# Patient Record
Sex: FEMALE | Race: WHITE | Employment: OTHER | URBAN - METROPOLITAN AREA
[De-identification: names, ages, dates, MRNs, and addresses within clinical notes are randomized per-mention and may not be internally consistent; named-entity substitution may affect disease eponyms.]

---

## 2017-09-12 PROBLEM — H26.9 LEFT CATARACT: Status: ACTIVE | Noted: 2017-09-12

## 2017-10-23 LAB — DIABETIC RETINOPATHY: NEGATIVE

## 2018-08-22 ENCOUNTER — HOSPITAL ENCOUNTER (OUTPATIENT)
Age: 73
Discharge: HOME | End: 2018-08-24
Payer: MEDICARE

## 2018-08-22 LAB
ALBUMIN SERPL-MCNC: 4.4 G/DL (ref 3.5–5.2)
ALP BLD-CCNC: 78 U/L (ref 35–104)
ALT SERPL-CCNC: 38 U/L (ref 0–32)
ANION GAP SERPL CALCULATED.3IONS-SCNC: 18 MMOL/L (ref 7–16)
AST SERPL-CCNC: 51 U/L (ref 0–31)
BASOPHILS ABSOLUTE: 0.05 E9/L (ref 0–0.2)
BASOPHILS RELATIVE PERCENT: 0.8 % (ref 0–2)
BILIRUB SERPL-MCNC: 0.5 MG/DL (ref 0–1.2)
BUN BLDV-MCNC: 27 MG/DL (ref 8–23)
CALCIUM SERPL-MCNC: 9.9 MG/DL (ref 8.6–10.2)
CHLORIDE BLD-SCNC: 98 MMOL/L (ref 98–107)
CHOLESTEROL, TOTAL: 155 MG/DL (ref 0–199)
CO2: 22 MMOL/L (ref 22–29)
CREAT SERPL-MCNC: 1.2 MG/DL (ref 0.5–1)
EOSINOPHILS ABSOLUTE: 0.22 E9/L (ref 0.05–0.5)
EOSINOPHILS RELATIVE PERCENT: 3.4 % (ref 0–6)
FERRITIN: 28 NG/ML
GFR AFRICAN AMERICAN: 53
GFR NON-AFRICAN AMERICAN: 44 ML/MIN/1.73
GLUCOSE BLD-MCNC: 122 MG/DL (ref 74–109)
HBA1C MFR BLD: 6.4 % (ref 4–5.6)
HCT VFR BLD CALC: 41.7 % (ref 34–48)
HDLC SERPL-MCNC: 47 MG/DL
HEMOGLOBIN: 12.9 G/DL (ref 11.5–15.5)
IMMATURE GRANULOCYTES #: 0.02 E9/L
IMMATURE GRANULOCYTES %: 0.3 % (ref 0–5)
IRON: 60 MCG/DL (ref 37–145)
LDL CHOLESTEROL CALCULATED: 75 MG/DL (ref 0–99)
LYMPHOCYTES ABSOLUTE: 2 E9/L (ref 1.5–4)
LYMPHOCYTES RELATIVE PERCENT: 31.3 % (ref 20–42)
MAGNESIUM: 1.3 MG/DL (ref 1.6–2.6)
MCH RBC QN AUTO: 28.1 PG (ref 26–35)
MCHC RBC AUTO-ENTMCNC: 30.9 % (ref 32–34.5)
MCV RBC AUTO: 90.8 FL (ref 80–99.9)
MONOCYTES ABSOLUTE: 0.62 E9/L (ref 0.1–0.95)
MONOCYTES RELATIVE PERCENT: 9.7 % (ref 2–12)
NEUTROPHILS ABSOLUTE: 3.47 E9/L (ref 1.8–7.3)
NEUTROPHILS RELATIVE PERCENT: 54.5 % (ref 43–80)
PDW BLD-RTO: 15.9 FL (ref 11.5–15)
PLATELET # BLD: 208 E9/L (ref 130–450)
PMV BLD AUTO: 11 FL (ref 7–12)
POTASSIUM SERPL-SCNC: 4.4 MMOL/L (ref 3.5–5)
PRO-BNP: 77 PG/ML (ref 0–125)
RBC # BLD: 4.59 E12/L (ref 3.5–5.5)
SODIUM BLD-SCNC: 138 MMOL/L (ref 132–146)
T4 FREE: 2.03 NG/DL (ref 0.93–1.7)
TOTAL PROTEIN: 7.2 G/DL (ref 6.4–8.3)
TRIGL SERPL-MCNC: 164 MG/DL (ref 0–149)
TSH SERPL DL<=0.05 MIU/L-ACNC: 2.78 UIU/ML (ref 0.27–4.2)
URIC ACID, SERUM: 8.2 MG/DL (ref 2.4–5.7)
VITAMIN B-12: 1888 PG/ML (ref 211–946)
VITAMIN D 25-HYDROXY: >120 NG/ML (ref 30–100)
VLDLC SERPL CALC-MCNC: 33 MG/DL
WBC # BLD: 6.4 E9/L (ref 4.5–11.5)

## 2018-08-22 PROCEDURE — 80053 COMPREHEN METABOLIC PANEL: CPT

## 2018-08-22 PROCEDURE — 83735 ASSAY OF MAGNESIUM: CPT

## 2018-08-22 PROCEDURE — 85025 COMPLETE CBC W/AUTO DIFF WBC: CPT

## 2018-08-22 PROCEDURE — 82607 VITAMIN B-12: CPT

## 2018-08-22 PROCEDURE — 82306 VITAMIN D 25 HYDROXY: CPT

## 2018-08-22 PROCEDURE — 84439 ASSAY OF FREE THYROXINE: CPT

## 2018-08-22 PROCEDURE — 82728 ASSAY OF FERRITIN: CPT

## 2018-08-22 PROCEDURE — 80061 LIPID PANEL: CPT

## 2018-08-22 PROCEDURE — 84443 ASSAY THYROID STIM HORMONE: CPT

## 2018-08-22 PROCEDURE — 83036 HEMOGLOBIN GLYCOSYLATED A1C: CPT

## 2018-08-22 PROCEDURE — 84550 ASSAY OF BLOOD/URIC ACID: CPT

## 2018-08-22 PROCEDURE — 36415 COLL VENOUS BLD VENIPUNCTURE: CPT

## 2018-08-22 PROCEDURE — 83880 ASSAY OF NATRIURETIC PEPTIDE: CPT

## 2018-08-22 PROCEDURE — 83540 ASSAY OF IRON: CPT

## 2018-10-04 RX ORDER — COLCHICINE 0.6 MG/1
0.6 TABLET ORAL DAILY
COMMUNITY
End: 2018-10-31 | Stop reason: ALTCHOICE

## 2018-10-04 RX ORDER — OXYBUTYNIN CHLORIDE 5 MG/1
5 TABLET ORAL DAILY
COMMUNITY
End: 2020-10-30 | Stop reason: SDUPTHER

## 2018-10-04 RX ORDER — ALLOPURINOL 100 MG/1
200 TABLET ORAL DAILY
COMMUNITY
End: 2020-10-30 | Stop reason: SDUPTHER

## 2018-10-04 RX ORDER — MAGNESIUM 30 MG
30 TABLET ORAL DAILY
COMMUNITY
End: 2020-10-30 | Stop reason: SDUPTHER

## 2018-10-04 RX ORDER — FUROSEMIDE 20 MG/1
20 TABLET ORAL DAILY PRN
Status: ON HOLD | COMMUNITY
End: 2021-04-28 | Stop reason: HOSPADM

## 2018-10-08 ENCOUNTER — ANESTHESIA EVENT (OUTPATIENT)
Dept: OPERATING ROOM | Age: 73
End: 2018-10-08
Payer: MEDICARE

## 2018-10-08 NOTE — ANESTHESIA PRE PROCEDURE
Department of Anesthesiology  Preprocedure Note       Name:  Evelia Henriquez   Age:  72 y.o.  :  1945                                          MRN:  [id number]         Date:  10/9/2018      Surgeon: Radha Martin):  Tracey Elliott MD    Procedure: Procedure(s):  RIGHT EYE CATARACT EMULSIFICATION IOL IMPLANT    Medications prior to admission:   Prior to Admission medications    Medication Sig Start Date End Date Taking?  Authorizing Provider   furosemide (LASIX) 20 MG tablet Take 20 mg by mouth See Admin Instructions QOD   Yes Historical Provider, MD   colchicine (COLCRYS) 0.6 MG tablet Take 0.6 mg by mouth daily   Yes Historical Provider, MD   KRILL OIL PO Take by mouth   Yes Historical Provider, MD   magnesium 30 MG tablet Take 30 mg by mouth 2 times daily   Yes Historical Provider, MD   Coenzyme Q10 (COQ10 PO) Take by mouth   Yes Historical Provider, MD   allopurinol (ZYLOPRIM) 100 MG tablet Take 100 mg by mouth daily   Yes Historical Provider, MD   oxybutynin (DITROPAN) 5 MG tablet Take 5 mg by mouth 3 times daily   Yes Historical Provider, MD   levothyroxine (SYNTHROID) 50 MCG tablet Take 50 mcg by mouth Daily   Yes Historical Provider, MD   omeprazole (PRILOSEC) 20 MG delayed release capsule Take 20 mg by mouth daily   Yes Historical Provider, MD   losartan (COZAAR) 25 MG tablet Take 25 mg by mouth daily   Yes Historical Provider, MD   propranolol (INDERAL LA) 60 MG extended release capsule Take 60 mg by mouth daily   Yes Historical Provider, MD   aspirin 81 MG tablet Take 81 mg by mouth daily    Historical Provider, MD   glipiZIDE-metformin (METAGLIP) 2.5-500 MG per tablet Take 1 tablet by mouth 2 times daily (before meals)    Historical Provider, MD   latanoprost (XALATAN) 0.005 % ophthalmic solution Place 1 drop into the left eye nightly    Historical Provider, MD   BIOTIN PO Take by mouth daily    Historical Provider, MD   Cholecalciferol (VITAMIN D3) 400 UNIT/ML LIQD Take by mouth daily    Historical Provider, MD       Current medications:    Current Facility-Administered Medications   Medication Dose Route Frequency Provider Last Rate Last Dose    phenylephrine (PHILIP-SYNEPHRINE) 10 % ophthalmic solution 1 drop  1 drop Right Eye PRN Anselmo Nova MD        lactated ringers infusion   Intravenous Continuous Patrick Johnson  mL/hr at 10/09/18 4396         Allergies:     Allergies   Allergen Reactions    Adhesive Tape     Epinephrine Other (See Comments)     Heart racing felt like I couldn't breath       Problem List:    Patient Active Problem List   Diagnosis Code    Left cataract H26.9       Past Medical History:        Diagnosis Date    Anesthesia complication     difficulty breathing post thyroid surgery    Cancer ([facility] [address]     skin     Diabetes mellitus ([address]     GERD (gastroesophageal reflux disease)     Hyperlipidemia     Hypertension     Sleep apnea     uses cpap    Thyroid disease     Tremors of nervous system     familial       Past Surgical History:        Procedure Laterality Date    ABDOMEN SURGERY      BLEPHAROPLASTY      CATARACT REMOVAL WITH IMPLANT Left 09/12/2017    CHOLECYSTECTOMY      COSMETIC SURGERY      abdominoplasty    FOOT SURGERY      HYSTERECTOMY      OVARIAN CYST REMOVAL      SKIN BIOPSY      THYROIDECTOMY, PARTIAL      left lobe removed    TONSILLECTOMY      TUBAL LIGATION         Social History:    Social History   Substance Use Topics    Smoking status: Never Smoker    Smokeless tobacco: Never Used    Alcohol use Yes      Comment: social                                Counseling given: Not Answered      Vital Signs (Current):   Vitals:    10/04/18 1228 10/09/18 0606   BP:  133/62   Pulse:  (!) 43   Resp:  16   SpO2:  97%   Weight: 205 lb (93 kg) 206 lb (93.4 kg)   Height: 5' 5\" (1.651 m) 5' 5\" (1.651 m)                                              BP Readings from Last 3 Encounters:   10/09/18 133/62   09/12/17 (!) 165/69       NPO Status:

## 2018-10-09 ENCOUNTER — ANESTHESIA (OUTPATIENT)
Dept: OPERATING ROOM | Age: 73
End: 2018-10-09
Payer: MEDICARE

## 2018-10-09 ENCOUNTER — HOSPITAL ENCOUNTER (OUTPATIENT)
Age: 73
Setting detail: OUTPATIENT SURGERY
Discharge: HOME | End: 2018-10-09
Attending: OPHTHALMOLOGY | Admitting: OPHTHALMOLOGY
Payer: MEDICARE

## 2018-10-09 VITALS
TEMPERATURE: 98 F | HEIGHT: 65 IN | WEIGHT: 206 LBS | RESPIRATION RATE: 16 BRPM | OXYGEN SATURATION: 96 % | SYSTOLIC BLOOD PRESSURE: 123 MMHG | HEART RATE: 45 BPM | BODY MASS INDEX: 34.32 KG/M2 | DIASTOLIC BLOOD PRESSURE: 53 MMHG

## 2018-10-09 VITALS
RESPIRATION RATE: 8 BRPM | OXYGEN SATURATION: 98 % | SYSTOLIC BLOOD PRESSURE: 170 MMHG | DIASTOLIC BLOOD PRESSURE: 89 MMHG

## 2018-10-09 PROBLEM — H26.9 RIGHT CATARACT: Status: ACTIVE | Noted: 2018-10-09

## 2018-10-09 LAB — GLUCOSE BLD-MCNC: 95 MG/DL

## 2018-10-09 PROCEDURE — 2500000003 HC RX 250 WO HCPCS: Performed by: OPHTHALMOLOGY

## 2018-10-09 PROCEDURE — 3700000000 HC ANESTHESIA ATTENDED CARE: Performed by: OPHTHALMOLOGY

## 2018-10-09 PROCEDURE — 6370000000 HC RX 637 (ALT 250 FOR IP): Performed by: OPHTHALMOLOGY

## 2018-10-09 PROCEDURE — 2580000003 HC RX 258: Performed by: ANESTHESIOLOGY

## 2018-10-09 PROCEDURE — 2500000003 HC RX 250 WO HCPCS: Performed by: NURSE ANESTHETIST, CERTIFIED REGISTERED

## 2018-10-09 PROCEDURE — 7100000010 HC PHASE II RECOVERY - FIRST 15 MIN: Performed by: OPHTHALMOLOGY

## 2018-10-09 PROCEDURE — 82962 GLUCOSE BLOOD TEST: CPT | Performed by: OPHTHALMOLOGY

## 2018-10-09 PROCEDURE — 3600000003 HC SURGERY LEVEL 3 BASE: Performed by: OPHTHALMOLOGY

## 2018-10-09 PROCEDURE — 6360000002 HC RX W HCPCS: Performed by: NURSE ANESTHETIST, CERTIFIED REGISTERED

## 2018-10-09 PROCEDURE — 7100000011 HC PHASE II RECOVERY - ADDTL 15 MIN: Performed by: OPHTHALMOLOGY

## 2018-10-09 PROCEDURE — V2632 POST CHMBR INTRAOCULAR LENS: HCPCS | Performed by: OPHTHALMOLOGY

## 2018-10-09 PROCEDURE — 2709999900 HC NON-CHARGEABLE SUPPLY: Performed by: OPHTHALMOLOGY

## 2018-10-09 DEVICE — LENS INTOCU +17.0 DIOPT A CONSTANT 118.8 L13MM DIA6MM 0DEG: Type: IMPLANTABLE DEVICE | Status: FUNCTIONAL

## 2018-10-09 RX ORDER — TETRACAINE HYDROCHLORIDE 5 MG/ML
SOLUTION OPHTHALMIC PRN
Status: DISCONTINUED | OUTPATIENT
Start: 2018-10-09 | End: 2018-10-09 | Stop reason: HOSPADM

## 2018-10-09 RX ORDER — PHENYLEPHRINE HCL 2.5 %
1 DROPS OPHTHALMIC (EYE)
Status: COMPLETED | OUTPATIENT
Start: 2018-10-09 | End: 2018-10-09

## 2018-10-09 RX ORDER — FLURBIPROFEN SODIUM 0.3 MG/ML
1 SOLUTION/ DROPS OPHTHALMIC
Status: COMPLETED | OUTPATIENT
Start: 2018-10-09 | End: 2018-10-09

## 2018-10-09 RX ORDER — SODIUM CHLORIDE, SODIUM LACTATE, POTASSIUM CHLORIDE, CALCIUM CHLORIDE 600; 310; 30; 20 MG/100ML; MG/100ML; MG/100ML; MG/100ML
INJECTION, SOLUTION INTRAVENOUS CONTINUOUS
Status: DISCONTINUED | OUTPATIENT
Start: 2018-10-09 | End: 2018-10-09 | Stop reason: HOSPADM

## 2018-10-09 RX ORDER — TETRACAINE HYDROCHLORIDE 5 MG/ML
1 SOLUTION OPHTHALMIC ONCE
Status: COMPLETED | OUTPATIENT
Start: 2018-10-09 | End: 2018-10-09

## 2018-10-09 RX ORDER — CYCLOPENTOLATE HYDROCHLORIDE 10 MG/ML
1 SOLUTION/ DROPS OPHTHALMIC
Status: COMPLETED | OUTPATIENT
Start: 2018-10-09 | End: 2018-10-09

## 2018-10-09 RX ORDER — PHENYLEPHRINE HYDROCHLORIDE 100 MG/ML
1 SOLUTION/ DROPS OPHTHALMIC PRN
Status: DISCONTINUED | OUTPATIENT
Start: 2018-10-09 | End: 2018-10-09 | Stop reason: HOSPADM

## 2018-10-09 RX ORDER — ALFENTANIL HYDROCHLORIDE 500 UG/ML
INJECTION INTRAVENOUS PRN
Status: DISCONTINUED | OUTPATIENT
Start: 2018-10-09 | End: 2018-10-09 | Stop reason: SDUPTHER

## 2018-10-09 RX ORDER — MIDAZOLAM HYDROCHLORIDE 1 MG/ML
INJECTION INTRAMUSCULAR; INTRAVENOUS PRN
Status: DISCONTINUED | OUTPATIENT
Start: 2018-10-09 | End: 2018-10-09 | Stop reason: SDUPTHER

## 2018-10-09 RX ORDER — BALANCED SALT SOLUTION 6.4; .75; .48; .3; 3.9; 1.7 MG/ML; MG/ML; MG/ML; MG/ML; MG/ML; MG/ML
SOLUTION OPHTHALMIC PRN
Status: DISCONTINUED | OUTPATIENT
Start: 2018-10-09 | End: 2018-10-09 | Stop reason: HOSPADM

## 2018-10-09 RX ADMIN — ALFENTANIL HYDROCHLORIDE 250 MCG: 500 INJECTION INTRAVENOUS at 07:34

## 2018-10-09 RX ADMIN — FLURBIPROFEN SODIUM 1 DROP: 0.3 SOLUTION/ DROPS OPHTHALMIC at 06:20

## 2018-10-09 RX ADMIN — PHENYLEPHRINE HYDROCHLORIDE 1 DROP: 25 SOLUTION/ DROPS OPHTHALMIC at 06:20

## 2018-10-09 RX ADMIN — FLURBIPROFEN SODIUM 1 DROP: 0.3 SOLUTION/ DROPS OPHTHALMIC at 06:15

## 2018-10-09 RX ADMIN — SODIUM CHLORIDE, POTASSIUM CHLORIDE, SODIUM LACTATE AND CALCIUM CHLORIDE: 600; 310; 30; 20 INJECTION, SOLUTION INTRAVENOUS at 06:10

## 2018-10-09 RX ADMIN — TETRACAINE HYDROCHLORIDE 1 DROP: 5 SOLUTION OPHTHALMIC at 06:30

## 2018-10-09 RX ADMIN — CYCLOPENTOLATE HYDROCHLORIDE 1 DROP: 10 SOLUTION/ DROPS OPHTHALMIC at 06:10

## 2018-10-09 RX ADMIN — ALFENTANIL HYDROCHLORIDE 250 MCG: 500 INJECTION INTRAVENOUS at 07:36

## 2018-10-09 RX ADMIN — CYCLOPENTOLATE HYDROCHLORIDE 1 DROP: 10 SOLUTION/ DROPS OPHTHALMIC at 06:20

## 2018-10-09 RX ADMIN — PHENYLEPHRINE HYDROCHLORIDE 1 DROP: 25 SOLUTION/ DROPS OPHTHALMIC at 06:15

## 2018-10-09 RX ADMIN — PHENYLEPHRINE HYDROCHLORIDE 1 DROP: 25 SOLUTION/ DROPS OPHTHALMIC at 06:10

## 2018-10-09 RX ADMIN — CYCLOPENTOLATE HYDROCHLORIDE 1 DROP: 10 SOLUTION/ DROPS OPHTHALMIC at 06:15

## 2018-10-09 RX ADMIN — FLURBIPROFEN SODIUM 1 DROP: 0.3 SOLUTION/ DROPS OPHTHALMIC at 06:10

## 2018-10-09 RX ADMIN — MIDAZOLAM 2 MG: 1 INJECTION INTRAMUSCULAR; INTRAVENOUS at 07:34

## 2018-10-09 RX ADMIN — SODIUM CHLORIDE, POTASSIUM CHLORIDE, SODIUM LACTATE AND CALCIUM CHLORIDE: 600; 310; 30; 20 INJECTION, SOLUTION INTRAVENOUS at 06:25

## 2018-10-09 ASSESSMENT — PULMONARY FUNCTION TESTS
PIF_VALUE: 1

## 2018-10-09 ASSESSMENT — PAIN SCALES - GENERAL
PAINLEVEL_OUTOF10: 0

## 2018-10-09 ASSESSMENT — PAIN - FUNCTIONAL ASSESSMENT: PAIN_FUNCTIONAL_ASSESSMENT: 0-10

## 2018-10-31 ENCOUNTER — ANESTHESIA EVENT (OUTPATIENT)
Dept: OPERATING ROOM | Age: 73
End: 2018-10-31
Payer: MEDICARE

## 2018-10-31 NOTE — ANESTHESIA PRE PROCEDURE
PREGTESTUR, PREGSERUM, HCG, HCGQUANT     ABGs: No results found for: PHART, PO2ART, ZBI4VUG, QSB3OPW, BEART, I2KZOLSH     Type & Screen (If Applicable):  No results found for: LABABO, [address]    Anesthesia Evaluation  Patient summary reviewed history of anesthetic complications (Dyspnea post thyroid surgery. ):   Airway: Mallampati: III  TM distance: <3 FB   Neck ROM: full  Mouth opening: > = 3 FB Dental: normal exam         Pulmonary:normal exam  breath sounds clear to auscultation  (+) sleep apnea:                             Cardiovascular:  Exercise tolerance: good (>4 METS),   (+) hypertension:, murmur (Gr II/VI murmur), hyperlipidemia        Rhythm: regular             Beta Blocker:  Not on Beta Blocker        PE comment: bradycardia   Neuro/Psych:                ROS comment: Tremors. GI/Hepatic/Renal:   (+) GERD:,           Endo/Other:    (+) DiabetesType II DM, , hypothyroidism::., .                  ROS comment: Cancer Skin. Abdominal:   (+) obese,         Vascular: negative vascular ROS. Anesthesia Plan      MAC     ASA 3       Induction: intravenous. Anesthetic plan and risks discussed with patient. Plan discussed with CRNA. Wes Srinivasan MD   10/8/2018  DOS STAFF ADDENDUM:    Pt seen and examined, chart reviewed (including anesthesia, drug and allergy history). Anesthetic plan, risks, benefits, alternatives, and personnel involved discussed with patient. Patient verbalized an understanding and agrees to proceed. Plan discussed with care team members and agreed upon.     Abi Garcia MD  Staff Anesthesiologist  11:06 AM

## 2018-11-01 ENCOUNTER — HOSPITAL ENCOUNTER (OUTPATIENT)
Age: 73
Setting detail: OUTPATIENT SURGERY
Discharge: HOME | End: 2018-11-01
Attending: OPHTHALMOLOGY | Admitting: OPHTHALMOLOGY
Payer: MEDICARE

## 2018-11-01 ENCOUNTER — ANESTHESIA (OUTPATIENT)
Dept: OPERATING ROOM | Age: 73
End: 2018-11-01
Payer: MEDICARE

## 2018-11-01 VITALS
OXYGEN SATURATION: 97 % | SYSTOLIC BLOOD PRESSURE: 158 MMHG | TEMPERATURE: 98.6 F | RESPIRATION RATE: 20 BRPM | DIASTOLIC BLOOD PRESSURE: 65 MMHG

## 2018-11-01 VITALS
TEMPERATURE: 98 F | SYSTOLIC BLOOD PRESSURE: 162 MMHG | HEART RATE: 57 BPM | WEIGHT: 205 LBS | RESPIRATION RATE: 16 BRPM | HEIGHT: 65 IN | BODY MASS INDEX: 34.16 KG/M2 | OXYGEN SATURATION: 95 % | DIASTOLIC BLOOD PRESSURE: 57 MMHG

## 2018-11-01 PROBLEM — T85.22XA DISLOCATED IOL (INTRAOCULAR LENS): Status: ACTIVE | Noted: 2018-11-01

## 2018-11-01 PROCEDURE — 3600000002 HC SURGERY LEVEL 2 BASE: Performed by: OPHTHALMOLOGY

## 2018-11-01 PROCEDURE — 3600000012 HC SURGERY LEVEL 2 ADDTL 15MIN: Performed by: OPHTHALMOLOGY

## 2018-11-01 PROCEDURE — 7100000010 HC PHASE II RECOVERY - FIRST 15 MIN: Performed by: OPHTHALMOLOGY

## 2018-11-01 PROCEDURE — 7100000011 HC PHASE II RECOVERY - ADDTL 15 MIN: Performed by: OPHTHALMOLOGY

## 2018-11-01 PROCEDURE — 3700000000 HC ANESTHESIA ATTENDED CARE: Performed by: OPHTHALMOLOGY

## 2018-11-01 PROCEDURE — 2709999900 HC NON-CHARGEABLE SUPPLY: Performed by: OPHTHALMOLOGY

## 2018-11-01 PROCEDURE — 3700000001 HC ADD 15 MINUTES (ANESTHESIA): Performed by: OPHTHALMOLOGY

## 2018-11-01 PROCEDURE — 6370000000 HC RX 637 (ALT 250 FOR IP): Performed by: OPHTHALMOLOGY

## 2018-11-01 PROCEDURE — 2580000003 HC RX 258: Performed by: ANESTHESIOLOGY

## 2018-11-01 PROCEDURE — 2500000003 HC RX 250 WO HCPCS: Performed by: OPHTHALMOLOGY

## 2018-11-01 PROCEDURE — V2632 POST CHMBR INTRAOCULAR LENS: HCPCS | Performed by: OPHTHALMOLOGY

## 2018-11-01 PROCEDURE — 82962 GLUCOSE BLOOD TEST: CPT | Performed by: OPHTHALMOLOGY

## 2018-11-01 PROCEDURE — 2500000003 HC RX 250 WO HCPCS: Performed by: NURSE ANESTHETIST, CERTIFIED REGISTERED

## 2018-11-01 PROCEDURE — 6360000002 HC RX W HCPCS: Performed by: NURSE ANESTHETIST, CERTIFIED REGISTERED

## 2018-11-01 DEVICE — LENS INTOCU +22.5 DIOPT A CONSTANT 118.8 L13MM DIA6MM 0DEG: Type: IMPLANTABLE DEVICE | Site: EYE | Status: FUNCTIONAL

## 2018-11-01 RX ORDER — HYDRALAZINE HYDROCHLORIDE 20 MG/ML
5 INJECTION INTRAMUSCULAR; INTRAVENOUS EVERY 10 MIN PRN
Status: DISCONTINUED | OUTPATIENT
Start: 2018-11-01 | End: 2018-11-01 | Stop reason: HOSPADM

## 2018-11-01 RX ORDER — FENTANYL CITRATE 50 UG/ML
50 INJECTION, SOLUTION INTRAMUSCULAR; INTRAVENOUS EVERY 5 MIN PRN
Status: DISCONTINUED | OUTPATIENT
Start: 2018-11-01 | End: 2018-11-01 | Stop reason: HOSPADM

## 2018-11-01 RX ORDER — ALFENTANIL HYDROCHLORIDE 500 UG/ML
INJECTION INTRAVENOUS PRN
Status: DISCONTINUED | OUTPATIENT
Start: 2018-11-01 | End: 2018-11-01 | Stop reason: SDUPTHER

## 2018-11-01 RX ORDER — MORPHINE SULFATE 2 MG/ML
1 INJECTION, SOLUTION INTRAMUSCULAR; INTRAVENOUS EVERY 5 MIN PRN
Status: DISCONTINUED | OUTPATIENT
Start: 2018-11-01 | End: 2018-11-01 | Stop reason: HOSPADM

## 2018-11-01 RX ORDER — HYDROCODONE BITARTRATE AND ACETAMINOPHEN 5; 325 MG/1; MG/1
2 TABLET ORAL PRN
Status: DISCONTINUED | OUTPATIENT
Start: 2018-11-01 | End: 2018-11-01 | Stop reason: HOSPADM

## 2018-11-01 RX ORDER — FLURBIPROFEN SODIUM 0.3 MG/ML
1 SOLUTION/ DROPS OPHTHALMIC
Status: COMPLETED | OUTPATIENT
Start: 2018-11-01 | End: 2018-11-01

## 2018-11-01 RX ORDER — HYDROCODONE BITARTRATE AND ACETAMINOPHEN 5; 325 MG/1; MG/1
1 TABLET ORAL PRN
Status: DISCONTINUED | OUTPATIENT
Start: 2018-11-01 | End: 2018-11-01 | Stop reason: HOSPADM

## 2018-11-01 RX ORDER — SODIUM CHLORIDE, SODIUM LACTATE, POTASSIUM CHLORIDE, CALCIUM CHLORIDE 600; 310; 30; 20 MG/100ML; MG/100ML; MG/100ML; MG/100ML
INJECTION, SOLUTION INTRAVENOUS CONTINUOUS
Status: DISCONTINUED | OUTPATIENT
Start: 2018-11-01 | End: 2018-11-01 | Stop reason: HOSPADM

## 2018-11-01 RX ORDER — MIDAZOLAM HYDROCHLORIDE 1 MG/ML
INJECTION INTRAMUSCULAR; INTRAVENOUS PRN
Status: DISCONTINUED | OUTPATIENT
Start: 2018-11-01 | End: 2018-11-01 | Stop reason: SDUPTHER

## 2018-11-01 RX ORDER — TETRACAINE HYDROCHLORIDE 5 MG/ML
1 SOLUTION OPHTHALMIC ONCE
Status: COMPLETED | OUTPATIENT
Start: 2018-11-01 | End: 2018-11-01

## 2018-11-01 RX ORDER — BALANCED SALT SOLUTION 6.4; .75; .48; .3; 3.9; 1.7 MG/ML; MG/ML; MG/ML; MG/ML; MG/ML; MG/ML
SOLUTION OPHTHALMIC PRN
Status: DISCONTINUED | OUTPATIENT
Start: 2018-11-01 | End: 2018-11-01 | Stop reason: HOSPADM

## 2018-11-01 RX ORDER — LABETALOL HYDROCHLORIDE 5 MG/ML
5 INJECTION, SOLUTION INTRAVENOUS EVERY 10 MIN PRN
Status: DISCONTINUED | OUTPATIENT
Start: 2018-11-01 | End: 2018-11-01 | Stop reason: HOSPADM

## 2018-11-01 RX ORDER — PHENYLEPHRINE HYDROCHLORIDE 100 MG/ML
1 SOLUTION/ DROPS OPHTHALMIC PRN
Status: DISCONTINUED | OUTPATIENT
Start: 2018-11-01 | End: 2018-11-01 | Stop reason: HOSPADM

## 2018-11-01 RX ORDER — CYCLOPENTOLATE HYDROCHLORIDE 10 MG/ML
1 SOLUTION/ DROPS OPHTHALMIC
Status: COMPLETED | OUTPATIENT
Start: 2018-11-01 | End: 2018-11-01

## 2018-11-01 RX ORDER — MEPERIDINE HYDROCHLORIDE 25 MG/ML
12.5 INJECTION INTRAMUSCULAR; INTRAVENOUS; SUBCUTANEOUS EVERY 5 MIN PRN
Status: DISCONTINUED | OUTPATIENT
Start: 2018-11-01 | End: 2018-11-01 | Stop reason: HOSPADM

## 2018-11-01 RX ORDER — TETRACAINE HYDROCHLORIDE 5 MG/ML
SOLUTION OPHTHALMIC PRN
Status: DISCONTINUED | OUTPATIENT
Start: 2018-11-01 | End: 2018-11-01 | Stop reason: HOSPADM

## 2018-11-01 RX ORDER — PROMETHAZINE HYDROCHLORIDE 25 MG/ML
25 INJECTION, SOLUTION INTRAMUSCULAR; INTRAVENOUS
Status: DISCONTINUED | OUTPATIENT
Start: 2018-11-01 | End: 2018-11-01 | Stop reason: HOSPADM

## 2018-11-01 RX ORDER — DIPHENHYDRAMINE HYDROCHLORIDE 50 MG/ML
12.5 INJECTION INTRAMUSCULAR; INTRAVENOUS
Status: DISCONTINUED | OUTPATIENT
Start: 2018-11-01 | End: 2018-11-01 | Stop reason: HOSPADM

## 2018-11-01 RX ORDER — PHENYLEPHRINE HCL 2.5 %
1 DROPS OPHTHALMIC (EYE)
Status: COMPLETED | OUTPATIENT
Start: 2018-11-01 | End: 2018-11-01

## 2018-11-01 RX ADMIN — CYCLOPENTOLATE HYDROCHLORIDE 1 DROP: 10 SOLUTION/ DROPS OPHTHALMIC at 05:45

## 2018-11-01 RX ADMIN — MIDAZOLAM 1 MG: 1 INJECTION INTRAMUSCULAR; INTRAVENOUS at 06:33

## 2018-11-01 RX ADMIN — PHENYLEPHRINE HYDROCHLORIDE 1 DROP: 25 SOLUTION/ DROPS OPHTHALMIC at 05:50

## 2018-11-01 RX ADMIN — ALFENTANIL HYDROCHLORIDE 250 MCG: 500 INJECTION INTRAVENOUS at 06:39

## 2018-11-01 RX ADMIN — PHENYLEPHRINE HYDROCHLORIDE 1 DROP: 25 SOLUTION/ DROPS OPHTHALMIC at 05:40

## 2018-11-01 RX ADMIN — FLURBIPROFEN SODIUM 1 DROP: 0.3 SOLUTION/ DROPS OPHTHALMIC at 05:45

## 2018-11-01 RX ADMIN — FLURBIPROFEN SODIUM 1 DROP: 0.3 SOLUTION/ DROPS OPHTHALMIC at 05:40

## 2018-11-01 RX ADMIN — CYCLOPENTOLATE HYDROCHLORIDE 1 DROP: 10 SOLUTION/ DROPS OPHTHALMIC at 05:40

## 2018-11-01 RX ADMIN — SODIUM CHLORIDE, POTASSIUM CHLORIDE, SODIUM LACTATE AND CALCIUM CHLORIDE: 600; 310; 30; 20 INJECTION, SOLUTION INTRAVENOUS at 06:00

## 2018-11-01 RX ADMIN — TETRACAINE HYDROCHLORIDE 1 DROP: 25 LIQUID OPHTHALMIC at 06:09

## 2018-11-01 RX ADMIN — MIDAZOLAM 1 MG: 1 INJECTION INTRAMUSCULAR; INTRAVENOUS at 06:35

## 2018-11-01 RX ADMIN — ALFENTANIL HYDROCHLORIDE 250 MCG: 500 INJECTION INTRAVENOUS at 06:35

## 2018-11-01 RX ADMIN — FLURBIPROFEN SODIUM 1 DROP: 0.3 SOLUTION/ DROPS OPHTHALMIC at 05:50

## 2018-11-01 RX ADMIN — CYCLOPENTOLATE HYDROCHLORIDE 1 DROP: 10 SOLUTION/ DROPS OPHTHALMIC at 05:50

## 2018-11-01 RX ADMIN — ALFENTANIL HYDROCHLORIDE 250 MCG: 500 INJECTION INTRAVENOUS at 06:34

## 2018-11-01 RX ADMIN — ALFENTANIL HYDROCHLORIDE 250 MCG: 500 INJECTION INTRAVENOUS at 06:36

## 2018-11-01 RX ADMIN — PHENYLEPHRINE HYDROCHLORIDE 1 DROP: 25 SOLUTION/ DROPS OPHTHALMIC at 05:45

## 2018-11-01 ASSESSMENT — PAIN - FUNCTIONAL ASSESSMENT: PAIN_FUNCTIONAL_ASSESSMENT: 0-10

## 2018-11-01 ASSESSMENT — PULMONARY FUNCTION TESTS
PIF_VALUE: 0

## 2018-11-01 ASSESSMENT — PAIN SCALES - GENERAL
PAINLEVEL_OUTOF10: 0

## 2018-11-01 NOTE — ANESTHESIA POSTPROCEDURE EVALUATION
Department of Anesthesiology  Postprocedure Note    Patient: Evelia Henriquez  MRN: [id number]  YOB: 1945  Date of evaluation: 11/1/2018  Time:  7:08 AM     Procedure Summary     Date:  11/01/18 Room / Location:  [address] [place] [address] 02 / [address] [place] [address]    Anesthesia Start:  7998 Anesthesia Stop:  Serge Lux    Procedure:  EYE IOL REMOVAL (Right ) Diagnosis:  (dislocated intraocular lens)    Surgeon:  Tracey Elliott MD Responsible Provider:  Jose Vargas MD    Anesthesia Type:  MAC ASA Status:  3          Anesthesia Type: MAC    Moraima Phase I: Moraima Score: 10    Moraima Phase II: Moraima Score: 10    Last vitals: Reviewed and per EMR flowsheets.        Anesthesia Post Evaluation    Patient location during evaluation: PACU  Patient participation: complete - patient participated  Level of consciousness: awake and alert  Airway patency: patent  Nausea & Vomiting: no nausea and no vomiting  Complications: no  Cardiovascular status: hemodynamically stable  Respiratory status: room air and spontaneous ventilation  Hydration status: stable

## 2018-11-06 NOTE — OP NOTE
[address]                                OPERATIVE REPORT    PATIENT NAME: Nasrin Goff                       :        1945  MED REC NO:   [id number]                            ROOM:  ACCOUNT NO:   [de-identified]                           ADMIT DATE: 2018  PROVIDER:     Clari Tracey MD    DATE OF PROCEDURE:  2018    PREOPERATIVE DIAGNOSIS:  Anisometropia of the right eye. POSTOPERATIVE DIAGNOSIS:  Anisometropia of the right eye. OPERATION PERFORMED:  Right intraocular lens exchange. SURGEON:  Clari Tracey MD    ANESTHESIA:  Local.    PROCEDURE NOTE:  The patient was brought into the operating room and  local anesthesia was introduced. The right eye was prepped with  Betadine solution in the periorbital area and in the superior and  inferior fornices. A side-port incision was made at the 2 o'clock  position and then a clear corneal incision was made at the 12 o'clock  meridian in the usual manner at the limbus with a 3.2-mm keratome. Viscoat was used to stabilize the anterior chamber. Using a lens  positioning hook, the intraocular lens was dialed out of the capsular  bag and into the anterior chamber. Using lens graspers through the side  port incision, microscissors were used to cut the lens in half and then  each half of the lens was removed from the eye. The anterior and  posterior capsular flaps were  using Healon and the PCB00 +22.5  diopter lens was then instilled into the capsular bag. Healon was  removed from in front of and behind the eye. The wounds were checked  and found to be airtight and watertight. The lid speculum was removed. The drape was removed. The patient was brought to the recovery room in  excellent condition and discharged with postop instructions in excellent  condition.         Mike Lieberman MD    D: 2018 14:43:54       T:

## 2019-05-04 ENCOUNTER — APPOINTMENT (OUTPATIENT)
Dept: GENERAL RADIOLOGY | Age: 74
End: 2019-05-04
Payer: MEDICARE

## 2019-05-04 ENCOUNTER — HOSPITAL ENCOUNTER (EMERGENCY)
Age: 74
Discharge: HOME | End: 2019-05-04
Attending: EMERGENCY MEDICINE
Payer: MEDICARE

## 2019-05-04 VITALS
OXYGEN SATURATION: 100 % | HEIGHT: 66 IN | BODY MASS INDEX: 32.21 KG/M2 | DIASTOLIC BLOOD PRESSURE: 74 MMHG | RESPIRATION RATE: 15 BRPM | WEIGHT: 200.44 LBS | TEMPERATURE: 97.3 F | HEART RATE: 48 BPM | SYSTOLIC BLOOD PRESSURE: 172 MMHG

## 2019-05-04 DIAGNOSIS — R00.1 BRADYCARDIA: Primary | ICD-10-CM

## 2019-05-04 LAB
ANION GAP SERPL CALCULATED.3IONS-SCNC: 12 MMOL/L (ref 7–16)
BASOPHILS ABSOLUTE: 0.05 E9/L (ref 0–0.2)
BASOPHILS RELATIVE PERCENT: 0.6 % (ref 0–2)
BUN BLDV-MCNC: 22 MG/DL (ref 8–23)
CALCIUM SERPL-MCNC: 10 MG/DL (ref 8.6–10.2)
CHLORIDE BLD-SCNC: 100 MMOL/L (ref 98–107)
CO2: 28 MMOL/L (ref 22–29)
CREAT SERPL-MCNC: 1 MG/DL (ref 0.5–1)
EOSINOPHILS ABSOLUTE: 0.18 E9/L (ref 0.05–0.5)
EOSINOPHILS RELATIVE PERCENT: 2 % (ref 0–6)
GFR AFRICAN AMERICAN: >60
GFR NON-AFRICAN AMERICAN: 54 ML/MIN/1.73
GLUCOSE BLD-MCNC: 83 MG/DL (ref 74–99)
HCT VFR BLD CALC: 37.9 % (ref 34–48)
HEMOGLOBIN: 12.1 G/DL (ref 11.5–15.5)
IMMATURE GRANULOCYTES #: 0.07 E9/L
IMMATURE GRANULOCYTES %: 0.8 % (ref 0–5)
LYMPHOCYTES ABSOLUTE: 2.03 E9/L (ref 1.5–4)
LYMPHOCYTES RELATIVE PERCENT: 22.4 % (ref 20–42)
MAGNESIUM: 1.6 MG/DL (ref 1.6–2.6)
MCH RBC QN AUTO: 28.7 PG (ref 26–35)
MCHC RBC AUTO-ENTMCNC: 31.9 % (ref 32–34.5)
MCV RBC AUTO: 90 FL (ref 80–99.9)
MONOCYTES ABSOLUTE: 0.87 E9/L (ref 0.1–0.95)
MONOCYTES RELATIVE PERCENT: 9.6 % (ref 2–12)
NEUTROPHILS ABSOLUTE: 5.87 E9/L (ref 1.8–7.3)
NEUTROPHILS RELATIVE PERCENT: 64.6 % (ref 43–80)
PDW BLD-RTO: 14.7 FL (ref 11.5–15)
PLATELET # BLD: 181 E9/L (ref 130–450)
PMV BLD AUTO: 9.8 FL (ref 7–12)
POTASSIUM SERPL-SCNC: 4.9 MMOL/L (ref 3.5–5)
PRO-BNP: 138 PG/ML (ref 0–125)
RBC # BLD: 4.21 E12/L (ref 3.5–5.5)
SODIUM BLD-SCNC: 140 MMOL/L (ref 132–146)
TROPONIN: <0.01 NG/ML (ref 0–0.03)
TSH SERPL DL<=0.05 MIU/L-ACNC: 1.81 UIU/ML (ref 0.27–4.2)
WBC # BLD: 9.1 E9/L (ref 4.5–11.5)

## 2019-05-04 PROCEDURE — 83880 ASSAY OF NATRIURETIC PEPTIDE: CPT

## 2019-05-04 PROCEDURE — 85025 COMPLETE CBC W/AUTO DIFF WBC: CPT

## 2019-05-04 PROCEDURE — 84443 ASSAY THYROID STIM HORMONE: CPT

## 2019-05-04 PROCEDURE — 71045 X-RAY EXAM CHEST 1 VIEW: CPT

## 2019-05-04 PROCEDURE — 93010 ELECTROCARDIOGRAM REPORT: CPT | Performed by: INTERNAL MEDICINE

## 2019-05-04 PROCEDURE — 83735 ASSAY OF MAGNESIUM: CPT

## 2019-05-04 PROCEDURE — 99284 EMERGENCY DEPT VISIT MOD MDM: CPT

## 2019-05-04 PROCEDURE — 84484 ASSAY OF TROPONIN QUANT: CPT

## 2019-05-04 PROCEDURE — 80048 BASIC METABOLIC PNL TOTAL CA: CPT

## 2019-05-04 PROCEDURE — 93005 ELECTROCARDIOGRAM TRACING: CPT | Performed by: STUDENT IN AN ORGANIZED HEALTH CARE EDUCATION/TRAINING PROGRAM

## 2019-05-04 PROCEDURE — 36415 COLL VENOUS BLD VENIPUNCTURE: CPT

## 2019-05-04 ASSESSMENT — ENCOUNTER SYMPTOMS
WHEEZING: 0
CONSTIPATION: 0
NAUSEA: 0
SHORTNESS OF BREATH: 0
COUGH: 0
VOMITING: 0
COLOR CHANGE: 0
DIARRHEA: 0
ABDOMINAL PAIN: 0

## 2019-05-04 NOTE — ED PROVIDER NOTES
Patient is a 51-year-old female with history of cervical cancer status post hysterectomy, type II diabetes, hyperlipidemia, hypertension, who presents to the ED for bradycardia. Patient states that she has a history of bradycardia. She states that her heart rate is usually in the 40s or 50s. Today, she states that a one half hours ago, the patient was standing, preparing lunch, and felt a sudden lightheadedness. She states that she began having some numbness and tingling to her lips. She states that she called her daughter, and the daughter came over, evaluated the patient as she is a nurse, and found that the patient's heart rate was in the 35s. Patient follows with Dr. Abhishek Flynn , and she states that she had a cardiac stress test performed last year. He was unremarkable at that time. Patient also states that she had a Holter monitor performed at that time, and noted some irregular heart rhythms, but was unable to specify which ones. She is not on any blood thinners. She states that she does take propranolol for which she uses for nervous tremors. No medication changes. Patient also has a history of hypothyroidism, has been taking her Synthroid, religiously, in the mornings. She has not missed any doses. The history is provided by the patient. No  was used. Review of Systems   Constitutional: Negative for chills and fever. Respiratory: Negative for cough, shortness of breath and wheezing. Cardiovascular: Negative for chest pain and palpitations. Gastrointestinal: Negative for abdominal pain, constipation, diarrhea, nausea and vomiting. Genitourinary: Negative for dysuria and hematuria. Musculoskeletal: Negative for neck pain and neck stiffness. Skin: Negative for color change, pallor, rash and wound. Neurological: Positive for light-headedness. Negative for dizziness, syncope, numbness and headaches.    Psychiatric/Behavioral: Negative for confusion and decreased concentration. The patient is not nervous/anxious. Physical Exam   Constitutional: She is oriented to person, place, and time. She appears well-developed and well-nourished. No distress. HENT:   Head: Normocephalic and atraumatic. Right Ear: External ear normal.   Left Ear: External ear normal.   Mouth/Throat: No oropharyngeal exudate. Eyes: Pupils are equal, round, and reactive to light. EOM are normal.   Neck: Normal range of motion. Cardiovascular: Regular rhythm and intact distal pulses. Exam reveals no gallop and no friction rub. Murmur: +2/5 systolic ejection murmur left sternal border. bradycardic     Pulmonary/Chest: Effort normal and breath sounds normal. No respiratory distress. She has no wheezes. She has no rales. She exhibits no tenderness. Abdominal: Soft. Bowel sounds are normal. She exhibits no distension and no mass. There is no tenderness. There is no rebound and no guarding. No hernia. Musculoskeletal: Normal range of motion. She exhibits no edema, tenderness or deformity. Lymphadenopathy:     She has no cervical adenopathy. Neurological: She is alert and oriented to person, place, and time. No cranial nerve deficit. Skin: Skin is warm and dry. Capillary refill takes less than 2 seconds. No rash noted. She is not diaphoretic. No erythema. No pallor. Psychiatric: She has a normal mood and affect. Her behavior is normal. Judgment and thought content normal.   Nursing note and vitals reviewed. Procedures  --------------------------------------------- PAST HISTORY ---------------------------------------------  Past Medical History:  has a past medical history of Anesthesia complication, [facility] GERD (gastroesophageal reflux disease), Hyperlipidemia, Hypertension, Sleep apnea, Thyroid disease, and Tremors of nervous system.     Past Surgical History:  has a past surgical history that includes Thyroidectomy, partial; skin biopsy; Tonsillectomy; ovarian cyst removal; Cholecystectomy; Cosmetic surgery; blepharoplasty; Hysterectomy; Tubal ligation; Foot surgery; Cataract removal with implant (Left, 09/12/2017); Cataract removal with implant (Right, 10/09/2018); pr remv cataract extracap,insert lens (Right, 10/9/2018); Cataract removal with implant (Right, 11/01/2018); and pr remv post eye implnt mater,intraocul (Right, 11/1/2018). Social History:  reports that she has never smoked. She has never used smokeless tobacco. She reports that she does not drink alcohol or use drugs. Family History: family history is not on file. The patients home medications have been reviewed.     Allergies: Adhesive tape; Epinephrine; and Propoxyphene    -------------------------------------------------- RESULTS -------------------------------------------------  Labs:  Results for orders placed or performed during the hospital encounter of 05/04/19   CBC auto differential   Result Value Ref Range    WBC 9.1 4.5 - 11.5 E9/L    RBC 4.21 3.50 - 5.50 E12/L    Hemoglobin 12.1 11.5 - 15.5 g/dL    Hematocrit 37.9 34.0 - 48.0 %    MCV 90.0 80.0 - 99.9 fL    MCH 28.7 26.0 - 35.0 pg    MCHC 31.9 (L) 32.0 - 34.5 %    RDW 14.7 11.5 - 15.0 fL    Platelets 373 492 - 512 E9/L    MPV 9.8 7.0 - 12.0 fL    Neutrophils % 64.6 43.0 - 80.0 %    Immature Granulocytes % 0.8 0.0 - 5.0 %    Lymphocytes % 22.4 20.0 - 42.0 %    Monocytes % 9.6 2.0 - 12.0 %    Eosinophils % 2.0 0.0 - 6.0 %    Basophils % 0.6 0.0 - 2.0 %    Neutrophils # 5.87 1.80 - 7.30 E9/L    Immature Granulocytes # 0.07 E9/L    Lymphocytes # 2.03 1.50 - 4.00 E9/L    Monocytes # 0.87 0.10 - 0.95 E9/L    Eosinophils # 0.18 0.05 - 0.50 E9/L    Basophils # 0.05 0.00 - 0.20 J8/Q   Basic Metabolic Panel   Result Value Ref Range    Sodium 140 132 - 146 mmol/L    Potassium 4.9 3.5 - 5.0 mmol/L    Chloride 100 98 - 107 mmol/L    CO2 28 22 - 29 mmol/L    Anion Gap 12 7 - 16 mmol/L    Glucose 83 74 - 99 mg/dL    BUN 22 8 - providing specific details for the plan of care and counseling regarding the diagnosis and prognosis. Their questions are answered at this time and they are agreeable with the plan. I discussed at length with them reasons for immediate return here for re evaluation. They will followup with their primary care physician by calling their office on Monday.      --------------------------------- ADDITIONAL PROVIDER NOTES ---------------------------------  At this time the patient is without objective evidence of an acute process requiring hospitalization or inpatient management. They have remained hemodynamically stable throughout their entire ED visit and are stable for discharge with outpatient follow-up. The plan has been discussed in detail and they are aware of the specific conditions for emergent return, as well as the importance of follow-up. Discharge Medication List as of 5/4/2019  6:07 PM          Diagnosis:  1. Bradycardia        Disposition:  Patient's disposition: Discharge to home  Patient's condition is stable. MDM  Number of Diagnoses or Management Options  Bradycardia:   Diagnosis management comments: Patient is a 73-year-old female who presents to the ED after mild lightheadedness and bradycardia. Patient was evaluated, found to have an initial heart rate in the mid 45s. She was otherwise resting comfortably in no acute distress. She had no focal neurologic deficits. Patient did not have any head trauma, the remainder for labwork was unremarkable. Chest x-ray was negative. He shouldn't remained to be mildly bradycardic, but did not have any obvious heart block, incomplete heart block or complete heart block. Patient was initially hypotensive. Unknown if the patient's use of propranolol may have been contributing to the bradycardia at this time. Unlikely to be propranolol overdose of the patient was also hypertensive.  Patient will be discharged at this time, she was instructed to follow-up with her PCP as well as cardiologist for further evaluation as needed. She was instructed come back to the ED if her symptoms worsen. Amount and/or Complexity of Data Reviewed  Clinical lab tests: ordered and reviewed  Tests in the radiology section of CPT®: ordered and reviewed        ED Course as of May 04 2000   Sat May 04, 2019   1747 Patient resting comfortably in no acute distress at this time. Although no focal neurologic deficits, offered CT head but patient declined at this time. Patient otherwise resting comfortably at this time. Patient monitored in the ED, no additional bradycardia. Patient would like to go home at this time. Smiling, in no acute distress.      [KS]      ED Course User Index  [KS] DO Yovana Garcia DO  Resident  05/04/19 2002

## 2019-05-04 NOTE — ED NOTES
Bed: 03  Expected date:   Expected time:   Means of arrival:   Comments:  trauma     Dot Lupis, RN  05/04/19 5019

## 2019-05-06 LAB
EKG ATRIAL RATE: 40 BPM
EKG P AXIS: 46 DEGREES
EKG P-R INTERVAL: 170 MS
EKG Q-T INTERVAL: 492 MS
EKG QRS DURATION: 80 MS
EKG QTC CALCULATION (BAZETT): 400 MS
EKG R AXIS: 15 DEGREES
EKG T AXIS: 44 DEGREES
EKG VENTRICULAR RATE: 40 BPM

## 2019-06-20 LAB
AVERAGE GLUCOSE: 148
CHOLESTEROL, TOTAL: 179 MG/DL
CHOLESTEROL/HDL RATIO: 4
CREATININE, URINE: 165
CREATININE: 1.1 MG/DL
HBA1C MFR BLD: 6.8 %
HDLC SERPL-MCNC: 45 MG/DL (ref 35–70)
LDL CHOLESTEROL CALCULATED: 102 MG/DL (ref 0–160)
MICROALBUMIN/CREAT 24H UR: 3.69 MG/G{CREAT}
MICROALBUMIN/CREAT UR-RTO: 22.3
POTASSIUM (K+): 4.4
TRIGL SERPL-MCNC: 158 MG/DL
VLDLC SERPL CALC-MCNC: NORMAL MG/DL

## 2019-10-20 ENCOUNTER — APPOINTMENT (OUTPATIENT)
Dept: GENERAL RADIOLOGY | Age: 74
End: 2019-10-20
Payer: MEDICARE

## 2019-10-20 ENCOUNTER — HOSPITAL ENCOUNTER (EMERGENCY)
Age: 74
Discharge: HOME | End: 2019-10-20
Attending: EMERGENCY MEDICINE
Payer: MEDICARE

## 2019-10-20 VITALS
BODY MASS INDEX: 34.99 KG/M2 | HEIGHT: 65 IN | TEMPERATURE: 97.7 F | DIASTOLIC BLOOD PRESSURE: 78 MMHG | HEART RATE: 63 BPM | RESPIRATION RATE: 19 BRPM | SYSTOLIC BLOOD PRESSURE: 208 MMHG | WEIGHT: 210 LBS | OXYGEN SATURATION: 100 %

## 2019-10-20 DIAGNOSIS — R00.2 PALPITATIONS: Primary | ICD-10-CM

## 2019-10-20 DIAGNOSIS — I49.8 SINUS ARRHYTHMIA: ICD-10-CM

## 2019-10-20 LAB
ANION GAP SERPL CALCULATED.3IONS-SCNC: 12 MMOL/L (ref 7–16)
BASOPHILS ABSOLUTE: 0.03 E9/L (ref 0–0.2)
BASOPHILS RELATIVE PERCENT: 0.4 % (ref 0–2)
BUN BLDV-MCNC: 18 MG/DL (ref 8–23)
CALCIUM SERPL-MCNC: 9.9 MG/DL (ref 8.6–10.2)
CHLORIDE BLD-SCNC: 104 MMOL/L (ref 98–107)
CO2: 26 MMOL/L (ref 22–29)
CREAT SERPL-MCNC: 1.1 MG/DL (ref 0.5–1)
EKG ATRIAL RATE: 60 BPM
EKG P AXIS: 31 DEGREES
EKG P-R INTERVAL: 154 MS
EKG Q-T INTERVAL: 436 MS
EKG QRS DURATION: 80 MS
EKG QTC CALCULATION (BAZETT): 436 MS
EKG R AXIS: 0 DEGREES
EKG T AXIS: 74 DEGREES
EKG VENTRICULAR RATE: 60 BPM
EOSINOPHILS ABSOLUTE: 0.16 E9/L (ref 0.05–0.5)
EOSINOPHILS RELATIVE PERCENT: 2.3 % (ref 0–6)
GFR AFRICAN AMERICAN: 59
GFR NON-AFRICAN AMERICAN: 49 ML/MIN/1.73
GLUCOSE BLD-MCNC: 172 MG/DL (ref 74–99)
HCT VFR BLD CALC: 37.7 % (ref 34–48)
HEMOGLOBIN: 11.9 G/DL (ref 11.5–15.5)
IMMATURE GRANULOCYTES #: 0.02 E9/L
IMMATURE GRANULOCYTES %: 0.3 % (ref 0–5)
LYMPHOCYTES ABSOLUTE: 2.16 E9/L (ref 1.5–4)
LYMPHOCYTES RELATIVE PERCENT: 30.6 % (ref 20–42)
MAGNESIUM: 1.6 MG/DL (ref 1.6–2.6)
MCH RBC QN AUTO: 29.1 PG (ref 26–35)
MCHC RBC AUTO-ENTMCNC: 31.6 % (ref 32–34.5)
MCV RBC AUTO: 92.2 FL (ref 80–99.9)
MONOCYTES ABSOLUTE: 0.54 E9/L (ref 0.1–0.95)
MONOCYTES RELATIVE PERCENT: 7.6 % (ref 2–12)
NEUTROPHILS ABSOLUTE: 4.16 E9/L (ref 1.8–7.3)
NEUTROPHILS RELATIVE PERCENT: 58.8 % (ref 43–80)
PDW BLD-RTO: 14 FL (ref 11.5–15)
PLATELET # BLD: 165 E9/L (ref 130–450)
PMV BLD AUTO: 9.9 FL (ref 7–12)
POTASSIUM SERPL-SCNC: 4.1 MMOL/L (ref 3.5–5)
RBC # BLD: 4.09 E12/L (ref 3.5–5.5)
SODIUM BLD-SCNC: 142 MMOL/L (ref 132–146)
TROPONIN: <0.01 NG/ML (ref 0–0.03)
WBC # BLD: 7.1 E9/L (ref 4.5–11.5)

## 2019-10-20 PROCEDURE — 71046 X-RAY EXAM CHEST 2 VIEWS: CPT

## 2019-10-20 PROCEDURE — 93005 ELECTROCARDIOGRAM TRACING: CPT | Performed by: EMERGENCY MEDICINE

## 2019-10-20 PROCEDURE — 96365 THER/PROPH/DIAG IV INF INIT: CPT

## 2019-10-20 PROCEDURE — 80048 BASIC METABOLIC PNL TOTAL CA: CPT

## 2019-10-20 PROCEDURE — 83735 ASSAY OF MAGNESIUM: CPT

## 2019-10-20 PROCEDURE — 93010 ELECTROCARDIOGRAM REPORT: CPT | Performed by: INTERNAL MEDICINE

## 2019-10-20 PROCEDURE — 85025 COMPLETE CBC W/AUTO DIFF WBC: CPT

## 2019-10-20 PROCEDURE — 6360000002 HC RX W HCPCS: Performed by: EMERGENCY MEDICINE

## 2019-10-20 PROCEDURE — 36415 COLL VENOUS BLD VENIPUNCTURE: CPT

## 2019-10-20 PROCEDURE — 99285 EMERGENCY DEPT VISIT HI MDM: CPT

## 2019-10-20 PROCEDURE — 84484 ASSAY OF TROPONIN QUANT: CPT

## 2019-10-20 RX ORDER — MAGNESIUM SULFATE IN WATER 40 MG/ML
2 INJECTION, SOLUTION INTRAVENOUS ONCE
Status: COMPLETED | OUTPATIENT
Start: 2019-10-20 | End: 2019-10-20

## 2019-10-20 RX ADMIN — MAGNESIUM SULFATE HEPTAHYDRATE 2 G: 40 INJECTION, SOLUTION INTRAVENOUS at 21:59

## 2020-05-28 VITALS
DIASTOLIC BLOOD PRESSURE: 70 MMHG | BODY MASS INDEX: 35.32 KG/M2 | HEART RATE: 76 BPM | RESPIRATION RATE: 14 BRPM | SYSTOLIC BLOOD PRESSURE: 120 MMHG | WEIGHT: 212 LBS | HEIGHT: 65 IN

## 2020-05-28 RX ORDER — DICYCLOMINE HCL 20 MG
20 TABLET ORAL EVERY 6 HOURS
COMMUNITY
End: 2020-09-09

## 2020-05-28 RX ORDER — ATORVASTATIN CALCIUM 10 MG/1
10 TABLET, FILM COATED ORAL DAILY
COMMUNITY
End: 2020-10-30 | Stop reason: SDUPTHER

## 2020-07-05 ENCOUNTER — HOSPITAL ENCOUNTER (OUTPATIENT)
Age: 75
Setting detail: OBSERVATION
Discharge: HOME | End: 2020-07-08
Attending: EMERGENCY MEDICINE | Admitting: HOSPITALIST
Payer: MEDICARE

## 2020-07-05 ENCOUNTER — APPOINTMENT (OUTPATIENT)
Dept: GENERAL RADIOLOGY | Age: 75
End: 2020-07-05
Payer: MEDICARE

## 2020-07-05 ENCOUNTER — APPOINTMENT (OUTPATIENT)
Dept: CT IMAGING | Age: 75
End: 2020-07-05
Payer: MEDICARE

## 2020-07-05 PROBLEM — M54.50 BACK PAIN AT L4-L5 LEVEL: Status: ACTIVE | Noted: 2020-07-05

## 2020-07-05 PROCEDURE — 99284 EMERGENCY DEPT VISIT MOD MDM: CPT

## 2020-07-05 PROCEDURE — 6360000002 HC RX W HCPCS: Performed by: HOSPITALIST

## 2020-07-05 PROCEDURE — 96376 TX/PRO/DX INJ SAME DRUG ADON: CPT

## 2020-07-05 PROCEDURE — G0378 HOSPITAL OBSERVATION PER HR: HCPCS

## 2020-07-05 PROCEDURE — 72131 CT LUMBAR SPINE W/O DYE: CPT

## 2020-07-05 PROCEDURE — 6370000000 HC RX 637 (ALT 250 FOR IP): Performed by: HOSPITALIST

## 2020-07-05 PROCEDURE — 6370000000 HC RX 637 (ALT 250 FOR IP): Performed by: FAMILY MEDICINE

## 2020-07-05 PROCEDURE — 96374 THER/PROPH/DIAG INJ IV PUSH: CPT

## 2020-07-05 PROCEDURE — 6360000002 HC RX W HCPCS: Performed by: NURSE PRACTITIONER

## 2020-07-05 PROCEDURE — 96375 TX/PRO/DX INJ NEW DRUG ADDON: CPT

## 2020-07-05 PROCEDURE — 72120 X-RAY BEND ONLY L-S SPINE: CPT

## 2020-07-05 RX ORDER — FUROSEMIDE 20 MG/1
20 TABLET ORAL DAILY
Status: DISCONTINUED | OUTPATIENT
Start: 2020-07-06 | End: 2020-07-08 | Stop reason: HOSPADM

## 2020-07-05 RX ORDER — FENTANYL CITRATE 50 UG/ML
25 INJECTION, SOLUTION INTRAMUSCULAR; INTRAVENOUS ONCE
Status: COMPLETED | OUTPATIENT
Start: 2020-07-05 | End: 2020-07-05

## 2020-07-05 RX ORDER — LATANOPROST 50 UG/ML
1 SOLUTION/ DROPS OPHTHALMIC NIGHTLY
Status: DISCONTINUED | OUTPATIENT
Start: 2020-07-05 | End: 2020-07-08 | Stop reason: HOSPADM

## 2020-07-05 RX ORDER — MORPHINE SULFATE 2 MG/ML
2 INJECTION, SOLUTION INTRAMUSCULAR; INTRAVENOUS
Status: DISCONTINUED | OUTPATIENT
Start: 2020-07-05 | End: 2020-07-06

## 2020-07-05 RX ORDER — LOSARTAN POTASSIUM 25 MG/1
25 TABLET ORAL DAILY
Status: DISCONTINUED | OUTPATIENT
Start: 2020-07-05 | End: 2020-07-08 | Stop reason: HOSPADM

## 2020-07-05 RX ORDER — ATORVASTATIN CALCIUM 10 MG/1
10 TABLET, FILM COATED ORAL DAILY
Status: DISCONTINUED | OUTPATIENT
Start: 2020-07-05 | End: 2020-07-08 | Stop reason: HOSPADM

## 2020-07-05 RX ORDER — PROPRANOLOL HCL 60 MG
60 CAPSULE, EXTENDED RELEASE 24HR ORAL NIGHTLY
Status: DISCONTINUED | OUTPATIENT
Start: 2020-07-05 | End: 2020-07-08 | Stop reason: HOSPADM

## 2020-07-05 RX ORDER — OXYCODONE HYDROCHLORIDE AND ACETAMINOPHEN 5; 325 MG/1; MG/1
1 TABLET ORAL EVERY 6 HOURS PRN
Status: DISCONTINUED | OUTPATIENT
Start: 2020-07-05 | End: 2020-07-08 | Stop reason: HOSPADM

## 2020-07-05 RX ORDER — LEVOTHYROXINE SODIUM 0.05 MG/1
50 TABLET ORAL DAILY
Status: DISCONTINUED | OUTPATIENT
Start: 2020-07-06 | End: 2020-07-08 | Stop reason: HOSPADM

## 2020-07-05 RX ORDER — KETOROLAC TROMETHAMINE 30 MG/ML
15 INJECTION, SOLUTION INTRAMUSCULAR; INTRAVENOUS ONCE
Status: COMPLETED | OUTPATIENT
Start: 2020-07-05 | End: 2020-07-05

## 2020-07-05 RX ORDER — OXYBUTYNIN CHLORIDE 5 MG/1
5 TABLET ORAL DAILY
Status: DISCONTINUED | OUTPATIENT
Start: 2020-07-05 | End: 2020-07-08 | Stop reason: HOSPADM

## 2020-07-05 RX ORDER — PANTOPRAZOLE SODIUM 40 MG/1
40 TABLET, DELAYED RELEASE ORAL
Status: DISCONTINUED | OUTPATIENT
Start: 2020-07-06 | End: 2020-07-08 | Stop reason: HOSPADM

## 2020-07-05 RX ORDER — DICYCLOMINE HYDROCHLORIDE 10 MG/1
20 CAPSULE ORAL EVERY 6 HOURS
Status: DISCONTINUED | OUTPATIENT
Start: 2020-07-05 | End: 2020-07-08 | Stop reason: HOSPADM

## 2020-07-05 RX ORDER — MORPHINE SULFATE 4 MG/ML
4 INJECTION, SOLUTION INTRAMUSCULAR; INTRAVENOUS ONCE
Status: COMPLETED | OUTPATIENT
Start: 2020-07-05 | End: 2020-07-05

## 2020-07-05 RX ORDER — ALLOPURINOL 100 MG/1
200 TABLET ORAL DAILY
Status: DISCONTINUED | OUTPATIENT
Start: 2020-07-05 | End: 2020-07-08 | Stop reason: HOSPADM

## 2020-07-05 RX ORDER — DEXAMETHASONE SODIUM PHOSPHATE 10 MG/ML
10 INJECTION INTRAMUSCULAR; INTRAVENOUS ONCE
Status: COMPLETED | OUTPATIENT
Start: 2020-07-05 | End: 2020-07-05

## 2020-07-05 RX ORDER — ASPIRIN 81 MG/1
81 TABLET, CHEWABLE ORAL DAILY
Status: DISCONTINUED | OUTPATIENT
Start: 2020-07-06 | End: 2020-07-06

## 2020-07-05 RX ADMIN — LATANOPROST 1 DROP: 50 SOLUTION OPHTHALMIC at 20:01

## 2020-07-05 RX ADMIN — DEXAMETHASONE SODIUM PHOSPHATE 10 MG: 10 INJECTION INTRAMUSCULAR; INTRAVENOUS at 14:05

## 2020-07-05 RX ADMIN — MORPHINE SULFATE 4 MG: 4 INJECTION, SOLUTION INTRAMUSCULAR; INTRAVENOUS at 14:55

## 2020-07-05 RX ADMIN — LOSARTAN POTASSIUM 25 MG: 25 TABLET, FILM COATED ORAL at 20:01

## 2020-07-05 RX ADMIN — FENTANYL CITRATE 25 MCG: 0.05 INJECTION, SOLUTION INTRAMUSCULAR; INTRAVENOUS at 14:04

## 2020-07-05 RX ADMIN — ALLOPURINOL 200 MG: 100 TABLET ORAL at 20:01

## 2020-07-05 RX ADMIN — OXYCODONE AND ACETAMINOPHEN 1 TABLET: 5; 325 TABLET ORAL at 23:14

## 2020-07-05 RX ADMIN — KETOROLAC TROMETHAMINE 15 MG: 30 INJECTION, SOLUTION INTRAMUSCULAR at 14:04

## 2020-07-05 RX ADMIN — ATORVASTATIN CALCIUM 10 MG: 10 TABLET, FILM COATED ORAL at 20:01

## 2020-07-05 RX ADMIN — MORPHINE SULFATE 2 MG: 2 INJECTION, SOLUTION INTRAMUSCULAR; INTRAVENOUS at 20:07

## 2020-07-05 RX ADMIN — MAGNESIUM GLUCONATE 500 MG ORAL TABLET 400 MG: 500 TABLET ORAL at 20:01

## 2020-07-05 RX ADMIN — MORPHINE SULFATE 2 MG: 2 INJECTION, SOLUTION INTRAMUSCULAR; INTRAVENOUS at 22:07

## 2020-07-05 RX ADMIN — OXYBUTYNIN CHLORIDE 5 MG: 5 TABLET ORAL at 20:01

## 2020-07-05 RX ADMIN — MORPHINE SULFATE 4 MG: 4 INJECTION, SOLUTION INTRAMUSCULAR; INTRAVENOUS at 15:52

## 2020-07-05 RX ADMIN — PROPRANOLOL HYDROCHLORIDE 60 MG: 60 CAPSULE, EXTENDED RELEASE ORAL at 20:01

## 2020-07-05 ASSESSMENT — PAIN SCALES - GENERAL
PAINLEVEL_OUTOF10: 5
PAINLEVEL_OUTOF10: 9
PAINLEVEL_OUTOF10: 9
PAINLEVEL_OUTOF10: 7
PAINLEVEL_OUTOF10: 9
PAINLEVEL_OUTOF10: 7
PAINLEVEL_OUTOF10: 7

## 2020-07-05 ASSESSMENT — PAIN DESCRIPTION - PAIN TYPE
TYPE: ACUTE PAIN
TYPE: CHRONIC PAIN
TYPE: ACUTE PAIN

## 2020-07-05 ASSESSMENT — PAIN DESCRIPTION - ORIENTATION
ORIENTATION: MID;LOWER
ORIENTATION: MID;LOWER

## 2020-07-05 ASSESSMENT — PAIN DESCRIPTION - LOCATION
LOCATION: BACK
LOCATION: BACK

## 2020-07-05 ASSESSMENT — PAIN DESCRIPTION - DIRECTION: RADIATING_TOWARDS: RIGHT HIP AND LEG

## 2020-07-05 ASSESSMENT — PAIN - FUNCTIONAL ASSESSMENT: PAIN_FUNCTIONAL_ASSESSMENT: PREVENTS OR INTERFERES SOME ACTIVE ACTIVITIES AND ADLS

## 2020-07-05 ASSESSMENT — ENCOUNTER SYMPTOMS: BACK PAIN: 1

## 2020-07-05 ASSESSMENT — PAIN DESCRIPTION - ONSET: ONSET: GRADUAL

## 2020-07-05 ASSESSMENT — PAIN DESCRIPTION - FREQUENCY: FREQUENCY: CONTINUOUS

## 2020-07-05 ASSESSMENT — PAIN DESCRIPTION - PROGRESSION: CLINICAL_PROGRESSION: GRADUALLY WORSENING

## 2020-07-05 NOTE — ED PROVIDER NOTES
ED Attending  CC: Sherron     Department of Emergency Medicine   ED  Provider Note  Admit Date/RoomTime: 7/5/2020  1:26 PM  ED Room: 24/24  Chief Complaint   Back Pain (Low back pain, radiating into right hip and down leg. States had shot at pain clinic recently )    History of Present Illness   Source of history provided by:  patient. History/Exam Limitations: none. Wilmar Polk is a 70 y.o. old female with a prior history of recurrent self limited episodes of low back pain in the past, presents to the emergency department by private vehicle, for acute-on-chronic, sharp right sided lower lumbar spine pain with radiation, below the right knee, for 4 day(s) prior to arrival.  The original pain was caused by no known injury. There has been no history of recent injury. Since onset the symptoms have been constant and moderate in severity. She denies any of the following: bladder incontinence, bladder urgency, bowel incontinence, bowel urgency or sacral numbness. Associated Signs & Symptoms: none. The pain is aggraveated by standing and relieved by nothing. There has been no abdominal pain, cramping, vomiting, diarrhea, fever, chills, sweats, headache, arthralgias, myalgias, irritability, URI symptoms or cough. She is not enrolled in pain management program.  Patient is a longstanding history of lower lumbar back pain. Patient recently had a caudal epidural nerve block and June 16 per Dr. Tru Hernandez    N/A  ROS    Pertinent positives and negatives are stated within HPI, all other systems reviewed and are negative. Past Surgical History:  has a past surgical history that includes Thyroidectomy, partial; skin biopsy; Tonsillectomy; ovarian cyst removal; Cholecystectomy; Cosmetic surgery; blepharoplasty; Hysterectomy; Tubal ligation; Foot surgery; Cataract removal with implant (Left, 09/12/2017);  Cataract removal with implant (Right, 10/09/2018); pr xcapsl ctrc rmvl insj io lens prosth w/o ecp (Right, 10/9/2018); Cataract removal with implant (Right, 11/01/2018); and pr remv post eye implnt mater,intraocul (Right, 11/1/2018). Social History:  reports that she has never smoked. She has never used smokeless tobacco. She reports that she does not drink alcohol or use drugs. Family History: family history is not on file. Allergies: Ace inhibitors; Adhesive tape; Epinephrine; and Propoxyphene    Physical Exam           ED Triage Vitals   BP Temp Temp Source Pulse Resp SpO2 Height Weight   07/05/20 1455 07/05/20 1317 07/05/20 1317 07/05/20 1317 07/05/20 1317 07/05/20 1317 -- --   (!) 193/74 97.5 °F (36.4 °C) Infrared 73 16 95 %        Oxygen Saturation Interpretation: Normal.    Constitutional:  Alert, development consistent with age. HEENT:  NC/NT. Airway patent. Neck:  Normal ROM. Supple. Respiratory:  Clear to auscultation and breath sounds equal.  CV:  Regular rate and rhythm, normal heart sounds, without pathological murmurs, ectopy, gallops, or rubs. GI:  Abdomen Soft, nontender, good bowel sounds. No firm or pulsatile mass. Back: lower lumbar spine right sided. Tenderness: Moderate. Swelling: no.              Range of Motion: full range with pain. CVA Tenderness: No.            Straight leg raising:  Right positive  degrees. Skin:  no erythema, rash or swelling noted. Distal Function:              Motor deficit: none. Sensory deficit: none. Pulse deficit: none. Calf Tenderness:  No Bilateral.               Edema:  none Both lower extremity(s). Gait:  Difficulty ambulating needed assistance . Integument:  Normal turgor. Warm, dry, without visible rash. Lymphatics: No lymphangitis or adenopathy noted. Neurological:  Oriented. Motor functions intact.     Lab / Imaging Results   (All laboratory and radiology results have been personally reviewed by myself)  Labs:  No results found for this visit on

## 2020-07-05 NOTE — PROGRESS NOTES
Contacted attending provider via perfect serve for elevated BP.     Electronically signed by Trupti Lucero RN on 7/5/2020 at 7:52 PM

## 2020-07-05 NOTE — H&P
PROSTH W/O ECP Right 10/9/2018    RIGHT EYE CATARACT EMULSIFICATION IOL IMPLANT performed by Sweetie Garces MD at [address] [facility], PARTIAL      left lobe removed    TONSILLECTOMY      TUBAL LIGATION         Binh Begum to Admission:    Prior to Admission medications    Medication Sig Start Date End Date Taking?  Authorizing Provider   dicyclomine (BENTYL) 20 MG tablet Take 20 mg by mouth every 6 hours    Historical Provider, MD   atorvastatin (LIPITOR) 10 MG tablet Take 10 mg by mouth daily    Historical Provider, MD   Methocarbamol (ROBAXIN PO) Take by mouth    Historical Provider, MD   COLCHICINE PO Take by mouth as needed    Historical Provider, MD   Magnesium Oxide (MAG- PO) Take by mouth daily    Historical Provider, MD   furosemide (LASIX) 20 MG tablet Take 20 mg by mouth daily as needed     Historical Provider, MD   KRILL OIL PO Take by mouth daily     Historical Provider, MD   magnesium 30 MG tablet Take 30 mg by mouth daily     Historical Provider, MD   Coenzyme Q10 (COQ10 PO) Take by mouth daily     Historical Provider, MD   allopurinol (ZYLOPRIM) 100 MG tablet Take 200 mg by mouth daily     Historical Provider, MD   oxybutynin (DITROPAN) 5 MG tablet Take 5 mg by mouth daily     Historical Provider, MD   levothyroxine (SYNTHROID) 50 MCG tablet Take 50 mcg by mouth Daily Takes an extra 1/2 tab on sundays & thursdays    Historical Provider, MD   omeprazole (PRILOSEC) 20 MG delayed release capsule Take 20 mg by mouth daily    Historical Provider, MD   aspirin 81 MG tablet Take 81 mg by mouth daily    Historical Provider, MD   glipiZIDE-metformin (METAGLIP) 2.5-500 MG per tablet Take 1 tablet by mouth daily     Historical Provider, MD   losartan (COZAAR) 25 MG tablet Take 25 mg by mouth daily    Historical Provider, MD   propranolol (INDERAL LA) 60 MG extended release capsule Take 60 mg by mouth nightly     Historical Provider, MD   latanoprost Sydcaitlyn Hooper) Indication:   lumbar pain/recent epidural lumbar pain/recent epidural Comparison: None. FINDINGS: No fracture or joint dislocation. The lumbar lordosis is preserved. Vertebral body heights are intact. The paraspinal soft tissues are unremarkable. L1-2: Unremarkable. L2-3: Mild loss of disc height. Mild facet hypertrophy. No significant central canal, lateral recess or neural foraminal stenoses. L3-4: Mild loss of disc height. Minimal anterolisthesis of L3 over L4. Mild to moderate facet hypertrophy. Moderate central canal stenosis. Mild lateral recess and neural foraminal stenoses. L4-5: Mild loss of disc small disc bulge. Mild facet and ligamentum flavum hypertrophy. Moderate central canal stenosis. Mild to moderate lateral recess and neural foraminal stenoses. L5-S1: Severe loss of disc height. Mild facet hypertrophy. No central canal stenosis. Mild lateral recess stenoses. Mild to moderate neural foraminal stenoses. 1. No fracture or joint dislocation. 2. Moderate central canal stenoses at L3-4 and L4-5. 3. Multilevel neural foraminal stenoses, worst (mild to moderate) at L4-5 and L5-S1. EKG:      CBC   No results for input(s): WBC, HGB, HCT, PLT in the last 72 hours. RENAL  No results for input(s): NA, K, CL, CO2, PHOS, BUN, CREATININE in the last 72 hours. Invalid input(s): CA  LFT'S  No results for input(s): AST, ALT, ALB, BILIDIR, BILITOT, ALKPHOS in the last 72 hours. COAG  No results for input(s): INR in the last 72 hours. CARDIAC ENZYMES  No results for input(s): CKTOTAL, CKMB, CKMBINDEX, TROPONINT in the last 72 hours.     U/A: No results found for: NITRITE, COLORU, WBCUA, RBCUA, MUCUS, BACTERIA, CLARITYU, SPECGRAV, LEUKOCYTESUR, BLOODU, GLUCOSEU, AMORPHOUS    ABG  No results found for: NIZ8CIL, BEART, G1BYWNGN, PHART, THGBART, JFD7BGR, PO2ART, ALW5BET        Active Hospital Problems    Diagnosis Date Noted    Back pain at L4-L5 level [M54.5] 07/05/2020       PHYSICIAN CERTIFICATION    I certify that Kyler Suero is expected to be hospitalized for < 2  midnights based on the following assessment and plan:    ASSESSMENT/PLAN:  1. Lumbar back pain with central and foraminal stenosis of L3-4, L4-5- VS [place] pain control at this time, neurosurgery consulted for evaluation and treatment, patient states she had steroid injections last was on 06/16/2020  2. Hypertension-losartan, Inderal, monitor blood pressure  3. Hypothyroidism-Synthroid daily  4. Hyperlipidemia-Lipitor at night, will check lipid profile Ceftin again in the morning.       DVT Prophylaxis: lovenox  Diet: No diet orders on file  Code Status: full code       Allan Rodriguez MD  [facility]

## 2020-07-06 PROBLEM — M54.41 ACUTE MIDLINE LOW BACK PAIN WITH RIGHT-SIDED SCIATICA: Status: ACTIVE | Noted: 2020-07-06

## 2020-07-06 LAB — METER GLUCOSE: 134 MG/DL (ref 74–99)

## 2020-07-06 PROCEDURE — 6370000000 HC RX 637 (ALT 250 FOR IP): Performed by: NEUROLOGICAL SURGERY

## 2020-07-06 PROCEDURE — G0378 HOSPITAL OBSERVATION PER HR: HCPCS

## 2020-07-06 PROCEDURE — 6360000002 HC RX W HCPCS: Performed by: HOSPITALIST

## 2020-07-06 PROCEDURE — 6370000000 HC RX 637 (ALT 250 FOR IP): Performed by: HOSPITALIST

## 2020-07-06 PROCEDURE — 6370000000 HC RX 637 (ALT 250 FOR IP): Performed by: FAMILY MEDICINE

## 2020-07-06 PROCEDURE — 96376 TX/PRO/DX INJ SAME DRUG ADON: CPT

## 2020-07-06 PROCEDURE — 82962 GLUCOSE BLOOD TEST: CPT

## 2020-07-06 PROCEDURE — 99219 PR INITIAL OBSERVATION CARE/DAY 50 MINUTES: CPT | Performed by: NEUROLOGICAL SURGERY

## 2020-07-06 RX ORDER — DOCUSATE SODIUM 100 MG/1
100 CAPSULE, LIQUID FILLED ORAL DAILY
Status: DISCONTINUED | OUTPATIENT
Start: 2020-07-07 | End: 2020-07-08 | Stop reason: HOSPADM

## 2020-07-06 RX ORDER — MORPHINE SULFATE 2 MG/ML
2 INJECTION, SOLUTION INTRAMUSCULAR; INTRAVENOUS
Status: COMPLETED | OUTPATIENT
Start: 2020-07-06 | End: 2020-07-06

## 2020-07-06 RX ORDER — CYCLOBENZAPRINE HCL 10 MG
10 TABLET ORAL 3 TIMES DAILY PRN
Status: DISCONTINUED | OUTPATIENT
Start: 2020-07-06 | End: 2020-07-08 | Stop reason: HOSPADM

## 2020-07-06 RX ORDER — GABAPENTIN 300 MG/1
300 CAPSULE ORAL 3 TIMES DAILY
Status: DISCONTINUED | OUTPATIENT
Start: 2020-07-06 | End: 2020-07-08 | Stop reason: HOSPADM

## 2020-07-06 RX ADMIN — LATANOPROST 1 DROP: 50 SOLUTION OPHTHALMIC at 20:12

## 2020-07-06 RX ADMIN — GABAPENTIN 300 MG: 300 CAPSULE ORAL at 08:41

## 2020-07-06 RX ADMIN — OXYCODONE AND ACETAMINOPHEN 1 TABLET: 5; 325 TABLET ORAL at 17:17

## 2020-07-06 RX ADMIN — LEVOTHYROXINE SODIUM 50 MCG: 0.05 TABLET ORAL at 06:21

## 2020-07-06 RX ADMIN — MORPHINE SULFATE 2 MG: 2 INJECTION, SOLUTION INTRAMUSCULAR; INTRAVENOUS at 12:00

## 2020-07-06 RX ADMIN — ATORVASTATIN CALCIUM 10 MG: 10 TABLET, FILM COATED ORAL at 20:12

## 2020-07-06 RX ADMIN — OXYBUTYNIN CHLORIDE 5 MG: 5 TABLET ORAL at 08:41

## 2020-07-06 RX ADMIN — LOSARTAN POTASSIUM 25 MG: 25 TABLET, FILM COATED ORAL at 08:41

## 2020-07-06 RX ADMIN — PANTOPRAZOLE SODIUM 40 MG: 40 TABLET, DELAYED RELEASE ORAL at 06:21

## 2020-07-06 RX ADMIN — MORPHINE SULFATE 2 MG: 2 INJECTION, SOLUTION INTRAMUSCULAR; INTRAVENOUS at 14:35

## 2020-07-06 RX ADMIN — OXYCODONE AND ACETAMINOPHEN 1 TABLET: 5; 325 TABLET ORAL at 23:26

## 2020-07-06 RX ADMIN — MORPHINE SULFATE 2 MG: 2 INJECTION, SOLUTION INTRAMUSCULAR; INTRAVENOUS at 03:08

## 2020-07-06 RX ADMIN — GABAPENTIN 300 MG: 300 CAPSULE ORAL at 14:43

## 2020-07-06 RX ADMIN — ALLOPURINOL 200 MG: 100 TABLET ORAL at 08:41

## 2020-07-06 RX ADMIN — PROPRANOLOL HYDROCHLORIDE 60 MG: 60 CAPSULE, EXTENDED RELEASE ORAL at 20:12

## 2020-07-06 RX ADMIN — OXYCODONE AND ACETAMINOPHEN 1 TABLET: 5; 325 TABLET ORAL at 07:46

## 2020-07-06 RX ADMIN — GABAPENTIN 300 MG: 300 CAPSULE ORAL at 20:12

## 2020-07-06 RX ADMIN — MAGNESIUM GLUCONATE 500 MG ORAL TABLET 400 MG: 500 TABLET ORAL at 08:41

## 2020-07-06 RX ADMIN — MORPHINE SULFATE 2 MG: 2 INJECTION, SOLUTION INTRAMUSCULAR; INTRAVENOUS at 08:48

## 2020-07-06 ASSESSMENT — PAIN DESCRIPTION - PROGRESSION: CLINICAL_PROGRESSION: GRADUALLY WORSENING

## 2020-07-06 ASSESSMENT — PAIN SCALES - GENERAL
PAINLEVEL_OUTOF10: 10
PAINLEVEL_OUTOF10: 5
PAINLEVEL_OUTOF10: 5
PAINLEVEL_OUTOF10: 6
PAINLEVEL_OUTOF10: 7
PAINLEVEL_OUTOF10: 7
PAINLEVEL_OUTOF10: 6

## 2020-07-06 ASSESSMENT — PAIN DESCRIPTION - DESCRIPTORS
DESCRIPTORS: ACHING;THROBBING
DESCRIPTORS: CONSTANT;SHARP

## 2020-07-06 ASSESSMENT — PAIN DESCRIPTION - LOCATION
LOCATION: BACK
LOCATION: BACK

## 2020-07-06 ASSESSMENT — PAIN DESCRIPTION - FREQUENCY
FREQUENCY: CONTINUOUS
FREQUENCY: CONTINUOUS

## 2020-07-06 ASSESSMENT — PAIN DESCRIPTION - ORIENTATION
ORIENTATION: RIGHT
ORIENTATION: MID;LOWER

## 2020-07-06 ASSESSMENT — PAIN DESCRIPTION - PAIN TYPE
TYPE: ACUTE PAIN
TYPE: ACUTE PAIN;CHRONIC PAIN

## 2020-07-06 ASSESSMENT — PAIN DESCRIPTION - DIRECTION: RADIATING_TOWARDS: R LEG/FOOT

## 2020-07-06 ASSESSMENT — PAIN DESCRIPTION - ONSET
ONSET: ON-GOING
ONSET: ON-GOING

## 2020-07-06 NOTE — PROGRESS NOTES
According to the Rn, the patient is extremely claustrophobic and needs anesthesia for the mri. Being that the patient ate breakfast this morning, the mri will need to be postponed for at least 8 hrs according to anesthesia. We will contact anesthesia later today to see their availability. RN notified.

## 2020-07-06 NOTE — PROGRESS NOTES
HOSPITALIST PROGRESS NOTE  Date: 7/6/2020   Name: Wilmar Polk   MRN: [id number]   YOB: 1945      Subjective/Interval Hx:   Patient states that she feels a little bit better today than she did yesterday son at the bedside stated she did talk to neurosurgery the pain is controlled with medication awaiting MRI at this time. Objective:   Physical Exam:   /66   Pulse 54   Temp 97.6 °F (36.4 °C) (Temporal)   Resp 18   Ht 5' 5\" (1.651 m)   Wt 204 lb (92.5 kg)   SpO2 96%   BMI 33.95 kg/m²   General: no acute distress, well nourished and well hydrated  HEENT: NCAT  Heart: S1S2 RRR  Lungs: Clear to ascultation bilaterally, respiratory effort normal  Abdomen: soft, NT/ND, positive bowel sounds  Extremities: no pitting edema, nontender   Neuro: patient is awake, alert and orientated times 3, no gross deficits  Skin: no rashes or ecchymosis        Meds:   Meds:    gabapentin  300 mg Oral TID    allopurinol  200 mg Oral Daily    atorvastatin  10 mg Oral Daily    dicyclomine  20 mg Oral Q6H    furosemide  20 mg Oral Daily    latanoprost  1 drop Left Eye Nightly    levothyroxine  50 mcg Oral Daily    losartan  25 mg Oral Daily    magnesium oxide  400 mg Oral Daily    pantoprazole  40 mg Oral QAM AC    oxybutynin  5 mg Oral Daily    propranolol  60 mg Oral Nightly      Infusions:   PRN Meds: cyclobenzaprine, 10 mg, TID PRN  morphine, 2 mg, Q2H PRN  oxyCODONE-acetaminophen, 1 tablet, Q6H PRN        Data/Labs:   No results for input(s): WBC, HGB, HCT, PLT in the last 72 hours. No results for input(s): NA, K, CL, CO2, PHOS, BUN, CREATININE in the last 72 hours. Invalid input(s): CA  No results for input(s): AST, ALT, ALB, BILIDIR, BILITOT, ALKPHOS in the last 72 hours. No results for input(s): INR in the last 72 hours. No results for input(s): CKTOTAL, CKMB, CKMBINDEX, TROPONINT in the last 72 hours. I/O last 3 completed shifts:   In: 360 [P.O.:360]  Out: 250 [Urine:250]    Intake/Output Summary (Last 24 hours) at 7/6/2020 1224  Last data filed at 7/6/2020 0821  Gross per 24 hour   Intake 600 ml   Output 250 ml   Net 350 ml        Assessment/Plan:   1. Lumbar back pain with central and foraminal stenosis of L3-4, L4-5- VS [place] pain control at this time, neurosurgery consulted for evaluation and treatment, patient states she had steroid injections last was on 06/16/2020 07/06/2020- patient states that pain is controlled with medication and smiling lying in bed with her son at the bedside, that she did talk to neurosurgery plans is for MRI and to continue medical management  2. Hypertension-losartan, Inderal, monitor blood pressure  3. Hypothyroidism-Synthroid daily  4.  Hyperlipidemia-Lipitor at night, will check lipid profile   in the morning, if indicated      DVT Prophylaxis: scd  Diet: Diet NPO Effective Now  Code Status: No Order    Dispo: when stable     Electronically signed by Shana Singleton MD on 7/6/2020 at 12:24 PM  [facility] Hospitalist

## 2020-07-06 NOTE — PROGRESS NOTES
Occupational Therapy      OT consult received and appreciated. Chart reviewed. Will hold evaluation due to awaiting NS POC 7/6. Will evaluate at a later time. Thank you.    Wili Buckley, OTR/L #[id number]

## 2020-07-06 NOTE — PROCEDURES
Spoke with Lety SINGH, MRI with anesthesia will be attempted tomorrow.  Patient to be NPO at midnight

## 2020-07-06 NOTE — CARE COORDINATION
7/6 Transition of Care: patient is alert and oriented. She resides in a home alone but prior to back pain was independent. She has support from her children. She does drive. She has a walker at home and has been using it the last couple of days. She sees a physician at the [facility] pain clinic and has been injected both in Dec 2019/June 2020. She also has had outpatient therapy along with seeing a chiropractor. Her pcp is Dr Boubacar Gipson and her pharmacy is [facility] in [place]. PT and OT and MRI are pending for determination of plan of care. She is planning on returning home at discharge and will discuss homecare if needed. Will follow.   [phone]

## 2020-07-06 NOTE — CONSULTS
[address]                  [address] [place],  [facility]                                  CONSULTATION    PATIENT NAME: Karina Horta                       :        1945  MED REC NO:   [id number]                            ROOM:       [id number]  ACCOUNT NO:   [de-identified]                           ADMIT DATE: 2020  PROVIDER:     Jese Rios MD    CONSULT DATE:  2020    REASON FOR CONSULT:  Midline low back pain and right-sided sciatica. HISTORY OF PRESENT ILLNESS:  The patient is a 72-year-old lady who  presents with back pain radiating to her right leg. This has been going  on since last year. She has actually tried two caudal epidural  injections. She has also tried some NSAIDs and some oral narcotic pain  medication without any significant relief. The pain got significantly  worse on Thursday, and she was unable to ambulate. She presented to the  emergency room yesterday. She denies any new weakness or loss of  control of bowel or bladder function, but does have some numbness in her  right lower extremity. PAST MEDICAL HISTORY:  Positive for tremors, thyroid disorder, sleep  apnea, hypertension, hyperlipidemia, gastroesophageal reflux, diabetes. PAST SURGICAL HISTORY:  Positive for tubal ligation, tonsillectomy,  thyroidectomy, skin biopsy, foot surgery, cholecystectomy, cataract  removal and implant. FAMILY HISTORY:  Positive for heart disease in her father and cancer in  her mother. SOCIAL HISTORY:  Negative for tobacco use. ALLERGIES:  Include ACE INHIBITORS and ADHESIVE TAPE. REVIEW OF SYSTEMS:  HEENT:  Negative for headache, double vision, or  blurry vision. CARDIOVASCULAR:  Negative for chest pain, arrhythmia or  palpitations. RESPIRATORY:  Negative for shortness of breath, asthma,  bronchitis or pneumonia. GASTROINTESTINAL:  Positive for  gastroesophageal reflux.   GENITOURINARY:  Negative for hematuria or  dysuria. HEMATOLOGIC:  Positive for easy bruising. INFECTIOUS:   Negative for any recent infection. MUSCULOSKELETAL:  Positive for back  pain. PSYCHIATRIC:  Negative for anxiety or depression. NEUROLOGIC:   Negative for seizure or stroke. ENDOCRINE:  Positive for thyroid  disorder and diabetes. PHYSICAL EXAMINATION:  VITAL SIGNS:  She is currently afebrile with a T-current of 36.1 degrees  Celsius, respiratory rate is 18, pulse 65, blood pressure is 157/69. GENERAL:  She is resting in bed. Appears to be in mild-to-moderate  distress secondary to pain. Appears her stated age. HEENT:  Her head is normocephalic and atraumatic. Pupils are 3 to 2 mm  and reactive. She has no drainage out of her eyes, ears, nose or  throat. SKIN:  Warm and dry. MUSCULOSKELETAL:  She has got good range of motion in her bilateral  upper and lower extremities. ABDOMEN:  Soft, nontender, nondistended. RESPIRATORY:  She is not using any accessory muscles for respiration. NEUROLOGIC:  On rest of her neurologic exam, she is awake, alert and  oriented x3. Cranial nerves II through XII are intact bilaterally. Motor exam reveals 5/5 strength in her bilateral upper and lower  extremities. Sensation is grossly intact to light touch. Reflexes are  2+ and symmetric. Toes are going down. IMAGING:  Review of imaging, she had CT scan of her lumbar spine that  shows that she has got stenosis at L3-L4 and L4-L5 with an L3-L4  degenerative spondylolisthesis. ASSESSMENT AND PLAN:  The patient is a 81-year-old lady with back pain  going into her right leg. She is neurologically stable. Plan is to  obtain an MRI of her lumbar spine. Once the MRI is obtained, we will  finalize treatment plan. I have advised her that she would need to  exhaust all conservative therapeutic intervention prior to considering  any kind of surgical intervention as any surgical intervention would  involve a fusion operation.         Abilio Sheppard, MD    D: 07/06/2020 6:47:31       T: 07/06/2020 8:24:54     [email or web address]  Job#: [id number]     Doc#: [id number]    CC:

## 2020-07-06 NOTE — PROGRESS NOTES
Physical Therapy    Facility/Department: [address] [facility]  Initial Assessment    NAME: Kamron Ibarra  : 1945  MRN: [id number]    Date of Service: 2020    Physical therapy orders received/treatment attempted and chart review completed. Patient on hold for MRI and NS input and not able to be seen. Will attempt at a later time/date as able. Thank you for the opportunity to assist in the care of this patient.     Luis Landry, PT, DPT  License [id number]

## 2020-07-07 ENCOUNTER — ANESTHESIA (OUTPATIENT)
Dept: MRI IMAGING | Age: 75
End: 2020-07-07
Payer: MEDICARE

## 2020-07-07 ENCOUNTER — ANESTHESIA EVENT (OUTPATIENT)
Dept: MRI IMAGING | Age: 75
End: 2020-07-07
Payer: MEDICARE

## 2020-07-07 ENCOUNTER — APPOINTMENT (OUTPATIENT)
Dept: MRI IMAGING | Age: 75
End: 2020-07-07
Payer: MEDICARE

## 2020-07-07 VITALS
RESPIRATION RATE: 14 BRPM | OXYGEN SATURATION: 98 % | SYSTOLIC BLOOD PRESSURE: 140 MMHG | DIASTOLIC BLOOD PRESSURE: 54 MMHG

## 2020-07-07 PROBLEM — E66.9 OBESITY: Status: ACTIVE | Noted: 2020-07-07

## 2020-07-07 PROBLEM — I10 HTN (HYPERTENSION): Status: ACTIVE | Noted: 2020-07-07

## 2020-07-07 PROCEDURE — 6360000002 HC RX W HCPCS: Performed by: ANESTHESIOLOGIST ASSISTANT

## 2020-07-07 PROCEDURE — 3700000001 HC ADD 15 MINUTES (ANESTHESIA)

## 2020-07-07 PROCEDURE — 6370000000 HC RX 637 (ALT 250 FOR IP): Performed by: NEUROLOGICAL SURGERY

## 2020-07-07 PROCEDURE — 7100000001 HC PACU RECOVERY - ADDTL 15 MIN

## 2020-07-07 PROCEDURE — 6370000000 HC RX 637 (ALT 250 FOR IP): Performed by: FAMILY MEDICINE

## 2020-07-07 PROCEDURE — G0378 HOSPITAL OBSERVATION PER HR: HCPCS

## 2020-07-07 PROCEDURE — 99225 PR SBSQ OBSERVATION CARE/DAY 25 MINUTES: CPT | Performed by: NEUROLOGICAL SURGERY

## 2020-07-07 PROCEDURE — 7100000000 HC PACU RECOVERY - FIRST 15 MIN

## 2020-07-07 PROCEDURE — 3700000000 HC ANESTHESIA ATTENDED CARE

## 2020-07-07 PROCEDURE — 6370000000 HC RX 637 (ALT 250 FOR IP): Performed by: HOSPITALIST

## 2020-07-07 PROCEDURE — 6360000002 HC RX W HCPCS: Performed by: FAMILY MEDICINE

## 2020-07-07 PROCEDURE — 72148 MRI LUMBAR SPINE W/O DYE: CPT

## 2020-07-07 PROCEDURE — 96376 TX/PRO/DX INJ SAME DRUG ADON: CPT

## 2020-07-07 PROCEDURE — 2580000003 HC RX 258: Performed by: ANESTHESIOLOGIST ASSISTANT

## 2020-07-07 PROCEDURE — 2500000003 HC RX 250 WO HCPCS: Performed by: ANESTHESIOLOGIST ASSISTANT

## 2020-07-07 RX ORDER — KETAMINE HYDROCHLORIDE 10 MG/ML
INJECTION, SOLUTION INTRAMUSCULAR; INTRAVENOUS PRN
Status: DISCONTINUED | OUTPATIENT
Start: 2020-07-07 | End: 2020-07-07 | Stop reason: SDUPTHER

## 2020-07-07 RX ORDER — GLYCOPYRROLATE 1 MG/5 ML
SYRINGE (ML) INTRAVENOUS PRN
Status: DISCONTINUED | OUTPATIENT
Start: 2020-07-07 | End: 2020-07-07 | Stop reason: SDUPTHER

## 2020-07-07 RX ORDER — MIDAZOLAM HYDROCHLORIDE 1 MG/ML
INJECTION INTRAMUSCULAR; INTRAVENOUS PRN
Status: DISCONTINUED | OUTPATIENT
Start: 2020-07-07 | End: 2020-07-07 | Stop reason: SDUPTHER

## 2020-07-07 RX ORDER — MORPHINE SULFATE 2 MG/ML
2 INJECTION, SOLUTION INTRAMUSCULAR; INTRAVENOUS EVERY 4 HOURS PRN
Status: COMPLETED | OUTPATIENT
Start: 2020-07-07 | End: 2020-07-08

## 2020-07-07 RX ORDER — SODIUM CHLORIDE 9 MG/ML
INJECTION, SOLUTION INTRAVENOUS CONTINUOUS PRN
Status: DISCONTINUED | OUTPATIENT
Start: 2020-07-07 | End: 2020-07-07 | Stop reason: SDUPTHER

## 2020-07-07 RX ADMIN — PROPRANOLOL HYDROCHLORIDE 60 MG: 60 CAPSULE, EXTENDED RELEASE ORAL at 19:33

## 2020-07-07 RX ADMIN — LATANOPROST 1 DROP: 50 SOLUTION OPHTHALMIC at 19:32

## 2020-07-07 RX ADMIN — CYCLOBENZAPRINE 10 MG: 10 TABLET, FILM COATED ORAL at 12:00

## 2020-07-07 RX ADMIN — PANTOPRAZOLE SODIUM 40 MG: 40 TABLET, DELAYED RELEASE ORAL at 06:36

## 2020-07-07 RX ADMIN — GABAPENTIN 300 MG: 300 CAPSULE ORAL at 14:35

## 2020-07-07 RX ADMIN — DOCUSATE SODIUM 100 MG: 100 CAPSULE, LIQUID FILLED ORAL at 06:36

## 2020-07-07 RX ADMIN — Medication 0.2 MG: at 11:07

## 2020-07-07 RX ADMIN — MIDAZOLAM 2 MG: 1 INJECTION INTRAMUSCULAR; INTRAVENOUS at 11:08

## 2020-07-07 RX ADMIN — GABAPENTIN 300 MG: 300 CAPSULE ORAL at 19:32

## 2020-07-07 RX ADMIN — ALLOPURINOL 200 MG: 100 TABLET ORAL at 14:35

## 2020-07-07 RX ADMIN — LEVOTHYROXINE SODIUM 50 MCG: 0.05 TABLET ORAL at 06:36

## 2020-07-07 RX ADMIN — MORPHINE SULFATE 2 MG: 2 INJECTION, SOLUTION INTRAMUSCULAR; INTRAVENOUS at 15:09

## 2020-07-07 RX ADMIN — ATORVASTATIN CALCIUM 10 MG: 10 TABLET, FILM COATED ORAL at 19:32

## 2020-07-07 RX ADMIN — KETAMINE HYDROCHLORIDE 20 MG: 10 INJECTION INTRAMUSCULAR; INTRAVENOUS at 11:08

## 2020-07-07 RX ADMIN — OXYCODONE AND ACETAMINOPHEN 1 TABLET: 5; 325 TABLET ORAL at 06:36

## 2020-07-07 RX ADMIN — OXYCODONE AND ACETAMINOPHEN 1 TABLET: 5; 325 TABLET ORAL at 22:43

## 2020-07-07 RX ADMIN — LOSARTAN POTASSIUM 25 MG: 25 TABLET, FILM COATED ORAL at 14:35

## 2020-07-07 RX ADMIN — CYCLOBENZAPRINE 10 MG: 10 TABLET, FILM COATED ORAL at 22:43

## 2020-07-07 RX ADMIN — SODIUM CHLORIDE: 9 INJECTION, SOLUTION INTRAVENOUS at 10:59

## 2020-07-07 RX ADMIN — OXYCODONE AND ACETAMINOPHEN 1 TABLET: 5; 325 TABLET ORAL at 12:43

## 2020-07-07 ASSESSMENT — PAIN DESCRIPTION - FREQUENCY
FREQUENCY: CONTINUOUS
FREQUENCY: CONTINUOUS
FREQUENCY: INTERMITTENT
FREQUENCY: CONTINUOUS
FREQUENCY: CONTINUOUS

## 2020-07-07 ASSESSMENT — PAIN DESCRIPTION - DESCRIPTORS
DESCRIPTORS: BURNING;SHOOTING;DISCOMFORT
DESCRIPTORS: BURNING;CRAMPING
DESCRIPTORS: BURNING;SHOOTING;DISCOMFORT
DESCRIPTORS: CONSTANT;SHARP
DESCRIPTORS: BURNING;DISCOMFORT;ACHING

## 2020-07-07 ASSESSMENT — PAIN DESCRIPTION - ONSET
ONSET: GRADUAL
ONSET: ON-GOING

## 2020-07-07 ASSESSMENT — PAIN DESCRIPTION - LOCATION
LOCATION: BACK

## 2020-07-07 ASSESSMENT — PAIN SCALES - GENERAL
PAINLEVEL_OUTOF10: 10
PAINLEVEL_OUTOF10: 0
PAINLEVEL_OUTOF10: 8
PAINLEVEL_OUTOF10: 6
PAINLEVEL_OUTOF10: 0
PAINLEVEL_OUTOF10: 7
PAINLEVEL_OUTOF10: 10
PAINLEVEL_OUTOF10: 0

## 2020-07-07 ASSESSMENT — PAIN DESCRIPTION - ORIENTATION
ORIENTATION: LOWER
ORIENTATION: RIGHT
ORIENTATION: LOWER

## 2020-07-07 ASSESSMENT — PAIN DESCRIPTION - PAIN TYPE
TYPE: ACUTE PAIN
TYPE: ACUTE PAIN;CHRONIC PAIN
TYPE: ACUTE PAIN
TYPE: ACUTE PAIN

## 2020-07-07 ASSESSMENT — PAIN DESCRIPTION - PROGRESSION
CLINICAL_PROGRESSION: GRADUALLY WORSENING
CLINICAL_PROGRESSION: GRADUALLY WORSENING
CLINICAL_PROGRESSION: NOT CHANGED

## 2020-07-07 ASSESSMENT — PAIN - FUNCTIONAL ASSESSMENT: PAIN_FUNCTIONAL_ASSESSMENT: PREVENTS OR INTERFERES SOME ACTIVE ACTIVITIES AND ADLS

## 2020-07-07 ASSESSMENT — PAIN DESCRIPTION - DIRECTION: RADIATING_TOWARDS: R LEG/FOOT

## 2020-07-07 NOTE — PROGRESS NOTES
Dr. Luna Cons via [facility] to see if we are able to get a one time dose of pain medication for patient as she is still having an 8/10 pain after percocet.

## 2020-07-07 NOTE — PROGRESS NOTES
Occupational Therapy      OT consult received and appreciated. Chart reviewed. Will hold evaluation due to awaiting MRI results and NS input. Will evaluate at a later time. Thank you.    Isabel Hill, OTR/L #[id number]

## 2020-07-07 NOTE — PROGRESS NOTES
Department of Neurosurgery  Progress Note    CHIEF COMPLAINT: seen for Low back pain, right leg pain    SUBJECTIVE:  MRI with anesthesia today. Continued back and leg pain. No new issues overnight. REVIEW OF SYSTEMS :  Constitutional: Negative for chills and fever. Neurological: Negative for dizziness, tremors and speech change.      OBJECTIVE:   VITALS:  BP (!) 137/51   Pulse 50   Temp 97.4 °F (36.3 °C) (Temporal)   Resp 18   Ht 5' 5\" (1.651 m)   Wt 204 lb (92.5 kg)   SpO2 96%   BMI 33.95 kg/m²     PHYSICAL:  Neurologic:  Mental Status Exam:  Level of Alertness:   awake  Orientation:   person, place, time  Motor Exam:  Motor exam is symmetrical 5 out of 5 all extremities bilaterally  Sensory:  Sensory intact  Tenderness to palpation of lumbar spine        DATA:  CBC:   Lab Results   Component Value Date    WBC 7.1 10/20/2019    RBC 4.09 10/20/2019    HGB 11.9 10/20/2019    HCT 37.7 10/20/2019    MCV 92.2 10/20/2019    MCH 29.1 10/20/2019    MCHC 31.6 10/20/2019    RDW 14.0 10/20/2019     10/20/2019    MPV 9.9 10/20/2019     BMP:    Lab Results   Component Value Date     10/20/2019    K 4.1 10/20/2019     10/20/2019    CO2 26 10/20/2019    BUN 18 10/20/2019    LABALBU 4.4 08/22/2018    CREATININE 1.1 10/20/2019    CREATININE 1.1 06/20/2019    CALCIUM 9.9 10/20/2019    GFRAA 59 10/20/2019    LABGLOM 49 10/20/2019    GLUCOSE 172 10/20/2019     PT/INR:  No results found for: PROTIME, INR  PTT:  No results found for: APTT, PTT[APTT}    Current Inpatient Medications  Current Facility-Administered Medications: gabapentin (NEURONTIN) capsule 300 mg, 300 mg, Oral, TID  cyclobenzaprine (FLEXERIL) tablet 10 mg, 10 mg, Oral, TID PRN  docusate sodium (COLACE) capsule 100 mg, 100 mg, Oral, Daily  allopurinol (ZYLOPRIM) tablet 200 mg, 200 mg, Oral, Daily  atorvastatin (LIPITOR) tablet 10 mg, 10 mg, Oral, Daily  dicyclomine (BENTYL) capsule 20 mg, 20 mg, Oral, Q6H  furosemide (LASIX) tablet 20 mg,

## 2020-07-07 NOTE — PROGRESS NOTES
Patient admitted to PACU and placed on appropriate monitorsAirway patent at this time. Report obtained from CRNA. Warm blankets applied.

## 2020-07-07 NOTE — PROGRESS NOTES
Floor Called, nurse to nurse given. Spoke with Maral RN . Patients test results review, VS reported to receiving nurse. Any and all important information regarding patient disclosed.

## 2020-07-07 NOTE — PROCEDURES
7/7/20 8:22 am - Spoke to Dr Brady Denton from Anesthesia and he may have an availability at 10:30 am. Notified Los wes RN of this and we will check in again with anesthesia at 10 am to see if it is for certain or not. Otherwise, MRI will have to be scheduled for tomorrow with Anes.

## 2020-07-07 NOTE — PROGRESS NOTES
Physical Therapy    PT order received, chart reviewed and PT evaluation held this date. Pt awaiting lumbar MRI and NS POC/recommendations. Will re-attempt once received. Thank you for the opportunity to assist in the care of this patient.   Ernesto Holland, PT, DPT  License PT [id number]

## 2020-07-07 NOTE — ANESTHESIA POSTPROCEDURE EVALUATION
Department of Anesthesiology  Postprocedure Note    Patient: Lea Amin  MRN: [id number]  YOB: 1945  Date of evaluation: 7/7/2020  Time:  12:22 PM     Procedure Summary     Date:  07/07/20 Room / Location:  [address] [place] [address]    Anesthesia Start:  1059 Anesthesia Stop:  1138    Procedure:  MRI LUMBAR SPINE WO CONTRAST Diagnosis:  (back pain)    Scheduled Providers:   Responsible Provider:  Shahla Mayer MD    Anesthesia Type:  MAC ASA Status:  3          Anesthesia Type: No value filed. Moraima Phase I: Moraima Score: 10    Moraima Phase II:      Last vitals: Reviewed and per EMR flowsheets.        Anesthesia Post Evaluation    Patient location during evaluation: PACU  Patient participation: complete - patient participated  Level of consciousness: awake and alert  Pain score: 0  Airway patency: patent  Nausea & Vomiting: no vomiting and no nausea  Complications: no  Cardiovascular status: hemodynamically stable  Respiratory status: acceptable  Hydration status: stable

## 2020-07-07 NOTE — PROGRESS NOTES
Hospitalist Progress Note      PCP: Arlene Riley MD    Date of Admission: 7/5/2020    Chief Complaint: back pain    Hospital Course: Bindu Farmer is a 70 y.o. female presented to the emergency department due to recurrent low back pain. Patient had received injections in the past which usually last for a few months her last injection was in June 16, 2020. She states that the pain starts in the middle low back and comes around her buttocks to her lateral side of her thigh down to her mid leg. Patient states onset occurred last night while she was sleeping has progressively worsened during the day. Will place patient in observation on hospitalist service with neurosurgery in consultation for evaluation and treatment per request of family. Patient denies any problems with bowel or bladder. 7/7/20: no acute events overnight. Awaiting MRI with anesthesia before NS treatment plan can be provided-thank you for your recommendations. Subjective:    Patient sitting up in side of bed, states pain is controlled at the present time.      Medications:  Reviewed    Infusion Medications   Scheduled Medications    gabapentin  300 mg Oral TID    docusate sodium  100 mg Oral Daily    allopurinol  200 mg Oral Daily    atorvastatin  10 mg Oral Daily    dicyclomine  20 mg Oral Q6H    furosemide  20 mg Oral Daily    latanoprost  1 drop Left Eye Nightly    levothyroxine  50 mcg Oral Daily    losartan  25 mg Oral Daily    magnesium oxide  400 mg Oral Daily    pantoprazole  40 mg Oral QAM AC    oxybutynin  5 mg Oral Daily    propranolol  60 mg Oral Nightly     PRN Meds: cyclobenzaprine, oxyCODONE-acetaminophen      Intake/Output Summary (Last 24 hours) at 7/7/2020 0933  Last data filed at 7/7/2020 0654  Gross per 24 hour   Intake 480 ml   Output 0 ml   Net 480 ml       Exam:    BP (!) 169/66   Pulse 50   Temp 97.4 °F (36.3 °C) (Temporal)   Resp 16   Ht 5' 5\" (1.651 m)   Wt 204 lb (92.5 kg)   SpO2 93% BMI 33.95 kg/m²     General appearance: No apparent distress, appears stated age and cooperative. HEENT: Pupils equal, round, and reactive to light. Conjunctivae/corneas clear. Neck: Supple, with full range of motion. No jugular venous distention. Trachea midline. Respiratory:  Normal respiratory effort. Clear to auscultation, bilaterally without Rales/Wheezes/Rhonchi. Cardiovascular: Regular rate and rhythm with normal S1/S2 without murmurs, rubs or gallops. Abdomen: Soft, non-tender, non-distended with normal bowel sounds. Musculoskeletal: No clubbing, cyanosis or edema bilaterally. Full range of motion without deformity. Skin: Skin color, texture, turgor normal.  No rashes or lesions. Neurologic:  Neurovascularly intact without any focal sensory/motor deficits. Psychiatric: Alert and oriented, thought content appropriate, normal insight  Capillary Refill: Brisk,< 3 seconds   Peripheral Pulses: +2 palpable, equal bilaterally       Labs:   No results for input(s): WBC, HGB, HCT, PLT in the last 72 hours. No results for input(s): NA, K, CL, CO2, BUN, CREATININE, CALCIUM, PHOS in the last 72 hours. Invalid input(s): MAGNES  No results for input(s): AST, ALT, BILIDIR, BILITOT, ALKPHOS in the last 72 hours. No results for input(s): INR in the last 72 hours. No results for input(s): Dulcie Mean in the last 72 hours. XR Lumbar Spine Flex and Ext Only   Final Result   Minimal instability from extension to neutral to flexion   at L3-4 where a discrete degree of anterolisthesis, likely related to   the degenerative hypertrophic changes of the L3-4 facet joints. .      CT LUMBAR SPINE WO CONTRAST   Final Result    1. No fracture or joint dislocation. 2. Moderate central canal stenoses at L3-4 and L4-5.   3. Multilevel neural foraminal stenoses, worst (mild to moderate) at   L4-5 and L5-S1.       MRI LUMBAR SPINE WO CONTRAST    (Results Pending)       Assessment/Plan:    Active Hospital Problems Diagnosis Date Noted    Obesity [E66.9] 07/07/2020    HTN (hypertension) [I10] 07/07/2020    Acute midline low back pain with right-sided sciatica [M54.41] 07/06/2020    Intractable back pain [M54.9]     Back pain at L4-L5 level [M54.5] 07/05/2020     Plan  MRI  Recommendations per NS  Pain control  BP control  Neurovascular checks  Resume home medications      DVT Prophylaxis: lovenox  Diet: Diet NPO, After Midnight  Code Status: No Order    PT/OT Eval Status: ordered    Dispo - observation    Abelino Sweeney, CNP

## 2020-07-07 NOTE — CARE COORDINATION
7/7 Update CM Note: Patient with completion of MRI under anesthesia and awaiting the results. Plan remains to home at discharge. Will follow.  MICHELLET

## 2020-07-07 NOTE — ANESTHESIA PRE PROCEDURE
Department of Anesthesiology  Preprocedure Note       Name:  Tequila Bass   Age:  70 y.o.  :  1945                                          MRN:  [id number]         Date:  2020      Surgeon: * Surgery not found *    MRI with anesthesia    Medications prior to admission:   Prior to Admission medications    Medication Sig Start Date End Date Taking?  Authorizing Provider   dicyclomine (BENTYL) 20 MG tablet Take 20 mg by mouth every 6 hours   Yes Historical Provider, MD   atorvastatin (LIPITOR) 10 MG tablet Take 10 mg by mouth daily   Yes Historical Provider, MD   Methocarbamol (ROBAXIN PO) Take by mouth   Yes Historical Provider, MD   Magnesium Oxide (MAG- PO) Take by mouth daily   Yes Historical Provider, MD   furosemide (LASIX) 20 MG tablet Take 20 mg by mouth daily as needed    Yes Historical Provider, MD   KRILL OIL PO Take by mouth daily    Yes Historical Provider, MD   magnesium 30 MG tablet Take 30 mg by mouth daily    Yes Historical Provider, MD   Coenzyme Q10 (COQ10 PO) Take by mouth daily    Yes Historical Provider, MD   allopurinol (ZYLOPRIM) 100 MG tablet Take 200 mg by mouth daily    Yes Historical Provider, MD   oxybutynin (DITROPAN) 5 MG tablet Take 5 mg by mouth daily    Yes Historical Provider, MD   levothyroxine (SYNTHROID) 50 MCG tablet Take 50 mcg by mouth Daily Takes an extra 1/2 tab on sundays &    Yes Historical Provider, MD   omeprazole (PRILOSEC) 20 MG delayed release capsule Take 20 mg by mouth daily   Yes Historical Provider, MD   aspirin 81 MG tablet Take 81 mg by mouth daily   Yes Historical Provider, MD   glipiZIDE-metformin (METAGLIP) 2.5-500 MG per tablet Take 1 tablet by mouth daily    Yes Historical Provider, MD   losartan (COZAAR) 25 MG tablet Take 25 mg by mouth daily   Yes Historical Provider, MD   propranolol (INDERAL LA) 60 MG extended release capsule Take 60 mg by mouth nightly    Yes Historical Provider, MD   latanoprost (XALATAN) 0.005 % ophthalmic solution Place 1 drop into the left eye nightly   Yes Historical Provider, MD   BIOTIN PO Take by mouth daily   Yes Historical Provider, MD   Cholecalciferol (VITAMIN D3) 400 UNIT/ML LIQD Take by mouth daily   Yes Historical Provider, MD   COLCHICINE PO Take by mouth as needed    Historical Provider, MD       Current medications:    Current Facility-Administered Medications   Medication Dose Route Frequency Provider Last Rate Last Dose    gabapentin (NEURONTIN) capsule 300 mg  300 mg Oral TID Maritza Duggan MD   300 mg at 07/06/20 2012    cyclobenzaprine (FLEXERIL) tablet 10 mg  10 mg Oral TID PRN Maritza Duggan MD        docusate sodium (COLACE) capsule 100 mg  100 mg Oral Daily Satya Bhatia MD   100 mg at 07/07/20 0636    allopurinol (ZYLOPRIM) tablet 200 mg  200 mg Oral Daily Zayda Gonzalez MD   200 mg at 07/06/20 0841    atorvastatin (LIPITOR) tablet 10 mg  10 mg Oral Daily Zayda Gonzalez MD   10 mg at 07/06/20 2012    dicyclomine (BENTYL) capsule 20 mg  20 mg Oral Q6H Zayda Gonzalez MD        furosemide (LASIX) tablet 20 mg  20 mg Oral Daily Zayda Gonzalez MD        latanoprost (XALATAN) 0.005 % ophthalmic solution 1 drop  1 drop Left Eye Nightly Zayda Gonzalez MD   1 drop at 07/06/20 2012    levothyroxine (SYNTHROID) tablet 50 mcg  50 mcg Oral Daily Zayda Gonzalez MD   50 mcg at 07/07/20 0636    losartan (COZAAR) tablet 25 mg  25 mg Oral Daily Zayda Gonzalez MD   25 mg at 07/06/20 0841    magnesium oxide (MAG-OX) tablet 400 mg  400 mg Oral Daily Zayda Gonzalez MD   400 mg at 07/06/20 0841    pantoprazole (PROTONIX) tablet 40 mg  40 mg Oral QAM AC Zayda Gonzalez MD   40 mg at 07/07/20 0636    oxybutynin (DITROPAN) tablet 5 mg  5 mg Oral Daily Zayda Gonzalez MD   5 mg at 07/06/20 0841    propranolol (INDERAL LA) extended release capsule 60 mg  60 mg Oral Nightly Zayda Gonzalez MD   60 mg at 07/06/20 2012    oxyCODONE-acetaminophen (PERCOCET) 5-325 MG per tablet 1 tablet  1 tablet Oral Q6H PRN Autumn Barker MD   1 tablet at 07/07/20 0636       Allergies: Allergies   Allergen Reactions    Ace Inhibitors      Cough      Adhesive Tape     Epinephrine Other (See Comments)     Heart racing felt like I couldn't breath    Propoxyphene Other (See Comments)       Problem List:    Patient Active Problem List   Diagnosis Code    Left cataract H26.9    Right cataract H26.9    Dislocated IOL (intraocular lens) T85. 23XA    Back pain at L4-L5 level M54.5    Acute midline low back pain with right-sided sciatica M54.41    Intractable back pain M54.9    Obesity E66.9    HTN (hypertension) I10       Past Medical History:        Diagnosis Date    Anesthesia complication     difficulty breathing post thyroid surgery    Cancer ([address]     skin     Diabetes mellitus ([address]     GERD (gastroesophageal reflux disease)     Hyperlipidemia     Hypertension     Sleep apnea     uses cpap    Thyroid disease     Tremors of nervous system     familial       Past Surgical History:        Procedure Laterality Date    BLEPHAROPLASTY      CATARACT REMOVAL WITH IMPLANT Left 09/12/2017    CATARACT REMOVAL WITH IMPLANT Right 10/09/2018    CATARACT REMOVAL WITH IMPLANT Right 11/01/2018    secondary procedure to correct first cataract     CHOLECYSTECTOMY      COSMETIC SURGERY      abdominoplasty    FOOT SURGERY      HYSTERECTOMY      OVARIAN CYST REMOVAL       [facility] POST EYE IMPLNT MATER,INTRAOCUL Right 11/1/2018    EYE IOL REMOVAL performed by Ligia Floyd MD at [facility] INS IO LENS PROSTH W/O ECP Right 10/9/2018    RIGHT EYE CATARACT EMULSIFICATION IOL IMPLANT performed by Ligia Floyd MD at [address] [facility], PARTIAL      left lobe removed    TONSILLECTOMY      TUBAL LIGATION         Social History:    Social History     Tobacco Use    Smoking status: Never Smoker    Smokeless tobacco: Never Used   Substance Use Topics    Incoming Ekg Results From [facility]     Component Value Ref Range & Units Status Collected Lab   Ventricular Rate 60  BPM Final 10/20/2019  6:34 PM [facility]   Atrial Rate 60  BPM Final 10/20/2019  6:34 PM [facility]   P-R Interval 154  ms Final 10/20/2019  6:34 PM [facility]   QRS Duration 80  ms Final 10/20/2019  6:34 PM [facility]   Q-T Interval 436  ms Final 10/20/2019  6:34 PM [facility]   QTc Calculation (Bazett) 436  ms Final 10/20/2019  6:34 PM [facility]   P [place] 31  degrees Final 10/20/2019  6:34 PM [facility]   R [place] 0  degrees Final 10/20/2019  6:34 PM [facility]   T [place] 74  degrees Final 10/20/2019  6:34 PM [facility]   Testing Performed By     Lab - [address] Name Director Address Valid Date Range   360-[facility] MUSE Unknown Unknown 04/18/16 0721-Present   Narrative & Impression     Normal sinus rhythm with sinus arrhythmia  Normal ECG  When compared with ECG of 04-MAY-2019 14:52,  Sinus bradycardia resolved  Confirmed by Mayra Myles ([id number]) on 10/20/2019 10:04:03 PM     CT lumbar spine: 7/5/2020  Impression    1. No fracture or joint dislocation. 2. Moderate central canal stenoses at L3-4 and L4-5.   3. Multilevel neural foraminal stenoses, worst (mild to moderate) at   L4-5 and L5-S1. Anesthesia Evaluation  Patient summary reviewed and Nursing notes reviewed history of anesthetic complications (Dyspnea post thyroid surgery. ):   Airway: Mallampati: III  TM distance: <3 FB   Neck ROM: full  Mouth opening: > = 3 FB Dental: normal exam     Comment: Pt claims she has crowns and has had several chipped teeth filled in the past, but denies any chipped, cracked or loose teeth currently    Pulmonary:normal exam  breath sounds clear to auscultation  (+) sleep apnea: on CPAP,                             Cardiovascular:  Exercise tolerance: good (>4 METS),   (+) hypertension:, murmur (Gr II/VI murmur), hyperlipidemia        Rhythm: regular             Beta Blocker:  Not on Beta Blocker        PE comment: bradycardia   Neuro/Psych:                ROS comment: Tremors GI/Hepatic/Renal:   (+) GERD (with meds): well controlled,           Endo/Other:    (+) Diabetes (doesn't check sugar daily)Type II DM, , hypothyroidism::., .                  ROS comment: Cancer Skin  cataracts  Back pain at L4-L5 level Abdominal:   (+) obese,         Vascular: negative vascular ROS. Anesthesia Plan      MAC     ASA 3     (Discussed used of versed and ketamine with Dr. Lucy Hooper )  Induction: intravenous. Anesthetic plan and risks discussed with patient. Plan discussed with attending.

## 2020-07-08 VITALS
HEART RATE: 54 BPM | OXYGEN SATURATION: 92 % | RESPIRATION RATE: 16 BRPM | DIASTOLIC BLOOD PRESSURE: 108 MMHG | SYSTOLIC BLOOD PRESSURE: 183 MMHG | BODY MASS INDEX: 33.99 KG/M2 | HEIGHT: 65 IN | TEMPERATURE: 97 F | WEIGHT: 204 LBS

## 2020-07-08 LAB
INR BLD: 0.9
PLATELET # BLD: 168 E9/L (ref 130–450)
PROTHROMBIN TIME: 10.3 SEC (ref 9.3–12.4)

## 2020-07-08 PROCEDURE — 85049 AUTOMATED PLATELET COUNT: CPT

## 2020-07-08 PROCEDURE — 6360000002 HC RX W HCPCS: Performed by: RADIOLOGY

## 2020-07-08 PROCEDURE — 6370000000 HC RX 637 (ALT 250 FOR IP): Performed by: FAMILY MEDICINE

## 2020-07-08 PROCEDURE — 6360000002 HC RX W HCPCS: Performed by: FAMILY MEDICINE

## 2020-07-08 PROCEDURE — G0378 HOSPITAL OBSERVATION PER HR: HCPCS

## 2020-07-08 PROCEDURE — 6370000000 HC RX 637 (ALT 250 FOR IP): Performed by: NEUROLOGICAL SURGERY

## 2020-07-08 PROCEDURE — 85610 PROTHROMBIN TIME: CPT

## 2020-07-08 PROCEDURE — 6370000000 HC RX 637 (ALT 250 FOR IP): Performed by: HOSPITALIST

## 2020-07-08 PROCEDURE — 62323 NJX INTERLAMINAR LMBR/SAC: CPT

## 2020-07-08 PROCEDURE — 2500000003 HC RX 250 WO HCPCS: Performed by: RADIOLOGY

## 2020-07-08 PROCEDURE — 36415 COLL VENOUS BLD VENIPUNCTURE: CPT

## 2020-07-08 PROCEDURE — 96376 TX/PRO/DX INJ SAME DRUG ADON: CPT

## 2020-07-08 PROCEDURE — 99225 PR SBSQ OBSERVATION CARE/DAY 25 MINUTES: CPT | Performed by: NEUROLOGICAL SURGERY

## 2020-07-08 RX ORDER — CYCLOBENZAPRINE HCL 10 MG
10 TABLET ORAL 3 TIMES DAILY PRN
Qty: 15 TABLET | Refills: 0 | Status: SHIPPED | OUTPATIENT
Start: 2020-07-08 | End: 2020-07-13

## 2020-07-08 RX ORDER — BETAMETHASONE SODIUM PHOSPHATE AND BETAMETHASONE ACETATE 3; 3 MG/ML; MG/ML
INJECTION, SUSPENSION INTRA-ARTICULAR; INTRALESIONAL; INTRAMUSCULAR; SOFT TISSUE
Status: COMPLETED | OUTPATIENT
Start: 2020-07-08 | End: 2020-07-08

## 2020-07-08 RX ORDER — LIDOCAINE HYDROCHLORIDE 20 MG/ML
INJECTION, SOLUTION INFILTRATION; PERINEURAL
Status: COMPLETED | OUTPATIENT
Start: 2020-07-08 | End: 2020-07-08

## 2020-07-08 RX ORDER — OXYCODONE HYDROCHLORIDE AND ACETAMINOPHEN 5; 325 MG/1; MG/1
1 TABLET ORAL EVERY 6 HOURS PRN
Qty: 20 TABLET | Refills: 0 | Status: SHIPPED | OUTPATIENT
Start: 2020-07-08 | End: 2020-07-13

## 2020-07-08 RX ORDER — GABAPENTIN 300 MG/1
300 CAPSULE ORAL 3 TIMES DAILY
Qty: 15 CAPSULE | Refills: 0 | Status: SHIPPED | OUTPATIENT
Start: 2020-07-08 | End: 2020-09-09

## 2020-07-08 RX ADMIN — LIDOCAINE HYDROCHLORIDE 5 ML: 20 INJECTION, SOLUTION INFILTRATION; PERINEURAL at 18:17

## 2020-07-08 RX ADMIN — BETAMETHASONE SODIUM PHOSPHATE AND BETAMETHASONE ACETATE 18 MG: 3; 3 INJECTION, SUSPENSION INTRA-ARTICULAR; INTRALESIONAL; INTRAMUSCULAR at 18:19

## 2020-07-08 RX ADMIN — PANTOPRAZOLE SODIUM 40 MG: 40 TABLET, DELAYED RELEASE ORAL at 06:37

## 2020-07-08 RX ADMIN — MORPHINE SULFATE 2 MG: 2 INJECTION, SOLUTION INTRAMUSCULAR; INTRAVENOUS at 01:33

## 2020-07-08 RX ADMIN — GABAPENTIN 300 MG: 300 CAPSULE ORAL at 14:26

## 2020-07-08 RX ADMIN — FUROSEMIDE 20 MG: 20 TABLET ORAL at 08:45

## 2020-07-08 RX ADMIN — DOCUSATE SODIUM 100 MG: 100 CAPSULE, LIQUID FILLED ORAL at 08:46

## 2020-07-08 RX ADMIN — GABAPENTIN 300 MG: 300 CAPSULE ORAL at 08:46

## 2020-07-08 RX ADMIN — OXYCODONE AND ACETAMINOPHEN 1 TABLET: 5; 325 TABLET ORAL at 17:44

## 2020-07-08 RX ADMIN — LEVOTHYROXINE SODIUM 50 MCG: 0.05 TABLET ORAL at 06:37

## 2020-07-08 RX ADMIN — LOSARTAN POTASSIUM 25 MG: 25 TABLET, FILM COATED ORAL at 08:45

## 2020-07-08 RX ADMIN — ALLOPURINOL 200 MG: 100 TABLET ORAL at 08:46

## 2020-07-08 RX ADMIN — OXYCODONE AND ACETAMINOPHEN 1 TABLET: 5; 325 TABLET ORAL at 12:18

## 2020-07-08 RX ADMIN — OXYCODONE AND ACETAMINOPHEN 1 TABLET: 5; 325 TABLET ORAL at 06:38

## 2020-07-08 RX ADMIN — MAGNESIUM GLUCONATE 500 MG ORAL TABLET 400 MG: 500 TABLET ORAL at 08:45

## 2020-07-08 RX ADMIN — CYCLOBENZAPRINE 10 MG: 10 TABLET, FILM COATED ORAL at 17:43

## 2020-07-08 RX ADMIN — OXYBUTYNIN CHLORIDE 5 MG: 5 TABLET ORAL at 08:45

## 2020-07-08 RX ADMIN — MORPHINE SULFATE 2 MG: 2 INJECTION, SOLUTION INTRAMUSCULAR; INTRAVENOUS at 10:01

## 2020-07-08 ASSESSMENT — PAIN SCALES - GENERAL
PAINLEVEL_OUTOF10: 9
PAINLEVEL_OUTOF10: 5
PAINLEVEL_OUTOF10: 10
PAINLEVEL_OUTOF10: 7

## 2020-07-08 NOTE — DISCHARGE SUMMARY
Hospital Medicine Discharge Summary    Patient: Ronald Villeda     Gender: female  : 1945   Age: 70 y.o. MRN: [id number]    Admitting Physician: Kamini Telles MD  Discharge Physician: PERLITA Segura - CNS, CNP    Code Status: No Order     Admit Date: 2020   Discharge Date:   20     Disposition:  Home    Discharge Diagnoses: Active Hospital Problems    Diagnosis Date Noted    Obesity [E66.9] 2020    HTN (hypertension) [I10] 2020    Acute midline low back pain with right-sided sciatica [M54.41] 2020    Intractable back pain [M54.9]     Back pain at L4-L5 level [M54.5] 2020       Follow-up appointments:  one week    Outpatient to do list: medications as ordered    Condition at Discharge:  Stable    Hospital Course: Thepatient is a 80 y. o. female presented to the emergency department due to recurrent low back pain.  Patient had received injections in the past which usually last for a few months her last injection was in 2020.  She states that the pain starts in the middle low back and comes around her buttocks to her lateral side of her thigh down to her mid leg.  Patient states onset occurred last night while she was sleeping has progressively worsened during the day.  Will place patient in observation on hospitalist service with neurosurgery in consultation for evaluation and treatment per request of family. Alon Bullock denies any problems with bowel or bladder. 20: no acute events overnight.  Awaiting MRI with anesthesia before NS treatment plan can be provided-thank you for your recommendations    Discharge Medications:   Current Discharge Medication List        Current Discharge Medication List        Current Discharge Medication List      CONTINUE these medications which have NOT CHANGED    Details   dicyclomine (BENTYL) 20 MG tablet Take 20 mg by mouth every 6 hours      atorvastatin (LIPITOR) 10 MG tablet Take 10 mg by mouth daily Methocarbamol (ROBAXIN PO) Take by mouth      Magnesium Oxide (MAG- PO) Take by mouth daily      furosemide (LASIX) 20 MG tablet Take 20 mg by mouth daily as needed       KRILL OIL PO Take by mouth daily       magnesium 30 MG tablet Take 30 mg by mouth daily       Coenzyme Q10 (COQ10 PO) Take by mouth daily       allopurinol (ZYLOPRIM) 100 MG tablet Take 200 mg by mouth daily       oxybutynin (DITROPAN) 5 MG tablet Take 5 mg by mouth daily       levothyroxine (SYNTHROID) 50 MCG tablet Take 50 mcg by mouth Daily Takes an extra 1/2 tab on sundays & thursdays      omeprazole (PRILOSEC) 20 MG delayed release capsule Take 20 mg by mouth daily      aspirin 81 MG tablet Take 81 mg by mouth daily      glipiZIDE-metformin (METAGLIP) 2.5-500 MG per tablet Take 1 tablet by mouth daily       losartan (COZAAR) 25 MG tablet Take 25 mg by mouth daily      propranolol (INDERAL LA) 60 MG extended release capsule Take 60 mg by mouth nightly       latanoprost (XALATAN) 0.005 % ophthalmic solution Place 1 drop into the left eye nightly      BIOTIN PO Take by mouth daily      Cholecalciferol (VITAMIN D3) 400 UNIT/ML LIQD Take by mouth daily      COLCHICINE PO Take by mouth as needed           Current Discharge Medication List          Discharge ROS:  A complete review of systems was asked and negative except for back pain    Discharge Exam:    /69   Pulse (!) 48   Temp 97.7 °F (36.5 °C) (Temporal)   Resp 18   Ht 5' 5\" (1.651 m)   Wt 204 lb (92.5 kg)   SpO2 95%   BMI 33.95 kg/m²   General appearance:  NAD  HEENT:   Normal cephalic, atraumatic, moist mucous membranes, no oropharyngeal erythema or exudate  Neck: Supple, trachea midline, no anterior cervical or SC LAD  Heart[de-identified] Normal s1/s2, RRR, no murmurs, gallops, or rubs. no leg edema  Lungs:  clear bilaterally, no wheeze, no rales or rhonchi, no use of accessory musclesNormal respiratory effort.  Clear to auscultation, bilaterally without Severe loss of disc height. Mild facet hypertrophy. No central canal stenosis. Mild lateral recess stenoses. Mild to moderate neural foraminal stenoses. 1. No fracture or joint dislocation. 2. Moderate central canal stenoses at L3-4 and L4-5. 3. Multilevel neural foraminal stenoses, worst (mild to moderate) at L4-5 and L5-S1. Mri Lumbar Spine Wo Contrast    Result Date: 7/7/2020  Clinical indications: Degenerative disc disease. Degenerative joint disease. Back pain. Back pain with sciatica. Back pain with radiculopathy. Intractable back pain. COMPARISON: Conventional radiographs of the lumbar spine July 5, 2020. CT of the lumbar spine July 5, 2020. TECHNIQUE: Multiplanar multisequence MRI of the lumbar spine was performed without contrast. FINDINGS: There is no evidence of fracture or spondylolisthesis. Slight anterolisthesis of L3 on the prior radiographs appears to be reduced in the supine position. There is diffuse disc dehydration and loss of disc height. Diffuse degenerative spondylosis and facet arthropathy are present. Conus terminates at L1. T12-L1: No evidence for central or neuroforaminal canal stenosis or neural impingement. L1-2: No evidence for central or neuroforaminal canal stenosis or neural impingement. L2-3: No evidence for central or neuroforaminal canal stenosis or neural impingement. L3-4: Mild central spinal canal stenosis due to circumferential disc bulge, ligament flavum hypertrophy and degenerative facet arthropathy. No definite foraminal stenosis. L4-5: Moderate central spinal canal stenosis due to circumferential disc bulge, ligament flavum hypertrophy and degenerative facet arthropathy. A prominent right paracentral component of circumferential disc bulge is exerts mass effect on the right L5 nerve root as it attempts to exit the thecal sac and the lateral recess. Less severe but similar process on the left with slight effacement of the left L5 nerve root.  No left foraminal stenosis. Mild stenosis of right neural foramen due to posterior lateral disc and osteophyte complex and degenerative facet arthropathy. L5-S1: No central canal stenosis. No evidence of foraminal stenosis or neural impingement. Central spinal canal stenosis at L3-4 and L4-5. Neural foraminal canal stenosis is present on the right at L4-5. Mass effect on bilateral L5 nerve roots at L4-5. Xr Lumbar Spine Flex And Ext Only    Result Date: 2020  Patient MRN:  [id number] : 1945 Age: 70 years Gender: Female Order Date:  2020 5:45 PM TECHNIQUE/NUMBER OF IMAGES/COMPARISON/CLINICAL HISTORY: X-ray series of the lumbar spine frontal and lateral views with additional flexion and extension views. History for evaluation of instability. Reviewed the images CT lumbar spine of . Same day. FINDINGS: There are discrete instability observed at the level of L3-4 where there is a discrete anterior position of L3 in relation to L4. The measurements of the anterolisthesis is about the 2.3 mm in extension, 2.8 mm in neutral, and 3.1 mm in flexion. Degenerative disc disease without indication for instability are seen in the levels of L4-5, L5-S1, and at L2-3, L1-2, T12-L1. Mild spondylosis are seen in degenerative changes in the intervertebral discs is of L5-S1, L3-4, L2-3. The alignment is preserved in the sagittal images. The pedicles spinous process to assess process intact. Degenerative changes are mainly seen in the facet joints of L3-L4 which are better demonstrated on the CT scan of the lumbar spine. Minimal instability from extension to neutral to flexion at L3-4 where a discrete degree of anterolisthesis, likely related to the degenerative hypertrophic changes of the L3-4 facet joints. .        The patient was seen and examined on day of discharge and this discharge summary is in conjunction with any daily progress note from day of discharge. Time Spent on discharge is 30 minutes  in the examination, evaluation, counseling and review of medications and discharge plan. Note that more than 30 minutes was spent in preparing discharge papers, discussing discharge with patient, medication review, etc.       Signed:    Radha Decker, APRN - CNS, CNP   7/8/2020      Thank you Arlene Riley MD for the opportunity to be involved in this patient's care.  If you have any questions or concerns please feel free to contact me at [phone]

## 2020-07-08 NOTE — CARE COORDINATION
7/8 CM Note: patient is scheduled for epidural injection for sometime today. Plan is for patient to discharge post injection if stable per pcp.  VAHE

## 2020-07-08 NOTE — INTERVAL H&P NOTE
H&P Update    Patient's History and Physical  was reviewed. The patient appears likely to able to tolerate the procedure. Risk and benefits discussed including ultimate complications, possibly death and consent obtained.     Nuha Melo, II

## 2020-07-08 NOTE — BRIEF OP NOTE
Brief Postoperative Note    Bufford Babinski  YOB: 1945  [id number]    Pre-operative Diagnosis and Procedure: epidural    Post-operative Diagnosis: Same    Anesthesia: Local    Estimated Blood Loss: < 10 cc    Surgeon: Trish ABDI     Complications: none    Specimen obtained: none     Findings: none     Shankar Nuñez II   7/8/2020 6:13 PM

## 2020-07-08 NOTE — PROGRESS NOTES
Department of Neurosurgery  Progress Note    CHIEF COMPLAINT: seen for Low back pain, right leg pain    SUBJECTIVE:   Continued back and leg pain. Some improvement with gabapentin    REVIEW OF SYSTEMS :  Constitutional: Negative for chills and fever. Neurological: Negative for dizziness, tremors and speech change.      OBJECTIVE:   VITALS:  /69   Pulse (!) 48   Temp 97.7 °F (36.5 °C) (Temporal)   Resp 18   Ht 5' 5\" (1.651 m)   Wt 204 lb (92.5 kg)   SpO2 95%   BMI 33.95 kg/m²     PHYSICAL:  Neurologic:  Mental Status Exam:  Level of Alertness:   awake  Orientation:   person, place, time  Motor Exam:  Motor exam is symmetrical 5 out of 5 all extremities bilaterally  Sensory:  Sensory intact  Tenderness to palpation of lumbar spine        DATA:  CBC:   Lab Results   Component Value Date    WBC 7.1 10/20/2019    RBC 4.09 10/20/2019    HGB 11.9 10/20/2019    HCT 37.7 10/20/2019    MCV 92.2 10/20/2019    MCH 29.1 10/20/2019    MCHC 31.6 10/20/2019    RDW 14.0 10/20/2019     10/20/2019    MPV 9.9 10/20/2019     BMP:    Lab Results   Component Value Date     10/20/2019    K 4.1 10/20/2019     10/20/2019    CO2 26 10/20/2019    BUN 18 10/20/2019    LABALBU 4.4 08/22/2018    CREATININE 1.1 10/20/2019    CREATININE 1.1 06/20/2019    CALCIUM 9.9 10/20/2019    GFRAA 59 10/20/2019    LABGLOM 49 10/20/2019    GLUCOSE 172 10/20/2019     PT/INR:  No results found for: PROTIME, INR  PTT:  No results found for: APTT, PTT[APTT}    Current Inpatient Medications  Current Facility-Administered Medications: morphine (PF) injection 2 mg, 2 mg, Intravenous, Q4H PRN  gabapentin (NEURONTIN) capsule 300 mg, 300 mg, Oral, TID  cyclobenzaprine (FLEXERIL) tablet 10 mg, 10 mg, Oral, TID PRN  docusate sodium (COLACE) capsule 100 mg, 100 mg, Oral, Daily  allopurinol (ZYLOPRIM) tablet 200 mg, 200 mg, Oral, Daily  atorvastatin (LIPITOR) tablet 10 mg, 10 mg, Oral, Daily  dicyclomine (BENTYL) capsule 20 mg, 20 mg, Oral, Q6H  furosemide (LASIX) tablet 20 mg, 20 mg, Oral, Daily  latanoprost (XALATAN) 0.005 % ophthalmic solution 1 drop, 1 drop, Left Eye, Nightly  levothyroxine (SYNTHROID) tablet 50 mcg, 50 mcg, Oral, Daily  losartan (COZAAR) tablet 25 mg, 25 mg, Oral, Daily  magnesium oxide (MAG-OX) tablet 400 mg, 400 mg, Oral, Daily  pantoprazole (PROTONIX) tablet 40 mg, 40 mg, Oral, QAM AC  oxybutynin (DITROPAN) tablet 5 mg, 5 mg, Oral, Daily  propranolol (INDERAL LA) extended release capsule 60 mg, 60 mg, Oral, Nightly  oxyCODONE-acetaminophen (PERCOCET) 5-325 MG per tablet 1 tablet, 1 tablet, Oral, Q6H PRN    ASSESSMENT:   L3-L4 spondylolisthesis, right L4-5 disc herniation on MRI    PLAN:  -consult IR for KATALINA  -Pain control  -PT/OT  -WBAT        Electronically signed by MERI Clark on 7/8/2020 at 9:17 AM     I have interviewed and examined the patient and agree with above. She has stenosis and listhesis at L3-L4 and L4-L5.   Will consider another KATALINA    Andrea Everett

## 2020-07-09 ENCOUNTER — APPOINTMENT (OUTPATIENT)
Dept: INTERVENTIONAL RADIOLOGY/VASCULAR | Age: 75
End: 2020-07-09
Payer: MEDICARE

## 2020-07-09 PROCEDURE — 2709999900 IR INJ INTERLAMINAR EPI/SUBARACHNOID LUMB/SAC

## 2020-07-10 ENCOUNTER — TELEPHONE (OUTPATIENT)
Dept: NEUROSURGERY | Age: 75
End: 2020-07-10

## 2020-07-10 NOTE — TELEPHONE ENCOUNTER
Pt's daughter Hortencia Atkinson called and stated mom was recently released from hospital and she was to get an order for PT/OT but never got one. Also wondering when she needs to f/u in clinic. Nuvia's phone # [phone].

## 2020-07-31 VITALS
BODY MASS INDEX: 33.95 KG/M2 | RESPIRATION RATE: 14 BRPM | DIASTOLIC BLOOD PRESSURE: 70 MMHG | HEART RATE: 78 BPM | SYSTOLIC BLOOD PRESSURE: 122 MMHG | TEMPERATURE: 96.6 F | WEIGHT: 204 LBS

## 2020-08-04 ENCOUNTER — OFFICE VISIT (OUTPATIENT)
Dept: NEUROSURGERY | Age: 75
End: 2020-08-04
Payer: MEDICARE

## 2020-08-04 VITALS — WEIGHT: 205 LBS | BODY MASS INDEX: 34.16 KG/M2 | TEMPERATURE: 97.5 F | HEIGHT: 65 IN

## 2020-08-04 PROCEDURE — 99213 OFFICE O/P EST LOW 20 MIN: CPT | Performed by: NEUROLOGICAL SURGERY

## 2020-08-04 RX ORDER — GABAPENTIN 600 MG
600 TABLET ORAL 3 TIMES DAILY
Qty: 90 TABLET | Refills: 3
Start: 2020-08-04 | End: 2020-09-09

## 2020-08-04 RX ORDER — GABAPENTIN 600 MG
600 TABLET ORAL 3 TIMES DAILY
Qty: 90 TABLET | Refills: 3 | Status: SHIPPED
Start: 2020-08-04 | End: 2020-12-08 | Stop reason: SDUPTHER

## 2020-08-04 NOTE — PROGRESS NOTES
Patient is here for follow up from a hospital consult for: back and leg pain. Physical exam  Alert and Oriented X3  PERRLA, EOMI  WHEELER 5/5  Sensation intact to LT and PP  Reflexes are 2+ and symmetric    A/P: patient is here for follow up for: back and leg pain. Her MRI shows a spondylolisthesis at L3-L4 with stenosis from L3-L5. She wishes to try more PT and increase in her gabapentin.   If this fails, she will need an L3-L4 and L4-L5 PLIF Follow up carlos Laws (0) understands/communicates without difficulty

## 2020-08-10 ENCOUNTER — TELEPHONE (OUTPATIENT)
Dept: NEUROSURGERY | Age: 75
End: 2020-08-10

## 2020-08-20 ENCOUNTER — PREP FOR PROCEDURE (OUTPATIENT)
Dept: NEUROSURGERY | Age: 75
End: 2020-08-20

## 2020-08-20 RX ORDER — SODIUM CHLORIDE 0.9 % (FLUSH) 0.9 %
10 SYRINGE (ML) INJECTION PRN
Status: CANCELLED | OUTPATIENT
Start: 2020-08-20

## 2020-08-20 RX ORDER — SODIUM CHLORIDE 9 MG/ML
INJECTION, SOLUTION INTRAVENOUS CONTINUOUS
Status: CANCELLED | OUTPATIENT
Start: 2020-08-20

## 2020-08-20 RX ORDER — SODIUM CHLORIDE 0.9 % (FLUSH) 0.9 %
10 SYRINGE (ML) INJECTION EVERY 12 HOURS SCHEDULED
Status: CANCELLED | OUTPATIENT
Start: 2020-08-20

## 2020-08-28 VITALS
DIASTOLIC BLOOD PRESSURE: 72 MMHG | HEART RATE: 56 BPM | RESPIRATION RATE: 14 BRPM | BODY MASS INDEX: 34.78 KG/M2 | WEIGHT: 209 LBS | TEMPERATURE: 96.4 F | SYSTOLIC BLOOD PRESSURE: 120 MMHG

## 2020-08-31 NOTE — PROGRESS NOTES
Patient having covid testing at [facility] 9/9/20. Patient asked to  bring ID and to self quarantine until day of procedure.

## 2020-09-09 ENCOUNTER — HOSPITAL ENCOUNTER (OUTPATIENT)
Age: 75
Discharge: HOME | End: 2020-09-11
Payer: MEDICARE

## 2020-09-09 PROCEDURE — U0003 INFECTIOUS AGENT DETECTION BY NUCLEIC ACID (DNA OR RNA); SEVERE ACUTE RESPIRATORY SYNDROME CORONAVIRUS 2 (SARS-COV-2) (CORONAVIRUS DISEASE [COVID-19]), AMPLIFIED PROBE TECHNIQUE, MAKING USE OF HIGH THROUGHPUT TECHNOLOGIES AS DESCRIBED BY CMS-2020-01-R: HCPCS

## 2020-09-09 RX ORDER — PROPRANOLOL HYDROCHLORIDE 10 MG/1
TABLET ORAL EVERY EVENING
COMMUNITY
Start: 2019-06-25 | End: 2021-02-03 | Stop reason: SDUPTHER

## 2020-09-09 NOTE — PROGRESS NOTES
Den [facility] PRE-ADMISSION TESTING GENERAL INSTRUCTIONS- [facility]-phone number:[phone]    GENERAL INSTRUCTIONS  [x] Antibacterial Soap shower Night before Surgery  [x] Ld wipe instruction sheet and wipes given. [x] Nothing by mouth after midnight, including gum, candy, mints, or water. [x] You may brush your teeth, gargle, but do NOT swallow water. [x]No smoking, chewing tobacco, illegal drugs, or alcohol within 24 hours of your surgery. [x] Jewelry, valuables or body piercing's should not be brought to the hospital. All body and/or tongue piercing's must be removed prior to arriving to hospital.  ALL hair pins must be removed. [x] Do not wear makeup, lotions, powders, deodorant. Nail polish as directed by the nurse. [x] Arrange transportation with a responsible adult  to and from the hospital.  [x] Bring insurance card and photo ID. [x] Transfusion Bracelet: Please bring with you to hospital, day of surgery  [x] Bring copy of living will or healthcare power of  papers to be placed in your electronic record. [x] CPAP/BI-PAP: Please bring your machine if you are to spend the night in the hospital.     PARKING INSTRUCTIONS:   [x] Arrival Time:____0500 for surgery 9-14 with dr Bill Galeana via [address] door          [x] To reach the [facility] from [facility], upon entering the hospital, take elevator B to the 3rd floor. EDUCATION INSTRUCTIONS:    [x] Pre-admission Testing educational folder given  [x] Incentive Spirometry,coughing & deep breathing exercises reviewed. [x]Fluoroscopy-Xray used in surgery reviewed with patient. Educational pamphlet placed in chart. [x]Pain: Post-op pain is normal and to be expected. You will be asked to rate your pain from 0-10(a zero is not acceptable-education is needed). Your post-op pain goal is:  [x] Ask your nurse for your pain medication.   [] Other:___________________________    MEDICATION INSTRUCTIONS:   [x]Bring a complete list of your medications, please write the last time you took the medicine, give this list to the nurse. GIVEN SEE LIST  [x] Take the following medications the morning of surgery with 1-2 ounces of water:   [x] Stop herbal supplements and vitamins 5 days before your surgery. [x] DO NOT take any diabetic medicine the morning of surgery. [x] If you are diabetic and your blood sugar is low or you feel symptomatic, you may drink 1-2 ounces of apple juice or take a glucose tablet. The morning of your procedure, you may call the pre-op area if you have concerns about your blood sugar [phone].   [] [x] Follow physician instructions regarding any blood thinners you may be taking. WHAT TO EXPECT:  [x] The day of surgery you will be greeted and checked in by the [employer].  In addition, you will be registered in the [facility] by a Patient Access Representative. Please bring your photo ID and insurance card. A nurse will greet you in accordance to the time you are needed in the pre-op area to prepare you for surgery. Please do not be discouraged if you are not greeted in the order you arrive as there are many variables that are involved in patient preparation. Your patience is greatly appreciated as you wait for your nurse. Please bring in items such as: books, magazines, newspapers, electronics, or any other items  to occupy your time in the waiting area. Bertha Pickard

## 2020-09-10 ENCOUNTER — HOSPITAL ENCOUNTER (OUTPATIENT)
Dept: GENERAL RADIOLOGY | Age: 75
Discharge: HOME | End: 2020-09-12
Payer: MEDICARE

## 2020-09-10 ENCOUNTER — HOSPITAL ENCOUNTER (OUTPATIENT)
Dept: PREADMISSION TESTING | Age: 75
Discharge: HOME | End: 2020-09-10
Payer: MEDICARE

## 2020-09-10 VITALS
HEART RATE: 58 BPM | RESPIRATION RATE: 20 BRPM | BODY MASS INDEX: 35.82 KG/M2 | OXYGEN SATURATION: 97 % | HEIGHT: 65 IN | TEMPERATURE: 98.1 F | DIASTOLIC BLOOD PRESSURE: 58 MMHG | WEIGHT: 215 LBS | SYSTOLIC BLOOD PRESSURE: 132 MMHG

## 2020-09-10 LAB
ABO/RH: NORMAL
ANION GAP SERPL CALCULATED.3IONS-SCNC: 12 MMOL/L (ref 7–16)
ANTIBODY SCREEN: NORMAL
BACTERIA: ABNORMAL /HPF
BASOPHILS ABSOLUTE: 0.04 E9/L (ref 0–0.2)
BASOPHILS RELATIVE PERCENT: 0.7 % (ref 0–2)
BILIRUBIN URINE: NEGATIVE
BLOOD, URINE: NEGATIVE
BUN BLDV-MCNC: 23 MG/DL (ref 8–23)
CALCIUM SERPL-MCNC: 9.2 MG/DL (ref 8.6–10.2)
CHLORIDE BLD-SCNC: 102 MMOL/L (ref 98–107)
CLARITY: CLEAR
CO2: 23 MMOL/L (ref 22–29)
COLOR: YELLOW
CREAT SERPL-MCNC: 0.9 MG/DL (ref 0.5–1)
EOSINOPHILS ABSOLUTE: 0.19 E9/L (ref 0.05–0.5)
EOSINOPHILS RELATIVE PERCENT: 3.3 % (ref 0–6)
EPITHELIAL CELLS, UA: ABNORMAL /HPF
GFR AFRICAN AMERICAN: >60
GFR NON-AFRICAN AMERICAN: >60 ML/MIN/1.73
GLUCOSE BLD-MCNC: 195 MG/DL (ref 74–99)
GLUCOSE URINE: NEGATIVE MG/DL
HCT VFR BLD CALC: 35.3 % (ref 34–48)
HEMOGLOBIN: 11.4 G/DL (ref 11.5–15.5)
IMMATURE GRANULOCYTES #: 0.04 E9/L
IMMATURE GRANULOCYTES %: 0.7 % (ref 0–5)
INR BLD: 1
KETONES, URINE: NEGATIVE MG/DL
LEUKOCYTE ESTERASE, URINE: ABNORMAL
LYMPHOCYTES ABSOLUTE: 1.49 E9/L (ref 1.5–4)
LYMPHOCYTES RELATIVE PERCENT: 25.7 % (ref 20–42)
MCH RBC QN AUTO: 30.1 PG (ref 26–35)
MCHC RBC AUTO-ENTMCNC: 32.3 % (ref 32–34.5)
MCV RBC AUTO: 93.1 FL (ref 80–99.9)
MONOCYTES ABSOLUTE: 0.59 E9/L (ref 0.1–0.95)
MONOCYTES RELATIVE PERCENT: 10.2 % (ref 2–12)
NEUTROPHILS ABSOLUTE: 3.44 E9/L (ref 1.8–7.3)
NEUTROPHILS RELATIVE PERCENT: 59.4 % (ref 43–80)
NITRITE, URINE: NEGATIVE
PDW BLD-RTO: 14.4 FL (ref 11.5–15)
PH UA: 6 (ref 5–9)
PLATELET # BLD: 161 E9/L (ref 130–450)
PMV BLD AUTO: 10 FL (ref 7–12)
POTASSIUM REFLEX MAGNESIUM: 4.3 MMOL/L (ref 3.5–5)
PROTEIN UA: NEGATIVE MG/DL
PROTHROMBIN TIME: 10.8 SEC (ref 9.3–12.4)
RBC # BLD: 3.79 E12/L (ref 3.5–5.5)
RBC UA: ABNORMAL /HPF (ref 0–2)
SODIUM BLD-SCNC: 137 MMOL/L (ref 132–146)
SPECIFIC GRAVITY UA: 1.02 (ref 1–1.03)
UROBILINOGEN, URINE: 0.2 E.U./DL
WBC # BLD: 5.8 E9/L (ref 4.5–11.5)
WBC UA: ABNORMAL /HPF (ref 0–5)

## 2020-09-10 PROCEDURE — 85610 PROTHROMBIN TIME: CPT

## 2020-09-10 PROCEDURE — 87081 CULTURE SCREEN ONLY: CPT

## 2020-09-10 PROCEDURE — 86901 BLOOD TYPING SEROLOGIC RH(D): CPT

## 2020-09-10 PROCEDURE — 36415 COLL VENOUS BLD VENIPUNCTURE: CPT

## 2020-09-10 PROCEDURE — 71046 X-RAY EXAM CHEST 2 VIEWS: CPT

## 2020-09-10 PROCEDURE — 86923 COMPATIBILITY TEST ELECTRIC: CPT

## 2020-09-10 PROCEDURE — 86850 RBC ANTIBODY SCREEN: CPT

## 2020-09-10 PROCEDURE — 86900 BLOOD TYPING SEROLOGIC ABO: CPT

## 2020-09-10 PROCEDURE — 87088 URINE BACTERIA CULTURE: CPT

## 2020-09-10 PROCEDURE — 80048 BASIC METABOLIC PNL TOTAL CA: CPT

## 2020-09-10 PROCEDURE — 85025 COMPLETE CBC W/AUTO DIFF WBC: CPT

## 2020-09-10 PROCEDURE — 81001 URINALYSIS AUTO W/SCOPE: CPT

## 2020-09-10 NOTE — H&P
HISTORY OF PRESENT ILLNESS:  The patient is a 43-year-old lady who  presents with back pain radiating to her right leg. This has been going  on since last year. She has actually tried two caudal epidural  injections. She has also tried some NSAIDs and some oral narcotic pain  medication without any significant relief. The pain got significantly  worse on Thursday, and she was unable to ambulate. She presented to the  emergency room yesterday. She denies any new weakness or loss of  control of bowel or bladder function, but does have some numbness in her  right lower extremity.     PAST MEDICAL HISTORY:  Positive for tremors, thyroid disorder, sleep  apnea, hypertension, hyperlipidemia, gastroesophageal reflux, diabetes.     PAST SURGICAL HISTORY:  Positive for tubal ligation, tonsillectomy,  thyroidectomy, skin biopsy, foot surgery, cholecystectomy, cataract  removal and implant.     FAMILY HISTORY:  Positive for heart disease in her father and cancer in  her mother.     SOCIAL HISTORY:  Negative for tobacco use.     ALLERGIES:  Include ACE INHIBITORS and ADHESIVE TAPE.     REVIEW OF SYSTEMS:  HEENT:  Negative for headache, double vision, or  blurry vision. CARDIOVASCULAR:  Negative for chest pain, arrhythmia or  palpitations. RESPIRATORY:  Negative for shortness of breath, asthma,  bronchitis or pneumonia. GASTROINTESTINAL:  Positive for  gastroesophageal reflux. GENITOURINARY:  Negative for hematuria or  dysuria. HEMATOLOGIC:  Positive for easy bruising. INFECTIOUS:   Negative for any recent infection. MUSCULOSKELETAL:  Positive for back  pain. PSYCHIATRIC:  Negative for anxiety or depression. NEUROLOGIC:   Negative for seizure or stroke. ENDOCRINE:  Positive for thyroid  disorder and diabetes.     PHYSICAL EXAMINATION:  VITAL SIGNS:  She is currently afebrile with a T-current of 36.1 degrees  Celsius, respiratory rate is 18, pulse 65, blood pressure is 157/69. GENERAL:  She is resting in bed. Appears to be in mild-to-moderate  distress secondary to pain. Appears her stated age. HEENT:  Her head is normocephalic and atraumatic. Pupils are 3 to 2 mm  and reactive. She has no drainage out of her eyes, ears, nose or  throat. SKIN:  Warm and dry. MUSCULOSKELETAL:  She has got good range of motion in her bilateral  upper and lower extremities. ABDOMEN:  Soft, nontender, nondistended. RESPIRATORY:  She is not using any accessory muscles for respiration. NEUROLOGIC:  On rest of her neurologic exam, she is awake, alert and  oriented x3. Cranial nerves II through XII are intact bilaterally. Motor exam reveals 5/5 strength in her bilateral upper and lower  extremities. Sensation is grossly intact to light touch. Reflexes are  2+ and symmetric. Toes are going down.     IMAGING:  Review of imaging, she had CT scan of her lumbar spine that  shows that she has got stenosis at L3-L4 and L4-L5 with an L3-L4  degenerative spondylolisthesis.     ASSESSMENT AND PLAN:  The patient is a 72-year-old lady with back pain  going into her right leg. I am recommending an L3-L4 and L4-L5 posterior lumbar interbody fusion. R/B/A of surgery have been discussed and she wishes to proceed. Tonye Cogan

## 2020-09-11 LAB
MRSA CULTURE ONLY: NORMAL
SARS-COV-2: NOT DETECTED
SOURCE: NORMAL

## 2020-09-12 LAB — URINE CULTURE, ROUTINE: NORMAL

## 2020-09-14 ENCOUNTER — HOSPITAL ENCOUNTER (INPATIENT)
Age: 75
LOS: 1 days | Discharge: NURSING, REHAB OR LONG-TERM CARE | DRG: 455 | End: 2020-09-17
Attending: NEUROLOGICAL SURGERY | Admitting: NEUROLOGICAL SURGERY
Payer: MEDICARE

## 2020-09-14 ENCOUNTER — ANESTHESIA (OUTPATIENT)
Dept: OPERATING ROOM | Age: 75
DRG: 455 | End: 2020-09-14
Payer: MEDICARE

## 2020-09-14 ENCOUNTER — APPOINTMENT (OUTPATIENT)
Dept: GENERAL RADIOLOGY | Age: 75
DRG: 455 | End: 2020-09-14
Attending: NEUROLOGICAL SURGERY
Payer: MEDICARE

## 2020-09-14 ENCOUNTER — ANESTHESIA EVENT (OUTPATIENT)
Dept: OPERATING ROOM | Age: 75
DRG: 455 | End: 2020-09-14
Payer: MEDICARE

## 2020-09-14 VITALS
SYSTOLIC BLOOD PRESSURE: 168 MMHG | OXYGEN SATURATION: 99 % | RESPIRATION RATE: 21 BRPM | TEMPERATURE: 97.3 F | DIASTOLIC BLOOD PRESSURE: 82 MMHG

## 2020-09-14 PROBLEM — M43.16 SPONDYLOLISTHESIS OF LUMBAR REGION: Status: ACTIVE | Noted: 2020-09-14

## 2020-09-14 LAB
B.E.: -0.5 MMOL/L (ref -3–0)
B.E.: -1.7 MMOL/L (ref -3–0)
BLOOD BANK DISPENSE STATUS: NORMAL
BLOOD BANK DISPENSE STATUS: NORMAL
BLOOD BANK PRODUCT CODE: NORMAL
BLOOD BANK PRODUCT CODE: NORMAL
BPU ID: NORMAL
BPU ID: NORMAL
CARDIOPULMONARY BYPASS: NO
CARDIOPULMONARY BYPASS: NO
DESCRIPTION BLOOD BANK: NORMAL
DESCRIPTION BLOOD BANK: NORMAL
DEVICE: ABNORMAL
DEVICE: ABNORMAL
HCO3 ARTERIAL: 23.8 MMOL/L (ref 22–26)
HCO3 ARTERIAL: 24.2 MMOL/L (ref 22–26)
HCT (EST): 29 % (ref 34–48)
HCT (EST): 31 % (ref 34–48)
HGB, (EST): 10.5 G/DL (ref 11.5–15.5)
HGB, (EST): 9.8 G/DL (ref 11.5–15.5)
METER GLUCOSE: 172 MG/DL (ref 74–99)
METER GLUCOSE: 224 MG/DL (ref 74–99)
METER GLUCOSE: 247 MG/DL (ref 74–99)
O2 SATURATION: 98.8 % (ref 92–98.5)
O2 SATURATION: 99.3 % (ref 92–98.5)
OPERATOR ID: ABNORMAL
OPERATOR ID: ABNORMAL
PCO2 ARTERIAL: 39 MMHG (ref 35–45)
PCO2 ARTERIAL: 42.9 MMHG (ref 35–45)
PH BLOOD GAS: 7.35 (ref 7.35–7.45)
PH BLOOD GAS: 7.4 (ref 7.35–7.45)
PO2 ARTERIAL: 128.8 MMHG (ref 60–80)
PO2 ARTERIAL: 149.4 MMHG (ref 60–80)
SOURCE, BLOOD GAS: ABNORMAL
SOURCE, BLOOD GAS: ABNORMAL

## 2020-09-14 PROCEDURE — 0SG1071 FUSION OF 2 OR MORE LUMBAR VERTEBRAL JOINTS WITH AUTOLOGOUS TISSUE SUBSTITUTE, POSTERIOR APPROACH, POSTERIOR COLUMN, OPEN APPROACH: ICD-10-PCS | Performed by: NEUROLOGICAL SURGERY

## 2020-09-14 PROCEDURE — 22633 ARTHRD CMBN 1NTRSPC LUMBAR: CPT | Performed by: NEUROLOGICAL SURGERY

## 2020-09-14 PROCEDURE — 2500000003 HC RX 250 WO HCPCS: Performed by: NURSE ANESTHETIST, CERTIFIED REGISTERED

## 2020-09-14 PROCEDURE — 3E0U0GB INTRODUCTION OF RECOMBINANT BONE MORPHOGENETIC PROTEIN INTO JOINTS, OPEN APPROACH: ICD-10-PCS | Performed by: NEUROLOGICAL SURGERY

## 2020-09-14 PROCEDURE — 2580000003 HC RX 258: Performed by: NEUROLOGICAL SURGERY

## 2020-09-14 PROCEDURE — 2580000003 HC RX 258: Performed by: INTERNAL MEDICINE

## 2020-09-14 PROCEDURE — 95909 NRV CNDJ TST 5-6 STUDIES: CPT | Performed by: AUDIOLOGIST

## 2020-09-14 PROCEDURE — 7100000001 HC PACU RECOVERY - ADDTL 15 MIN: Performed by: NEUROLOGICAL SURGERY

## 2020-09-14 PROCEDURE — 6370000000 HC RX 637 (ALT 250 FOR IP): Performed by: INTERNAL MEDICINE

## 2020-09-14 PROCEDURE — 00NY0ZZ RELEASE LUMBAR SPINAL CORD, OPEN APPROACH: ICD-10-PCS | Performed by: NEUROLOGICAL SURGERY

## 2020-09-14 PROCEDURE — 2720000010 HC SURG SUPPLY STERILE: Performed by: NEUROLOGICAL SURGERY

## 2020-09-14 PROCEDURE — C1713 ANCHOR/SCREW BN/BN,TIS/BN: HCPCS | Performed by: NEUROLOGICAL SURGERY

## 2020-09-14 PROCEDURE — 22853 INSJ BIOMECHANICAL DEVICE: CPT | Performed by: NEUROLOGICAL SURGERY

## 2020-09-14 PROCEDURE — 88311 DECALCIFY TISSUE: CPT

## 2020-09-14 PROCEDURE — 2780000010 HC IMPLANT OTHER: Performed by: NEUROLOGICAL SURGERY

## 2020-09-14 PROCEDURE — 95940 IONM IN OPERATNG ROOM 15 MIN: CPT | Performed by: AUDIOLOGIST

## 2020-09-14 PROCEDURE — 6360000002 HC RX W HCPCS: Performed by: PHYSICIAN ASSISTANT

## 2020-09-14 PROCEDURE — 82962 GLUCOSE BLOOD TEST: CPT

## 2020-09-14 PROCEDURE — 3700000001 HC ADD 15 MINUTES (ANESTHESIA): Performed by: NEUROLOGICAL SURGERY

## 2020-09-14 PROCEDURE — G0378 HOSPITAL OBSERVATION PER HR: HCPCS

## 2020-09-14 PROCEDURE — 0SG10AJ FUSION OF 2 OR MORE LUMBAR VERTEBRAL JOINTS WITH INTERBODY FUSION DEVICE, POSTERIOR APPROACH, ANTERIOR COLUMN, OPEN APPROACH: ICD-10-PCS | Performed by: NEUROLOGICAL SURGERY

## 2020-09-14 PROCEDURE — 6360000002 HC RX W HCPCS: Performed by: ANESTHESIOLOGY

## 2020-09-14 PROCEDURE — 8E0WXBF COMPUTER ASSISTED PROCEDURE OF TRUNK REGION, WITH FLUOROSCOPY: ICD-10-PCS | Performed by: NEUROLOGICAL SURGERY

## 2020-09-14 PROCEDURE — 3600000005 HC SURGERY LEVEL 5 BASE: Performed by: NEUROLOGICAL SURGERY

## 2020-09-14 PROCEDURE — 6360000002 HC RX W HCPCS: Performed by: NEUROLOGICAL SURGERY

## 2020-09-14 PROCEDURE — 7100000000 HC PACU RECOVERY - FIRST 15 MIN: Performed by: NEUROLOGICAL SURGERY

## 2020-09-14 PROCEDURE — 6370000000 HC RX 637 (ALT 250 FOR IP): Performed by: NEUROLOGICAL SURGERY

## 2020-09-14 PROCEDURE — 88304 TISSUE EXAM BY PATHOLOGIST: CPT

## 2020-09-14 PROCEDURE — 3209999900 FLUORO FOR SURGICAL PROCEDURES

## 2020-09-14 PROCEDURE — 2580000003 HC RX 258: Performed by: PHYSICIAN ASSISTANT

## 2020-09-14 PROCEDURE — 93005 ELECTROCARDIOGRAM TRACING: CPT | Performed by: INTERNAL MEDICINE

## 2020-09-14 PROCEDURE — 82803 BLOOD GASES ANY COMBINATION: CPT

## 2020-09-14 PROCEDURE — 22842 INSERT SPINE FIXATION DEVICE: CPT | Performed by: NEUROLOGICAL SURGERY

## 2020-09-14 PROCEDURE — 3600000015 HC SURGERY LEVEL 5 ADDTL 15MIN: Performed by: NEUROLOGICAL SURGERY

## 2020-09-14 PROCEDURE — 6360000002 HC RX W HCPCS: Performed by: NURSE ANESTHETIST, CERTIFIED REGISTERED

## 2020-09-14 PROCEDURE — 2500000003 HC RX 250 WO HCPCS: Performed by: NEUROLOGICAL SURGERY

## 2020-09-14 PROCEDURE — 2709999900 HC NON-CHARGEABLE SUPPLY: Performed by: NEUROLOGICAL SURGERY

## 2020-09-14 PROCEDURE — 3700000000 HC ANESTHESIA ATTENDED CARE: Performed by: NEUROLOGICAL SURGERY

## 2020-09-14 PROCEDURE — 22634 ARTHRD CMBN 1NTRSPC EA ADDL: CPT | Performed by: NEUROLOGICAL SURGERY

## 2020-09-14 PROCEDURE — 0SB20ZZ EXCISION OF LUMBAR VERTEBRAL DISC, OPEN APPROACH: ICD-10-PCS | Performed by: NEUROLOGICAL SURGERY

## 2020-09-14 DEVICE — BONE GRAFT KIT 7510800 INFUSE LARGE II
Type: IMPLANTABLE DEVICE | Site: SPINE LUMBAR | Status: FUNCTIONAL
Brand: INFUSE® BONE GRAFT

## 2020-09-14 DEVICE — CREO® THREADED 5.5 X 45MM POLYAXIAL SCREW
Type: IMPLANTABLE DEVICE | Site: SPINE LUMBAR | Status: FUNCTIONAL
Brand: CREO

## 2020-09-14 DEVICE — RISE SPACER 10X22MM, 7-13MM
Type: IMPLANTABLE DEVICE | Site: SPINE LUMBAR | Status: FUNCTIONAL
Brand: RISE

## 2020-09-14 DEVICE — CREO® THREADED 5.5 X 40MM POLYAXIAL SCREW
Type: IMPLANTABLE DEVICE | Site: SPINE LUMBAR | Status: FUNCTIONAL
Brand: CREO

## 2020-09-14 DEVICE — CREO® THREADED 7.5 X 40MM POLYAXIAL SCREW
Type: IMPLANTABLE DEVICE | Site: SPINE LUMBAR | Status: FUNCTIONAL
Brand: CREO

## 2020-09-14 DEVICE — 5.5MM CURVED ROD, TITANIUM ALLOY, 80MM LENGTH
Type: IMPLANTABLE DEVICE | Site: SPINE LUMBAR | Status: FUNCTIONAL
Brand: CREO

## 2020-09-14 DEVICE — 5.5MM CURVED ROD, TITANIUM ALLOY, 75MM LENGTH
Type: IMPLANTABLE DEVICE | Site: SPINE LUMBAR | Status: FUNCTIONAL
Brand: CREO

## 2020-09-14 DEVICE — THREADED LOCKING CAP, CREO
Type: IMPLANTABLE DEVICE | Site: SPINE LUMBAR | Status: FUNCTIONAL
Brand: CREO

## 2020-09-14 DEVICE — RISE SPACER 10X22MM, 8-14MM, 10&DEG;
Type: IMPLANTABLE DEVICE | Site: SPINE LUMBAR | Status: FUNCTIONAL
Brand: RISE

## 2020-09-14 DEVICE — CREO® THREADED 7.5 X 45MM POLYAXIAL SCREW
Type: IMPLANTABLE DEVICE | Site: SPINE LUMBAR | Status: FUNCTIONAL
Brand: CREO

## 2020-09-14 RX ORDER — PHENYLEPHRINE HCL IN 0.9% NACL 1 MG/10 ML
SYRINGE (ML) INTRAVENOUS PRN
Status: DISCONTINUED | OUTPATIENT
Start: 2020-09-14 | End: 2020-09-14 | Stop reason: SDUPTHER

## 2020-09-14 RX ORDER — DEXTROSE MONOHYDRATE 25 G/50ML
12.5 INJECTION, SOLUTION INTRAVENOUS PRN
Status: DISCONTINUED | OUTPATIENT
Start: 2020-09-14 | End: 2020-09-17 | Stop reason: HOSPADM

## 2020-09-14 RX ORDER — GLYCOPYRROLATE 1 MG/5 ML
SYRINGE (ML) INTRAVENOUS PRN
Status: DISCONTINUED | OUTPATIENT
Start: 2020-09-14 | End: 2020-09-14 | Stop reason: SDUPTHER

## 2020-09-14 RX ORDER — FENTANYL CITRATE 50 UG/ML
INJECTION, SOLUTION INTRAMUSCULAR; INTRAVENOUS PRN
Status: DISCONTINUED | OUTPATIENT
Start: 2020-09-14 | End: 2020-09-14 | Stop reason: SDUPTHER

## 2020-09-14 RX ORDER — SODIUM CHLORIDE 0.9 % (FLUSH) 0.9 %
10 SYRINGE (ML) INJECTION PRN
Status: DISCONTINUED | OUTPATIENT
Start: 2020-09-14 | End: 2020-09-17 | Stop reason: HOSPADM

## 2020-09-14 RX ORDER — NEOSTIGMINE METHYLSULFATE 1 MG/ML
INJECTION, SOLUTION INTRAVENOUS PRN
Status: DISCONTINUED | OUTPATIENT
Start: 2020-09-14 | End: 2020-09-14 | Stop reason: SDUPTHER

## 2020-09-14 RX ORDER — ONDANSETRON 2 MG/ML
INJECTION INTRAMUSCULAR; INTRAVENOUS PRN
Status: DISCONTINUED | OUTPATIENT
Start: 2020-09-14 | End: 2020-09-14 | Stop reason: SDUPTHER

## 2020-09-14 RX ORDER — POLYETHYLENE GLYCOL 3350 17 G/17G
17 POWDER, FOR SOLUTION ORAL DAILY
Status: DISCONTINUED | OUTPATIENT
Start: 2020-09-14 | End: 2020-09-17 | Stop reason: HOSPADM

## 2020-09-14 RX ORDER — BUPIVACAINE HYDROCHLORIDE 2.5 MG/ML
INJECTION, SOLUTION EPIDURAL; INFILTRATION; INTRACAUDAL PRN
Status: DISCONTINUED | OUTPATIENT
Start: 2020-09-14 | End: 2020-09-14 | Stop reason: ALTCHOICE

## 2020-09-14 RX ORDER — 0.9 % SODIUM CHLORIDE 0.9 %
20 INTRAVENOUS SOLUTION INTRAVENOUS ONCE
Status: DISCONTINUED | OUTPATIENT
Start: 2020-09-14 | End: 2020-09-17 | Stop reason: HOSPADM

## 2020-09-14 RX ORDER — GLIPIZIDE 5 MG/1
2.5 TABLET ORAL DAILY
Status: DISCONTINUED | OUTPATIENT
Start: 2020-09-14 | End: 2020-09-14

## 2020-09-14 RX ORDER — ROCURONIUM BROMIDE 10 MG/ML
INJECTION, SOLUTION INTRAVENOUS PRN
Status: DISCONTINUED | OUTPATIENT
Start: 2020-09-14 | End: 2020-09-14 | Stop reason: SDUPTHER

## 2020-09-14 RX ORDER — SODIUM PHOSPHATE, DIBASIC AND SODIUM PHOSPHATE, MONOBASIC 7; 19 G/133ML; G/133ML
1 ENEMA RECTAL DAILY PRN
Status: DISCONTINUED | OUTPATIENT
Start: 2020-09-14 | End: 2020-09-17 | Stop reason: HOSPADM

## 2020-09-14 RX ORDER — LATANOPROST 50 UG/ML
1 SOLUTION/ DROPS OPHTHALMIC NIGHTLY
Status: DISCONTINUED | OUTPATIENT
Start: 2020-09-14 | End: 2020-09-17 | Stop reason: HOSPADM

## 2020-09-14 RX ORDER — CYCLOBENZAPRINE HCL 10 MG
10 TABLET ORAL 3 TIMES DAILY PRN
Status: DISCONTINUED | OUTPATIENT
Start: 2020-09-14 | End: 2020-09-17 | Stop reason: HOSPADM

## 2020-09-14 RX ORDER — DEXTROSE MONOHYDRATE 50 MG/ML
100 INJECTION, SOLUTION INTRAVENOUS PRN
Status: DISCONTINUED | OUTPATIENT
Start: 2020-09-14 | End: 2020-09-17 | Stop reason: HOSPADM

## 2020-09-14 RX ORDER — LIDOCAINE HYDROCHLORIDE AND EPINEPHRINE 5; 5 MG/ML; UG/ML
INJECTION, SOLUTION INFILTRATION; PERINEURAL PRN
Status: DISCONTINUED | OUTPATIENT
Start: 2020-09-14 | End: 2020-09-14 | Stop reason: ALTCHOICE

## 2020-09-14 RX ORDER — PROPRANOLOL HYDROCHLORIDE 10 MG/1
10 TABLET ORAL EVERY EVENING
Status: DISCONTINUED | OUTPATIENT
Start: 2020-09-14 | End: 2020-09-17 | Stop reason: HOSPADM

## 2020-09-14 RX ORDER — VANCOMYCIN HYDROCHLORIDE 500 MG/10ML
INJECTION, POWDER, LYOPHILIZED, FOR SOLUTION INTRAVENOUS PRN
Status: DISCONTINUED | OUTPATIENT
Start: 2020-09-14 | End: 2020-09-14 | Stop reason: ALTCHOICE

## 2020-09-14 RX ORDER — SENNA AND DOCUSATE SODIUM 50; 8.6 MG/1; MG/1
1 TABLET, FILM COATED ORAL 2 TIMES DAILY
Status: DISCONTINUED | OUTPATIENT
Start: 2020-09-14 | End: 2020-09-17 | Stop reason: HOSPADM

## 2020-09-14 RX ORDER — LEVOTHYROXINE SODIUM 0.05 MG/1
50 TABLET ORAL DAILY
Status: DISCONTINUED | OUTPATIENT
Start: 2020-09-14 | End: 2020-09-17 | Stop reason: HOSPADM

## 2020-09-14 RX ORDER — SODIUM CHLORIDE 0.9 % (FLUSH) 0.9 %
10 SYRINGE (ML) INJECTION PRN
Status: DISCONTINUED | OUTPATIENT
Start: 2020-09-14 | End: 2020-09-14 | Stop reason: HOSPADM

## 2020-09-14 RX ORDER — DEXAMETHASONE SODIUM PHOSPHATE 10 MG/ML
INJECTION, SOLUTION INTRAMUSCULAR; INTRAVENOUS PRN
Status: DISCONTINUED | OUTPATIENT
Start: 2020-09-14 | End: 2020-09-14 | Stop reason: SDUPTHER

## 2020-09-14 RX ORDER — OXYCODONE HYDROCHLORIDE 10 MG/1
10 TABLET ORAL EVERY 4 HOURS PRN
Status: DISCONTINUED | OUTPATIENT
Start: 2020-09-14 | End: 2020-09-17 | Stop reason: HOSPADM

## 2020-09-14 RX ORDER — ONDANSETRON 2 MG/ML
4 INJECTION INTRAMUSCULAR; INTRAVENOUS EVERY 6 HOURS PRN
Status: DISCONTINUED | OUTPATIENT
Start: 2020-09-14 | End: 2020-09-17 | Stop reason: HOSPADM

## 2020-09-14 RX ORDER — SODIUM CHLORIDE 9 MG/ML
INJECTION, SOLUTION INTRAVENOUS CONTINUOUS
Status: DISCONTINUED | OUTPATIENT
Start: 2020-09-14 | End: 2020-09-14

## 2020-09-14 RX ORDER — MORPHINE SULFATE 4 MG/ML
4 INJECTION, SOLUTION INTRAMUSCULAR; INTRAVENOUS
Status: DISCONTINUED | OUTPATIENT
Start: 2020-09-14 | End: 2020-09-17 | Stop reason: HOSPADM

## 2020-09-14 RX ORDER — LOSARTAN POTASSIUM 25 MG/1
25 TABLET ORAL DAILY
Status: DISCONTINUED | OUTPATIENT
Start: 2020-09-14 | End: 2020-09-17 | Stop reason: HOSPADM

## 2020-09-14 RX ORDER — PANTOPRAZOLE SODIUM 40 MG/1
40 TABLET, DELAYED RELEASE ORAL
Status: DISCONTINUED | OUTPATIENT
Start: 2020-09-15 | End: 2020-09-17 | Stop reason: HOSPADM

## 2020-09-14 RX ORDER — MAGNESIUM GLUCONATE 27 MG(500)
250 TABLET ORAL DAILY
Status: DISCONTINUED | OUTPATIENT
Start: 2020-09-14 | End: 2020-09-17 | Stop reason: HOSPADM

## 2020-09-14 RX ORDER — PROPOFOL 10 MG/ML
INJECTION, EMULSION INTRAVENOUS PRN
Status: DISCONTINUED | OUTPATIENT
Start: 2020-09-14 | End: 2020-09-14 | Stop reason: SDUPTHER

## 2020-09-14 RX ORDER — DEXTROSE AND SODIUM CHLORIDE 5; .45 G/100ML; G/100ML
INJECTION, SOLUTION INTRAVENOUS CONTINUOUS
Status: DISCONTINUED | OUTPATIENT
Start: 2020-09-14 | End: 2020-09-17 | Stop reason: HOSPADM

## 2020-09-14 RX ORDER — SODIUM CHLORIDE 0.9 % (FLUSH) 0.9 %
10 SYRINGE (ML) INJECTION EVERY 12 HOURS SCHEDULED
Status: DISCONTINUED | OUTPATIENT
Start: 2020-09-14 | End: 2020-09-14 | Stop reason: HOSPADM

## 2020-09-14 RX ORDER — LIDOCAINE HYDROCHLORIDE 20 MG/ML
INJECTION, SOLUTION INTRAVENOUS PRN
Status: DISCONTINUED | OUTPATIENT
Start: 2020-09-14 | End: 2020-09-14 | Stop reason: SDUPTHER

## 2020-09-14 RX ORDER — PROMETHAZINE HYDROCHLORIDE 25 MG/1
12.5 TABLET ORAL EVERY 6 HOURS PRN
Status: DISCONTINUED | OUTPATIENT
Start: 2020-09-14 | End: 2020-09-17 | Stop reason: HOSPADM

## 2020-09-14 RX ORDER — GABAPENTIN 300 MG/1
600 CAPSULE ORAL 3 TIMES DAILY
Status: DISCONTINUED | OUTPATIENT
Start: 2020-09-14 | End: 2020-09-17 | Stop reason: HOSPADM

## 2020-09-14 RX ORDER — PROMETHAZINE HYDROCHLORIDE 25 MG/ML
6.25 INJECTION, SOLUTION INTRAMUSCULAR; INTRAVENOUS
Status: DISCONTINUED | OUTPATIENT
Start: 2020-09-14 | End: 2020-09-14 | Stop reason: HOSPADM

## 2020-09-14 RX ORDER — NICOTINE POLACRILEX 4 MG
15 LOZENGE BUCCAL PRN
Status: DISCONTINUED | OUTPATIENT
Start: 2020-09-14 | End: 2020-09-17 | Stop reason: HOSPADM

## 2020-09-14 RX ORDER — GLIPIZIDE AND METFORMIN HCL 2.5; 5 MG/1; MG/1
1 TABLET, FILM COATED ORAL DAILY
Status: DISCONTINUED | OUTPATIENT
Start: 2020-09-14 | End: 2020-09-14 | Stop reason: CLARIF

## 2020-09-14 RX ORDER — ATORVASTATIN CALCIUM 10 MG/1
10 TABLET, FILM COATED ORAL DAILY
Status: DISCONTINUED | OUTPATIENT
Start: 2020-09-14 | End: 2020-09-17 | Stop reason: HOSPADM

## 2020-09-14 RX ORDER — MORPHINE SULFATE 2 MG/ML
2 INJECTION, SOLUTION INTRAMUSCULAR; INTRAVENOUS
Status: DISCONTINUED | OUTPATIENT
Start: 2020-09-14 | End: 2020-09-17 | Stop reason: HOSPADM

## 2020-09-14 RX ORDER — FUROSEMIDE 20 MG/1
20 TABLET ORAL DAILY
Status: DISCONTINUED | OUTPATIENT
Start: 2020-09-14 | End: 2020-09-17 | Stop reason: HOSPADM

## 2020-09-14 RX ORDER — MIDAZOLAM HYDROCHLORIDE 1 MG/ML
INJECTION INTRAMUSCULAR; INTRAVENOUS PRN
Status: DISCONTINUED | OUTPATIENT
Start: 2020-09-14 | End: 2020-09-14 | Stop reason: SDUPTHER

## 2020-09-14 RX ORDER — SODIUM CHLORIDE 0.9 % (FLUSH) 0.9 %
10 SYRINGE (ML) INJECTION EVERY 12 HOURS SCHEDULED
Status: DISCONTINUED | OUTPATIENT
Start: 2020-09-14 | End: 2020-09-17 | Stop reason: HOSPADM

## 2020-09-14 RX ORDER — OXYCODONE HYDROCHLORIDE 5 MG/1
5 TABLET ORAL EVERY 4 HOURS PRN
Status: DISCONTINUED | OUTPATIENT
Start: 2020-09-14 | End: 2020-09-17 | Stop reason: HOSPADM

## 2020-09-14 RX ORDER — OXYBUTYNIN CHLORIDE 5 MG/1
5 TABLET ORAL DAILY
Status: DISCONTINUED | OUTPATIENT
Start: 2020-09-14 | End: 2020-09-17 | Stop reason: HOSPADM

## 2020-09-14 RX ADMIN — Medication 100 MCG: at 10:36

## 2020-09-14 RX ADMIN — CYCLOBENZAPRINE 10 MG: 10 TABLET, FILM COATED ORAL at 23:11

## 2020-09-14 RX ADMIN — ATORVASTATIN CALCIUM 10 MG: 10 TABLET, FILM COATED ORAL at 16:34

## 2020-09-14 RX ADMIN — BISACODYL 5 MG: 5 TABLET, COATED ORAL at 20:52

## 2020-09-14 RX ADMIN — FENTANYL CITRATE 25 MCG: 50 INJECTION, SOLUTION INTRAMUSCULAR; INTRAVENOUS at 11:11

## 2020-09-14 RX ADMIN — FENTANYL CITRATE 50 MCG: 50 INJECTION, SOLUTION INTRAMUSCULAR; INTRAVENOUS at 07:24

## 2020-09-14 RX ADMIN — HYDROMORPHONE HYDROCHLORIDE 0.5 MG: 1 INJECTION, SOLUTION INTRAMUSCULAR; INTRAVENOUS; SUBCUTANEOUS at 12:00

## 2020-09-14 RX ADMIN — SODIUM CHLORIDE: 9 INJECTION, SOLUTION INTRAVENOUS at 06:13

## 2020-09-14 RX ADMIN — ROCURONIUM BROMIDE 50 MG: 10 INJECTION, SOLUTION INTRAVENOUS at 06:33

## 2020-09-14 RX ADMIN — MIDAZOLAM 2 MG: 1 INJECTION INTRAMUSCULAR; INTRAVENOUS at 06:27

## 2020-09-14 RX ADMIN — GABAPENTIN 600 MG: 300 CAPSULE ORAL at 20:52

## 2020-09-14 RX ADMIN — MORPHINE SULFATE 4 MG: 4 INJECTION, SOLUTION INTRAMUSCULAR; INTRAVENOUS at 18:06

## 2020-09-14 RX ADMIN — POLYETHYLENE GLYCOL 3350 17 G: 17 POWDER, FOR SOLUTION ORAL at 16:32

## 2020-09-14 RX ADMIN — SODIUM CHLORIDE: 9 INJECTION, SOLUTION INTRAVENOUS at 11:02

## 2020-09-14 RX ADMIN — DOCUSATE SODIUM 50 MG AND SENNOSIDES 8.6 MG 1 TABLET: 8.6; 5 TABLET, FILM COATED ORAL at 20:52

## 2020-09-14 RX ADMIN — FENTANYL CITRATE 50 MCG: 50 INJECTION, SOLUTION INTRAMUSCULAR; INTRAVENOUS at 06:57

## 2020-09-14 RX ADMIN — LEVOTHYROXINE SODIUM 50 MCG: 0.05 TABLET ORAL at 16:33

## 2020-09-14 RX ADMIN — Medication 3 MG: at 10:30

## 2020-09-14 RX ADMIN — Medication 100 MCG: at 08:33

## 2020-09-14 RX ADMIN — Medication 100 MCG: at 10:50

## 2020-09-14 RX ADMIN — Medication 100 MCG: at 07:03

## 2020-09-14 RX ADMIN — Medication 100 MCG: at 09:21

## 2020-09-14 RX ADMIN — Medication 100 MCG: at 09:56

## 2020-09-14 RX ADMIN — FENTANYL CITRATE 50 MCG: 50 INJECTION, SOLUTION INTRAMUSCULAR; INTRAVENOUS at 06:33

## 2020-09-14 RX ADMIN — ROCURONIUM BROMIDE 20 MG: 10 INJECTION, SOLUTION INTRAVENOUS at 07:03

## 2020-09-14 RX ADMIN — Medication 2 G: at 21:59

## 2020-09-14 RX ADMIN — HYDROMORPHONE HYDROCHLORIDE 0.5 MG: 1 INJECTION, SOLUTION INTRAMUSCULAR; INTRAVENOUS; SUBCUTANEOUS at 11:40

## 2020-09-14 RX ADMIN — SODIUM CHLORIDE, PRESERVATIVE FREE 10 ML: 5 INJECTION INTRAVENOUS at 20:54

## 2020-09-14 RX ADMIN — HYDROMORPHONE HYDROCHLORIDE 0.5 MG: 1 INJECTION, SOLUTION INTRAMUSCULAR; INTRAVENOUS; SUBCUTANEOUS at 12:10

## 2020-09-14 RX ADMIN — Medication 100 MCG: at 08:00

## 2020-09-14 RX ADMIN — GLIPIZIDE 2.5 MG: 5 TABLET ORAL at 16:33

## 2020-09-14 RX ADMIN — DEXAMETHASONE SODIUM PHOSPHATE 10 MG: 10 INJECTION, SOLUTION INTRAMUSCULAR; INTRAVENOUS at 06:52

## 2020-09-14 RX ADMIN — OXYCODONE HYDROCHLORIDE 10 MG: 10 TABLET ORAL at 14:11

## 2020-09-14 RX ADMIN — Medication 100 MCG: at 09:44

## 2020-09-14 RX ADMIN — LIDOCAINE HYDROCHLORIDE 20 MG: 20 INJECTION, SOLUTION INTRAVENOUS at 06:33

## 2020-09-14 RX ADMIN — DEXTROSE AND SODIUM CHLORIDE: 5; 450 INJECTION, SOLUTION INTRAVENOUS at 21:47

## 2020-09-14 RX ADMIN — INSULIN LISPRO 2 UNITS: 100 INJECTION, SOLUTION INTRAVENOUS; SUBCUTANEOUS at 21:46

## 2020-09-14 RX ADMIN — HYDROMORPHONE HYDROCHLORIDE 0.5 MG: 1 INJECTION, SOLUTION INTRAMUSCULAR; INTRAVENOUS; SUBCUTANEOUS at 11:49

## 2020-09-14 RX ADMIN — SODIUM CHLORIDE, PRESERVATIVE FREE 10 ML: 5 INJECTION INTRAVENOUS at 22:00

## 2020-09-14 RX ADMIN — SODIUM CHLORIDE: 9 INJECTION, SOLUTION INTRAVENOUS at 07:49

## 2020-09-14 RX ADMIN — DOCUSATE SODIUM 50 MG AND SENNOSIDES 8.6 MG 1 TABLET: 8.6; 5 TABLET, FILM COATED ORAL at 16:34

## 2020-09-14 RX ADMIN — LOSARTAN POTASSIUM 25 MG: 25 TABLET, FILM COATED ORAL at 16:34

## 2020-09-14 RX ADMIN — Medication 0.2 MG: at 07:38

## 2020-09-14 RX ADMIN — ROCURONIUM BROMIDE 10 MG: 10 INJECTION, SOLUTION INTRAVENOUS at 08:31

## 2020-09-14 RX ADMIN — LATANOPROST 1 DROP: 50 SOLUTION OPHTHALMIC at 20:51

## 2020-09-14 RX ADMIN — OXYCODONE HYDROCHLORIDE 10 MG: 10 TABLET ORAL at 19:22

## 2020-09-14 RX ADMIN — Medication 2 G: at 06:52

## 2020-09-14 RX ADMIN — INSULIN LISPRO 1 UNITS: 100 INJECTION, SOLUTION INTRAVENOUS; SUBCUTANEOUS at 21:51

## 2020-09-14 RX ADMIN — MORPHINE SULFATE 4 MG: 4 INJECTION, SOLUTION INTRAMUSCULAR; INTRAVENOUS at 23:11

## 2020-09-14 RX ADMIN — GABAPENTIN 600 MG: 300 CAPSULE ORAL at 16:36

## 2020-09-14 RX ADMIN — PROPRANOLOL HYDROCHLORIDE 10 MG: 10 TABLET ORAL at 18:00

## 2020-09-14 RX ADMIN — Medication 100 MCG: at 07:41

## 2020-09-14 RX ADMIN — Medication 0.6 MG: at 10:30

## 2020-09-14 RX ADMIN — FENTANYL CITRATE 50 MCG: 50 INJECTION, SOLUTION INTRAMUSCULAR; INTRAVENOUS at 11:02

## 2020-09-14 RX ADMIN — Medication 250 MG: at 16:35

## 2020-09-14 RX ADMIN — Medication 100 MCG: at 10:11

## 2020-09-14 RX ADMIN — PROPOFOL 170 MG: 10 INJECTION, EMULSION INTRAVENOUS at 06:33

## 2020-09-14 RX ADMIN — ONDANSETRON HYDROCHLORIDE 4 MG: 2 INJECTION, SOLUTION INTRAMUSCULAR; INTRAVENOUS at 10:53

## 2020-09-14 RX ADMIN — MORPHINE SULFATE 4 MG: 4 INJECTION, SOLUTION INTRAMUSCULAR; INTRAVENOUS at 20:54

## 2020-09-14 RX ADMIN — CYCLOBENZAPRINE 10 MG: 10 TABLET, FILM COATED ORAL at 14:11

## 2020-09-14 RX ADMIN — OXYBUTYNIN CHLORIDE 5 MG: 5 TABLET ORAL at 20:51

## 2020-09-14 RX ADMIN — Medication 100 MCG: at 10:05

## 2020-09-14 RX ADMIN — MORPHINE SULFATE 4 MG: 4 INJECTION, SOLUTION INTRAMUSCULAR; INTRAVENOUS at 15:28

## 2020-09-14 RX ADMIN — METFORMIN HYDROCHLORIDE 500 MG: 500 TABLET ORAL at 16:33

## 2020-09-14 RX ADMIN — FUROSEMIDE 20 MG: 20 TABLET ORAL at 16:35

## 2020-09-14 RX ADMIN — Medication 100 MCG: at 10:00

## 2020-09-14 RX ADMIN — SODIUM CHLORIDE, PRESERVATIVE FREE 10 ML: 5 INJECTION INTRAVENOUS at 15:28

## 2020-09-14 RX ADMIN — Medication 2 G: at 10:42

## 2020-09-14 RX ADMIN — Medication 100 MCG: at 08:55

## 2020-09-14 ASSESSMENT — PULMONARY FUNCTION TESTS
PIF_VALUE: 28
PIF_VALUE: 28
PIF_VALUE: 11
PIF_VALUE: 27
PIF_VALUE: 26
PIF_VALUE: 28
PIF_VALUE: 21
PIF_VALUE: 28
PIF_VALUE: 27
PIF_VALUE: 28
PIF_VALUE: 27
PIF_VALUE: 26
PIF_VALUE: 27
PIF_VALUE: 26
PIF_VALUE: 28
PIF_VALUE: 28
PIF_VALUE: 26
PIF_VALUE: 23
PIF_VALUE: 27
PIF_VALUE: 27
PIF_VALUE: 26
PIF_VALUE: 27
PIF_VALUE: 28
PIF_VALUE: 27
PIF_VALUE: 26
PIF_VALUE: 27
PIF_VALUE: 28
PIF_VALUE: 27
PIF_VALUE: 27
PIF_VALUE: 26
PIF_VALUE: 27
PIF_VALUE: 20
PIF_VALUE: 27
PIF_VALUE: 26
PIF_VALUE: 3
PIF_VALUE: 27
PIF_VALUE: 28
PIF_VALUE: 27
PIF_VALUE: 28
PIF_VALUE: 27
PIF_VALUE: 27
PIF_VALUE: 15
PIF_VALUE: 26
PIF_VALUE: 0
PIF_VALUE: 11
PIF_VALUE: 27
PIF_VALUE: 4
PIF_VALUE: 27
PIF_VALUE: 31
PIF_VALUE: 27
PIF_VALUE: 1
PIF_VALUE: 27
PIF_VALUE: 0
PIF_VALUE: 22
PIF_VALUE: 27
PIF_VALUE: 21
PIF_VALUE: 27
PIF_VALUE: 22
PIF_VALUE: 27
PIF_VALUE: 28
PIF_VALUE: 28
PIF_VALUE: 22
PIF_VALUE: 27
PIF_VALUE: 28
PIF_VALUE: 21
PIF_VALUE: 27
PIF_VALUE: 28
PIF_VALUE: 28
PIF_VALUE: 27
PIF_VALUE: 27
PIF_VALUE: 28
PIF_VALUE: 27
PIF_VALUE: 22
PIF_VALUE: 2
PIF_VALUE: 28
PIF_VALUE: 27
PIF_VALUE: 28
PIF_VALUE: 27
PIF_VALUE: 1
PIF_VALUE: 27
PIF_VALUE: 28
PIF_VALUE: 27
PIF_VALUE: 24
PIF_VALUE: 26
PIF_VALUE: 28
PIF_VALUE: 27
PIF_VALUE: 20
PIF_VALUE: 26
PIF_VALUE: 27
PIF_VALUE: 27
PIF_VALUE: 23
PIF_VALUE: 27
PIF_VALUE: 2
PIF_VALUE: 24
PIF_VALUE: 22
PIF_VALUE: 24
PIF_VALUE: 27
PIF_VALUE: 27
PIF_VALUE: 28
PIF_VALUE: 3
PIF_VALUE: 28
PIF_VALUE: 27
PIF_VALUE: 26
PIF_VALUE: 27
PIF_VALUE: 25
PIF_VALUE: 24
PIF_VALUE: 28
PIF_VALUE: 27
PIF_VALUE: 27
PIF_VALUE: 15
PIF_VALUE: 24
PIF_VALUE: 28
PIF_VALUE: 26
PIF_VALUE: 29
PIF_VALUE: 28
PIF_VALUE: 27
PIF_VALUE: 21
PIF_VALUE: 27
PIF_VALUE: 28
PIF_VALUE: 27
PIF_VALUE: 27
PIF_VALUE: 5
PIF_VALUE: 2
PIF_VALUE: 30
PIF_VALUE: 21
PIF_VALUE: 3
PIF_VALUE: 28
PIF_VALUE: 27
PIF_VALUE: 11
PIF_VALUE: 8
PIF_VALUE: 24
PIF_VALUE: 14
PIF_VALUE: 27
PIF_VALUE: 28
PIF_VALUE: 28
PIF_VALUE: 27
PIF_VALUE: 27
PIF_VALUE: 28
PIF_VALUE: 24
PIF_VALUE: 22
PIF_VALUE: 28
PIF_VALUE: 27
PIF_VALUE: 28
PIF_VALUE: 27
PIF_VALUE: 27
PIF_VALUE: 26
PIF_VALUE: 28
PIF_VALUE: 27
PIF_VALUE: 26
PIF_VALUE: 26
PIF_VALUE: 27
PIF_VALUE: 4
PIF_VALUE: 28
PIF_VALUE: 27
PIF_VALUE: 25
PIF_VALUE: 3
PIF_VALUE: 27
PIF_VALUE: 27
PIF_VALUE: 28
PIF_VALUE: 17
PIF_VALUE: 28
PIF_VALUE: 26
PIF_VALUE: 27
PIF_VALUE: 27
PIF_VALUE: 26
PIF_VALUE: 24
PIF_VALUE: 26
PIF_VALUE: 25
PIF_VALUE: 27
PIF_VALUE: 27
PIF_VALUE: 24
PIF_VALUE: 2
PIF_VALUE: 27
PIF_VALUE: 24
PIF_VALUE: 27
PIF_VALUE: 27
PIF_VALUE: 4
PIF_VALUE: 27
PIF_VALUE: 27
PIF_VALUE: 26
PIF_VALUE: 28
PIF_VALUE: 27
PIF_VALUE: 1
PIF_VALUE: 25
PIF_VALUE: 15
PIF_VALUE: 27
PIF_VALUE: 28
PIF_VALUE: 25
PIF_VALUE: 27
PIF_VALUE: 28
PIF_VALUE: 19
PIF_VALUE: 24
PIF_VALUE: 26
PIF_VALUE: 28
PIF_VALUE: 28
PIF_VALUE: 0
PIF_VALUE: 2
PIF_VALUE: 28
PIF_VALUE: 27
PIF_VALUE: 26
PIF_VALUE: 22
PIF_VALUE: 26
PIF_VALUE: 24
PIF_VALUE: 27
PIF_VALUE: 27
PIF_VALUE: 24
PIF_VALUE: 27
PIF_VALUE: 4
PIF_VALUE: 27
PIF_VALUE: 27
PIF_VALUE: 25
PIF_VALUE: 27
PIF_VALUE: 27
PIF_VALUE: 28
PIF_VALUE: 28
PIF_VALUE: 27
PIF_VALUE: 28
PIF_VALUE: 27
PIF_VALUE: 28
PIF_VALUE: 2
PIF_VALUE: 27
PIF_VALUE: 28
PIF_VALUE: 27
PIF_VALUE: 28
PIF_VALUE: 28
PIF_VALUE: 20
PIF_VALUE: 27
PIF_VALUE: 28
PIF_VALUE: 26
PIF_VALUE: 27
PIF_VALUE: 2
PIF_VALUE: 28
PIF_VALUE: 27
PIF_VALUE: 27

## 2020-09-14 ASSESSMENT — PAIN DESCRIPTION - LOCATION
LOCATION: BACK

## 2020-09-14 ASSESSMENT — PAIN DESCRIPTION - FREQUENCY
FREQUENCY: CONTINUOUS
FREQUENCY: CONTINUOUS

## 2020-09-14 ASSESSMENT — PAIN SCALES - GENERAL
PAINLEVEL_OUTOF10: 7
PAINLEVEL_OUTOF10: 7
PAINLEVEL_OUTOF10: 5
PAINLEVEL_OUTOF10: 8
PAINLEVEL_OUTOF10: 9
PAINLEVEL_OUTOF10: 4
PAINLEVEL_OUTOF10: 10
PAINLEVEL_OUTOF10: 7
PAINLEVEL_OUTOF10: 9
PAINLEVEL_OUTOF10: 10
PAINLEVEL_OUTOF10: 10
PAINLEVEL_OUTOF10: 8

## 2020-09-14 ASSESSMENT — PAIN DESCRIPTION - ORIENTATION
ORIENTATION: MID
ORIENTATION: LOWER;MID
ORIENTATION: MID;LOWER

## 2020-09-14 ASSESSMENT — PAIN DESCRIPTION - DESCRIPTORS
DESCRIPTORS: ACHING;SHOOTING;THROBBING;SHARP
DESCRIPTORS: ACHING;DULL
DESCRIPTORS: ACHING;DISCOMFORT
DESCRIPTORS: ACHING;DISCOMFORT
DESCRIPTORS: ACHING;CONSTANT;DISCOMFORT
DESCRIPTORS: ACHING;SHOOTING

## 2020-09-14 ASSESSMENT — PAIN - FUNCTIONAL ASSESSMENT
PAIN_FUNCTIONAL_ASSESSMENT: PREVENTS OR INTERFERES SOME ACTIVE ACTIVITIES AND ADLS
PAIN_FUNCTIONAL_ASSESSMENT: 0-10

## 2020-09-14 ASSESSMENT — PAIN DESCRIPTION - PAIN TYPE
TYPE: SURGICAL PAIN

## 2020-09-14 NOTE — ANESTHESIA POSTPROCEDURE EVALUATION
Department of Anesthesiology  Postprocedure Note    Patient: Blu Turner  MRN: [id number]  YOB: 1945  Date of evaluation: 9/14/2020  Time:  3:57 PM     Procedure Summary     Date:  09/14/20 Room / Location:  [facility] OR 06 / [facility]    Anesthesia Start:  6248 Anesthesia Stop:  1132    Procedure:  L3-L4, L4-L5  POSTERIOR LUMBAR INTERBODY FUSION (N/A Spine Lumbar) Diagnosis:  (SPONDYLOLISTHESIS)    Surgeon:  Nathaniel Bah MD Responsible Provider:  Reese Hubbard MD    Anesthesia Type:  general ASA Status:  3          Anesthesia Type: general    Moraima Phase I: Moraima Score: 9    Moraima Phase II:      Last vitals: Reviewed and per EMR flowsheets.        Anesthesia Post Evaluation    Patient location during evaluation: PACU  Patient participation: complete - patient participated  Level of consciousness: awake and alert  Pain score: 4  Airway patency: patent  Nausea & Vomiting: no vomiting and no nausea  Complications: no  Cardiovascular status: hemodynamically stable  Respiratory status: spontaneous ventilation  Hydration status: stable

## 2020-09-14 NOTE — PROGRESS NOTES
Went to patient's room to speak with patient and her daughter regarding concerns. The patiet told me that she was apprehensive of having surgery due to the current COVID pandemic, and now she is even more concerned with hearing that a patient was being transferred for [facility]. She was stating that she did not want to leave her room while admitted. I explained to the patient and her daughter that we follow universal precautions with each patient and that [facility] is a surgical/ non-isolation floor. Any patients needing isolation are transferred off of our floor to continue to provide appropriate care. Patient is satisfied after re-assuring her of all of her concerns. Gave her my phone extension if she has any further needs.

## 2020-09-14 NOTE — PROGRESS NOTES
Acute Rehab Pre-Admission Screen      Referral date: 9/14/20  Onset/Hospital Admit Date: 9/14/2020  5:16 AM    Current Location: 5206/5206-A    Name: Magdalena Ramos: 1945  Age: 70 y.o. Admitting Diagnosis: spondylolisthesis- s/p lumbar fusion   Address: [address] [place] [address]  Home Phone: [phone] (home)  Social Security #:     Sex: female  Race:   Marital Status:    Ethnic/Cultural/Anglican Considerations: Orthodoxy    Advanced Directives: [x] Full Code  [] [facility] [] Medications only       [] Living Will  [] DPOA      []Organ donor      [] No mechanical breathing or ventilation     [] no tube feeding, nutrition or hydration      [x] Patient does not have advanced directives or living will         COVERAGE INFORMATION   Primary Insurer: BCBS medicare  Payor Contact:   Phone:   FAX:  Authorization #:     Verified coverage: [] Patient  [] Family/caregiver    [x] financial department [] insurance carrier    MEDICAL UPDATE:  History of present admission: 70year old female admitted 9/14/20 for treatment of lumbar spondylolisthesis with L3-L4, L4-L5  POSTERIOR LUMBAR INTERBODY FUSION.       PHYSICIAN / Triston Leap INFORMATION  Referring Physician: Magalys Lopez  Attending Physician: Edilma Gold MD  Primary Care Physician: Evens Solis MD  Consultants/Opinions (see full consult notes on chart): internal medicine    SOCIAL INFORMATION  Primary  Contact: Osborne Castleman  Relationship: child  Primary Phone: [phone]  Secondary  Contact: Davy Jackson  Relationship: child  Primary Phone: [phone]    Previous Community Services:   Caregiver available: [] Yes [] No Hours per day available:   Patient previously employed:  [] Yes [] Part Time [] Full Time [] No [] Retired  Occupation/Profession:   Prior living arrangements: [] Home  [] Assisted living  [] SNF [] Other  Lived with:  [] Alone  [] Spouse  [] Family  [] Other  Lived with:   Contact phone:   Home:   [facility]  Cancer ([facility] [address]     skin     GERD (gastroesophageal reflux disease)     Hyperlipidemia     Hypertension     Sleep apnea     uses cpap    Spinal stenosis     Thyroid disease     Tremors of nervous system     familial    Type 2 diabetes mellitus without complication (HCC)        Current Co-morbidities:  [] Alzheimer's   [] Dysphasia     [] Parkinsonism  [] Amputation   [] GERD  [] Peripheral artery disease   [] Anemia      [] Encephalopathy  [] Peripheral vascular disease  [] Anxiety   [] Gangrene   [] Pneumonia  [] Aphasia   [] Gout    [] Polyneuropathy  [] Asthma   [] Heart Failure (diastolic) [] Post-polio syndrome  [] Atrial fibrillation  [] Heart Failure (left-sided) [] Pseudomonas enteritis   [] Blind    [] Heart Failure (right-sided) [] Pulmonary embolism  [] Cellulitis     [] Heart Failure (systolic) [] Renal dialysis  [] Clostridium difficile  [] Hemiparesis   [] Renal failure  [] Congestive heart failure [] Hypertension   [] Rheumatoid arthritis  [] COPD   [] Hypotension   [] Seizure disorder   [] Coronary Artery Disease [] Hypothyroidism  [] Septicemia   [] Deaf    [] Hyperlipidemia   [] Sleep apnea  [] Depression   [] Morbid obesity  [] Spinal cord injury  [] Diabetes   [] MRSA   [] Stroke  [] Diabetic nephropathy  [] Myocardial infarction  [] Tracheostomy  [] Diabetic neuropathy  [] Osteoarthritis  [] Traumatic brain injury   [] Diabetic retinopathy  [] Osteoporosis   [] Urinary tract infection  [] DVT    [] Pancytopenia  [] Vocal cord paralysis  []  Spinal stenosis   []  kidney disease [] VRE  [] Post op    []    []        Medical/Functional Conditions requiring inpatient rehabilitation:     Risk for Medical/Clinical Complications:     CLINICAL DATA:     Height : 5'5     Weight:  215 lbs   BMI: 35.78       Date:  Date:  Date:    temperature      pulse      respirations      Blood pressure      Pulse oximeter         ALLERGIES: Epinephrine; Ace inhibitors;  Adhesive tape; and Propoxyphene    DIET Intravenous 2 times per day    ceFAZolin (ANCEF) IVPB  2 g Intravenous Q8H    polyethylene glycol  17 g Oral Daily    bisacodyl  5 mg Oral Daily    sennosides-docusate sodium  1 tablet Oral BID    sodium chloride  20 mL Intravenous Once    glipiZIDE  2.5 mg Oral Daily      sodium chloride       sodium chloride flush, promethazine **OR** ondansetron, oxyCODONE **OR** oxyCODONE, morphine **OR** morphine, cyclobenzaprine, magnesium hydroxide, fleet, benzocaine-menthol    SPECIAL PRECAUTIONS: [] No current precautions  [] Cardiac  [] Renal [] Sternal [] Respiratory      [] Neurological           [] Hip  [x] Spinal [] Seizure  [] Aspiration  [] Isolation precautions:    [] Contact   [] Respiratory   [] Protective     [] Droplet    [x] Weight Bearing precautions:         [] Non Weight Bearing :         [] Toe Touch Weight Bearing :        [] Partial Weight Bearing :         [x] Weight Bearing as Tolerated :         [x] Fall Risk:   [] Recent history of falls [] Falls risk level (Escamilla Scale):       [] Bed Alarm    [] Do not leave alone in the bathroom    [] Chair Alarm    [] Cognitive impairment      [] One to One supervision  [] Sitter / Tele sitter   [] Safety enclosure bed  [x] Decreased balance     SPECIAL REHABILITATION NEEDS:   [] IV Therapy: [] PRN Adapter  [] Midline  [] PICC      [] Central Line    [] TPN       [] Oxygen: [] Trach [] Bi-PAP [] CPAP  [] Nasal cannula  [] Liters:      [x] Wound Care:   [] Pressure ulcers(stage and location) -    [] Wound vac   [x] Wound or incision care-spinal incision    [x] Pain Management (level of pain, meds): back, 10-0, oxycodone     [] Incontinence Bladder [] Lew  Insertion date:    []Hemodialysis and  Frequency:   [] Incontinence Bowel    [] Last bowel movement :     Substance use history: [] Yes  [] No   [] Tobacco  [] Alcohol  [] Other     [] Ethnic  [] Cultural  [] Spiritual  [] Language [] Needs  [] Other than English  [] Hearing Impaired  [] Visually Impaired  [] Speaking Impaired  [] Blind  [] Special equipment:  [] Devices/Splints  [] Type   [] Brace   [] Type  [] Bariatric bed  [] Extra wide commode  [] Extra wide wheelchair [] Extra wide walker  [] Narayan walker  [] Narayan wheelchair  [] Transfer lift    [] Other equipment     FUNCTIONAL STATUS PT / Janeen Silva / Areli Macon:  FIM / EVAL Discipline Initial:  Follow Up:  Current:    Eating OT      Grooming OT      Bathing OT      Dressing Upper Extremity OT      Dressing Lower Extremity OT      Toileting OT      Toilet Transfers OT      Tub/Shower Transfers OT      Homemaking OT      Bed Mobility PT      Bed/Wheelchair Transfers PT      Locomotion Walk / Wheelchair  Device:  Distance: PT      Endurance PT      Expression SP      Social Interaction SP      Problem Solving SP      Memory SP      Comprehension SP      Swallowing SP      Bowel Management NSG      Bladder Management NSG        Comments on Functional Status: Able to tolerate 3 hours of therapy a day    [x] Able to participate a minimum of 3 hours per day of therapy intervention    Required treatments/services: [x] Rehabilitation nursing [] Dietitian / nurtition                 [x] Case management  [] Respiratory Therapy      [x] Social work   [] Other     Required Therapy:  Therapy Hours per Day Days per Week Therapeutic Interventions Required   [x] Physical Therapy 1.5 5-7 Gait, transfers, Safety, strength, education, endurance   [x] Occupational Therapy 1.5 5-7 ADLS, IADLS, Safety, strength, education, endurance   [] Speech Pathology      [] Prosthetics / Orthotics       []         Anticipated Discharge Plan:   Anticipated DME Needs:  [x] Home     [] Commode   [] Alone    [] Wheelchair   [] Supervised    [] Walker   [x] Assist    [] Oxygen        [] Hospital Bed  [] Assisted Living    [] Ramp        [x] To Be Determined    Anticipated Home Health Services:  Anticipated Outpatient Services:  [] PT       [] PT  [] OT      [] OT  [] Speech     [] Speech  [] Nursing     [] Dialysis  [] Aide      [x] To Be Determined  [x] To Be Determined    Anticipated support group:  [] Amputation  [] Multiple Sclerosis  [] Stroke  [] Brain Injury  [] Spinal cord injury  [] Other     Barriers to discharge: Impaired mobility, impaired self care    Discharge Support: [] Patient lives alone and does not have a caregiver available     [] Patient has a caregiver available     [] Discharge plan has been verified with patient's caregiver      [] Caregiver is in agreement with the discharge plan     Expected functional status for safe discharge: modified independent    Patient/support person goals: go home    Expected length of stay:     Discussed expected length of stay and agreeable to IRF plan: [] Yes   [] No    Impairment Group Category:     Etiological Diagnosis:     Primary Rehabilitation Diagnosis:     Electronically signed by Gonzalo Garza RN on 9/14/2020 at 2:04 PM    Prescreen completed __________________________________ (signature of prescreener)    Date:    Time:      JUSTIFICATION FOR ADMISSION TO ACUTE REHABILITATION:          RECOMMEND LEVEL OF CARE  Recommend inpatient rehabilitation: [] Yes   [] No  If no indicate reason:  [] Functional level too high  [] Unmotivated  [] No insurance carrier approval [] Unlikely to return to community  [] No medical necessity  [] Patient or family chose other facility  [] Too medically complex  [] Inadequate discharge plan  [x] Rehabilitation bed unavailable [] Functional level too low  [] patient or family refused ARU    If patient not accepted for IRF admission, recommended level of care:  [] [address]  [] [facility]  [] [place]   [] Home Care  [] Other      [] LTAC       Physician Assigned:  [] Dr. Daina Ramirez         [] Dr. Wilfredo Desai              [] Dr. Cristine Gould [] Dr. Nicolasa Drummond  [] Dr. Naida Garza REVIEW:

## 2020-09-14 NOTE — CONSULTS
Medical Consult Note    Patient's Name: Sharron Hills  5:41 PM  9/14/2020    Reason for Consult: Medical management  Requesting Physician: Dr. Jovi Mason    History Obtained From:  patient    History of Present Ilness:    Sharron Hills is a 70 y.o. female with back pain and lumbar radiculopathy that did not respond to conservative management, patient had surgery today L3-L4, L4-L5 posterior lumbar interbody fusion. Patient has past medical history of sleep apnea hypertension hyperlipidemia GERD and diabetes. The patient is examined she is awake, pleasant and cooperative denies any chest pain or shortness of breath.     Past Medical History:   Diagnosis Date    Anesthesia complication 5081    difficulty breathing post thyroid surgery [facility]    Cancer [facility])     skin     GERD (gastroesophageal reflux disease)     Hyperlipidemia     Hypertension     Sleep apnea     uses cpap    Spinal stenosis     Thyroid disease     Tremors of nervous system     familial    Type 2 diabetes mellitus without complication ([facility] [address]        Past Surgical History:   Procedure Laterality Date    BLEPHAROPLASTY      CATARACT REMOVAL WITH IMPLANT Left 09/12/2017    CATARACT REMOVAL WITH IMPLANT Right 10/09/2018    CATARACT REMOVAL WITH IMPLANT Right 11/01/2018    secondary procedure to correct first cataract     CHOLECYSTECTOMY      COSMETIC SURGERY      abdominoplasty    FOOT SURGERY Left     skin cancer    HYSTERECTOMY      LUMBAR SPINE SURGERY N/A 9/14/2020    L3-L4, L4-L5  POSTERIOR LUMBAR INTERBODY FUSION performed by Irina Quick MD at [address] [facility] [address] SHERI WALLACE,INTRAOCUL Right 11/1/2018    EYE IOL REMOVAL performed by Rocael Juárez MD at [address] [place] [address] INS IO LENS PROSTH W/O ECP Right 10/9/2018    RIGHT EYE CATARACT EMULSIFICATION IOL IMPLANT performed by Rocael Juárez MD at [address] [facility] left lobe removed    TONSILLECTOMY      TUBAL LIGATION         History reviewed. No pertinent family history. reports that she has never smoked. She has never used smokeless tobacco. She reports that she does not drink alcohol or use drugs. Allergies:  Epinephrine; Ace inhibitors;  Adhesive tape; and Propoxyphene    Current Medications:    atorvastatin (LIPITOR) tablet 10 mg, Daily  furosemide (LASIX) tablet 20 mg, Daily  latanoprost (XALATAN) 0.005 % ophthalmic solution 1 drop, Nightly  levothyroxine (SYNTHROID) tablet 50 mcg, Daily  losartan (COZAAR) tablet 25 mg, Daily  magnesium gluconate (MAGONATE) tablet 250 mg, Daily  gabapentin (NEURONTIN) capsule 600 mg, TID  [START ON 9/15/2020] pantoprazole (PROTONIX) tablet 40 mg, QAM AC  oxybutynin (DITROPAN) tablet 5 mg, Daily  propranolol (INDERAL) tablet 10 mg, QPM  sodium chloride flush 0.9 % injection 10 mL, 2 times per day  sodium chloride flush 0.9 % injection 10 mL, PRN  promethazine (PHENERGAN) tablet 12.5 mg, Q6H PRN    Or  ondansetron (ZOFRAN) injection 4 mg, Q6H PRN  0.9 % sodium chloride infusion, Continuous  ceFAZolin (ANCEF) 2 g in sterile water 20 mL IV syringe, Q8H  oxyCODONE (ROXICODONE) immediate release tablet 5 mg, Q4H PRN    Or  oxyCODONE HCl (OXY-IR) immediate release tablet 10 mg, Q4H PRN  morphine (PF) injection 2 mg, Q2H PRN    Or  morphine sulfate (PF) injection 4 mg, Q2H PRN  cyclobenzaprine (FLEXERIL) tablet 10 mg, TID PRN  polyethylene glycol (GLYCOLAX) packet 17 g, Daily  bisacodyl (DULCOLAX) EC tablet 5 mg, Daily  sennosides-docusate sodium (SENOKOT-S) 8.6-50 MG tablet 1 tablet, BID  magnesium hydroxide (MILK OF MAGNESIA) 400 MG/5ML suspension 30 mL, Daily PRN  fleet rectal enema 1 enema, Daily PRN  benzocaine-menthol (CEPACOL SORE THROAT) lozenge 1 lozenge, Q2H PRN  0.9 % sodium chloride bolus, Once  glipiZIDE (GLUCOTROL) tablet 2.5 mg, Daily    And  metFORMIN (GLUCOPHAGE) tablet 500 mg, Daily        REVIEW OF SYSTEMS:  CONSTITUTIONAL:  Neg   Recent weight changes,fatigue,fever,chills or night sweats  EYES:  Neg  blurriness,tearing,itching or acute change in vision  NOSE:  Neg  rhinorrhea,sneezing,itching,allergy or epistaxis  MOUTH/THROAT:  Neg  bleeding gums,hoarseness or sore throat. RESPIRATORY:   Neg SOB,wheeze,cough,sputum,hemoptysis or bronochitis  CARDIOVASCULAR   Neg : chest pain,palpitations,dyspnea on exertion,orthopnea,paroxysmal nocturnal dyspnea or edema  GASTROINTESTINAL:  Neg   Appetite changes,nausea,vomiting,or diarrhea,indigestion,dysphagia,change in bowel movements, or abdominal pain. NEUROLOGICAL:  Neg  Loss of Consciousness,memeory loss,forgetfulness,periods of confusion,decline in intellect,nervousness,insomina,aphasia or dysarthria. SKIN :  Neg  skin or hair changes,and has no itching,rashes,sores. PHYSICAL EXAM:  BP (!) 149/63   Pulse 75   Temp 98 °F (36.7 °C) (Temporal)   Resp 16   Ht 5' 5\" (1.651 m)   Wt 215 lb (97.5 kg)   SpO2 99%   BMI 35.78 kg/m²   General appearance: alert, appears stated age, cooperative and no distress  Head: Normocephalic, without obvious abnormality, atraumatic  Eyes: conjunctivae/corneas clear. PERRL, EOM's intact. Ears: normal external ear canals both ears  Neck: no adenopathy, no carotid bruit, no JVD, supple, symmetrical, trachea midline and thyroid not enlarged, no tenderness/mass/nodules  Lungs: clear to auscultation bilaterally, symmetrical expansion  Heart: regular rate and rhythm, S1, S2 normal, no murmur, regular rate and rhythm ,no precordial heave  Abdomen:soft, non-tender; non-distended normal bowel sounds no masses, no organomegaly  Extremities:Pedal edema none  Homans sign is negative, no sign of DVT  Skin: Skin color, texture, turgor normal. No rashes or lesions  Neurologic:Mental status: Alert, oriented, thought content appropriate  Patient moving all extremities.     Data:   Labs:  No results for input(s): WBC, HGB, HCT, MCV, PLT in the

## 2020-09-14 NOTE — PROGRESS NOTES
Patient admitted to pre op. She has a small dressing on her left foot. 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 She states she changes the dressing 2 times a day and applied gent to site. She also states she was cleared to go to surgery and all the doctors are aware.

## 2020-09-14 NOTE — PROGRESS NOTES
COVID testing completed on: 9/9/2020  Results of the test: negative  The patient verbally confirms that they did adhere to the self-quarantine guidelines. No signs or symptoms expressed or observed.

## 2020-09-14 NOTE — PROGRESS NOTES
INTRAOPERATIVE MONITORING EMG REPORT    Diagnosis: spondylolisthesis  Procedure: PLIF L3/4    Anesthesia: isoflurane  Surgeon: Dian Zamora M.D. Intra-op Monitorin hours    Procedure:     EMG recording electrodes were placed over the vastus medialis, vastus lateralis, anterior tibialis, and medial gastrocnemius  musles for recording spontaneous EMG activity. A silent control was performed to test the integrity of the system prior to the procedure. Results:  Spontaneous EMG: was primarily quiet during the surgical procedure.     Evoked EMG:   LEFT    Direct Nerve (mA)            Screw (mA)   L3                                                      10  L4                                                      15  L5     30      RIGHT     L3                                                      >30  L4                                                      29  L5                                                      26

## 2020-09-14 NOTE — BRIEF OP NOTE
Brief Postoperative Note      Patient: Kyler Suero  YOB: 1945  MRN: [id number]    Date of Procedure: 9/14/2020    Pre-Op Diagnosis: SPONDYLOLISTHESIS    Post-Op Diagnosis: Same       Procedure(s):  L3-L4, L4-L5  POSTERIOR LUMBAR INTERBODY FUSION    Surgeon(s):  Tomasa Roach MD    Assistant:  Physician Assistant: MERI Rojas  Resident: Estephania Hunter DO    Anesthesia: General    Estimated Blood Loss (mL): 746    Complications: None    Specimens:   ID Type Source Tests Collected by Time Destination   A : LUMBAR DISC Tissue Spine SURGICAL PATHOLOGY Tomasa Roach MD 9/14/2020 7202        Implants:  Implant Name Type Inv.  Item Serial No.  Lot No. LRB No. Used Action   SCREW CREO MIS THRD 7.5X45MM - [id number] Spine SCREW CREO MIS THRD 7.5X45MM [id number] [facility]  N/A 2 Implanted   SCREW CREO MIS THRD 7.5X40MM - [id number] Spine SCREW CREO MIS THRD 7.5X40MM [id number] [facility]  N/A 2 Implanted   SCREW POLYAXIAL CREO THRDED 5.5X45MM - [id number] Spine SCREW POLYAXIAL CREO THRDED 5.5X45MM [id number] [facility]  N/A 1 Implanted   SCREW CREO MIS THRD 5.5X40MM - [id number] Spine SCREW CREO MIS THRD 5.5X40MM [id number] [employer]  N/A 1 Implanted   CAP LK CREO THRD 5.5MM - [id number] Spine CAP LK CREO THRD 5.5MM [id number] [employer]  N/A 6 Implanted   JOSE-GRAFT INFUSE KT LG Spine JOSE-GRAFT INFUSE KT LG  [employer] [employer] [id number] N/A 1 Implanted   IMPL SPACER SPINE RISE 7-13 [id number] Spine IMPL SPACER SPINE RISE 7-13 [id number]  [employer] [id number] N/A 2 Implanted   IMPL SPACR SPINE RISE [id number] 8-14MM 10DEG Spine IMPL SPACR SPINE RISE [id number] 8-14MM 10DEG  [employer] [id number] N/A 2 Implanted   IMPL SPINE TEJAS CREO CRV TI 5.5X75MM Spine IMPL SPINE TEJAS CREO CRV TI 5.5X75MM  [employer]  N/A 1 Implanted   IMPL SPINE TEJAS CREO 80MM Spine IMPL SPINE TEJAS CREO 80MM  [employer] MED[employer] [id number] N/A 1 Implanted         Drains:   Closed/Suction Drain Midline Back Bulb 10 [facility] (Active) Dressing Status Clean;Dry; Intact 09/14/20 1057       Urethral Catheter 16 fr (Active)   $ Urethral catheter insertion Inserted for procedure 09/14/20 0710   Catheter Indications Perioperative use in selected surgeries including but not limited to urologic, pelvic or need for intraoperative monitoring of urinary output due to prolonged surgery, large volume infusion or need for diuretic therapy in surgery 09/14/20 0710   Urine Color Yellow 09/14/20 0710   Urine Appearance Clear 09/14/20 0710       Findings: see dictated op note    Electronically signed by Delonte Bassett MD on 9/14/2020 at 11:33 AM

## 2020-09-14 NOTE — ANESTHESIA PRE PROCEDURE
Department of Anesthesiology  Preprocedure Note       Name:  Kyler Suero   Age:  70 y.o.  :  1945                                          MRN:  [id number]         Date:  2020      Surgeon: Ny Amaro):  Tomasa Roach MD    Procedure: Procedure(s):  L3-L4, L4-L5  POSTERIOR LUMBAR INTERBODY FUSION -- NEEDS O-ARM, C-ARM, KACEY TABLE, CELL SAVER, PLATELET GEL, AUDIOLOGY, CAGES, PLATES, SCREWS -- GLOBUS    Medications prior to admission:   Prior to Admission medications    Medication Sig Start Date End Date Taking? Authorizing Provider   propranolol (INDERAL) 10 MG tablet every evening  19  Yes Historical Provider, MD   NEURONTIN 600 MG tablet Take 1 tablet by mouth 3 times daily.  20 Yes Tomasa Roach MD   atorvastatin (LIPITOR) 10 MG tablet Take 10 mg by mouth daily   Yes Historical Provider, MD   furosemide (LASIX) 20 MG tablet Take 20 mg by mouth daily as needed    Yes Historical Provider, MD   KRILL OIL PO Take by mouth daily    Yes Historical Provider, MD   magnesium 30 MG tablet Take 30 mg by mouth daily    Yes Historical Provider, MD   Coenzyme Q10 (COQ10 PO) Take by mouth daily    Yes Historical Provider, MD   allopurinol (ZYLOPRIM) 100 MG tablet Take 200 mg by mouth daily    Yes Historical Provider, MD   oxybutynin (DITROPAN) 5 MG tablet Take 5 mg by mouth daily    Yes Historical Provider, MD   levothyroxine (SYNTHROID) 50 MCG tablet Take 50 mcg by mouth Daily Takes an extra 1/2 tab on sundays &    Yes Historical Provider, MD   omeprazole (PRILOSEC) 20 MG delayed release capsule Take 20 mg by mouth daily   Yes Historical Provider, MD   aspirin 81 MG tablet Take 81 mg by mouth daily   Yes Historical Provider, MD   glipiZIDE-metformin (METAGLIP) 2.5-500 MG per tablet Take 1 tablet by mouth daily    Yes Historical Provider, MD   losartan (COZAAR) 25 MG tablet Take 25 mg by mouth daily   Yes Historical Provider, MD   latanoprost (XALATAN) 0.005 % ophthalmic solution Place 1 drop into the left eye nightly   Yes Historical Provider, MD   BIOTIN PO Take by mouth daily   Yes Historical Provider, MD   Cholecalciferol (VITAMIN D3) 400 UNIT/ML LIQD Take by mouth daily   Yes Historical Provider, MD   COLCHICINE PO Take by mouth as needed    Historical Provider, MD       Current medications:    Current Facility-Administered Medications   Medication Dose Route Frequency Provider Last Rate Last Dose    0.9 % sodium chloride infusion   Intravenous Continuous MERI Barragan 100 mL/hr at 09/14/20 4035      ceFAZolin (ANCEF) 2 g in sterile water 20 mL IV syringe  2 g Intravenous [facility]        sodium chloride flush 0.9 % injection 10 mL  10 mL Intravenous 2 times per day MERI Barragan        sodium chloride flush 0.9 % injection 10 mL  10 mL Intravenous PRN MERI Barragan           Allergies: Allergies   Allergen Reactions    Epinephrine Other (See Comments)     Heart racing felt like I couldn't breath    Ace Inhibitors      Cough      Adhesive Tape Rash    Propoxyphene Other (See Comments)     ? Problem List:    Patient Active Problem List   Diagnosis Code    Left cataract H26.9    Right cataract H26.9    Dislocated IOL (intraocular lens) T85. 23XA    Back pain at L4-L5 level M54.5    Acute midline low back pain with right-sided sciatica M54.41    Intractable back pain M54.9    Obesity E66.9    HTN (hypertension) I10       Past Medical History:        Diagnosis Date    Anesthesia complication 7359    difficulty breathing post thyroid surgery [facility]    Cancer [facility])     skin     GERD (gastroesophageal reflux disease)     Hyperlipidemia     Hypertension     Sleep apnea     uses cpap    Spinal stenosis     Thyroid disease     Tremors of nervous system     familial    Type 2 diabetes mellitus without complication ([facility] [address]        Past Surgical History:        Procedure Laterality Date    BLEPHAROPLASTY      CATARACT REMOVAL WITH IMPLANT Left 09/12/2017    CATARACT REMOVAL WITH IMPLANT Right 10/09/2018    CATARACT REMOVAL WITH IMPLANT Right 11/01/2018    secondary procedure to correct first cataract     CHOLECYSTECTOMY      COSMETIC SURGERY      abdominoplasty    FOOT SURGERY Left     skin cancer    HYSTERECTOMY      OVARIAN CYST REMOVAL      MD REMV POST EYE IMPLNT MATER,INTRAOCUL Right 11/1/2018    EYE IOL REMOVAL performed by Latoya Huertas MD at [address] RM[facility] INSJ IO LENS PROSTH W/O ECP Right 10/9/2018    RIGHT EYE CATARACT EMULSIFICATION IOL IMPLANT performed by Latoya Huertas MD at [address], PARTIAL      left lobe removed    TONSILLECTOMY      TUBAL LIGATION         Social History:    Social History     Tobacco Use    Smoking status: Never Smoker    Smokeless tobacco: Never Used   Substance Use Topics    Alcohol use: No     Comment: rare                                Counseling given: Not Answered      Vital Signs (Current):   Vitals:    09/09/20 1023 09/14/20 0531   BP:  (!) 155/68   Pulse:  60   Resp:  20   Temp:  97.1 °F (36.2 °C)   SpO2:  97%   Weight:  215 lb (97.5 kg)   Height: 5' 5.5\" (1.664 m) 5' 5\" (1.651 m)                                              BP Readings from Last 3 Encounters:   09/14/20 (!) 155/68   09/10/20 (!) 132/58   08/26/20 120/72       NPO Status: Time of last liquid consumption: 2330                        Time of last solid consumption: 1930                        Date of last liquid consumption: 09/13/20                        Date of last solid food consumption: 09/13/20    BMI:   Wt Readings from Last 3 Encounters:   09/14/20 215 lb (97.5 kg)   09/10/20 215 lb (97.5 kg)   08/26/20 209 lb (94.8 kg)     Body mass index is 35.78 kg/m².     CBC:   Lab Results   Component Value Date    WBC 5.8 09/10/2020    RBC 3.79 09/10/2020    HGB 11.4 09/10/2020    HCT 35.3 09/10/2020    MCV 93.1 09/10/2020    RDW 14.4 09/10/2020     09/10/2020       CMP:   Lab Results   Component Value Date     09/10/2020    K 4.3 09/10/2020     09/10/2020    CO2 23 09/10/2020    BUN 23 09/10/2020    CREATININE 0.9 09/10/2020    CREATININE 1.1 06/20/2019    GFRAA >60 09/10/2020    LABGLOM >60 09/10/2020    GLUCOSE 195 09/10/2020    PROT 7.2 08/22/2018    CALCIUM 9.2 09/10/2020    BILITOT 0.5 08/22/2018    ALKPHOS 78 08/22/2018    AST 51 08/22/2018    ALT 38 08/22/2018       POC Tests: No results for input(s): POCGLU, POCNA, POCK, POCCL, POCBUN, POCHEMO, POCHCT in the last 72 hours. Coags:   Lab Results   Component Value Date    PROTIME 10.8 09/10/2020    INR 1.0 09/10/2020       HCG (If Applicable): No results found for: PREGTESTUR, PREGSERUM, HCG, HCGQUANT     ABGs: No results found for: PHART, PO2ART, EUH8ZGR, ZBS3JXA, BEART, K6LKMNXS     Type & Screen (If Applicable):  No results found for: LABABO, LABRH    Drug/Infectious Status (If Applicable):  No results found for: HIV, HEPCAB    COVID-19 Screening (If Applicable):   Lab Results   Component Value Date    COVID19 Not Detected 09/09/2020         Anesthesia Evaluation  Patient summary reviewed no history of anesthetic complications:   Airway: Mallampati: III  TM distance: <3 FB     Mouth opening: > = 3 FB Dental:          Pulmonary: breath sounds clear to auscultation  (+) sleep apnea: on CPAP,                             Cardiovascular:    (+) hypertension:, hyperlipidemia        Rhythm: regular  Rate: normal                    Neuro/Psych:                ROS comment: Low back pain GI/Hepatic/Renal:   (+) GERD:,          ROS comment: Overactive bladder. Endo/Other:    (+) Diabetes, hypothyroidism::., .                  ROS comment: Gout Abdominal:   (+) obese,         Vascular: negative vascular ROS. Anesthesia Plan      general     ASA 3       Induction: intravenous. Anesthetic plan and risks discussed with patient.     Use of blood products discussed with patient whom consented to blood products. Plan discussed with CRNA.                   Melvi Rizvi MD   9/14/2020

## 2020-09-15 LAB
ALBUMIN SERPL-MCNC: 3.3 G/DL (ref 3.5–5.2)
ALP BLD-CCNC: 73 U/L (ref 35–104)
ALT SERPL-CCNC: 17 U/L (ref 0–32)
ANION GAP SERPL CALCULATED.3IONS-SCNC: 9 MMOL/L (ref 7–16)
AST SERPL-CCNC: 22 U/L (ref 0–31)
BILIRUB SERPL-MCNC: 0.2 MG/DL (ref 0–1.2)
BUN BLDV-MCNC: 21 MG/DL (ref 8–23)
CALCIUM SERPL-MCNC: 8.6 MG/DL (ref 8.6–10.2)
CHLORIDE BLD-SCNC: 107 MMOL/L (ref 98–107)
CO2: 24 MMOL/L (ref 22–29)
CREAT SERPL-MCNC: 1 MG/DL (ref 0.5–1)
GFR AFRICAN AMERICAN: >60
GFR NON-AFRICAN AMERICAN: 54 ML/MIN/1.73
GLUCOSE BLD-MCNC: 174 MG/DL (ref 74–99)
METER GLUCOSE: 123 MG/DL (ref 74–99)
METER GLUCOSE: 130 MG/DL (ref 74–99)
METER GLUCOSE: 154 MG/DL (ref 74–99)
METER GLUCOSE: 158 MG/DL (ref 74–99)
POTASSIUM SERPL-SCNC: 4.4 MMOL/L (ref 3.5–5)
SODIUM BLD-SCNC: 140 MMOL/L (ref 132–146)
TOTAL PROTEIN: 5.4 G/DL (ref 6.4–8.3)

## 2020-09-15 PROCEDURE — 2700000000 HC OXYGEN THERAPY PER DAY

## 2020-09-15 PROCEDURE — 97535 SELF CARE MNGMENT TRAINING: CPT

## 2020-09-15 PROCEDURE — G0378 HOSPITAL OBSERVATION PER HR: HCPCS

## 2020-09-15 PROCEDURE — 6370000000 HC RX 637 (ALT 250 FOR IP): Performed by: NEUROLOGICAL SURGERY

## 2020-09-15 PROCEDURE — 97530 THERAPEUTIC ACTIVITIES: CPT

## 2020-09-15 PROCEDURE — 97162 PT EVAL MOD COMPLEX 30 MIN: CPT

## 2020-09-15 PROCEDURE — 96375 TX/PRO/DX INJ NEW DRUG ADDON: CPT

## 2020-09-15 PROCEDURE — 82962 GLUCOSE BLOOD TEST: CPT

## 2020-09-15 PROCEDURE — 6360000002 HC RX W HCPCS: Performed by: NEUROLOGICAL SURGERY

## 2020-09-15 PROCEDURE — 96374 THER/PROPH/DIAG INJ IV PUSH: CPT

## 2020-09-15 PROCEDURE — 36415 COLL VENOUS BLD VENIPUNCTURE: CPT

## 2020-09-15 PROCEDURE — 80053 COMPREHEN METABOLIC PANEL: CPT

## 2020-09-15 PROCEDURE — 6370000000 HC RX 637 (ALT 250 FOR IP): Performed by: INTERNAL MEDICINE

## 2020-09-15 PROCEDURE — 97165 OT EVAL LOW COMPLEX 30 MIN: CPT

## 2020-09-15 PROCEDURE — 2580000003 HC RX 258: Performed by: NEUROLOGICAL SURGERY

## 2020-09-15 PROCEDURE — 96376 TX/PRO/DX INJ SAME DRUG ADON: CPT

## 2020-09-15 RX ADMIN — MORPHINE SULFATE 4 MG: 4 INJECTION, SOLUTION INTRAMUSCULAR; INTRAVENOUS at 06:03

## 2020-09-15 RX ADMIN — LATANOPROST 1 DROP: 50 SOLUTION OPHTHALMIC at 21:04

## 2020-09-15 RX ADMIN — ATORVASTATIN CALCIUM 10 MG: 10 TABLET, FILM COATED ORAL at 08:34

## 2020-09-15 RX ADMIN — GABAPENTIN 600 MG: 300 CAPSULE ORAL at 13:52

## 2020-09-15 RX ADMIN — GABAPENTIN 600 MG: 300 CAPSULE ORAL at 20:39

## 2020-09-15 RX ADMIN — INSULIN LISPRO 1 UNITS: 100 INJECTION, SOLUTION INTRAVENOUS; SUBCUTANEOUS at 21:04

## 2020-09-15 RX ADMIN — LEVOTHYROXINE SODIUM 50 MCG: 0.05 TABLET ORAL at 06:42

## 2020-09-15 RX ADMIN — OXYCODONE HYDROCHLORIDE 10 MG: 10 TABLET ORAL at 16:24

## 2020-09-15 RX ADMIN — OXYCODONE HYDROCHLORIDE 10 MG: 10 TABLET ORAL at 10:26

## 2020-09-15 RX ADMIN — SODIUM CHLORIDE, PRESERVATIVE FREE 10 ML: 5 INJECTION INTRAVENOUS at 05:58

## 2020-09-15 RX ADMIN — Medication 2 G: at 18:58

## 2020-09-15 RX ADMIN — MORPHINE SULFATE 4 MG: 4 INJECTION, SOLUTION INTRAMUSCULAR; INTRAVENOUS at 08:06

## 2020-09-15 RX ADMIN — PROPRANOLOL HYDROCHLORIDE 10 MG: 10 TABLET ORAL at 18:58

## 2020-09-15 RX ADMIN — OXYCODONE HYDROCHLORIDE 10 MG: 10 TABLET ORAL at 20:39

## 2020-09-15 RX ADMIN — GABAPENTIN 600 MG: 300 CAPSULE ORAL at 08:34

## 2020-09-15 RX ADMIN — BISACODYL 5 MG: 5 TABLET, COATED ORAL at 08:35

## 2020-09-15 RX ADMIN — INSULIN LISPRO 1 UNITS: 100 INJECTION, SOLUTION INTRAVENOUS; SUBCUTANEOUS at 08:29

## 2020-09-15 RX ADMIN — Medication 2 G: at 10:45

## 2020-09-15 RX ADMIN — PANTOPRAZOLE SODIUM 40 MG: 40 TABLET, DELAYED RELEASE ORAL at 06:02

## 2020-09-15 RX ADMIN — POLYETHYLENE GLYCOL 3350 17 G: 17 POWDER, FOR SOLUTION ORAL at 08:34

## 2020-09-15 RX ADMIN — DOCUSATE SODIUM 50 MG AND SENNOSIDES 8.6 MG 1 TABLET: 8.6; 5 TABLET, FILM COATED ORAL at 08:34

## 2020-09-15 RX ADMIN — CYCLOBENZAPRINE 10 MG: 10 TABLET, FILM COATED ORAL at 07:53

## 2020-09-15 RX ADMIN — MORPHINE SULFATE 4 MG: 4 INJECTION, SOLUTION INTRAMUSCULAR; INTRAVENOUS at 11:34

## 2020-09-15 RX ADMIN — OXYCODONE HYDROCHLORIDE 10 MG: 10 TABLET ORAL at 04:33

## 2020-09-15 RX ADMIN — CYCLOBENZAPRINE 10 MG: 10 TABLET, FILM COATED ORAL at 16:24

## 2020-09-15 RX ADMIN — DOCUSATE SODIUM 50 MG AND SENNOSIDES 8.6 MG 1 TABLET: 8.6; 5 TABLET, FILM COATED ORAL at 20:39

## 2020-09-15 RX ADMIN — Medication 10 ML: at 21:04

## 2020-09-15 RX ADMIN — METFORMIN HYDROCHLORIDE 500 MG: 500 TABLET ORAL at 08:34

## 2020-09-15 RX ADMIN — LOSARTAN POTASSIUM 25 MG: 25 TABLET, FILM COATED ORAL at 08:34

## 2020-09-15 RX ADMIN — Medication 250 MG: at 08:35

## 2020-09-15 RX ADMIN — Medication 2 G: at 05:59

## 2020-09-15 RX ADMIN — OXYBUTYNIN CHLORIDE 5 MG: 5 TABLET ORAL at 08:34

## 2020-09-15 ASSESSMENT — PAIN DESCRIPTION - LOCATION
LOCATION: BACK

## 2020-09-15 ASSESSMENT — PAIN DESCRIPTION - ONSET
ONSET: ON-GOING

## 2020-09-15 ASSESSMENT — PAIN SCALES - GENERAL
PAINLEVEL_OUTOF10: 4
PAINLEVEL_OUTOF10: 7
PAINLEVEL_OUTOF10: 7
PAINLEVEL_OUTOF10: 6
PAINLEVEL_OUTOF10: 6
PAINLEVEL_OUTOF10: 7
PAINLEVEL_OUTOF10: 7
PAINLEVEL_OUTOF10: 6
PAINLEVEL_OUTOF10: 7

## 2020-09-15 ASSESSMENT — PAIN DESCRIPTION - DESCRIPTORS
DESCRIPTORS: ACHING;DISCOMFORT;CONSTANT
DESCRIPTORS: ACHING;CONSTANT
DESCRIPTORS: ACHING;SORE;DISCOMFORT
DESCRIPTORS: ACHING;SORE;DISCOMFORT
DESCRIPTORS: ACHING;JABBING;SHARP

## 2020-09-15 ASSESSMENT — PAIN DESCRIPTION - PAIN TYPE
TYPE: ACUTE PAIN
TYPE: ACUTE PAIN
TYPE: SURGICAL PAIN
TYPE: ACUTE PAIN
TYPE: ACUTE PAIN

## 2020-09-15 ASSESSMENT — PAIN DESCRIPTION - FREQUENCY
FREQUENCY: CONTINUOUS

## 2020-09-15 ASSESSMENT — PAIN DESCRIPTION - ORIENTATION
ORIENTATION: LOWER

## 2020-09-15 NOTE — PROGRESS NOTES
Attempted to remove the pt's santiago. Pt stated that she wants to \"enjoy breakfast\". Told pt that I would be back to remove the santiago.

## 2020-09-15 NOTE — PROGRESS NOTES
Department of Neurosurgery  Progress Note    CHIEF COMPLAINT: s/p L3-L5 PLIF 9/14    SUBJECTIVE:  Sitting up in chair. Johana op site pain. Right leg pain somewhat improved. No new issues overnight. REVIEW OF SYSTEMS :  Constitutional: Negative for chills and fever. Neurological: Negative for dizziness, tremors and speech change.      OBJECTIVE:   VITALS:  BP (!) 118/54   Pulse 72   Temp 98.2 °F (36.8 °C) (Temporal)   Resp 16   Ht 5' 5\" (1.651 m)   Wt 215 lb (97.5 kg)   SpO2 98%   BMI 35.78 kg/m²     PHYSICAL:  Neurologic:  Mental Status Exam:  Level of Alertness:   awake  Orientation:   person, place, time  Motor Exam:  Moving all extremities   Sensory:  Sensory intact  Incision c/d/i  Hemovac drain intact      DATA:  CBC:   Lab Results   Component Value Date    WBC 5.8 09/10/2020    RBC 3.79 09/10/2020    HGB 11.4 09/10/2020    HCT 35.3 09/10/2020    MCV 93.1 09/10/2020    MCH 30.1 09/10/2020    MCHC 32.3 09/10/2020    RDW 14.4 09/10/2020     09/10/2020    MPV 10.0 09/10/2020     BMP:    Lab Results   Component Value Date     09/15/2020    K 4.4 09/15/2020    K 4.3 09/10/2020     09/15/2020    CO2 24 09/15/2020    BUN 21 09/15/2020    LABALBU 3.3 09/15/2020    CREATININE 1.0 09/15/2020    CREATININE 1.1 06/20/2019    CALCIUM 8.6 09/15/2020    GFRAA >60 09/15/2020    LABGLOM 54 09/15/2020    GLUCOSE 174 09/15/2020     PT/INR:    Lab Results   Component Value Date    PROTIME 10.8 09/10/2020    INR 1.0 09/10/2020     PTT:  No results found for: APTT, PTT[APTT}    Current Inpatient Medications  Current Facility-Administered Medications: atorvastatin (LIPITOR) tablet 10 mg, 10 mg, Oral, Daily  furosemide (LASIX) tablet 20 mg, 20 mg, Oral, Daily  latanoprost (XALATAN) 0.005 % ophthalmic solution 1 drop, 1 drop, Left Eye, Nightly  levothyroxine (SYNTHROID) tablet 50 mcg, 50 mcg, Oral, Daily  losartan (COZAAR) tablet 25 mg, 25 mg, Oral, Daily  magnesium gluconate (MAGONATE) tablet 250 mg, 250 mg, Oral, Daily  gabapentin (NEURONTIN) capsule 600 mg, 600 mg, Oral, TID  pantoprazole (PROTONIX) tablet 40 mg, 40 mg, Oral, QAM AC  oxybutynin (DITROPAN) tablet 5 mg, 5 mg, Oral, Daily  propranolol (INDERAL) tablet 10 mg, 10 mg, Oral, QPM  sodium chloride flush 0.9 % injection 10 mL, 10 mL, Intravenous, 2 times per day  sodium chloride flush 0.9 % injection 10 mL, 10 mL, Intravenous, PRN  promethazine (PHENERGAN) tablet 12.5 mg, 12.5 mg, Oral, Q6H PRN **OR** ondansetron (ZOFRAN) injection 4 mg, 4 mg, Intravenous, Q6H PRN  ceFAZolin (ANCEF) 2 g in sterile water 20 mL IV syringe, 2 g, Intravenous, Q8H  oxyCODONE (ROXICODONE) immediate release tablet 5 mg, 5 mg, Oral, Q4H PRN **OR** oxyCODONE HCl (OXY-IR) immediate release tablet 10 mg, 10 mg, Oral, Q4H PRN  morphine (PF) injection 2 mg, 2 mg, Intravenous, Q2H PRN **OR** morphine sulfate (PF) injection 4 mg, 4 mg, Intravenous, Q2H PRN  cyclobenzaprine (FLEXERIL) tablet 10 mg, 10 mg, Oral, TID PRN  polyethylene glycol (GLYCOLAX) packet 17 g, 17 g, Oral, Daily  bisacodyl (DULCOLAX) EC tablet 5 mg, 5 mg, Oral, Daily  sennosides-docusate sodium (SENOKOT-S) 8.6-50 MG tablet 1 tablet, 1 tablet, Oral, BID  magnesium hydroxide (MILK OF MAGNESIA) 400 MG/5ML suspension 30 mL, 30 mL, Oral, Daily PRN  fleet rectal enema 1 enema, 1 enema, Rectal, Daily PRN  benzocaine-menthol (CEPACOL SORE THROAT) lozenge 1 lozenge, 1 lozenge, Oral, Q2H PRN  0.9 % sodium chloride bolus, 20 mL, Intravenous, Once  [DISCONTINUED] glipiZIDE (GLUCOTROL) tablet 2.5 mg, 2.5 mg, Oral, Daily **AND** metFORMIN (GLUCOPHAGE) tablet 500 mg, 500 mg, Oral, Daily  insulin lispro (HUMALOG) injection vial 0-6 Units, 0-6 Units, Subcutaneous, TID WC  insulin lispro (HUMALOG) injection vial 0-3 Units, 0-3 Units, Subcutaneous, Nightly  glucose (GLUTOSE) 40 % oral gel 15 g, 15 g, Oral, PRN  dextrose 50 % IV solution, 12.5 g, Intravenous, PRN  glucagon (rDNA) injection 1 mg, 1 mg, Intramuscular, PRN  dextrose 5 % solution, 100 mL/hr, Intravenous, PRN  dextrose 5 % and 0.45 % sodium chloride infusion, , Intravenous, Continuous    ASSESSMENT:   s/p L3-L5 PLIF 9/14    PLAN:  -Pain control  -PT/OT  -Drain care  -PMR consult  -Follow up in neurosurgery clinic in 4 weeks with x-rays      Electronically signed by Kenneth Kamara PA-C on 9/15/2020 at 2:22 PM

## 2020-09-15 NOTE — PROGRESS NOTES
OCCUPATIONAL THERAPY INITIAL EVALUATION      Date:9/15/2020  Patient Name: Blu Turner  MRN: [id number]  : 1945  Room: [address] [place] [address]-A  Referring Provider:  Nathaniel Bah MD    Evaluating OT: Juan David Hammond OTR/L   License #  [id number]     AM-PAC Daily Activity Raw Score: 15/24    Recommended Adaptive Equipment: A.E. as needed, pt. Requesting hospital bed if home-going    Diagnosis:  1. L3 to L5 lumbar canal stenosis. 2.  L3-L4 degenerative spondylolisthesis    Surgery: 2020:  1. Bilateral segmental arthrodesis and fusion from L3 to L5 with use of  bilateral L3, L4, and L5 pedicle screws with use of locally harvested  autograft plus recombinant BMP-2 (Infuse posterolateral fusion from L3  to L5). 2.  360 degree fusion with a posterior lumbar interbody fusion at L3-L4  and L4-L5 with use of bilateral Globus Rise expandable cage packed with  recombinant BMP-2 (Infuse). 3.  Bilateral L3, L4, and L5 laminectomy; bilateral L3-L4 and L4-L5  medial facetectomy; and bilateral L3, L4, and L5 foraminotomy with  bilateral L3-L4 and L4-L5 diskectomy. Pertinent Medical History:   Past Medical History:   Diagnosis Date    Anesthesia complication     difficulty breathing post thyroid surgery [facility]    Cancer [facility])     skin     GERD (gastroesophageal reflux disease)     Hyperlipidemia     Hypertension     Sleep apnea     uses cpap    Spinal stenosis     Thyroid disease     Tremors of nervous system     familial    Type 2 diabetes mellitus without complication ([facility] [address]       Precautions:  Falls, neutral spine (no B,L,T), hemovac drain, santiago, IV, LSO brace     Home Living: Pt lives alone in a 2 story home, 1st floor set up with 5steps to enter and 2 HR. Bathroom setup: tub/shower, std. commode   Equipment owned: toilet riser, in-dwelling shower seat, ww, std. walker    Prior Level of Function: Ind. with ADLs , Ind. with IADLs; ambulated no A.D.   Driving: active  Occupation:  Pain Level: 7/10 low back pain  Cognition: A&O: 4/4; Follows 2 step directions   Memory:  Fair/good   Sequencing:  fair   Problem solving:  fair   Judgement/safety:  Fair with occasional cue for precautions     Functional Assessment:   Initial Eval Status  Date: 9- Treatment Status  Date: Short Term Goals = Long Term Goals  Treatment frequency: 3-5 days   Feeding Independent       Grooming Set-up seated  Modified [place]    UB Dressing Maximal Assist to adjust gown & yaquelin LSO supine/ log-rolling. Min A to yaquelin robe seated  Modified [place]    LB Dressing Moderate/Max  Assist seated  Modified [place] with A.E. Bathing Moderate Assist with sim. task  Modified [place] with A.E. Toileting NT, santiago  Modified [place]    Bed Mobility  Logroll: SBA  Supine to sit: Moderate Assist   Sit to supine: NT  Supine to sit: Modified [place]   Sit to supine: Modified [place]    Functional Transfers Moderate Assist with sit <> stand. Min A with SPT using ww  Modified [place]    Functional Mobility Minimal Assist with ww short in room distance with fair tolerance  Modified [place]    Balance Sitting:     Static:  Sup    Dynamic:SBA  Standing: Min A     Activity Tolerance Fair- with lt. Ax.  Fair+/good with mod. Ax. Visual/  Perceptual Glasses: yes        Safety Awareness fair                    good     Hand dominance: L     Strength ROM Additional Info:    RUE  4/5  WFL good  and wfl FMC/dexterity noted during ADL tasks     LUE 4/5  WFL good  and wfl FMC/dexterity noted during ADL tasks       Hearing: WFL   Sensation:  Pt. c/o no numbness/ tingling B UE  Tone: WFL B UE  Edema: none noted B UE                            Comments: Upon arrival, patient supine in bed, son present & instructed throughout, cleared by Nursing, agreeable to OT. Pt demonstrating fair+ understanding of education/techniques, requiring additional training / education.  At end of session, patient seated in bedside chair with breakfast tray, all needs met, RN notified, with call light and phone within reach, all lines and tubes intact. Pt would benefit from continued skilled OT to increase safety and independence with completion of ADL/iADL tasks, functional transfers/mobility to improve independence and quality of life. Treatment:  OT services provided include: facilitation of neutral spine techniques, skilled monitoring of vitals & pt.'s response to treatment (remained WFL on RA), instruction/training on safe & adapted techniques within precautions for completion of ADLs, proper posture and/or positioning, facilitation of functional seated & standing tolerance & balance ax. during ADLs and functional ax., skilled cuing on hand placement & proper body mechanics during functional transfer/mobility training with focus on safety, technique, precautions. Pt.  also Instructed RE: safe transfers/mobility, ADL training, role of OT, E.C. techniques, treatment plan, recs. , prec. Eval Complexity: Low    Assessment of current deficits:    Functional mobility [x]  ADLs [x] Strength [x]  Cognition []  Functional transfers  [x] IADLs [x] Safety Awareness [x]  Endurance [x]  Fine Motor Coordination [] Balance [x] Vision/perception [] Sensation []   Gross Motor Coordination [] ROM [] Delirium []                  Motor Control []    Plan of Care: OT 1-3x/week for 5-7 days PRN   [x] ADL retraining/AE recommendations to increase functional [place]  [x] Energy Conservation Techniques/Strategies to improve tolerance for safe ADLs, functional transfers & mobility      [x] Functional Transfer /MobilityTraining for fall prevention & DME recommendations        [x] Pt./family Training to improve Ind. With ADLs within neutral spine, sequencing of ax. and safety during functional ax. Joey Leone                [x] Therapeutic Activity to improve functional skills, endurance, balance & posture  [x]Therapeutic Exercise to increase strength & endurance for functional ax. [x] Positioning to Improve Functional [place], Safety, and Skin Integrity  [x] Patient and/or Family Education to Increase Safety and Functional [place] with ADLs, functional transfers/mobility    Rehab Potential: Good for established goals     Patient / Family Goal:  To return home soon    Patient and/or family were instructed diagnosis, prognosis/goals and plan of care. Demonstrated fair+ understanding. [] Malnutrition indicators have been identified and nursing has been notified to ensure a dietitian consult is ordered. low Evaluation + 15    Time In: 8:01      Time Out: 8:25                Total Treatment time: 15   Min Units   OT Eval Low 86589  x    OT Eval Medium 16331     OT Eval High 25316     OT Re-Eval [id number]     Therapeutic Ex 38943     Therapeutic Activities 34258  05    ADL/Self Care 87220  10    Orthotic Management 31956     Neuro Re-Ed 94736     Non-Billable Time     TOTAL TIMED TREATMENT  15  1        Evaluation time includes thorough review of current medical information, gathering information on past medical history/social history and prior level of function, completion of standardized testing/informal observation of tasks, assessment of data, consultation with other medical professions/disciplines, and development of POC/Goals. Gwendolyn Hammond, OTR/L   License #  [id number]

## 2020-09-15 NOTE — PROGRESS NOTES
Hospitalist Progress Note      PCP: Patrick Gomes MD    Date of Admission: 9/14/2020    Chief Complaint: medical management    Hospital Course:   Sridhar Maharaj is a 70 y.o. female with back pain and lumbar radiculopathy that did not respond to conservative management, patient had surgery today L3-L4, L4-L5 posterior lumbar interbody fusion. Patient has past medical history of sleep apnea hypertension hyperlipidemia GERD and diabetes. 9/15/20: POD 1. No acute events overnight. Vital signs reviewed and stable. Labs reviewed and stable. Subjective:   Patient lying in bed. She is complaining of back pain but no other complaints at the present time.      Medications:  Reviewed    Infusion Medications    dextrose      dextrose 5 % and 0.45 % NaCl 50 mL/hr at 09/14/20 2147     Scheduled Medications    atorvastatin  10 mg Oral Daily    furosemide  20 mg Oral Daily    latanoprost  1 drop Left Eye Nightly    levothyroxine  50 mcg Oral Daily    losartan  25 mg Oral Daily    magnesium gluconate  250 mg Oral Daily    gabapentin  600 mg Oral TID    pantoprazole  40 mg Oral QAM AC    oxybutynin  5 mg Oral Daily    propranolol  10 mg Oral QPM    sodium chloride flush  10 mL Intravenous 2 times per day    ceFAZolin (ANCEF) IVPB  2 g Intravenous Q8H    polyethylene glycol  17 g Oral Daily    bisacodyl  5 mg Oral Daily    sennosides-docusate sodium  1 tablet Oral BID    sodium chloride  20 mL Intravenous Once    metFORMIN  500 mg Oral Daily    insulin lispro  0-6 Units Subcutaneous TID WC    insulin lispro  0-3 Units Subcutaneous Nightly     PRN Meds: sodium chloride flush, promethazine **OR** ondansetron, oxyCODONE **OR** oxyCODONE, morphine **OR** morphine, cyclobenzaprine, magnesium hydroxide, fleet, benzocaine-menthol, glucose, dextrose, glucagon (rDNA), dextrose      Intake/Output Summary (Last 24 hours) at 9/15/2020 0912  Last data filed at 9/15/2020 0622  Gross per 24 hour   Intake 3025 ml Output 2500 ml   Net 525 ml       Exam:    /64   Pulse 78   Temp 97.7 °F (36.5 °C) (Temporal)   Resp 16   Ht 5' 5\" (1.651 m)   Wt 215 lb (97.5 kg)   SpO2 98%   BMI 35.78 kg/m²     General appearance: No apparent distress, appears stated age and cooperative. HEENT: Pupils equal, round, and reactive to light. Conjunctivae/corneas clear. Neck: Supple, with full range of motion. No jugular venous distention. Trachea midline. Respiratory:  Normal respiratory effort. Clear to auscultation, bilaterally without Rales/Wheezes/Rhonchi. Cardiovascular: Regular rate and rhythm with normal S1/S2 without murmurs, rubs or gallops. Abdomen: Soft, non-tender, non-distended with normal bowel sounds. Musculoskeletal: No clubbing, cyanosis or edema bilaterally. Full range of motion without deformity. Skin: Skin color, texture, turgor normal.  No rashes or lesions. Neurologic:  Neurovascularly intact without any focal sensory/motor deficits. Psychiatric: Alert and oriented, thought content appropriate, normal insight  Capillary Refill: Brisk,< 3 seconds   Peripheral Pulses: +2 palpable, equal bilaterally       Labs:   No results for input(s): WBC, HGB, HCT, PLT in the last 72 hours. Recent Labs     09/15/20  0418      K 4.4      CO2 24   BUN 21   CREATININE 1.0   CALCIUM 8.6     Recent Labs     09/15/20  0418   AST 22   ALT 17   BILITOT 0.2   ALKPHOS 73     No results for input(s): INR in the last 72 hours. No results for input(s): Laura Rossi in the last 72 hours. FLUORO FOR SURGICAL PROCEDURES   Final Result   Intraoperative bilateral posterior lumbar fusion L3-L5      This study was dictated by Esequiel Anne PA-C and Jennifer Ramirez MD   reviewed and concurred with the findings.           Assessment/Plan:    Active Hospital Problems    Diagnosis Date Noted    Spondylolisthesis of lumbar region [M43.16] 09/14/2020    HTN (hypertension) [I10] 07/07/2020    Obesity [E66.9] 07/07/2020 Plan  Pain control  Glucose control-metformin, insulin sliding scale  Bp management-lasix, losartan, propanolol  Continue remaining home medications as ordered  Neurovascular checks  Monitor bowel function    DVT Prophylaxis: SCDs  Diet: DIET CARB CONTROL;  Code Status: Full Code    PT/OT Eval Status: ordered    Dispo - per primary    Mina Hopper CNP

## 2020-09-15 NOTE — OP NOTE
conservative  therapy and after risks, benefits, and alternatives were discussed with  the patient, it was determined that she would undergo the above-listed  procedure. OPERATIVE PROCEDURE:  The patient was brought into the operating room. A timeout was performed where she was identified by her name, medical  record number, and the operative procedure which she was about to  undergo. Next, induction of generalized endotracheal anesthesia was  then commenced. Upon completion of induction of generalized  endotracheal anesthesia, she received preoperative antibiotics. Lew  catheter was placed. Monitoring electrodes were placed into the muscle  groups in her lower extremity for free-running EMG testing. She was  then flipped into prone position on a Dominick table. All pressure  points were padded. Lumbosacral region was prepped and draped in the  usual sterile fashion. After this was done, #10 blade was used to make  a skin incision. Monopolar cautery was used to dissect through the  subcutaneous tissue. I placed a self-retaining Weitlaner retractor into  the wound. Next, I used monopolar cautery to then open up the  lumbodorsal fascia sharply with monopolar cautery and exposed the  spinous processes at L3, L4, and L5. After this was done, I proceeded  to then perform a subperiosteal dissection to expose the bilateral  lamina and transverse processes at L3, L4, and L5. After this was done,  I attached the O-arm reference frame to the L5 spinous process. Using  O-arm assisted navigation, I placed bilateral L3, L4, and L5 pedicle  screws. After this was done, I performed another O-arm spin that showed  that screws were within the pedicle. I used a handheld stimulator to  test pedicle screw heads. There was no evidence of pedicle breach. I  then proceeded to then replace our self-retaining angled cerebellar  retractors into the wound.   Next, I used a Leksell rongeur to bite up  the spinous processes at L3, L4, and L5. I used a high-speed liss to  thin out the lamina bilaterally at L3, L4, and L5. I then used a #4  Kerrison punch to perform bilateral decompression at L3, L4, and L5. Once I was done with central decompression, I focused my attention to  lateral recesses. I used #4 Kerrison punch to perform a bilateral L3-L4  and L4-L5 medial facetectomy and also bilateral L3, L4, and L5  foraminotomy. After this was done, I identified the L3-L4 disk space on  the left, retracted the thecal sac medially, performed an annulotomy  with an 11-blade. I removed disk material with pituitary rongeurs and  curettes. I prepared both the superior and inferior endplates and after  this was done, I then placed a #8 Globus Rise expandable cage that was  packed with recombinant BMP-2 (Infuse). It was expanded to 13 mm. After this was done, I then proceeded to then identify the L4-L5 disk  space on the left, retracted the thecal sac medially, performed an  annulotomy with an 11-blade. I removed disk material with pituitary  rongeurs and curettes. I then placed a #12 shaver that was rotated 360  degrees. Once the diskectomy was completed and I prepared both the  superior and inferior endplates, I then placed a #9 Globus Rise  expandable cage that was packed with recombinant BMP-2 (Infuse). It was  expanded to 14 mm. After this was done, I went over to the patient's  right side, retracted the thecal sac medially at L3-4. I performed an  annulotomy with a #11 blade. I removed disk material with pituitary  rongeurs and curettes. I prepared both the superior and inferior  endplates and once I completed diskectomy, I then proceeded to then  insert a #8 Rise expandable cage that was packed with recombinant BMP-2  (Infuse).   It was expanded to 13 mm and once this was completed, I then  proceeded to identify the L4-L5 disk space on the right, retracted the  thecal sac medially, performed an annulotomy with an

## 2020-09-15 NOTE — CARE COORDINATION
9/15 Care Coordination: Met with Karmen Locke and her son Jeanine Mccann. Discussed discharge plan. 1st option is Acute rehab here. If not Home with [facility]. She would Like a hospital bed if discharge to Home. Will await PT/OT and PM&R to see. CM/SW will continue to follow for discharge planning.    Geetha CARBAJAL,RN--[phone]

## 2020-09-15 NOTE — PROGRESS NOTES
Physical Therapy  Physical Therapy Initial Assessment     Name: Ronald Villeda  : 1945  MRN: [id number]    Referring Provider:  Tripp Whittaker MD    Date of Service: 9/15/2020    Evaluating PT:  Carmitaroxanne Bradford PT   Room #:  2065/8053-S  Diagnosis: COVID-19 [U07.1]  Preop testing [Z01.818]  Spondylolisthesis of lumbar region [M43.16]     PMHx/PSHx:   has a past medical history of Anesthesia complication, Cancer ([facility] [address] GERD (gastroesophageal reflux disease), Hyperlipidemia, Hypertension, Sleep apnea, Spinal stenosis, Thyroid disease, Tremors of nervous system, and Type 2 diabetes mellitus without complication ([facility] [address] Procedure/Surgery:  20 L3-L4, L4-L5  POSTERIOR LUMBAR INTERBODY FUSION  Precautions:  Falls,  , FWB (full weight bearing), Spinal precautions and LSO, hemovac  Equipment Needs:  Patient requesting hospital bed. SUBJECTIVE:    Patient lives alone however states will have support once home in a two story home with 1st floor setup  with 5 steps to enter with 1Rail  Bed to be setup on the 1st floor and bath is on 1 floor. Patient ambulated independently  PTA. Equipment owned:  [employer],      OBJECTIVE:   Initial Evaluation  Date: 9/15/20 Treatment Short Term/ Long Term   Goals   AM-PAC 6 Clicks 80/49     Was pt agreeable to Eval/treatment? yes     Does pt have pain? Yes 7.5/10     Bed Mobility  Rolling: SBA  Supine to sit: Mod  Sit to supine: NT  Scooting: Min  Rolling: Ind  Supine to sit: Ind  Sit to supine: Ind  Scooting: Ind   Transfers Sit to stand: Mod to fww  Stand to sit: Mod  Stand pivot: Min with fww. Sit to stand: Mod Ind  Stand to sit: Mod Ind  Stand pivot: Mod Ind   Ambulation    20 feet with fww Min A small shuffling steps.    150 feet with fww Mod Ind   Stair negotiation: ascended and descended  NT  5 steps with 1 rail Min   ROM BUE:  See OT eval  BLE:  wfl     Strength BUE: See OT eval   RLE:  4/5  LLE:   4/5  4+/5   Balance Sitting EOB:  Ind   Dynamic Standing:  Mint o Mod a. Sitting EOB:  Ind  Dynamic Standing: Mod Ind. Patient is Alert & Oriented x person, place, time and situation and follows directions   Sensation:  Pt denies numbness and tingling to extremities  Edema:  none    Vitals:  Blood Pressure at rest - Blood Pressure post session -   Heart Rate at rest - Heart Rate post session -   SPO2 at rest 98% RA SPO2 post session -%     Therapeutic Exercises:  AROM instructed     Patient education  Patient educated on role of Physical Therapy, risks of immobility, safety and plan of care and spinal precautions provided written instructions. Patient response to education:   Pt verbalized understanding Pt demonstrated skill Pt requires further education in this area   yes yes      ASSESSMENT:    Comments:  Patient was seen this date for PT evaluation. OT was present in the room prior to PT entering applying LSO. Patient was agreeable to evaluation. Results of the functional assessment are noted above. Upon entering the room patient was found supine in bed. Instructed in mobility. Sat EOB x 5 minutes to increase dynamic sitting balance and activity tolerance. Transfers and gait completed with fww for support. Patient then educated on sitting therapeutic exercises. Patient would benefit from continued skilled Physical Therapy to improve functional independence and quality of life. At end of session, patient in chair with  call light and phone within reach,  all lines and tubes intact, nursing notified. This patient can benefit from the continuation of skilled PT  to maximize functional level and return to PLOF.      Treatment:  Patient practiced and was instructed in the following treatment:    · Bed mobility training - pt given verbal and tactile cues to facilitate proper sequencing and safety during rolling and supine>sit as well as provided with physical assistance to complete task    · Assistive device training - pt educated on using [employer] during gait, [address] approximation/negotiation, and hand placement during sit<>stand to [address]  · STS and transfer training - educated on hand/foot placement, safety, and sequencing during STS and pivot transfers using assistive device  · Gait training - verbal cues for [address] approximation/negotiation, upright posture, and safety during 90 and 180 degree turns during gait   · Education in spinal precautions and application of LSO adhering to those precautions. Pt's/ family goals   1. Return to home with family support. Patient and or family understand(s) diagnosis, prognosis, and plan of care. yes    PLAN OF CARE:    PT care will be provided in accordance with the objectives noted above. Exercises and functional mobility practice will be used as well as appropriate assistive devices or modalities to obtain goals. Patient and family education will also be administered as needed. Current Treatment Recommendations     [x] Strengthening     [x] ROM   [] Balance Training   [] Endurance Training   [x] Transfer Training   [x] Gait Training   [] Stair Training   [] Positioning   [] Safety and Education Training   [] Patient/Caregiver Education   [] HEP  [x] Other  Application of LSO adhering to spinal precautions    Frequency of treatments: 2-5x/week x 1-2 weeks. Time in  0820  Time out  0845    Total Treatment Time  25 minutes     Evaluation Time includes thorough review of current medical information, gathering information on past medical history/social history and prior level of function, completion of standardized testing/informal observation of tasks, assessment of data and education on plan of care and goals.     CPT codes:  [] Low Complexity PT evaluation 27072  [x] Moderate Complexity PT evaluation 94284  [] High Complexity PT evaluation 21262  [] PT Re-evaluation 17027  [] Gait training 63438 - minutes  [] Manual therapy 97265 - minutes  [x] Therapeutic activities 28532 15 minutes  [] Therapeutic exercises 07637 - minutes  [] Neuromuscular reeducation 25633 - minutes       Isaura Renteria, [address] [facility] [place], [place]

## 2020-09-15 NOTE — CONSULTS
[address]                  [address] [place],  [facility]                                  CONSULTATION    PATIENT NAME: Karina Horta                       :        1945  MED REC NO:   [id number]                            ROOM:       2480  ACCOUNT NO:   [de-identified]                           ADMIT DATE: 2020  PROVIDER:     Marisa Case MD    CONSULT DATE:  09/15/2020    AGE:  70    SEX:  Female. CHIEF COMPLAINT:  Lumbar spondylolisthesis. HISTORY OF PRESENT ILLNESS:  The patient has a history of progressively  worsening back pain with radiculopathy. She was found to have  spondylolisthesis and spinal stenosis at L3-4. She failed conservative  treatment and was admitted to [address] [place] [address] on 2020. She had  surgery yesterday for decompression and fusion. She tolerated the  surgery well. Postoperatively, she is starting to have a little better  control of pain just after her last medication dose. She tells me she  has been up out of bed with therapy, although I do not see a note yet. She still has a drain in the back as well, but I was asked to see her in  terms of rehab and pain management. PAST MEDICAL HISTORY:  1. Hypertension. 2.  Type 2 diabetes mellitus. ALLERGIES:  EPINEPHRINE, ACE INHIBITORS, ADHESIVE, and PROPOXYPHENE. CURRENT MEDICATIONS:  Lipitor, Lasix, gabapentin, sliding scale insulin,  Synthroid, Cozaar, Glucophage, Ditropan, Protonix, Inderal, Senokot,  morphine, and oxycodone as needed for pain. SOCIAL HISTORY:  She lives alone, but her daughter will stay with her at  discharge, and she can stay on one floor. REVIEW OF SYSTEMS:  HEENT:  Negative for prior HEENT problems. CARDIAC/PULMONARY:  Negative for prior cardiac or pulmonary problems. GI:  Negative for prior GI problems. :  Positive for incontinence. ORTHOPEDIC:  Positive for the spinal stenosis.   NEUROLOGIC:  Positive  for radiculopathy. PHYSICAL EXAMINATION:  GENERAL:  Obese white female in no acute distress. HEENT:  Head normocephalic, atraumatic. Eyes:  Pupils equal, round,  reactive to light. Pharynx normal.  LUNGS:  Clear to P and A. HEART:  Regular rate and rhythm. S1, S2 normal.  No murmurs or gallops. ABDOMEN:  Bowel sounds normal.  Soft, nontender. No masses. EXTREMITIES:  Without clubbing, cyanosis or edema. MENTAL STATUS:  Alert and oriented. NEUROMUSCULAR:  Sensation, there is some dysesthesia in the legs, right  is worse. 4/5 in the lowers. The right is a little bit weaker within  that grade, and she does have pain with straight leg raise type  maneuver. PROBLEM LIST:  1. Spinal stenosis. 2.  Status post lumbar decompression and fusion. 3.  Hypertension. 4.  Type 2 diabetes mellitus. RECOMMENDATIONS:  The patient really seems to be doing pretty well at  this point. She still has a drain in place, but she has been up and  moving. Pain is getting under better control. I suspect that most  likely once the drain is out, she will probably be doing well enough to  return home with assistance, and I think at this point her pain  medication is adequate without any significant changes, so I wanted to  make sure that she continues to improve.         Sebastian Hernández MD    D: 09/15/2020 9:51:43       T: 09/15/2020 9:57:12     [email or web address]  Job#: [id number]     Doc#: [id number]    CC:

## 2020-09-16 PROBLEM — M43.16 SPONDYLOLISTHESIS, LUMBAR REGION: Status: ACTIVE | Noted: 2020-09-16

## 2020-09-16 LAB
EKG ATRIAL RATE: 72 BPM
EKG P AXIS: 53 DEGREES
EKG P-R INTERVAL: 162 MS
EKG Q-T INTERVAL: 362 MS
EKG QRS DURATION: 78 MS
EKG QTC CALCULATION (BAZETT): 396 MS
EKG R AXIS: 6 DEGREES
EKG T AXIS: 21 DEGREES
EKG VENTRICULAR RATE: 72 BPM
METER GLUCOSE: 132 MG/DL (ref 74–99)
METER GLUCOSE: 148 MG/DL (ref 74–99)
METER GLUCOSE: 164 MG/DL (ref 74–99)
METER GLUCOSE: 168 MG/DL (ref 74–99)
SARS-COV-2, NAAT: NOT DETECTED

## 2020-09-16 PROCEDURE — 6370000000 HC RX 637 (ALT 250 FOR IP): Performed by: INTERNAL MEDICINE

## 2020-09-16 PROCEDURE — 2580000003 HC RX 258: Performed by: NEUROLOGICAL SURGERY

## 2020-09-16 PROCEDURE — 6360000002 HC RX W HCPCS: Performed by: NEUROLOGICAL SURGERY

## 2020-09-16 PROCEDURE — 1200000000 HC SEMI PRIVATE

## 2020-09-16 PROCEDURE — 96376 TX/PRO/DX INJ SAME DRUG ADON: CPT

## 2020-09-16 PROCEDURE — 82962 GLUCOSE BLOOD TEST: CPT

## 2020-09-16 PROCEDURE — U0002 COVID-19 LAB TEST NON-CDC: HCPCS

## 2020-09-16 PROCEDURE — 97530 THERAPEUTIC ACTIVITIES: CPT

## 2020-09-16 PROCEDURE — 6370000000 HC RX 637 (ALT 250 FOR IP): Performed by: NEUROLOGICAL SURGERY

## 2020-09-16 RX ORDER — PROPRANOLOL HYDROCHLORIDE 10 MG/1
10 TABLET ORAL ONCE
Status: COMPLETED | OUTPATIENT
Start: 2020-09-16 | End: 2020-09-16

## 2020-09-16 RX ADMIN — PANTOPRAZOLE SODIUM 40 MG: 40 TABLET, DELAYED RELEASE ORAL at 06:25

## 2020-09-16 RX ADMIN — PROPRANOLOL HYDROCHLORIDE 10 MG: 10 TABLET ORAL at 21:42

## 2020-09-16 RX ADMIN — BISACODYL 5 MG: 5 TABLET, COATED ORAL at 10:26

## 2020-09-16 RX ADMIN — Medication 250 MG: at 10:26

## 2020-09-16 RX ADMIN — LOSARTAN POTASSIUM 25 MG: 25 TABLET, FILM COATED ORAL at 10:25

## 2020-09-16 RX ADMIN — LEVOTHYROXINE SODIUM 50 MCG: 0.05 TABLET ORAL at 06:25

## 2020-09-16 RX ADMIN — Medication 2 G: at 10:41

## 2020-09-16 RX ADMIN — CYCLOBENZAPRINE 10 MG: 10 TABLET, FILM COATED ORAL at 10:25

## 2020-09-16 RX ADMIN — DOCUSATE SODIUM 50 MG AND SENNOSIDES 8.6 MG 1 TABLET: 8.6; 5 TABLET, FILM COATED ORAL at 20:11

## 2020-09-16 RX ADMIN — Medication 2 G: at 03:30

## 2020-09-16 RX ADMIN — OXYCODONE HYDROCHLORIDE 10 MG: 10 TABLET ORAL at 10:25

## 2020-09-16 RX ADMIN — ATORVASTATIN CALCIUM 10 MG: 10 TABLET, FILM COATED ORAL at 10:25

## 2020-09-16 RX ADMIN — LATANOPROST 1 DROP: 50 SOLUTION OPHTHALMIC at 21:42

## 2020-09-16 RX ADMIN — OXYCODONE HYDROCHLORIDE 10 MG: 10 TABLET ORAL at 23:37

## 2020-09-16 RX ADMIN — INSULIN LISPRO 1 UNITS: 100 INJECTION, SOLUTION INTRAVENOUS; SUBCUTANEOUS at 20:14

## 2020-09-16 RX ADMIN — Medication 10 ML: at 10:27

## 2020-09-16 RX ADMIN — OXYCODONE HYDROCHLORIDE 5 MG: 5 TABLET ORAL at 00:39

## 2020-09-16 RX ADMIN — GABAPENTIN 600 MG: 300 CAPSULE ORAL at 20:10

## 2020-09-16 RX ADMIN — POLYETHYLENE GLYCOL 3350 17 G: 17 POWDER, FOR SOLUTION ORAL at 10:27

## 2020-09-16 RX ADMIN — GABAPENTIN 600 MG: 300 CAPSULE ORAL at 15:05

## 2020-09-16 RX ADMIN — CYCLOBENZAPRINE 10 MG: 10 TABLET, FILM COATED ORAL at 20:11

## 2020-09-16 RX ADMIN — MORPHINE SULFATE 4 MG: 4 INJECTION, SOLUTION INTRAMUSCULAR; INTRAVENOUS at 03:51

## 2020-09-16 RX ADMIN — MORPHINE SULFATE 4 MG: 4 INJECTION, SOLUTION INTRAMUSCULAR; INTRAVENOUS at 07:25

## 2020-09-16 RX ADMIN — DOCUSATE SODIUM 50 MG AND SENNOSIDES 8.6 MG 1 TABLET: 8.6; 5 TABLET, FILM COATED ORAL at 10:25

## 2020-09-16 RX ADMIN — GABAPENTIN 600 MG: 300 CAPSULE ORAL at 10:26

## 2020-09-16 RX ADMIN — INSULIN LISPRO 1 UNITS: 100 INJECTION, SOLUTION INTRAVENOUS; SUBCUTANEOUS at 12:57

## 2020-09-16 RX ADMIN — FUROSEMIDE 20 MG: 20 TABLET ORAL at 10:25

## 2020-09-16 RX ADMIN — METFORMIN HYDROCHLORIDE 500 MG: 500 TABLET ORAL at 10:25

## 2020-09-16 RX ADMIN — CYCLOBENZAPRINE 10 MG: 10 TABLET, FILM COATED ORAL at 00:39

## 2020-09-16 RX ADMIN — Medication 2 G: at 18:31

## 2020-09-16 RX ADMIN — INSULIN LISPRO 1 UNITS: 100 INJECTION, SOLUTION INTRAVENOUS; SUBCUTANEOUS at 18:32

## 2020-09-16 RX ADMIN — OXYBUTYNIN CHLORIDE 5 MG: 5 TABLET ORAL at 10:25

## 2020-09-16 RX ADMIN — SODIUM CHLORIDE, PRESERVATIVE FREE 10 ML: 5 INJECTION INTRAVENOUS at 10:41

## 2020-09-16 ASSESSMENT — PAIN SCALES - GENERAL
PAINLEVEL_OUTOF10: 0
PAINLEVEL_OUTOF10: 7
PAINLEVEL_OUTOF10: 5
PAINLEVEL_OUTOF10: 9
PAINLEVEL_OUTOF10: 3
PAINLEVEL_OUTOF10: 0
PAINLEVEL_OUTOF10: 6
PAINLEVEL_OUTOF10: 7
PAINLEVEL_OUTOF10: 7

## 2020-09-16 ASSESSMENT — PAIN DESCRIPTION - DESCRIPTORS: DESCRIPTORS: JABBING;PENETRATING;SORE

## 2020-09-16 ASSESSMENT — PAIN DESCRIPTION - ORIENTATION
ORIENTATION: MID

## 2020-09-16 ASSESSMENT — PAIN - FUNCTIONAL ASSESSMENT
PAIN_FUNCTIONAL_ASSESSMENT: PREVENTS OR INTERFERES SOME ACTIVE ACTIVITIES AND ADLS
PAIN_FUNCTIONAL_ASSESSMENT: PREVENTS OR INTERFERES WITH MANY ACTIVE NOT PASSIVE ACTIVITIES

## 2020-09-16 ASSESSMENT — PAIN DESCRIPTION - LOCATION
LOCATION: BACK

## 2020-09-16 ASSESSMENT — PAIN DESCRIPTION - PAIN TYPE
TYPE: SURGICAL PAIN

## 2020-09-16 NOTE — PROGRESS NOTES
and proper technique when donning/doffing LSO brace. Dynamic sitting balance was challenged on bedside commode for 5+ minutes as pt sat unsupported. PLAN:    Patient is making fair progress towards established goals. Will continue with current POC.       Time in: 1050  Time out: 1115    Total Treatment Time 25 minutes     CPT codes:  [] Gait training 71473 0 minutes  [] Manual therapy 81924 0 minutes  [x] Therapeutic activities 12269 25 minutes  [] Therapeutic exercises 67774 0 minutes  [] Neuromuscular reeducation 93976 0 minutes      Uma Thapa, PT, DPT   [id number]

## 2020-09-16 NOTE — PROGRESS NOTES
Occupational Therapy        OT BEDSIDE TREATMENT NOTE      Date:2020  Patient Name: Alicia Hamilton  MRN: [id number]  : 1945  Room: [address] [place], [place] [address]         Per OT Eval:  Evaluating OT: RADHA Shannon/L   License #  [id number]      AM-PAC Daily Activity Raw Score:      Recommended Adaptive Equipment: A.E. as needed, pt. Requesting hospital bed if home-going     Diagnosis:  1.  L3 to L5 lumbar canal stenosis. 2.  L3-L4 degenerative spondylolisthesis     Surgery: 2020:  1.  Bilateral segmental arthrodesis and fusion from L3 to L5 with use of  bilateral L3, L4, and L5 pedicle screws with use of locally harvested  autograft plus recombinant BMP-2 (Infuse posterolateral fusion from L3  to L5). 2.  360 degree fusion with a posterior lumbar interbody fusion at L3-L4  and L4-L5 with use of bilateral Globus Rise expandable cage packed with  recombinant BMP-2 (Infuse). 3.  Bilateral L3, L4, and L5 laminectomy; bilateral L3-L4 and L4-L5  medial facetectomy; and bilateral L3, L4, and L5 foraminotomy with  bilateral L3-L4 and L4-L5 diskectomy.     Pertinent Medical History:   Past Medical History        Past Medical History:   Diagnosis Date    Anesthesia complication 1352     difficulty breathing post thyroid surgery [facility]    Cancer [facility])       skin     GERD (gastroesophageal reflux disease)      Hyperlipidemia      Hypertension      Sleep apnea       uses cpap    Spinal stenosis      Thyroid disease      Tremors of nervous system       familial    Type 2 diabetes mellitus without complication (HCC)           Precautions:  Falls, neutral spine (no B,L,T), hemovac drain, santiago, IV, LSO brace     Home Living: Pt lives alone in a 2 story home, 1st floor set up with 5steps to enter and 2 HR.   Bathroom setup: tub/shower, std. commode   Equipment owned: toilet riser, in-dwelling shower seat, ww, std. walker     Prior Level of Function: Ind. with ADLs , Ind. with IADLs; ambulated no SBA  Dynamic CGA  Standing min A      Activity Tolerance Fair- with lt. Ax.  poor due to pain and lethargy - reported to nurse Fair+/good with mod. Ax. Visual/  Perceptual Glasses: yes          Safety Awareness fair  fair recalled 2/3 spinal precautions                   good            Comments: Upon arrival pt sitting up in chair. Patient lethargic and in pain . Performed toileting with review of hygiene management with AE in compliance with spinal precautions, bed mobility and LSO management and education with daughter for donning brace in bed with log rolling technique. reviewed spinal precautions and LE dressing with AE available. bed mobility . Hand washing sitting EOB with poor tolerance due to lethargy and increased pain sitting unsupported on EOB. At end of session supine with HOB elevated slightly and  all lines and tubes intact, call light within reach. daughter in room. · Pt has made limited progress towards set goals. · Continue with current plan of care    Time in:  11:30  Time out:11:58  Total Tx Time: 28 minutes     Plan of Care: OT  1-3x/week for 5-7 days PRN   [x]? ADL retraining/AE recommendations   [x]? Energy Conservation Techniques/Strategies                                 []? Neuromuscular Re-Education      [x]? Functional Transfer Training                [x]? Functional Mobility Training                  []? Cognitive Re-Training                           []? Splinting/Positioning Needs                              [x]? Therapeutic Activity   [x]? Therapeutic Exercise   []? Visual/Perceptual   []? Delirium Prevention/Treatment   [x]? Positioning to Improve Functional [place], Safety, and Skin Integrity   [x]? Patient and/or Family Education to Increase Safety and Functional [place]   []?  Other:                                    Treatment Charges: Mins Units   Ther Ex  70109       Manual Therapy Augusta Hernández [id number]  77  0   ADL/Home Mgt 14880 86 1   Neuro Re-ed 39904       Group Therapy        Orthotic manage/training  48618       Non-Billable Time       Total Timed Treatment 28 2      González Muse.  Jess Patel OTR/L #[id number]]

## 2020-09-16 NOTE — PROGRESS NOTES
Department of Neurosurgery  Progress Note    CHIEF COMPLAINT: s/p L3-L5 PLIF 9/14    SUBJECTIVE: stephon op site pain well. REVIEW OF SYSTEMS :  Constitutional: Negative for chills and fever. Neurological: Negative for dizziness, tremors and speech change.      OBJECTIVE:   VITALS:  /69   Pulse 70   Temp 97.9 °F (36.6 °C) (Temporal)   Resp 16   Ht 5' 5\" (1.651 m)   Wt 215 lb (97.5 kg)   SpO2 96%   BMI 35.78 kg/m²     PHYSICAL:  Neurologic:  Mental Status Exam:  Level of Alertness:   awake  Orientation:   person, place, time  Motor Exam:  Moving all extremities   Sensory:  Sensory intact  Incision c/d/i  Hemovac drain intact      DATA:  CBC:   Lab Results   Component Value Date    WBC 5.8 09/10/2020    RBC 3.79 09/10/2020    HGB 11.4 09/10/2020    HCT 35.3 09/10/2020    MCV 93.1 09/10/2020    MCH 30.1 09/10/2020    MCHC 32.3 09/10/2020    RDW 14.4 09/10/2020     09/10/2020    MPV 10.0 09/10/2020     BMP:    Lab Results   Component Value Date     09/15/2020    K 4.4 09/15/2020    K 4.3 09/10/2020     09/15/2020    CO2 24 09/15/2020    BUN 21 09/15/2020    LABALBU 3.3 09/15/2020    CREATININE 1.0 09/15/2020    CREATININE 1.1 06/20/2019    CALCIUM 8.6 09/15/2020    GFRAA >60 09/15/2020    LABGLOM 54 09/15/2020    GLUCOSE 174 09/15/2020     PT/INR:    Lab Results   Component Value Date    PROTIME 10.8 09/10/2020    INR 1.0 09/10/2020     PTT:  No results found for: APTT, PTT[APTT}    Current Inpatient Medications  Current Facility-Administered Medications: atorvastatin (LIPITOR) tablet 10 mg, 10 mg, Oral, Daily  furosemide (LASIX) tablet 20 mg, 20 mg, Oral, Daily  latanoprost (XALATAN) 0.005 % ophthalmic solution 1 drop, 1 drop, Left Eye, Nightly  levothyroxine (SYNTHROID) tablet 50 mcg, 50 mcg, Oral, Daily  losartan (COZAAR) tablet 25 mg, 25 mg, Oral, Daily  magnesium gluconate (MAGONATE) tablet 250 mg, 250 mg, Oral, Daily  gabapentin (NEURONTIN) capsule 600 mg, 600 mg, Oral, TID  pantoprazole (PROTONIX) tablet 40 mg, 40 mg, Oral, QAM AC  oxybutynin (DITROPAN) tablet 5 mg, 5 mg, Oral, Daily  propranolol (INDERAL) tablet 10 mg, 10 mg, Oral, QPM  sodium chloride flush 0.9 % injection 10 mL, 10 mL, Intravenous, 2 times per day  sodium chloride flush 0.9 % injection 10 mL, 10 mL, Intravenous, PRN  promethazine (PHENERGAN) tablet 12.5 mg, 12.5 mg, Oral, Q6H PRN **OR** ondansetron (ZOFRAN) injection 4 mg, 4 mg, Intravenous, Q6H PRN  ceFAZolin (ANCEF) 2 g in sterile water 20 mL IV syringe, 2 g, Intravenous, Q8H  oxyCODONE (ROXICODONE) immediate release tablet 5 mg, 5 mg, Oral, Q4H PRN **OR** oxyCODONE HCl (OXY-IR) immediate release tablet 10 mg, 10 mg, Oral, Q4H PRN  morphine (PF) injection 2 mg, 2 mg, Intravenous, Q2H PRN **OR** morphine sulfate (PF) injection 4 mg, 4 mg, Intravenous, Q2H PRN  cyclobenzaprine (FLEXERIL) tablet 10 mg, 10 mg, Oral, TID PRN  polyethylene glycol (GLYCOLAX) packet 17 g, 17 g, Oral, Daily  bisacodyl (DULCOLAX) EC tablet 5 mg, 5 mg, Oral, Daily  sennosides-docusate sodium (SENOKOT-S) 8.6-50 MG tablet 1 tablet, 1 tablet, Oral, BID  magnesium hydroxide (MILK OF MAGNESIA) 400 MG/5ML suspension 30 mL, 30 mL, Oral, Daily PRN  fleet rectal enema 1 enema, 1 enema, Rectal, Daily PRN  benzocaine-menthol (CEPACOL SORE THROAT) lozenge 1 lozenge, 1 lozenge, Oral, Q2H PRN  0.9 % sodium chloride bolus, 20 mL, Intravenous, Once  [DISCONTINUED] glipiZIDE (GLUCOTROL) tablet 2.5 mg, 2.5 mg, Oral, Daily **AND** metFORMIN (GLUCOPHAGE) tablet 500 mg, 500 mg, Oral, Daily  insulin lispro (HUMALOG) injection vial 0-6 Units, 0-6 Units, Subcutaneous, TID WC  insulin lispro (HUMALOG) injection vial 0-3 Units, 0-3 Units, Subcutaneous, Nightly  glucose (GLUTOSE) 40 % oral gel 15 g, 15 g, Oral, PRN  dextrose 50 % IV solution, 12.5 g, Intravenous, PRN  glucagon (rDNA) injection 1 mg, 1 mg, Intramuscular, PRN  dextrose 5 % solution, 100 mL/hr, Intravenous, PRN  dextrose 5 % and 0.45 % sodium chloride infusion, , Intravenous, Continuous    ASSESSMENT:   s/p L3-L5 PLIF 9/14    PLAN:  -Pain control  -PT/OT  -Drain care  -PMR consult  -Follow up in neurosurgery clinic in 4 weeks with x-rays      Electronically signed by MERI Clark on 9/16/2020 at 8:28 AM

## 2020-09-16 NOTE — PROGRESS NOTES
the last 72 hours. APTT:No results for input(s): APTT in the last 72 hours. LIVER PROFILE:  Recent Labs     09/15/20  0418   AST 22   ALT 17   BILITOT 0.2   ALKPHOS 73       Imaging Last 24 Hours:  No results found. Assessment//Plan           Hospital Problems           Last Modified POA    * (Principal) Spondylolisthesis of lumbar region 9/15/2020 Yes    Obesity 9/15/2020 Yes    HTN (hypertension) 9/15/2020 Yes        Assessment:    Condition: In stable condition. Improving. (Radiculopathy). Plan:   Encourage ambulation. (Johana therapy  Pain well cont  Should be able to be d.c  Ed home oncurrent meds).        Electronically signed by Kareen Lopez MD on 9/16/20 at 11:32 AM EDT

## 2020-09-16 NOTE — PROGRESS NOTES
Hospitalist Progress Note      PCP: Marcia Bledsoe MD    Date of Admission: 9/14/2020    Chief Complaint: medical management    Hospital Course:   Domingo Aldana is a 70 y.o. female with back pain and lumbar radiculopathy that did not respond to conservative management, patient had surgery today L3-L4, L4-L5 posterior lumbar interbody fusion. Patient has past medical history of sleep apnea hypertension hyperlipidemia GERD and diabetes. 9/15/20: POD 1. No acute events overnight. Vital signs reviewed and stable. Labs reviewed and stable. Subjective:   Patient lying in bed. She states her pain is improved from yesterday. No BM yet.      Medications:  Reviewed    Infusion Medications    dextrose      dextrose 5 % and 0.45 % NaCl 50 mL/hr at 09/14/20 2147     Scheduled Medications    atorvastatin  10 mg Oral Daily    furosemide  20 mg Oral Daily    latanoprost  1 drop Left Eye Nightly    levothyroxine  50 mcg Oral Daily    losartan  25 mg Oral Daily    magnesium gluconate  250 mg Oral Daily    gabapentin  600 mg Oral TID    pantoprazole  40 mg Oral QAM AC    oxybutynin  5 mg Oral Daily    propranolol  10 mg Oral QPM    sodium chloride flush  10 mL Intravenous 2 times per day    ceFAZolin (ANCEF) IVPB  2 g Intravenous Q8H    polyethylene glycol  17 g Oral Daily    bisacodyl  5 mg Oral Daily    sennosides-docusate sodium  1 tablet Oral BID    sodium chloride  20 mL Intravenous Once    metFORMIN  500 mg Oral Daily    insulin lispro  0-6 Units Subcutaneous TID WC    insulin lispro  0-3 Units Subcutaneous Nightly     PRN Meds: sodium chloride flush, promethazine **OR** ondansetron, oxyCODONE **OR** oxyCODONE, morphine **OR** morphine, cyclobenzaprine, magnesium hydroxide, fleet, benzocaine-menthol, glucose, dextrose, glucagon (rDNA), dextrose      Intake/Output Summary (Last 24 hours) at 9/16/2020 0846  Last data filed at 9/16/2020 0400  Gross per 24 hour   Intake 240 ml   Output 1070 ml Net -830 ml       Exam:    /69   Pulse 70   Temp 97.9 °F (36.6 °C) (Temporal)   Resp 16   Ht 5' 5\" (1.651 m)   Wt 215 lb (97.5 kg)   SpO2 96%   BMI 35.78 kg/m²     General appearance: No apparent distress, appears stated age and cooperative. HEENT: Pupils equal, round, and reactive to light. Conjunctivae/corneas clear. Neck: Supple, with full range of motion. No jugular venous distention. Trachea midline. Respiratory:  Normal respiratory effort. Clear to auscultation, bilaterally without Rales/Wheezes/Rhonchi. Cardiovascular: Regular rate and rhythm with normal S1/S2 without murmurs, rubs or gallops. Abdomen: Soft, non-tender, non-distended with normal bowel sounds. Musculoskeletal: No clubbing, cyanosis or edema bilaterally. Full range of motion without deformity. Skin: Skin color, texture, turgor normal.  No rashes or lesions. Neurologic:  Neurovascularly intact without any focal sensory/motor deficits. Psychiatric: Alert and oriented, thought content appropriate, normal insight  Capillary Refill: Brisk,< 3 seconds   Peripheral Pulses: +2 palpable, equal bilaterally       Labs:   No results for input(s): WBC, HGB, HCT, PLT in the last 72 hours. Recent Labs     09/15/20  0418      K 4.4      CO2 24   BUN 21   CREATININE 1.0   CALCIUM 8.6     Recent Labs     09/15/20  0418   AST 22   ALT 17   BILITOT 0.2   ALKPHOS 73     No results for input(s): INR in the last 72 hours. No results for input(s): Robina Formosa in the last 72 hours. FLUORO FOR SURGICAL PROCEDURES   Final Result   Intraoperative bilateral posterior lumbar fusion L3-L5      This study was dictated by Rell Grande PA-C and Jolanta Connolly MD   reviewed and concurred with the findings.           Assessment/Plan:    Active Hospital Problems    Diagnosis Date Noted    Spondylolisthesis of lumbar region [M43.16] 09/14/2020    HTN (hypertension) [I10] 07/07/2020    Obesity [E66.9] 07/07/2020     Plan  Pain control  Glucose control-metformin, insulin sliding scale  Bp management-lasix, losartan, propanolol  Continue remaining home medications as ordered  Neurovascular checks  Monitor bowel function  Flu vaccine if ok with primary team  Remainder per primary    DVT Prophylaxis: SCDs  Diet: DIET CARB CONTROL;  Code Status: Full Code    PT/OT Eval Status: ordered    Dispo - per primary.      Mina Hopper, CNP

## 2020-09-17 VITALS
HEART RATE: 64 BPM | OXYGEN SATURATION: 95 % | BODY MASS INDEX: 35.82 KG/M2 | RESPIRATION RATE: 16 BRPM | WEIGHT: 215 LBS | DIASTOLIC BLOOD PRESSURE: 60 MMHG | HEIGHT: 65 IN | TEMPERATURE: 97 F | SYSTOLIC BLOOD PRESSURE: 123 MMHG

## 2020-09-17 LAB
METER GLUCOSE: 151 MG/DL (ref 74–99)
METER GLUCOSE: 166 MG/DL (ref 74–99)

## 2020-09-17 PROCEDURE — 82962 GLUCOSE BLOOD TEST: CPT

## 2020-09-17 PROCEDURE — 6370000000 HC RX 637 (ALT 250 FOR IP): Performed by: INTERNAL MEDICINE

## 2020-09-17 PROCEDURE — 2580000003 HC RX 258: Performed by: NEUROLOGICAL SURGERY

## 2020-09-17 PROCEDURE — 97530 THERAPEUTIC ACTIVITIES: CPT

## 2020-09-17 PROCEDURE — 6360000002 HC RX W HCPCS: Performed by: NEUROLOGICAL SURGERY

## 2020-09-17 PROCEDURE — 6370000000 HC RX 637 (ALT 250 FOR IP): Performed by: NEUROLOGICAL SURGERY

## 2020-09-17 PROCEDURE — 97535 SELF CARE MNGMENT TRAINING: CPT

## 2020-09-17 RX ORDER — CYCLOBENZAPRINE HCL 10 MG
10 TABLET ORAL 3 TIMES DAILY PRN
Qty: 30 TABLET | Refills: 0 | Status: SHIPPED | OUTPATIENT
Start: 2020-09-17 | End: 2020-09-27

## 2020-09-17 RX ORDER — OXYCODONE HYDROCHLORIDE 5 MG/1
5 TABLET ORAL EVERY 4 HOURS PRN
Qty: 42 TABLET | Refills: 0 | Status: SHIPPED | OUTPATIENT
Start: 2020-09-17 | End: 2020-12-08 | Stop reason: SDUPTHER

## 2020-09-17 RX ADMIN — FUROSEMIDE 20 MG: 20 TABLET ORAL at 09:14

## 2020-09-17 RX ADMIN — SODIUM CHLORIDE, PRESERVATIVE FREE 10 ML: 5 INJECTION INTRAVENOUS at 02:46

## 2020-09-17 RX ADMIN — Medication 2 G: at 02:46

## 2020-09-17 RX ADMIN — OXYBUTYNIN CHLORIDE 5 MG: 5 TABLET ORAL at 09:14

## 2020-09-17 RX ADMIN — LOSARTAN POTASSIUM 25 MG: 25 TABLET, FILM COATED ORAL at 09:14

## 2020-09-17 RX ADMIN — BISACODYL 5 MG: 5 TABLET, COATED ORAL at 09:12

## 2020-09-17 RX ADMIN — ATORVASTATIN CALCIUM 10 MG: 10 TABLET, FILM COATED ORAL at 09:14

## 2020-09-17 RX ADMIN — POLYETHYLENE GLYCOL 3350 17 G: 17 POWDER, FOR SOLUTION ORAL at 09:10

## 2020-09-17 RX ADMIN — GABAPENTIN 600 MG: 300 CAPSULE ORAL at 13:43

## 2020-09-17 RX ADMIN — Medication 2 G: at 11:25

## 2020-09-17 RX ADMIN — Medication 10 ML: at 09:12

## 2020-09-17 RX ADMIN — OXYCODONE HYDROCHLORIDE 10 MG: 10 TABLET ORAL at 06:33

## 2020-09-17 RX ADMIN — Medication 250 MG: at 09:13

## 2020-09-17 RX ADMIN — CYCLOBENZAPRINE 10 MG: 10 TABLET, FILM COATED ORAL at 09:13

## 2020-09-17 RX ADMIN — METFORMIN HYDROCHLORIDE 500 MG: 500 TABLET ORAL at 09:14

## 2020-09-17 RX ADMIN — PANTOPRAZOLE SODIUM 40 MG: 40 TABLET, DELAYED RELEASE ORAL at 06:33

## 2020-09-17 RX ADMIN — OXYCODONE HYDROCHLORIDE 10 MG: 10 TABLET ORAL at 11:32

## 2020-09-17 RX ADMIN — LEVOTHYROXINE SODIUM 50 MCG: 0.05 TABLET ORAL at 06:33

## 2020-09-17 RX ADMIN — PROPRANOLOL HYDROCHLORIDE 10 MG: 10 TABLET ORAL at 09:14

## 2020-09-17 RX ADMIN — INSULIN LISPRO 1 UNITS: 100 INJECTION, SOLUTION INTRAVENOUS; SUBCUTANEOUS at 09:10

## 2020-09-17 RX ADMIN — GABAPENTIN 600 MG: 300 CAPSULE ORAL at 09:10

## 2020-09-17 RX ADMIN — SODIUM PHOSPHATE 1 ENEMA: 7; 19 ENEMA RECTAL at 12:17

## 2020-09-17 RX ADMIN — INSULIN LISPRO 1 UNITS: 100 INJECTION, SOLUTION INTRAVENOUS; SUBCUTANEOUS at 12:17

## 2020-09-17 RX ADMIN — DOCUSATE SODIUM 50 MG AND SENNOSIDES 8.6 MG 1 TABLET: 8.6; 5 TABLET, FILM COATED ORAL at 09:10

## 2020-09-17 ASSESSMENT — PAIN DESCRIPTION - ONSET: ONSET: ON-GOING

## 2020-09-17 ASSESSMENT — PAIN SCALES - GENERAL
PAINLEVEL_OUTOF10: 0
PAINLEVEL_OUTOF10: 7
PAINLEVEL_OUTOF10: 8
PAINLEVEL_OUTOF10: 5
PAINLEVEL_OUTOF10: 4
PAINLEVEL_OUTOF10: 3

## 2020-09-17 ASSESSMENT — PAIN DESCRIPTION - FREQUENCY: FREQUENCY: CONTINUOUS

## 2020-09-17 ASSESSMENT — PAIN - FUNCTIONAL ASSESSMENT: PAIN_FUNCTIONAL_ASSESSMENT: PREVENTS OR INTERFERES SOME ACTIVE ACTIVITIES AND ADLS

## 2020-09-17 ASSESSMENT — PAIN DESCRIPTION - DESCRIPTORS
DESCRIPTORS: DISCOMFORT;BURNING;SHARP
DESCRIPTORS: ACHING;DISCOMFORT;SHARP;SORE

## 2020-09-17 ASSESSMENT — PAIN DESCRIPTION - PROGRESSION: CLINICAL_PROGRESSION: NOT CHANGED

## 2020-09-17 ASSESSMENT — PAIN DESCRIPTION - PAIN TYPE
TYPE: SURGICAL PAIN
TYPE: SURGICAL PAIN

## 2020-09-17 ASSESSMENT — PAIN DESCRIPTION - LOCATION
LOCATION: BACK
LOCATION: BACK

## 2020-09-17 ASSESSMENT — PAIN DESCRIPTION - ORIENTATION: ORIENTATION: MID

## 2020-09-17 NOTE — PROGRESS NOTES
I tried to call nurse to nurse to [facility]  and  I called several times and pushed different ext. for the DON ,nursing dept. , and no one answered the telephone . I left a message for the  to have someone call me back so I can give nurse to nurse report  .

## 2020-09-17 NOTE — DISCHARGE SUMMARY
daily              latanoprost (XALATAN) 0.005 % ophthalmic solution  Place 1 drop into the left eye nightly             levothyroxine (SYNTHROID) 50 MCG tablet  Take 50 mcg by mouth Daily Takes an extra 1/2 tab on sundays & thursdays             losartan (COZAAR) 25 MG tablet  Take 25 mg by mouth daily             magnesium 30 MG tablet  Take 30 mg by mouth daily              NEURONTIN 600 MG tablet  Take 1 tablet by mouth 3 times daily. omeprazole (PRILOSEC) 20 MG delayed release capsule  Take 20 mg by mouth daily             oxybutynin (DITROPAN) 5 MG tablet  Take 5 mg by mouth daily              oxyCODONE (ROXICODONE) 5 MG immediate release tablet  Take 1 tablet by mouth every 4 hours as needed for Pain for up to 7 days.              propranolol (INDERAL) 10 MG tablet  every evening                  Marzena Shine  9/17/2020

## 2020-09-17 NOTE — CARE COORDINATION
9/17 Care Coordination: Plan to discharge to Rehab today, [facility]. Hemovac discontinued.  1400. CM/SW will continue to follow for discharge planning.    Melva MONACON,RN--[phone]

## 2020-09-17 NOTE — PROGRESS NOTES
BILLY  Driving: active  Occupation:       Pain Level: Pt complaining of back pain this session  Cognition: A&O: 4/4; Follows 1-2 step directions              Memory:  Fair/good              Sequencing:  fair              Problem solving:  fair              Judgement/safety:  Fair with occasional cue for precautions                Functional Assessment:    Initial Eval Status  Date: 9- Treatment Status  Date:  9/17/20 Short Term Goals = Long Term Goals  Treatment frequency: 3-5 days   Feeding Independent  Independent      Grooming Set-up seated  SBA  Pt washed face, combed hair, applied deodorant seated upright in chair at sink Modified [place]    UB Dressing Maximal Assist to adjust gown & bc LSO supine/ log-rolling. Min A to bc robe seated Vic[place]  Bc/doff pullover shirt    maxA  Bc/doff LSO brace Modified [place]    LB Dressing Moderate/Max  Assist seated Vicenta  Pt donned/doffed hospital socks with use of reacher and sockaide seated upright in chair    Pt and daughter educated on use of AE to assist with LB dressing task Modified [place] with A.E. Bathing Moderate Assist with sim. task maxA  Pt completed sponge bathing task seated/standing in chair at sink, with pt able to wash of UB, requiring assistance to wash of LB and stand to wash of buttocks/apolinar area Modified [place] with A.E. Toileting jack [place]  Pt completed toileting task on standard commode, requiring assistance with transfer with use of grab bar, and personal hygeine task Modified [place]    Bed Mobility  Logroll: SBA  Supine to sit: Moderate Assist   Sit to supine: NT DNT  modA per previous level    Pt seated upright in chair upon arrival and at end of session Supine to sit: Modified [place]   Sit to supine: Modified [place]    Functional Transfers Moderate Assist with sit <> stand.   Min A with SPT using ww modA  Sit to Stand  Stand to Sit    Cueing for hand placement Modified [place]    Functional Mobility Minimal Assist with ww short in room distance with fair tolerance  Vicenta  Pt ambulated short distance in room chair<>bathroom with w.w. and extended time Modified [place]    Balance Sitting:     Static:  Sup    Dynamic:SBA  Standing: Min A Sitting:  SBA    Standing:  Vicenta     Activity Tolerance Fair- with lt. Magdalena Dawson- Fair+/good with mod. Ax. Visual/  Perceptual Glasses: yes          Safety Awareness fair  fair recalled 2/3 spinal precautions                   good            Treatment: Upon arrival pt sitting up in chair, agreeable to therapy, daughter present. Pt and daughter educated on precautions to follow, safety and hand placement with transfers, use of AE to assist with LB dressing/bathing tasks. Pt complaining of back pain throughout session, requiring rest breaks and extended time to complete all tasks. Spoke to pt and daughter in regards to d/c plans and need for AD, stating of pt to be going to [facility] for further rehab. At end of session, pt seated upright in chair, all tubes and lines intact, call light within reach, daughter still present. · Pt has made fair progress towards set goals.    · Continue with current plan of care with a focus on ADL's and use of AE to assist with LB dressing tasks    Time in:  9:55am  Time out: 10:50am  Total Tx Time: 55minutes      Treatment Charges: Mins Units   Ther Ex  [id number]       Manual Therapy [id number]       Thera Activities 17957 17 1   ADL/Home Mgt [id number] 38 3   Neuro Re-ed 39610       Group Therapy        Orthotic manage/training  59699       Non-Billable Time       Total Timed Treatment 55 4      Cleo Hernandes HIDALGO/L [id number]

## 2020-09-17 NOTE — DISCHARGE INSTR - COC
Continuity of Care Form    Patient Name: Abigail Weiss   :  1945  MRN:  [id number]    Admit date:  2020  Discharge date:  20    Code Status Order: Full Code   Advance Directives:   [facility] Documentation     Date/Time Healthcare Directive Type of Healthcare Directive Copy in [address] Agent's Name Healthcare Agent's Phone Number    20 7714  Yes, patient has an advance directive for healthcare treatment --  Yes, copy in chart -- -- --    20 1030  Yes, patient has an advance directive for healthcare treatment --  No, copy requested from family -- -- --          Admitting Physician:  Dian Zamora MD  PCP: Char Kelley MD    Discharging Nurse: [facility] Unit/Room#: 4017/3172-V  Discharging Unit Phone Number: [phone]    Emergency Contact:   Extended Emergency Contact Information  Primary Emergency Contact: Nuvia Sullivan  Address: [address] [place] [address] Phone: [phone]  Mobile Phone: [phone]  Relation: Child   needed?  No  Secondary Emergency Contact: [facility] Phone: [phone]  Mobile Phone: [phone]  Relation: Child    Past Surgical History:  Past Surgical History:   Procedure Laterality Date    BLEPHAROPLASTY      CATARACT REMOVAL WITH IMPLANT Left 2017    CATARACT REMOVAL WITH IMPLANT Right 10/09/2018    CATARACT REMOVAL WITH IMPLANT Right 2018    secondary procedure to correct first cataract     CHOLECYSTECTOMY      COSMETIC SURGERY      abdominoplasty    FOOT SURGERY Left     skin cancer    HYSTERECTOMY      LUMBAR SPINE SURGERY N/A 2020    L3-L4, L4-L5  POSTERIOR LUMBAR INTERBODY FUSION performed by Dian Zamora MD at [address] SHERI WALLACE,INTRAOCUL Right 2018    EYE IOL REMOVAL performed by Dolly Dewey MD at [address] [place] [address] RMVL INSJ IO LENS PROSTH W/O ECP Right 10/9/2018    RIGHT EYE CATARACT EMULSIFICATION IOL IMPLANT performed by Rodger Morales MD at [address] [facility], PARTIAL      left lobe removed    TONSILLECTOMY      TUBAL LIGATION         Immunization History:   Immunization History   Administered Date(s) Administered    Influenza, High Dose (Fluzone 65 yrs and older) 09/04/2018    Influenza, MDCK Quadv, IM, PF (Flucelvax 4 yrs and older) 10/25/2017    Influenza, Trivalent vaccine 6-35 months, IM 11/25/2013, 10/31/2014, 10/04/2016    Pneumococcal Conjugate 13-valent ([place]ne [place]) 11/04/2016       Active Problems:  Patient Active Problem List   Diagnosis Code    Left cataract H26.9    Right cataract H26.9    Dislocated IOL (intraocular lens) T85. 22XA    Back pain at L4-L5 level M54.5    Acute midline low back pain with right-sided sciatica M54.41    Intractable back pain M54.9    Obesity E66.9    HTN (hypertension) I10    Spondylolisthesis of lumbar region M43.16    Spondylolisthesis, lumbar region M43.16       Isolation/Infection:   Isolation          No Isolation        Patient Infection Status     Infection Onset Added Last Indicated Last Indicated By Review Planned Expiration Resolved Resolved By    None active    Resolved    COVID-19 Rule Out 09/16/20 09/16/20 09/16/20 COVID-19 (Ordered)   09/16/20 Rule-Out Test Resulted    COVID-19 Rule Out 09/09/20 09/09/20 09/09/20 Covid-19 Ambulatory (Ordered)   09/11/20 Rule-Out Test Resulted          Nurse Assessment:  Last Vital Signs: /60   Pulse 64   Temp 97 °F (36.1 °C) (Temporal)   Resp 16   Ht 5' 5\" (1.651 m)   Wt 215 lb (97.5 kg)   SpO2 95%   BMI 35.78 kg/m²     Last documented pain score (0-10 scale): Pain Level: 7  Last Weight:   Wt Readings from Last 1 Encounters:   09/14/20 215 lb (97.5 kg)     Mental Status:  oriented and alert    IV Access:  - None    Nursing Mobility/ADLs:  Walking   Assisted  Transfer Assisted  Bathing  Assisted  Dressing  Assisted  Toileting  Assisted  Feeding  Independent  Med Admin  Independent  Med Delivery   whole    Wound Care Documentation and Therapy:        Elimination:  Continence:   · Bowel: Yes  · Bladder: Yes  Urinary Catheter: None   Colostomy/Ileostomy/Ileal Conduit: No       Date of Last BM: 09/13/20    Intake/Output Summary (Last 24 hours) at 9/17/2020 1352  Last data filed at 9/17/2020 0642  Gross per 24 hour   Intake 360 ml   Output 660 ml   Net -300 ml     I/O last 3 completed shifts: In: 360 [P.O.:360]  Out: 660 [Urine:400; Drains:260]    Safety Concerns: At Risk for Falls    Impairments/Disabilities:      None    Nutrition Therapy:  Current Nutrition Therapy:   - Oral Diet:  Carb Control 5 carbs/meal (2000kcals/day)    Routes of Feeding: Oral  Liquids: Thin Liquids  Daily Fluid Restriction: no  Last Modified Barium Swallow with Video (Video Swallowing Test): not done    Treatments at the Time of Hospital Discharge:   Respiratory Treatments: n/a  Oxygen Therapy:  is not on home oxygen therapy.   Ventilator:    - No ventilator support    Rehab Therapies: Physical Therapy and Occupational Therapy  Weight Bearing Status/Restrictions: No weight bearing restirctions  Other Medical Equipment (for information only, NOT a DME order):  walker, bedside commode and hospital bed  Other Treatments: n/a    Patient's personal belongings (please select all that are sent with patient):  [place]    RN SIGNATURE:  Electronically signed by Tonya Neumann RN on 9/17/20 at 1:55 PM EDT    CASE MANAGEMENT/SOCIAL WORK SECTION    Inpatient Status Date: ***    Readmission Risk Assessment Score:  Readmission Risk              Risk of Unplanned Readmission:        12           Discharging to Facility/ Agency   · Name:   · Address:  · Phone:  · Fax:    Dialysis Facility (if applicable)   · Name:  · Address:  · Dialysis Schedule:  · Phone:  · Fax:    / signature: {Esignature:[id number]}    PHYSICIAN SECTION    Prognosis: {Prognosis:6668862048}    Condition at Discharge: [address] Patient Condition:937947334}    Rehab Potential (if transferring to Rehab): {Prognosis:4369534625}    Recommended Labs or Other Treatments After Discharge: ***    Physician Certification: I certify the above information and transfer of Abigail Weiss  is necessary for the continuing treatment of the diagnosis listed and that she requires {Admit to Appropriate Level of Care:56751} for {GREATER/LESS:997481678} 30 days.      Update Admission H&P: {CHP DME Changes in [facility]:758124576}    PHYSICIAN SIGNATURE:  {Esignature:[id number]}

## 2020-09-17 NOTE — PROGRESS NOTES
Department of Neurosurgery  Progress Note    CHIEF COMPLAINT: s/p L3-L5 PLIF 9/14    SUBJECTIVE: Pain controlled. Hemovac drain removed. Plan for discharge today. No new issues overnight. REVIEW OF SYSTEMS :  Constitutional: Negative for chills and fever. Neurological: Negative for dizziness, tremors and speech change.      OBJECTIVE:   VITALS:  /60   Pulse 64   Temp 97 °F (36.1 °C) (Temporal)   Resp 16   Ht 5' 5\" (1.651 m)   Wt 215 lb (97.5 kg)   SpO2 95%   BMI 35.78 kg/m²     PHYSICAL:  Neurologic:  Mental Status Exam:  Level of Alertness:   awake  Orientation:   person, place, time  Motor Exam:  Moving all extremities   Sensory:  Sensory intact  Incision c/d/i        DATA:  CBC:   Lab Results   Component Value Date    WBC 5.8 09/10/2020    RBC 3.79 09/10/2020    HGB 11.4 09/10/2020    HCT 35.3 09/10/2020    MCV 93.1 09/10/2020    MCH 30.1 09/10/2020    MCHC 32.3 09/10/2020    RDW 14.4 09/10/2020     09/10/2020    MPV 10.0 09/10/2020     BMP:    Lab Results   Component Value Date     09/15/2020    K 4.4 09/15/2020    K 4.3 09/10/2020     09/15/2020    CO2 24 09/15/2020    BUN 21 09/15/2020    LABALBU 3.3 09/15/2020    CREATININE 1.0 09/15/2020    CREATININE 1.1 06/20/2019    CALCIUM 8.6 09/15/2020    GFRAA >60 09/15/2020    LABGLOM 54 09/15/2020    GLUCOSE 174 09/15/2020     PT/INR:    Lab Results   Component Value Date    PROTIME 10.8 09/10/2020    INR 1.0 09/10/2020     PTT:  No results found for: APTT, PTT[APTT}    Current Inpatient Medications  Current Facility-Administered Medications: Influenza Vac A&B SA Adj Quad PRSY 0.5 mL, 0.5 mL, Intramuscular, Prior to discharge  atorvastatin (LIPITOR) tablet 10 mg, 10 mg, Oral, Daily  furosemide (LASIX) tablet 20 mg, 20 mg, Oral, Daily  latanoprost (XALATAN) 0.005 % ophthalmic solution 1 drop, 1 drop, Left Eye, Nightly  levothyroxine (SYNTHROID) tablet 50 mcg, 50 mcg, Oral, Daily  losartan (COZAAR) tablet 25 mg, 25 mg, Oral, Daily  magnesium gluconate (MAGONATE) tablet 250 mg, 250 mg, Oral, Daily  gabapentin (NEURONTIN) capsule 600 mg, 600 mg, Oral, TID  pantoprazole (PROTONIX) tablet 40 mg, 40 mg, Oral, QAM AC  oxybutynin (DITROPAN) tablet 5 mg, 5 mg, Oral, Daily  propranolol (INDERAL) tablet 10 mg, 10 mg, Oral, QPM  sodium chloride flush 0.9 % injection 10 mL, 10 mL, Intravenous, 2 times per day  sodium chloride flush 0.9 % injection 10 mL, 10 mL, Intravenous, PRN  promethazine (PHENERGAN) tablet 12.5 mg, 12.5 mg, Oral, Q6H PRN **OR** ondansetron (ZOFRAN) injection 4 mg, 4 mg, Intravenous, Q6H PRN  oxyCODONE (ROXICODONE) immediate release tablet 5 mg, 5 mg, Oral, Q4H PRN **OR** oxyCODONE HCl (OXY-IR) immediate release tablet 10 mg, 10 mg, Oral, Q4H PRN  morphine (PF) injection 2 mg, 2 mg, Intravenous, Q2H PRN **OR** morphine sulfate (PF) injection 4 mg, 4 mg, Intravenous, Q2H PRN  cyclobenzaprine (FLEXERIL) tablet 10 mg, 10 mg, Oral, TID PRN  polyethylene glycol (GLYCOLAX) packet 17 g, 17 g, Oral, Daily  bisacodyl (DULCOLAX) EC tablet 5 mg, 5 mg, Oral, Daily  sennosides-docusate sodium (SENOKOT-S) 8.6-50 MG tablet 1 tablet, 1 tablet, Oral, BID  magnesium hydroxide (MILK OF MAGNESIA) 400 MG/5ML suspension 30 mL, 30 mL, Oral, Daily PRN  fleet rectal enema 1 enema, 1 enema, Rectal, Daily PRN  benzocaine-menthol (CEPACOL SORE THROAT) lozenge 1 lozenge, 1 lozenge, Oral, Q2H PRN  0.9 % sodium chloride bolus, 20 mL, Intravenous, Once  [DISCONTINUED] glipiZIDE (GLUCOTROL) tablet 2.5 mg, 2.5 mg, Oral, Daily **AND** metFORMIN (GLUCOPHAGE) tablet 500 mg, 500 mg, Oral, Daily  insulin lispro (HUMALOG) injection vial 0-6 Units, 0-6 Units, Subcutaneous, TID WC  insulin lispro (HUMALOG) injection vial 0-3 Units, 0-3 Units, Subcutaneous, Nightly  glucose (GLUTOSE) 40 % oral gel 15 g, 15 g, Oral, PRN  dextrose 50 % IV solution, 12.5 g, Intravenous, PRN  glucagon (rDNA) injection 1 mg, 1 mg, Intramuscular, PRN  dextrose 5 % solution, 100 mL/hr,

## 2020-09-17 NOTE — PROGRESS NOTES
//  /  :   Hospitalist Progress Note      PCP: Tory Levin MD    Date of Admission: 9/14/2020    Chief Complaint: medical management    Hospital Course:   Kamron Ibarra is a 70 y.o. female with back pain and lumbar radiculopathy that did not respond to conservative management, patient had surgery today L3-L4, L4-L5 posterior lumbar interbody fusion. Patient has past medical history of sleep apnea hypertension hyperlipidemia GERD and diabetes. Discharge planning. Patient and family prefer to to go [facility], referral made. covid negative. Subjective:   Patient lying in bed. She is complaining of body-wide pain. States she has not had BM yet, but does not typically have daily movements.      Medications:  Reviewed    Infusion Medications    dextrose      dextrose 5 % and 0.45 % NaCl 50 mL/hr at 09/14/20 9747     Scheduled Medications    influenza virus vaccine  0.5 mL Intramuscular Prior to discharge    atorvastatin  10 mg Oral Daily    furosemide  20 mg Oral Daily    latanoprost  1 drop Left Eye Nightly    levothyroxine  50 mcg Oral Daily    losartan  25 mg Oral Daily    magnesium gluconate  250 mg Oral Daily    gabapentin  600 mg Oral TID    pantoprazole  40 mg Oral QAM AC    oxybutynin  5 mg Oral Daily    propranolol  10 mg Oral QPM    sodium chloride flush  10 mL Intravenous 2 times per day    ceFAZolin (ANCEF) IVPB  2 g Intravenous Q8H    polyethylene glycol  17 g Oral Daily    bisacodyl  5 mg Oral Daily    sennosides-docusate sodium  1 tablet Oral BID    sodium chloride  20 mL Intravenous Once    metFORMIN  500 mg Oral Daily    insulin lispro  0-6 Units Subcutaneous TID WC    insulin lispro  0-3 Units Subcutaneous Nightly     PRN Meds: sodium chloride flush, promethazine **OR** ondansetron, oxyCODONE **OR** oxyCODONE, morphine **OR** morphine, cyclobenzaprine, magnesium hydroxide, fleet, benzocaine-menthol, glucose, dextrose, glucagon (rDNA), dextrose      Intake/Output Summary (Last 24 hours) at 9/17/2020 0906  Last data filed at 9/17/2020 5377  Gross per 24 hour   Intake 360 ml   Output 660 ml   Net -300 ml       Exam:    BP (!) 122/56   Pulse 70   Temp 97.4 °F (36.3 °C) (Temporal)   Resp 16   Ht 5' 5\" (1.651 m)   Wt 215 lb (97.5 kg)   SpO2 95%   BMI 35.78 kg/m²     General appearance: No apparent distress, appears stated age and cooperative. HEENT: Pupils equal, round, and reactive to light. Conjunctivae/corneas clear. Neck: Supple, with full range of motion. No jugular venous distention. Trachea midline. Respiratory:  Normal respiratory effort. Clear to auscultation, bilaterally without Rales/Wheezes/Rhonchi. Cardiovascular: Regular rate and rhythm with normal S1/S2 without murmurs, rubs or gallops. Abdomen: Soft, non-tender, non-distended with normal bowel sounds. Musculoskeletal: No clubbing, cyanosis or edema bilaterally. Full range of motion without deformity. Skin: Skin color, texture, turgor normal.  No rashes or lesions. Neurologic:  Neurovascularly intact without any focal sensory/motor deficits. Psychiatric: Alert and oriented, thought content appropriate, normal insight  Capillary Refill: Brisk,< 3 seconds   Peripheral Pulses: +2 palpable, equal bilaterally       Labs:   No results for input(s): WBC, HGB, HCT, PLT in the last 72 hours. Recent Labs     09/15/20  0418      K 4.4      CO2 24   BUN 21   CREATININE 1.0   CALCIUM 8.6     Recent Labs     09/15/20  0418   AST 22   ALT 17   BILITOT 0.2   ALKPHOS 73     No results for input(s): INR in the last 72 hours. No results for input(s): Tiney Hayes in the last 72 hours. FLUORO FOR SURGICAL PROCEDURES   Final Result   Intraoperative bilateral posterior lumbar fusion L3-L5      This study was dictated by Lorna Elaine PA-C and Sandy Barcenas MD   reviewed and concurred with the findings.           Assessment/Plan:    Active Hospital Problems    Diagnosis Date Noted    Spondylolisthesis, lumbar region [M43.16] 09/16/2020    Spondylolisthesis of lumbar region [M43.16] 09/14/2020    HTN (hypertension) [I10] 07/07/2020    Obesity [E66.9] 07/07/2020     Plan  Pain control  Glucose control-metformin, insulin sliding scale  Bp management-lasix, losartan, propanolol  Continue remaining home medications as ordered  Neurovascular checks  Monitor bowel function  Flu vaccine if ok with primary team  Remainder per primary    DVT Prophylaxis: SCDs  Diet: DIET CARB CONTROL;  Code Status: Full Code    PT/OT Eval Status: ordered    Dispo - per primary.  [id number] Elvi Pichardo for discharge from [address] [place] [address], [facility]

## 2020-09-18 NOTE — ADT AUTH CERT
Lumbar Fusion - Care Day 3 (9/16/2020) by Olvin Gu RN         Review Status  Review Entered    Completed  9/18/2020 08:24        Criteria Review       Care Day: 3 Care Date: 9/16/2020 Level of Care: Inpatient Floor    Guideline Day 3    Clinical Status    (X) * Hemodynamic stability    9/18/2020 8:24 AM EDT by Spencer Lara      T 97.2; P 78; RR 16; /56; PULSE OX 96 % RA    ( ) * No new neurologic deficit    ( ) * No evidence of postoperative or surgical site infection    ( ) * Voiding ability at baseline    ( ) * Adequate ambulation    ( ) * Pain absent or managed    ( ) * Discharge plans and education understood    Activity    ( ) * Ambulatory or acceptable for next level of care [E]    Routes    ( ) * Oral hydration, medications, and diet    Medications    ( ) * PCA absent [F]    * Milestone    Additional Notes    9/16/2020    MEDICATIONS: LIPITOR PO DAILY, DULCOLAX PO DAILY, ANCEF IV Q8H, LASIX PO DAILY, NEURONTIN PO 3 TIMES DAILY, GLUCOPHAGE PO DAILY, HUMALOG SS 3 TIMES DAILY WITH MEALS AND NIGHTLY, XALATAN NIGHTLY, SYNTHROID PO DAILY, COZAAR PO DAILY, MAGONATE PO DAILY, DITROPAN PO DAILY, PROTONIX PO DAILY BEFORE BREAKFAST, GLYCOLAX PO DAILY, INDERAL PO X1 THEN EVERY EVENING, SENOKOT-S PO 2 TIMES DAILY, FLEXERIL PO 10 MG PRN X3, MORPHINE SULFATE IV 4 MG PRN X2, ROXICODONE 5 MG PRN X1, OXY-IR 10 MG PRN X2       LABS:    METER GLUCOSE: 132, 164, 168, 148       * ** NEUROSURGERY * *    ASSESSMENT:    s/p L3-L5 PLIF 9/14         PLAN:    -Pain control    -PT/OT    -Drain care    -PMR consult    -Follow up in neurosurgery clinic in 4 weeks with x-rays          * ** INTERNAL MEDICINE * **    Assessment/Plan:         Active Hospital Problems      Diagnosis Date Noted    · Spondylolisthesis of lumbar region [M43.16] 09/14/2020    · HTN (hypertension) [I10] 07/07/2020    · Obesity [E66.9] 07/07/2020       Plan    Pain control    Glucose control-metformin, insulin sliding scale    Bp management-lasix, 3:09 PM EDT by Ortiz Alfaro intermittent pneumatic compression devices    * Milestone    Additional Notes    REVIEW 09/15/2020       INTERNAL MED:    9/15/20: POD 1. No acute events overnight. Vital signs reviewed and stable. Labs reviewed and stable.         Subjective:    Patient lying in bed. She is complaining of back pain but no other complaints at the present time. Exam:         /64   Pulse 78   Temp 97.7 °F (36.5 °C) (Temporal)   Resp 16   Ht 5' 5\" (1.651 m)   Wt 215 lb (97.5 kg)   SpO2 98%   BMI 35.78 kg/m²         General appearance: No apparent distress, appears stated age and cooperative. HEENT: Pupils equal, round, and reactive to light. Conjunctivae/corneas clear. Neck: Supple, with full range of motion. No jugular venous distention. Trachea midline. Respiratory:  Normal respiratory effort. Clear to auscultation, bilaterally without Rales/Wheezes/Rhonchi. Cardiovascular: Regular rate and rhythm with normal S1/S2 without murmurs, rubs or gallops. Abdomen: Soft, non-tender, non-distended with normal bowel sounds. Musculoskeletal: No clubbing, cyanosis or edema bilaterally.  Full range of motion without deformity. Skin: Skin color, texture, turgor normal.  No rashes or lesions. Neurologic:  Neurovascularly intact without any focal sensory/motor deficits.     Psychiatric: Alert and oriented, thought content appropriate, normal insight    Capillary Refill: Brisk,< 3 seconds    Peripheral Pulses: +2 palpable, equal bilaterally         Plan    Pain control    Glucose control-metformin, insulin sliding scale    Bp management-lasix, losartan, propanolol    Continue remaining home medications as ordered    Neurovascular checks    Monitor bowel function         DVT Prophylaxis: SCDs    Diet: DIET CARB CONTROL;    Code Status: Full Code         PT/OT Eval Status: ordered         NEUROSURGERY:    ASSESSMENT:    s/p L3-L5 PLIF 9/14         PLAN:    -Pain control -PT/OT    -Drain care    -PMR consult    -Follow up in neurosurgery clinic in 4 weeks with x-rays

## 2020-10-08 ENCOUNTER — TELEPHONE (OUTPATIENT)
Dept: NEUROSURGERY | Age: 75
End: 2020-10-08

## 2020-10-08 NOTE — TELEPHONE ENCOUNTER
[address] [address] is requesting a home health referral with bathing aide and an order for transfer bench for tub.    Home health company of physician choice  Please fax DME script to [facility] [phone]

## 2020-10-12 ENCOUNTER — HOSPITAL ENCOUNTER (OUTPATIENT)
Age: 75
Discharge: HOME | End: 2020-10-14
Payer: MEDICARE

## 2020-10-12 ENCOUNTER — OFFICE VISIT (OUTPATIENT)
Dept: NEUROSURGERY | Age: 75
End: 2020-10-12

## 2020-10-12 ENCOUNTER — HOSPITAL ENCOUNTER (OUTPATIENT)
Dept: GENERAL RADIOLOGY | Age: 75
Discharge: HOME | End: 2020-10-14
Payer: MEDICARE

## 2020-10-12 VITALS
BODY MASS INDEX: 34.49 KG/M2 | HEIGHT: 65 IN | TEMPERATURE: 97.2 F | HEART RATE: 54 BPM | WEIGHT: 207 LBS | SYSTOLIC BLOOD PRESSURE: 137 MMHG | DIASTOLIC BLOOD PRESSURE: 63 MMHG

## 2020-10-12 PROCEDURE — 72100 X-RAY EXAM L-S SPINE 2/3 VWS: CPT

## 2020-10-12 PROCEDURE — 99024 POSTOP FOLLOW-UP VISIT: CPT | Performed by: PHYSICIAN ASSISTANT

## 2020-10-12 RX ORDER — METHOCARBAMOL 750 MG/1
TABLET, FILM COATED ORAL
COMMUNITY
Start: 2020-09-29 | End: 2020-10-19 | Stop reason: SDUPTHER

## 2020-10-12 NOTE — PROGRESS NOTES
Post Operative Follow-up     This is a 70year old female who presents to the office for a one month follow-up s/p L3-L5 PLIF     Subjective: Patient presents in a wheelchair with her daughter. States she has been doing well. Pre op back and leg pain improved. Continues PT at home. Denies new pain, weakness, numbness, tinging, or issues with incision site.      Physical Exam:              WDWN, no apparent distress              Non-labored breathing               Vitals Stable              Alert and oriented x3              CN 3-12 intact              PERRL              EOMI              WHEELER              Sensation to LT intact bilaterally   Incision healing well with no signs of infection. Scabs noted at top of incision.                Imaging: 10/12/20 lumbar x-rays:   Impression    1. Status post posterior fusion at L3 through L5.  No hardware complication. 2. Mild degenerative disc disease L5-S1.           Assessment: This is a 70 y.o.  female presenting for a one month follow-up s/p L3-L5 PLIF. Stable.      Plan:  -Pain control and expectations discussed  -Continue with restrictions  -OARRS report reviewed. -Follow-up in neurosurgery clinic in 2 months for 3 month follow up with x-rays  -Call or return to neurosurgery office sooner if symptoms worsen or if new issues arise in the interim.     Electronically signed by Lyndsey Linder PA-C on 10/12/2020 at 12:11 PM

## 2020-10-19 ENCOUNTER — TELEPHONE (OUTPATIENT)
Dept: NEUROSURGERY | Age: 75
End: 2020-10-19

## 2020-10-19 RX ORDER — METHOCARBAMOL 750 MG/1
750 TABLET, FILM COATED ORAL EVERY 8 HOURS PRN
Qty: 40 TABLET | Refills: 0 | Status: SHIPPED
Start: 2020-10-19 | End: 2020-11-23 | Stop reason: SDUPTHER

## 2020-10-23 LAB
ALBUMIN SERPL-MCNC: NORMAL G/DL
ALP BLD-CCNC: NORMAL U/L
ALT SERPL-CCNC: NORMAL U/L
ANION GAP SERPL CALCULATED.3IONS-SCNC: NORMAL MMOL/L
AST SERPL-CCNC: NORMAL U/L
BILIRUB SERPL-MCNC: NORMAL MG/DL
BUN BLDV-MCNC: NORMAL MG/DL
CALCIUM SERPL-MCNC: NORMAL MG/DL
CHLORIDE BLD-SCNC: NORMAL MMOL/L
CHOLESTEROL, TOTAL: NORMAL
CO2: NORMAL
CREAT SERPL-MCNC: NORMAL MG/DL
GFR CALCULATED: NORMAL
GLUCOSE BLD-MCNC: NORMAL MG/DL
POTASSIUM SERPL-SCNC: NORMAL MMOL/L
SODIUM BLD-SCNC: NORMAL MMOL/L
TOTAL PROTEIN: NORMAL

## 2020-10-24 LAB
AVERAGE GLUCOSE: NORMAL
BASOPHILS ABSOLUTE: NORMAL
BASOPHILS RELATIVE PERCENT: NORMAL
EOSINOPHILS ABSOLUTE: NORMAL
EOSINOPHILS RELATIVE PERCENT: NORMAL
HBA1C MFR BLD: NORMAL %
HCT VFR BLD CALC: NORMAL %
HEMOGLOBIN: NORMAL
LYMPHOCYTES ABSOLUTE: NORMAL
LYMPHOCYTES RELATIVE PERCENT: NORMAL
MCH RBC QN AUTO: NORMAL PG
MCHC RBC AUTO-ENTMCNC: NORMAL G/DL
MCV RBC AUTO: NORMAL FL
MONOCYTES ABSOLUTE: NORMAL
MONOCYTES RELATIVE PERCENT: NORMAL
NEUTROPHILS ABSOLUTE: NORMAL
NEUTROPHILS RELATIVE PERCENT: NORMAL
PLATELET # BLD: NORMAL 10*3/UL
PMV BLD AUTO: NORMAL FL
RBC # BLD: NORMAL 10*6/UL
WBC # BLD: NORMAL 10*3/UL

## 2020-10-28 ENCOUNTER — TELEPHONE (OUTPATIENT)
Dept: FAMILY MEDICINE CLINIC | Age: 75
End: 2020-10-28

## 2020-10-28 NOTE — TELEPHONE ENCOUNTER
[address] [facility], called to inform would like to extend home PT 2 x/wk for another 3 weeks. Mike Lewis stated verbal OK is fine and that she will fax paperwork to the [facility].

## 2020-10-30 NOTE — TELEPHONE ENCOUNTER
Patient called for refills. Patient stated she called a couple of days ago and needs her RX.       Patient is requesting a refill of:  Medication: Losartan   Dose: 25 mg  Frequency: take 1 tablet daily  Pharmacy: [facility]    Patient is requesting a refill of:  Medication: Allopurinol  Dose: 100 mg  Frequency: 2 tablets daily  Pharmacy: [facility]    Patient is requesting a refill of:  Medication: Oxybutynin  Dose: 5 mg  Frequency: take 1 daily  Pharmacy: [facility]    Patient is requesting a refill of:  Medication: Magnesium  Dose: 30 mg  Frequency: take 1 daily  Pharmacy: [facility]    Patient is requesting a refill of:  Medication: Omeprazole   Dose: 20 mg   Frequency: take 1 daily  Pharmacy: [facility]     Patient is requesting a refill of:  Medication: Atorvastatin  Dose: 10 mg  Frequency: take 1 tablet daily  Pharmacy: [facility]      Last seen Visit date not found  Next appt 12/7/2020

## 2020-11-02 RX ORDER — OMEPRAZOLE 20 MG/1
20 CAPSULE, DELAYED RELEASE ORAL DAILY
Qty: 90 CAPSULE | Refills: 0 | Status: SHIPPED | OUTPATIENT
Start: 2020-11-02 | End: 2021-02-03 | Stop reason: SDUPTHER

## 2020-11-02 RX ORDER — OXYBUTYNIN CHLORIDE 5 MG/1
5 TABLET ORAL DAILY
Qty: 90 TABLET | Refills: 0 | Status: SHIPPED | OUTPATIENT
Start: 2020-11-02 | End: 2021-02-03 | Stop reason: SDUPTHER

## 2020-11-02 RX ORDER — LOSARTAN POTASSIUM 25 MG/1
25 TABLET ORAL DAILY
Qty: 90 TABLET | Refills: 0 | Status: SHIPPED | OUTPATIENT
Start: 2020-11-02 | End: 2021-02-03 | Stop reason: SDUPTHER

## 2020-11-02 RX ORDER — ALLOPURINOL 100 MG/1
200 TABLET ORAL DAILY
Qty: 90 TABLET | Refills: 0 | Status: SHIPPED
Start: 2020-11-02 | End: 2021-07-06 | Stop reason: SDUPTHER

## 2020-11-02 RX ORDER — MAGNESIUM 30 MG
30 TABLET ORAL DAILY
Qty: 90 TABLET | Refills: 0 | Status: SHIPPED
Start: 2020-11-02 | End: 2021-04-26 | Stop reason: ALTCHOICE

## 2020-11-02 RX ORDER — ATORVASTATIN CALCIUM 10 MG/1
10 TABLET, FILM COATED ORAL DAILY
Qty: 30 TABLET | Refills: 0 | Status: SHIPPED
Start: 2020-11-02 | End: 2021-04-26 | Stop reason: ALTCHOICE

## 2020-11-04 RX ORDER — ATORVASTATIN CALCIUM 10 MG/1
TABLET, FILM COATED ORAL
Qty: 90 TABLET | Refills: 1 | OUTPATIENT
Start: 2020-11-04

## 2020-11-23 ENCOUNTER — TELEPHONE (OUTPATIENT)
Dept: NEUROSURGERY | Age: 75
End: 2020-11-23

## 2020-11-23 RX ORDER — METHOCARBAMOL 750 MG/1
750 TABLET, FILM COATED ORAL EVERY 8 HOURS PRN
Qty: 40 TABLET | Refills: 0 | Status: SHIPPED
Start: 2020-11-23 | End: 2020-12-08 | Stop reason: SDUPTHER

## 2020-12-07 ENCOUNTER — HOSPITAL ENCOUNTER (OUTPATIENT)
Age: 75
Discharge: HOME | End: 2020-12-09
Payer: MEDICARE

## 2020-12-07 ENCOUNTER — OFFICE VISIT (OUTPATIENT)
Dept: NEUROSURGERY | Age: 75
End: 2020-12-07

## 2020-12-07 ENCOUNTER — HOSPITAL ENCOUNTER (OUTPATIENT)
Dept: GENERAL RADIOLOGY | Age: 75
Discharge: HOME | End: 2020-12-09
Payer: MEDICARE

## 2020-12-07 ENCOUNTER — TELEPHONE (OUTPATIENT)
Dept: ADMINISTRATIVE | Age: 75
End: 2020-12-07

## 2020-12-07 VITALS
DIASTOLIC BLOOD PRESSURE: 69 MMHG | SYSTOLIC BLOOD PRESSURE: 159 MMHG | HEIGHT: 65 IN | TEMPERATURE: 97.7 F | WEIGHT: 205 LBS | HEART RATE: 57 BPM | BODY MASS INDEX: 34.16 KG/M2

## 2020-12-07 PROCEDURE — 99024 POSTOP FOLLOW-UP VISIT: CPT | Performed by: PHYSICIAN ASSISTANT

## 2020-12-07 PROCEDURE — 72100 X-RAY EXAM L-S SPINE 2/3 VWS: CPT

## 2020-12-07 NOTE — TELEPHONE ENCOUNTER
Pt called to cancel appt scheduled for today due to a schedule conflict with another provider. Pt want to be reschedule for her appt with Dr. Hayde Rodriguez.  Please advise Pt

## 2020-12-07 NOTE — PROGRESS NOTES
Post Operative Follow-up     This is a 70year old female who presents to the office for a three month follow-up s/p L3-L5 PLIF     Subjective: Patient presents in a wheelchair. States she feels well. Admits to intermittent back soreness and left leg pain. Wears brace for comfort     Physical Exam:              WDWN, no apparent distress              Non-labored breathing               Vitals Stable              Alert and oriented x3              CN 3-12 intact              PERRL              EOMI              WHEELER              Sensation to LT intact bilaterally   Incision healing well with no signs of infection.                 Imagin20 lumbar x-rays: Stable alignment, stable fusion. Final report pending.        Assessment: This is a 70 y.o.  female presenting for a three month follow-up s/p L3-L5 PLIF. Stable.      Plan:  -Pain control and expectations discussed  -No restrictions. Referral made for PT  -Pt unsure if she needs pain medication refilled. She will call our office if she needs a last refill. -OARRS report reviewed. -Follow-up in neurosurgery clinic in 9 months for 1 year follow up with x-rays  -Call or return to neurosurgery office sooner if symptoms worsen or if new issues arise in the interim.     Electronically signed by Elizabeth Morgan PA-C on 2020 at 3:07 PM

## 2020-12-08 ENCOUNTER — TELEPHONE (OUTPATIENT)
Dept: NEUROSURGERY | Age: 75
End: 2020-12-08

## 2020-12-08 RX ORDER — GABAPENTIN 600 MG
600 TABLET ORAL 3 TIMES DAILY
Qty: 90 TABLET | Refills: 3 | Status: SHIPPED | OUTPATIENT
Start: 2020-12-08 | End: 2021-08-10

## 2020-12-08 RX ORDER — OXYCODONE HYDROCHLORIDE 5 MG/1
5 TABLET ORAL EVERY 4 HOURS PRN
Qty: 42 TABLET | Refills: 0 | Status: SHIPPED | OUTPATIENT
Start: 2020-12-08 | End: 2020-12-15

## 2020-12-08 RX ORDER — METHOCARBAMOL 750 MG/1
750 TABLET, FILM COATED ORAL EVERY 8 HOURS PRN
Qty: 40 TABLET | Refills: 0 | Status: SHIPPED | OUTPATIENT
Start: 2020-12-08 | End: 2021-01-17

## 2020-12-18 ENCOUNTER — EVALUATION (OUTPATIENT)
Dept: PHYSICAL THERAPY | Age: 75
End: 2020-12-18

## 2020-12-18 PROBLEM — Z98.1 S/P LUMBAR FUSION: Status: ACTIVE | Noted: 2020-12-18

## 2020-12-18 NOTE — PROGRESS NOTES
Ther Activities        TA     Gait Training          GT     Neuro Re-education NR     Modalities     Non-Billable Service Time     Other     Total Time/Units 50 3

## 2020-12-18 NOTE — PROGRESS NOTES
[facility] OUTPATIENT REHABILITATION  PHYSICAL THERAPY INITIAL EVALUATION         Date:  2020   Patient: Margarito Nick  : 1945  MRN: [id number]  Referring Provider: Emely Felix PA-C  [address] [place] [address],  [address] [place] [address] [facility]     Medical Diagnosis:      Diagnosis Orders   1. S/P lumbar fusion         SUBJECTIVE:     Onset date: 2019    Onset: Gradual     Sx: L3-L4 fusion, L4-L5 fusion, L3-L4 laminectomy on 2020, NO RESTRICTIONS    Mechanism of Injury:     Previous PT: yes - helped    Medical Management for Current Problem: OTC meds     Chief complaint: pain, decreased motion, decreased mobility and weakness    Behavior: condition is getting better    Pain: intermittent  Current: 0/10     Best: 0/10     Worst:10/10    Symptom Type/Quality: sharp, shooting, throbbing  Location[de-identified] Back: noted above lumbar region and right lateral side radiates down the lateral right leg         Provoking Activities/Positions: sitting, standing, bending, rising to standing, getting up from low positions , lifting/carrying/material handling                 Relieving Activitie/Positions: sitting, heat, medication    Disturbed Sleep: yes  Bladder Dysfunction: no  Bowel Dysfunction: no     Imaging results: Xr Lumbar Spine (2-3 Views)    Result Date: 2020  EXAMINATION: THREE XRAY VIEWS OF THE LUMBAR SPINE 2020 12:49 pm COMPARISON: 2020 HISTORY: ORDERING SYSTEM PROVIDED HISTORY: Spondylolisthesis of lumbar region TECHNOLOGIST PROVIDED HISTORY: What reading provider will be dictating this exam?->CRC FINDINGS: Posterior fusion with pedicular screws and rods from L3-L5 with anatomic alignment. Normal soft tissues. There is moderate disc degeneration at L5-S1. Anatomic alignment of the spine fusion disc disease at L5-S1.       Past Medical History:  Past Medical History:   Diagnosis Date    Anesthesia complication 7816 difficulty breathing post thyroid surgery [facility]    Cancer [facility])     skin     GERD (gastroesophageal reflux disease)     Hyperlipidemia     Hypertension     Sleep apnea     uses cpap    Spinal stenosis     Thyroid disease     Tremors of nervous system     familial    Type 2 diabetes mellitus without complication ([address]      Past Surgical History:   Procedure Laterality Date    BLEPHAROPLASTY      CATARACT REMOVAL WITH IMPLANT Left 09/12/2017    CATARACT REMOVAL WITH IMPLANT Right 10/09/2018    CATARACT REMOVAL WITH IMPLANT Right 11/01/2018    secondary procedure to correct first cataract     CHOLECYSTECTOMY      COSMETIC SURGERY      abdominoplasty    FOOT SURGERY Left     skin cancer    HYSTERECTOMY      LUMBAR SPINE SURGERY N/A 9/14/2020    L3-L4, L4-L5  POSTERIOR LUMBAR INTERBODY FUSION performed by Juan Ramon Andino MD at [address] [facility] [address] WADE MATER,INTRAOCUL Right 11/1/2018    EYE IOL REMOVAL performed by Kevin George MD at [facility] INS IO LENS PROSTH W/O ECP Right 10/9/2018    RIGHT EYE CATARACT EMULSIFICATION IOL IMPLANT performed by Kevin George MD at [address], PARTIAL      left lobe removed    TONSILLECTOMY      TUBAL LIGATION         Medications:   Current Outpatient Medications   Medication Sig Dispense Refill    methocarbamol (ROBAXIN) 750 MG tablet Take 1 tablet by mouth every 8 hours as needed (muscle spasm) 40 tablet 0    NEURONTIN 600 MG tablet Take 1 tablet by mouth 3 times daily.  90 tablet 3    atorvastatin (LIPITOR) 10 MG tablet Take 1 tablet by mouth daily 30 tablet 0    omeprazole (PRILOSEC) 20 MG delayed release capsule Take 1 capsule by mouth daily 90 capsule 0    magnesium 30 MG tablet Take 1 tablet by mouth daily 90 tablet 0    oxybutynin (DITROPAN) 5 MG tablet Take 1 tablet by mouth daily 90 tablet 0 Palpation: Tender to palpation low lumbar     Sensation: Intact    Special Tests:   [] Nerve Root Compression           Right []+ / [] -    Left []+ / [] -  [] Slump           Right []+ / [] -    Left []+ / [] -  [] FADIR          Right []+ / [] -    Left []+ / [] -  [] S-I Distraction          Right []+ / [] -    Left []+ / [] -     [] SLR           Right []+ / [] -    Left []+ / [] -     [] LUDY          Right []+ / [] -    Left []+ / [] -  [] S-I Compression          Right []+ / [] -    Left []+ / [] -   [] Other: []+ / [] -       Special Test Comments:     ASSESSMENT     Outcome Measure:   Modified Oswestry 62% disability    Problems:   ? Pain reported 10/10  ? ROM decreased   ? Strength decreased 4/5 trunk and LLE, 4-/5 RLE  ? Decreased functional ability with walking, stairs, sitting, standing, work, ADLs, use of right lower extremity, bending, reaching, lifting, carrying, driving    [x] There are no barriers affecting plan of care or recovery    [] Barriers to this patient's plan of care or recovery include. Domestic Concerns:  [x] No  [] Yes:    Short Term goals (3-4 weeks)  ? Decrease reported pain to 6/10  ? Increase ROM to [facility]  ? Increase Strength to 4+/5 trunk and BLE   ? Able to perform/complete the following functions/tasks: Perform ADLs, hobbies, housework with less pain. ? Oswestry Low Back Disability Questionnaire 45% disability    Long Term goals (6-8 weeks)  ? Decrease reported pain to 0-4/10  ? Increase ROM to WNL  ? Increase Strength to 5/5 trunk and BLE    ? Able to perform/complete the following functions/tasks: Perform ADLs, hobbies, housework with no/minimal pain. ? Oswestry Low Back Disability Questionnaire 0-35% disability   ?  Independent with Home Exercise Programs    Rehab Potential: [x] Good  [] Fair  [] Poor    PLAN       Treatment Plan:   [x] Therapeutic Exercise  [x] Therapeutic Activity  [x] Neuromuscular Re-education   [x] Gait Training  [x] Balance Training [] Aerobic conditioning  [x] Manual Therapy  [x] Massage/Fascial release   [] Work/Sport specific activities    [] Pain Neuroscience [x] Cold/hotpack  [] Vasocompression  [x] Electrical Stimulation  [] Lumbar/Cervical Traction  [] Ultrasound   [] Iontophoresis: 4 mg/mL Dexamethasone Sodium Phosphate 40-80 mAmin        [x] Instruction in HEP      []  Medication allergies reviewed for use of Dexamethasone Sodium Phosphate 4mg/ml  with iontophoresis treatments. Patient is not allergic. The following CPT codes are likely to be used in the care of this patient: 78854 PT Evaluation: Moderate Complexity , 67547 PT Re-Evaluation , 39766 Therapeutic Exercise , 63428 Neuromuscular Re-Education , 45405 Therapeutic Activities , 35227 Manual Therapy , 61228 Gait Training ,  Electrical Stimulation      Suggested Professional Referral: [x] No  [] Yes:     Patient Education:  [x] Plans/Goals, Risks/Benefits discussed  [x] Home exercise program  Method of Education: [x] Verbal  [x] Demo  [x] Written  Comprehension of Education:  [x] Verbalizes understanding. [x] Demonstrates understanding. [] Needs Review. [] Demonstrates/verbalizes understanding of HEP/Ed previously given. Frequency:  1-2 days per week for 6-8 weeks    Patient understands diagnosis/prognosis and consents to treatment, plan and goals: [x] Yes    [] No     Thank you for the opportunity to work with your patient. If you have questions or comments, please contact me at [phone]; fax: [phone]. Electronically signed by: Tessy Nolan PT         Please sign Physician's Certification and return to: [address] [place] [id number]  Dept: [phone]  Dept Fax: [phone] Certification / Comments     Frequency/Duration 1-2 days per week for 6-8 weeks. Certification period from 12/18/2020  to 03/17/2021. I have reviewed the Plan of Care established for skilled therapy services and certify that the services are required and that they will be provided while the patient is under my care.     Physician's Comments/Revisions:               Physician's Printed Name:                                           [de-identified] Signature:                                                               Date:

## 2020-12-21 ENCOUNTER — TELEPHONE (OUTPATIENT)
Dept: FAMILY MEDICINE CLINIC | Age: 75
End: 2020-12-21

## 2020-12-28 ENCOUNTER — TREATMENT (OUTPATIENT)
Dept: PHYSICAL THERAPY | Age: 75
End: 2020-12-28
Payer: MEDICARE

## 2020-12-28 PROCEDURE — 97110 THERAPEUTIC EXERCISES: CPT

## 2020-12-28 PROCEDURE — 97530 THERAPEUTIC ACTIVITIES: CPT

## 2020-12-28 PROCEDURE — 97116 GAIT TRAINING THERAPY: CPT

## 2020-12-28 NOTE — PROGRESS NOTES
Physical Therapy Treatment Note    Date: 2020  Patient Name: Aba Kinsey  : 1945   MRN: [id number]  DOInjury: 2019  DOSx: 2020  Referring Provider: Garth Aase, PA-C  [address] [place] [address],  [address]    Medical Diagnosis:    Diagnosis Orders   1. S/P lumbar fusion        Sx: L3-L4 fusion, L4-L5 fusion, L3-L4 laminectomy on 2020, NO RESTRICTIONS    Outcome Measure:  Modified Oswestry 62% disability    S: pt states she has been compliant with HEP with minimal soreness . O:  Time [id number]      Visit 2 /  Repeat outcome measure at mid point and end. Pain 3-4 /10     ROM      Modalities      MH + ES            Exercise      ALL EXERCISE DONE WITH DRAW-IN TECHNIQUE              THEREX     Supine Knee to Chest 5 reps x 1 set with 10s holds each side  TE   LTR  10 reps x 1 set with 3s holds  TE   Supine Clams 10 reps x 1 set with 3s holds  TE   Pelvic Tilts 15 reps x 1 set  TE   Bridges Unable to perform        Seated HS Stretch  5 reps x 1 set with 10s holds  seated stretch was more comfortable for pt . TE   Piriformis Stretch      Trunk Flexion      Trunk Extension            Cat/Camel      Bird Dog      Prone Lumbar Extension      Prone Arm and Leg Raises      Crunches       Functional Activities To aid in reaching , pushing, pulling tasks at home     Nustep   L 5 x 10 min new   FA   ROWS: H      ROWS: M      ROWS: L      Punches      Triceps Ext Standing      Trunk Ext TB      Trunk Flex TB      Obliques - low      Obliques - high      Calf raises  2 x 10 new   FA   Marching 2 x 10 new  FA   Standing Hip Abduction 2 x 10 new   FA   Standing Hip Extension 2 x 10 new   FA   Standing Hip Flexion  2 x 10 new   FA         Stairs - FWD      Stairs - LAT            TG Squats      Leg Press       Gait     Marching Gait      Side Stepping      Gait  135 feet x 1 using wh.  Walker   GT    Weighted Machines     Lat Pull Down      Vertical Row      ROWS: H      ROWS: M

## 2020-12-29 ENCOUNTER — TELEPHONE (OUTPATIENT)
Dept: FAMILY MEDICINE CLINIC | Age: 75
End: 2020-12-29

## 2020-12-29 NOTE — TELEPHONE ENCOUNTER
Patient called to inform [employer] would not accept [employer] Plac[employer] RX, stating there is not an expiration date on it. Informed patient expiration date is 12/21/21 and will fax.     I spoke w/Ameean TRIPLETT MA who will fax   f - [phone]

## 2021-01-08 ENCOUNTER — TREATMENT (OUTPATIENT)
Dept: PHYSICAL THERAPY | Age: 76
End: 2021-01-08
Payer: MEDICARE

## 2021-01-08 DIAGNOSIS — Z98.1 S/P LUMBAR FUSION: Primary | ICD-10-CM

## 2021-01-08 PROCEDURE — 97116 GAIT TRAINING THERAPY: CPT

## 2021-01-08 PROCEDURE — 97530 THERAPEUTIC ACTIVITIES: CPT

## 2021-01-08 PROCEDURE — 97110 THERAPEUTIC EXERCISES: CPT

## 2021-01-14 RX ORDER — GLIPIZIDE AND METFORMIN HCL 2.5; 5 MG/1; MG/1
1 TABLET, FILM COATED ORAL DAILY
Qty: 180 TABLET | Refills: 1 | Status: SHIPPED | OUTPATIENT
Start: 2021-01-14 | End: 2022-03-22

## 2021-01-21 ENCOUNTER — TELEPHONE (OUTPATIENT)
Dept: NEUROSURGERY | Age: 76
End: 2021-01-21

## 2021-01-21 DIAGNOSIS — M54.16 LUMBAR RADICULOPATHY: Primary | ICD-10-CM

## 2021-01-21 RX ORDER — METHYLPREDNISOLONE 4 MG/1
TABLET ORAL
Qty: 1 KIT | Refills: 0 | Status: SHIPPED | OUTPATIENT
Start: 2021-01-21 | End: 2021-01-27

## 2021-01-21 NOTE — TELEPHONE ENCOUNTER
Patient having severe left-sided back pain and down her leg x one week. Prior pain was always in the middle or right side.  [id number]

## 2021-01-25 ENCOUNTER — TREATMENT (OUTPATIENT)
Dept: PHYSICAL THERAPY | Age: 76
End: 2021-01-25

## 2021-01-25 DIAGNOSIS — Z98.1 S/P LUMBAR FUSION: Primary | ICD-10-CM

## 2021-01-25 PROCEDURE — 97110 THERAPEUTIC EXERCISES: CPT

## 2021-01-25 PROCEDURE — 97116 GAIT TRAINING THERAPY: CPT

## 2021-01-25 PROCEDURE — 97530 THERAPEUTIC ACTIVITIES: CPT

## 2021-01-25 NOTE — PROGRESS NOTES
Physical Therapy Treatment Note    Date: 2021  Patient Name: Liss Cote  : 1945   MRN: [id number]  DOInjury: 2019  DOSx: 2020  Referring Provider: Fouzia Ferrera PA-C  [address] [place] [address],  [facility] Garth MENDES    Medical Diagnosis:    Diagnosis Orders   1. S/P lumbar fusion        Sx: L3-L4 fusion, L4-L5 fusion, L3-L4 laminectomy on 2020, NO RESTRICTIONS    Outcome Measure:  Modified Oswestry 62% disability    S: pt states she feels much better today . ( Pt also stated due to her co-pay she is not able to come multiple times a week . she requests to come every other week and concentrate on given HEP ) . . O: pt ambulating into Dept using straight cane today. Time 1515- 1600      Visit -16  Repeat outcome measure at mid point and end. Pain 3-4 /10     ROM      Modalities      MH + ES            Exercise      ALL EXERCISE DONE WITH DRAW-IN TECHNIQUE              THEREX     Supine Knee to Chest HEP TE   LTR  HEP TE   Supine Clams HEP TE   Pelvic Tilts HEP TE   Bridges HEP      Seated HS Stretch   seated stretch was more comfortable for pt .  TE   Piriformis Stretch      Trunk Flexion      Trunk Extension            Cat/Camel      Bird Dog      Prone Lumbar Extension      Prone Arm and Leg Raises      Crunches       Functional Activities To aid in reaching , pushing, pulling tasks at home     Nustep   L 5 x 15 min  FA         ROWS: H Green 2 x 15 seated  HEP FA   ROWS: M Green 2 x 15 seated  HEP FA   ROWS: L Green 2 x 15 seated  HEP FA      pt given green t tubing for HEP 2021     Punches      Triceps Ext Standing      Trunk Ext TB Green 2 x 15 seated new   FA   Trunk Flex TB Green 2 x 15 seated new   FA   Obliques - low      Obliques - high      Calf raises  2 x 10  HEP FA   Marching 2 x 10  HEP FA   Standing Hip Abduction 2 x 10 HEP FA   Standing Hip Extension 2 x 10  HEP FA   Standing Hip Flexion  2 x 10  HEP FA         Stairs - FWD      Stairs - LAT TG Squats      Leg Press       Gait     Marching Gait      Side Stepping      Gait  50 feet x 2 using Straight cane    GT    Weighted Machines       Lat Pull Down      Vertical Row      ROWS: H      ROWS: M      ROWS: L        A:  Tolerated well with pt's progression in safety/balance able to use straight cane now per tolerance/ weather conditions for safety. Above added to written HEP.   P: Continue with rehab plan  7 visits valid 12/1820 to 2/15/21   Chaparro Galvan PTA    Treatment Charges: Mins Units   Initial Evaluation     Re-Evaluation     Ther Exercise         TE 20 1   Manual Therapy     MT     Ther Activities        TA 15 1   Gait Training          GT 10 1   Neuro Re-education NR     Modalities     Non-Billable Service Time     Other     Total Time/Units 45 3

## 2021-01-28 ENCOUNTER — TELEPHONE (OUTPATIENT)
Dept: NEUROSURGERY | Age: 76
End: 2021-01-28

## 2021-01-28 DIAGNOSIS — Z01.812 PRE-PROCEDURE LAB EXAM: Primary | ICD-10-CM

## 2021-01-28 NOTE — TELEPHONE ENCOUNTER
Contacted patient regarding CT/melogram appt at [facility] on 02/17/21 arrival time 8:30am . Npo after midnight. Bring . She stopped me and is requesting to talk with Jacquelin Lowe only regarding procedure and alternative options.      Emmanuel Shoemaker [phone] good from 01/28/21 to 03/28/21

## 2021-01-28 NOTE — TELEPHONE ENCOUNTER
Talked to patient. She would like to hold off on myelogram for now. Her pain continues to improve. She will let us know how she is doing and if she would like to proceed with the myelogram in the future.

## 2021-02-03 ENCOUNTER — OFFICE VISIT (OUTPATIENT)
Dept: FAMILY MEDICINE CLINIC | Age: 76
End: 2021-02-03
Payer: MEDICARE

## 2021-02-03 VITALS
HEIGHT: 66 IN | WEIGHT: 203 LBS | DIASTOLIC BLOOD PRESSURE: 70 MMHG | HEART RATE: 78 BPM | OXYGEN SATURATION: 98 % | BODY MASS INDEX: 32.62 KG/M2 | SYSTOLIC BLOOD PRESSURE: 130 MMHG

## 2021-02-03 DIAGNOSIS — E83.42 HYPOMAGNESEMIA: ICD-10-CM

## 2021-02-03 DIAGNOSIS — D64.9 ANEMIA, UNSPECIFIED TYPE: Primary | ICD-10-CM

## 2021-02-03 DIAGNOSIS — I10 ESSENTIAL HYPERTENSION: ICD-10-CM

## 2021-02-03 DIAGNOSIS — E11.9 CONTROLLED TYPE 2 DIABETES MELLITUS WITHOUT COMPLICATION, WITHOUT LONG-TERM CURRENT USE OF INSULIN (HCC): ICD-10-CM

## 2021-02-03 DIAGNOSIS — M54.10 RADICULITIS: ICD-10-CM

## 2021-02-03 DIAGNOSIS — D64.9 ANEMIA, UNSPECIFIED TYPE: ICD-10-CM

## 2021-02-03 LAB
ANION GAP SERPL CALCULATED.3IONS-SCNC: 11 MMOL/L (ref 7–16)
BASOPHILS ABSOLUTE: 0.05 E9/L (ref 0–0.2)
BASOPHILS RELATIVE PERCENT: 0.6 % (ref 0–2)
BUN BLDV-MCNC: 25 MG/DL (ref 8–23)
CALCIUM SERPL-MCNC: 10.2 MG/DL (ref 8.6–10.2)
CHLORIDE BLD-SCNC: 102 MMOL/L (ref 98–107)
CO2: 26 MMOL/L (ref 22–29)
CREAT SERPL-MCNC: 0.9 MG/DL (ref 0.5–1)
EOSINOPHILS ABSOLUTE: 0.13 E9/L (ref 0.05–0.5)
EOSINOPHILS RELATIVE PERCENT: 1.5 % (ref 0–6)
FERRITIN: 40 NG/ML
FOLATE: 19.4 NG/ML (ref 4.8–24.2)
GFR AFRICAN AMERICAN: >60
GFR NON-AFRICAN AMERICAN: >60 ML/MIN/1.73
GLUCOSE BLD-MCNC: 111 MG/DL (ref 74–99)
HCT VFR BLD CALC: 37.8 % (ref 34–48)
HEMOGLOBIN: 11.8 G/DL (ref 11.5–15.5)
IMMATURE GRANULOCYTES #: 0.06 E9/L
IMMATURE GRANULOCYTES %: 0.7 % (ref 0–5)
LYMPHOCYTES ABSOLUTE: 2.01 E9/L (ref 1.5–4)
LYMPHOCYTES RELATIVE PERCENT: 23.3 % (ref 20–42)
MAGNESIUM: 1.7 MG/DL (ref 1.6–2.6)
MCH RBC QN AUTO: 27.3 PG (ref 26–35)
MCHC RBC AUTO-ENTMCNC: 31.2 % (ref 32–34.5)
MCV RBC AUTO: 87.3 FL (ref 80–99.9)
MONOCYTES ABSOLUTE: 0.72 E9/L (ref 0.1–0.95)
MONOCYTES RELATIVE PERCENT: 8.3 % (ref 2–12)
NEUTROPHILS ABSOLUTE: 5.67 E9/L (ref 1.8–7.3)
NEUTROPHILS RELATIVE PERCENT: 65.6 % (ref 43–80)
PDW BLD-RTO: 15.3 FL (ref 11.5–15)
PLATELET # BLD: 205 E9/L (ref 130–450)
PMV BLD AUTO: 9.9 FL (ref 7–12)
POTASSIUM SERPL-SCNC: 4.4 MMOL/L (ref 3.5–5)
RBC # BLD: 4.33 E12/L (ref 3.5–5.5)
SODIUM BLD-SCNC: 139 MMOL/L (ref 132–146)
VITAMIN B-12: 792 PG/ML (ref 211–946)
WBC # BLD: 8.6 E9/L (ref 4.5–11.5)

## 2021-02-03 PROCEDURE — 99213 OFFICE O/P EST LOW 20 MIN: CPT | Performed by: INTERNAL MEDICINE

## 2021-02-03 RX ORDER — METHOCARBAMOL 500 MG/1
500 TABLET, FILM COATED ORAL 2 TIMES DAILY
Qty: 60 TABLET | Refills: 0 | Status: SHIPPED | OUTPATIENT
Start: 2021-02-03 | End: 2021-03-05

## 2021-02-03 RX ORDER — OXYBUTYNIN CHLORIDE 5 MG/1
5 TABLET ORAL DAILY
Qty: 90 TABLET | Refills: 1 | Status: SHIPPED
Start: 2021-02-03 | End: 2021-08-10 | Stop reason: SDUPTHER

## 2021-02-03 RX ORDER — LOSARTAN POTASSIUM 25 MG/1
25 TABLET ORAL DAILY
Qty: 90 TABLET | Refills: 1 | Status: SHIPPED
Start: 2021-02-03 | End: 2021-12-13

## 2021-02-03 RX ORDER — PROPRANOLOL HYDROCHLORIDE 10 MG/1
10 TABLET ORAL EVERY EVENING
Qty: 90 TABLET | Refills: 1 | Status: SHIPPED
Start: 2021-02-03 | End: 2021-05-24

## 2021-02-03 RX ORDER — OMEPRAZOLE 20 MG/1
20 CAPSULE, DELAYED RELEASE ORAL DAILY
Qty: 90 CAPSULE | Refills: 1 | Status: SHIPPED
Start: 2021-02-03 | End: 2021-08-10 | Stop reason: SDUPTHER

## 2021-02-03 ASSESSMENT — PATIENT HEALTH QUESTIONNAIRE - PHQ9
SUM OF ALL RESPONSES TO PHQ QUESTIONS 1-9: 2
SUM OF ALL RESPONSES TO PHQ QUESTIONS 1-9: 2

## 2021-02-03 NOTE — PROGRESS NOTES
Subjective:   No chief complaint on file.   Here for follow-up on hypertension  Has chronic back pain  She had L3-L5 posterior lumbar interbody fusion she was in the hospital for 3 days then she went to [facility] for rehab she was discharged from the hospital September 17, 2020  She still gets back pain radiating to the leg getting physical therapy neurosurgeon advised to stay on the gabapentin 600 3 times daily  She was slightly anemic she had a blood drawn in October  She had a colonoscopy 2 years ago  Done by Dr. Mona Sorensen recommended to repeat in 2024    She is here for follow-up of the diabetes her A1c was 6% in October 2020      Past Medical History:   Diagnosis Date    Anesthesia complication 6056    difficulty breathing post thyroid surgery [facility]    Cancer [facility])     skin     GERD (gastroesophageal reflux disease)     Hyperlipidemia     Hypertension     Sleep apnea     uses cpap    Spinal stenosis     Thyroid disease     Tremors of nervous system     familial    Type 2 diabetes mellitus without complication ([facility] [address]         Social History     Socioeconomic History    Marital status:      Spouse name: Not on file    Number of children: Not on file    Years of education: Not on file    Highest education level: Not on file   Occupational History    Not on file   Social Needs    Financial resource strain: Not on file    Food insecurity     Worry: Not on file     Inability: Not on file    Transportation needs     Medical: Not on file     Non-medical: Not on file   Tobacco Use    Smoking status: Never Smoker    Smokeless tobacco: Never Used   Substance and Sexual Activity    Alcohol use: No     Comment: rare    Drug use: Never    Sexual activity: Not on file   Lifestyle    Physical activity     Days per week: Not on file     Minutes per session: Not on file    Stress: Not on file   Relationships    Social connections     Talks on phone: Not on file     Gets together: Not on file     Attends Gnosticism service: Not on file     Active member of club or organization: Not on file     Attends meetings of clubs or organizations: Not on file     Relationship status: Not on file    Intimate partner violence     Fear of current or ex partner: Not on file     Emotionally abused: Not on file     Physically abused: Not on file     Forced sexual activity: Not on file   Other Topics Concern    Not on file   Social History Narrative    Not on file        Past Surgical History:   Procedure Laterality Date    BLEPHAROPLASTY      CATARACT REMOVAL WITH IMPLANT Left 09/12/2017    CATARACT REMOVAL WITH IMPLANT Right 10/09/2018    CATARACT REMOVAL WITH IMPLANT Right 11/01/2018    secondary procedure to correct first cataract     CHOLECYSTECTOMY      COSMETIC SURGERY      abdominoplasty    FOOT SURGERY Left     skin cancer    HYSTERECTOMY      LUMBAR SPINE SURGERY N/A 9/14/2020    L3-L4, L4-L5  POSTERIOR LUMBAR INTERBODY FUSION performed by Roxanne Harding MD at [address] SHERI WALLACE,INTRAOCUL Right 11/1/2018    EYE IOL REMOVAL performed by Dulce Maria Michaels MD at [address] [facility] IO LENS PROSTH W/O ECP Right 10/9/2018    RIGHT EYE CATARACT EMULSIFICATION IOL IMPLANT performed by Dulce Maria Michaels MD at [address] [facility] [address], PARTIAL      left lobe removed    TONSILLECTOMY      TUBAL LIGATION          No family history on file. Allergies   Allergen Reactions    Epinephrine Other (See Comments)     Heart racing felt like I couldn't breath    Ace Inhibitors      Cough      Adhesive Tape Rash    Propoxyphene Other (See Comments)     ? ROS  No acute distress  Cardiac: Denies any chest pain or palpitation  Respiratory: Denies any cough or shortness of breath  GI: No abdominal pain.  Denies any nausea vomiting or diarrhea denies any blood in the stool  : Denies any dysuria frequency or hematuria  Neuro: No headache or dizziness  Endocrine: History of diabetes controlled  Skin: normal  No recent weight gain or weight loss  Denies any change in vision    Objective:    /70   Pulse 78   Ht 5' 5.5\" (1.664 m)   Wt 203 lb (92.1 kg)   SpO2 98%   BMI 33.27 kg/m²     Constitutional: Alert awake and oriented  Eyes: Pupils equal bilaterally. Extraocular muscles intact  Neck: no JVD adenopathy no bruit  Heart:  RRR, no murmurs, gallops, or rubs. Lungs:    no wheeze, rales or rhonchi  Abd: bowel sounds present, nontender, nondistended, no masses  Extrem:  No clubbing, cyanosis, or edema  Neuro: AAOx3,No Focal deficit  Straight leg raising was negative  Psychological: no depression or anxiety         Current Outpatient Medications   Medication Sig Dispense Refill    losartan (COZAAR) 25 MG tablet Take 1 tablet by mouth daily 90 tablet 1    propranolol (INDERAL) 10 MG tablet Take 1 tablet by mouth every evening 90 tablet 1    omeprazole (PRILOSEC) 20 MG delayed release capsule Take 1 capsule by mouth daily 90 capsule 1    oxybutynin (DITROPAN) 5 MG tablet Take 1 tablet by mouth daily 90 tablet 1    methocarbamol (ROBAXIN) 500 MG tablet Take 1 tablet by mouth 2 times daily 60 tablet 0    glipiZIDE-metformin (METAGLIP) 2.5-500 MG per tablet Take 1 tablet by mouth daily 180 tablet 1    Handicap Placard MISC UNTIL 12/29/2025 1 each 0    NEURONTIN 600 MG tablet Take 1 tablet by mouth 3 times daily.  90 tablet 3    atorvastatin (LIPITOR) 10 MG tablet Take 1 tablet by mouth daily 30 tablet 0    magnesium 30 MG tablet Take 1 tablet by mouth daily 90 tablet 0    allopurinol (ZYLOPRIM) 100 MG tablet Take 2 tablets by mouth daily 90 tablet 0    COLCHICINE PO Take by mouth as needed      furosemide (LASIX) 20 MG tablet Take 20 mg by mouth daily as needed       KRILL OIL PO Take by mouth daily       Coenzyme Q10 (COQ10 PO) Take by mouth daily       levothyroxine (SYNTHROID) 50 MCG tablet Take 50 mcg by mouth Daily Takes an extra 1/2 tab on sundays & thursdays      latanoprost (XALATAN) 0.005 % ophthalmic solution Place 1 drop into the left eye nightly      BIOTIN PO Take by mouth daily      Cholecalciferol (VITAMIN D3) 400 UNIT/ML LIQD Take by mouth daily       No current facility-administered medications for this visit.          Last 3 BMP  Lab Results   Component Value Date/Time     09/15/2020 04:18 AM     09/10/2020 08:30 AM     10/20/2019 08:30 PM    K 4.4 09/15/2020 04:18 AM    K 4.3 09/10/2020 08:30 AM    K 4.1 10/20/2019 08:30 PM    K 4.4 06/20/2019    K 4.9 05/04/2019 03:58 PM     09/15/2020 04:18 AM     09/10/2020 08:30 AM     10/20/2019 08:30 PM    CO2 24 09/15/2020 04:18 AM    CO2 23 09/10/2020 08:30 AM    CO2 26 10/20/2019 08:30 PM    BUN 21 09/15/2020 04:18 AM    BUN 23 09/10/2020 08:30 AM    BUN 18 10/20/2019 08:30 PM    CREATININE 1.0 09/15/2020 04:18 AM    CREATININE 0.9 09/10/2020 08:30 AM    CREATININE 1.1 (H) 10/20/2019 08:30 PM    CREATININE 1.1 06/20/2019    GLUCOSE 174 (H) 09/15/2020 04:18 AM    GLUCOSE 195 (H) 09/10/2020 08:30 AM    GLUCOSE 172 (H) 10/20/2019 08:30 PM    CALCIUM 8.6 09/15/2020 04:18 AM    CALCIUM 9.2 09/10/2020 08:30 AM    CALCIUM 9.9 10/20/2019 08:30 PM       Last 3 CMP:    Lab Results   Component Value Date/Time     09/15/2020 04:18 AM     09/10/2020 08:30 AM     10/20/2019 08:30 PM    K 4.4 09/15/2020 04:18 AM    K 4.3 09/10/2020 08:30 AM    K 4.1 10/20/2019 08:30 PM    K 4.4 06/20/2019    K 4.9 05/04/2019 03:58 PM     09/15/2020 04:18 AM     09/10/2020 08:30 AM     10/20/2019 08:30 PM    CO2 24 09/15/2020 04:18 AM    CO2 23 09/10/2020 08:30 AM    CO2 26 10/20/2019 08:30 PM    BUN 21 09/15/2020 04:18 AM    BUN 23 09/10/2020 08:30 AM    BUN 18 10/20/2019 08:30 PM    CREATININE 1.0 09/15/2020 04:18 AM    CREATININE 0.9 09/10/2020 08:30 AM    CREATININE 1.1 (H) 10/20/2019 08:30 PM    CREATININE 1.1 06/20/2019    GLUCOSE 174 (H) 09/15/2020 04:18 AM    GLUCOSE 195 (H) 09/10/2020 08:30 AM    GLUCOSE 172 (H) 10/20/2019 08:30 PM    CALCIUM 8.6 09/15/2020 04:18 AM    CALCIUM 9.2 09/10/2020 08:30 AM    CALCIUM 9.9 10/20/2019 08:30 PM    PROT 5.4 (L) 09/15/2020 04:18 AM    PROT 7.2 08/22/2018 09:44 AM    PROT 7.3 07/06/2016 10:33 AM    LABALBU 3.3 (L) 09/15/2020 04:18 AM    LABALBU 4.4 08/22/2018 09:44 AM    LABALBU 4.3 07/06/2016 10:33 AM    BILITOT 0.2 09/15/2020 04:18 AM    BILITOT 0.5 08/22/2018 09:44 AM    BILITOT 0.4 07/06/2016 10:33 AM    ALKPHOS 73 09/15/2020 04:18 AM    ALKPHOS 78 08/22/2018 09:44 AM    ALKPHOS 82 07/06/2016 10:33 AM    AST 22 09/15/2020 04:18 AM    AST 51 (H) 08/22/2018 09:44 AM    AST 26 07/06/2016 10:33 AM    ALT 17 09/15/2020 04:18 AM    ALT 38 (H) 08/22/2018 09:44 AM    ALT 26 07/06/2016 10:33 AM        CBC:   Lab Results   Component Value Date/Time    WBC 5.8 09/10/2020 08:30 AM    RBC 3.79 09/10/2020 08:30 AM    HGB 11.4 (L) 09/10/2020 08:30 AM    HCT 35.3 09/10/2020 08:30 AM    MCV 93.1 09/10/2020 08:30 AM    MCH 30.1 09/10/2020 08:30 AM    MCHC 32.3 09/10/2020 08:30 AM    RDW 14.4 09/10/2020 08:30 AM     09/10/2020 08:30 AM    MPV 10.0 09/10/2020 08:30 AM       A1C:  Lab Results   Component Value Date/Time    LABA1C 6.8 06/20/2019       Lipid panel:  Lab Results   Component Value Date    CHOL 179 06/20/2019    CHOL 155 08/22/2018    CHOL 180 07/06/2016    TRIG 158 06/20/2019    TRIG 164 08/22/2018    TRIG 128 07/06/2016    HDL 45 06/20/2019    HDL 47 08/22/2018    HDL 55 07/06/2016        Lab Results   Component Value Date/Time    PROT 5.4 (L) 09/15/2020 04:18 AM    PROT 7.2 08/22/2018 09:44 AM    PROT 7.3 07/06/2016 10:33 AM    INR 1.0 09/10/2020 08:30 AM    INR 0.9 07/08/2020 11:10 AM       Lab Results   Component Value Date/Time    MG 1.6 10/20/2019 08:30 PM    MG 1.6 05/04/2019 03:58 PM    MG 1.3 (L) 08/22/2018 09:44 AM         Assessment.   Diagnoses and all orders for this visit:    Anemia, unspecified type  -     CBC WITH AUTO DIFFERENTIAL; Future  -     Iron and TIBC; Future  -     VITAMIN B12 & FOLATE; Future  -     FERRITIN; Future    Essential hypertension  -     BASIC METABOLIC PANEL; Future    Hypomagnesemia  -     MAGNESIUM; Future    Radiculitis    Controlled type 2 diabetes mellitus without complication, without long-term current use of insulin ([address]    Other orders  -     losartan (COZAAR) 25 MG tablet; Take 1 tablet by mouth daily  -     propranolol (INDERAL) 10 MG tablet; Take 1 tablet by mouth every evening  -     omeprazole (PRILOSEC) 20 MG delayed release capsule; Take 1 capsule by mouth daily  -     oxybutynin (DITROPAN) 5 MG tablet; Take 1 tablet by mouth daily  -     methocarbamol (ROBAXIN) 500 MG tablet; Take 1 tablet by mouth 2 times daily       Patient Active Problem List   Diagnosis    Left cataract    Right cataract    Dislocated IOL (intraocular lens)    Back pain at L4-L5 level    Acute midline low back pain with right-sided sciatica    Intractable back pain    Obesity    HTN (hypertension)    Spondylolisthesis of lumbar region    Spondylolisthesis, lumbar region    S/P lumbar fusion       Plan: Her sugar is very well controlled continue glipizide Metformin  A1c 6%    Continue gabapentin as recommended by neurosurgeon for back pain with radiculitis    Continue current blood pressure medication      He retain some water occasionally Maxide was helping check creatinine and potassium if stable then we can try half Maxide    Continue physical therapy    Cholesterol doing well with the Lipitor    She is due for mammogram she would like to wait    Return in about 3 months (around 5/3/2021).        Sachin Gaona MD  2:45 PM  2/3/2021     [place]

## 2021-02-04 ENCOUNTER — TELEPHONE (OUTPATIENT)
Dept: FAMILY MEDICINE CLINIC | Age: 76
End: 2021-02-04

## 2021-02-04 RX ORDER — TRIAMTERENE AND HYDROCHLOROTHIAZIDE 75; 50 MG/1; MG/1
1 TABLET ORAL DAILY
Qty: 30 TABLET | Refills: 3 | Status: SHIPPED
Start: 2021-02-04 | End: 2021-05-25

## 2021-02-04 NOTE — TELEPHONE ENCOUNTER
Spoke to the patient labs discussed she would like to go back on Maxide try half tablet every other day if swelling is better and stated that otherwise we can increase to half tablet daily prescription for 30 tablets with a refill called to [facility]

## 2021-03-15 ENCOUNTER — TELEPHONE (OUTPATIENT)
Dept: ADMINISTRATIVE | Age: 76
End: 2021-03-15

## 2021-03-15 NOTE — TELEPHONE ENCOUNTER
Pt wanted an appt as soon as possible, she has had knee and leg pain for the last 6 weeks that has progressively gotten worse. It is affecting her sleep, etc. There are no openings in the next 2 weeks. Please contact pt with further instructions/appt.

## 2021-03-15 NOTE — TELEPHONE ENCOUNTER
I called patient and informed of [address] [facility]. Patient refused to see anyone else but Dr. Carl Share. Informed will send msg.

## 2021-03-16 NOTE — TELEPHONE ENCOUNTER
I called patient and offered appt today at 2:30 pm or 3/19/21 at 9:15 am.  Patient stated she has to check on her transportation and will call back to confirm.

## 2021-03-16 NOTE — TELEPHONE ENCOUNTER
Patient called back and stated unable to reach transportation for appt today.   Patient requested to schedule appt 3/19/21 at 9:15 am.

## 2021-03-19 ENCOUNTER — OFFICE VISIT (OUTPATIENT)
Dept: FAMILY MEDICINE CLINIC | Age: 76
End: 2021-03-19
Payer: MEDICARE

## 2021-03-19 VITALS
SYSTOLIC BLOOD PRESSURE: 130 MMHG | DIASTOLIC BLOOD PRESSURE: 80 MMHG | WEIGHT: 210 LBS | OXYGEN SATURATION: 99 % | HEART RATE: 67 BPM | BODY MASS INDEX: 34.41 KG/M2

## 2021-03-19 DIAGNOSIS — G47.33 OBSTRUCTIVE SLEEP APNEA SYNDROME: ICD-10-CM

## 2021-03-19 DIAGNOSIS — I10 ESSENTIAL HYPERTENSION: ICD-10-CM

## 2021-03-19 DIAGNOSIS — M79.605 PAIN OF LEFT LEG: Primary | ICD-10-CM

## 2021-03-19 DIAGNOSIS — M25.562 PAIN AND SWELLING OF LEFT KNEE: ICD-10-CM

## 2021-03-19 DIAGNOSIS — E11.9 CONTROLLED TYPE 2 DIABETES MELLITUS WITHOUT COMPLICATION, WITHOUT LONG-TERM CURRENT USE OF INSULIN (HCC): ICD-10-CM

## 2021-03-19 DIAGNOSIS — G25.0 ESSENTIAL TREMOR: ICD-10-CM

## 2021-03-19 DIAGNOSIS — M25.462 PAIN AND SWELLING OF LEFT KNEE: ICD-10-CM

## 2021-03-19 PROCEDURE — 99213 OFFICE O/P EST LOW 20 MIN: CPT | Performed by: INTERNAL MEDICINE

## 2021-03-19 RX ORDER — MELOXICAM 15 MG/1
15 TABLET ORAL DAILY
Qty: 30 TABLET | Refills: 3 | Status: ON HOLD
Start: 2021-03-19 | End: 2021-05-04 | Stop reason: HOSPADM

## 2021-03-19 NOTE — PROGRESS NOTES
Emotionally abused: Not on file     Physically abused: Not on file     Forced sexual activity: Not on file   Other Topics Concern    Not on file   Social History Narrative    Not on file        Past Surgical History:   Procedure Laterality Date    BLEPHAROPLASTY      CATARACT REMOVAL WITH IMPLANT Left 09/12/2017    CATARACT REMOVAL WITH IMPLANT Right 10/09/2018    CATARACT REMOVAL WITH IMPLANT Right 11/01/2018    secondary procedure to correct first cataract     CHOLECYSTECTOMY      COSMETIC SURGERY      abdominoplasty    FOOT SURGERY Left     skin cancer    HYSTERECTOMY      LUMBAR SPINE SURGERY N/A 9/14/2020    L3-L4, L4-L5  POSTERIOR LUMBAR INTERBODY FUSION performed by Raven Crews MD at [address] W[place]ER,INTRAOCUL Right 11/1/2018    EYE IOL REMOVAL performed by Luis Guidry MD at [address] [facility] W/O ECP Right 10/9/2018    RIGHT EYE CATARACT EMULSIFICATION IOL IMPLANT performed by Luis Guidry MD at [address] [facility], PARTIAL      left lobe removed    TONSILLECTOMY      TUBAL LIGATION          No family history on file. Allergies   Allergen Reactions    Epinephrine Other (See Comments)     Heart racing felt like I couldn't breath    Ace Inhibitors      Cough      Adhesive Tape Rash    Propoxyphene Other (See Comments)     ? ROS  No acute distress  Cardiac: Denies any chest pain or palpitation no angina no dyspnea has seen cardiologist Dr. Dewey Villa 2020  Respiratory: Denies any cough or shortness of breath  GI: No abdominal pain.  Denies any nausea vomiting or diarrhea no change in bowel habits  Endoscopy and colonoscopy July 2019   : Denies any dysuria frequency or hematuria  Neuro: No headache or dizziness  Endocrine: No diabetes  Skin: normal  No recent weight gain or weight loss  Denies any change in vision    Objective:    /80   Pulse 67   Wt 210 lb (95.3 kg)   SpO2 99%   BMI 34.41 kg/m²     Constitutional: Alert awake and oriented  Eyes: Pupils equal bilaterally. Extraocular muscles intact  Neck: no JVD adenopathy no bruit  Heart:  RRR, no murmurs, gallops, or rubs. Lungs:    no wheeze, rales or rhonchi  Abd: bowel sounds present, nontender, nondistended, no masses  Extrem:  No clubbing, cyanosis, or edema  Left leg discomfort the left knee left thigh  Trace edema  Neuro: AAOx3,No Focal deficit  Psychological: no depression or anxiety       Current Outpatient Medications   Medication Sig Dispense Refill    meloxicam (MOBIC) 15 MG tablet Take 1 tablet by mouth daily 30 tablet 3    triamterene-hydroCHLOROthiazide (MAXZIDE) 75-50 MG per tablet Take 1 tablet by mouth daily 30 tablet 3    losartan (COZAAR) 25 MG tablet Take 1 tablet by mouth daily 90 tablet 1    propranolol (INDERAL) 10 MG tablet Take 1 tablet by mouth every evening 90 tablet 1    omeprazole (PRILOSEC) 20 MG delayed release capsule Take 1 capsule by mouth daily 90 capsule 1    oxybutynin (DITROPAN) 5 MG tablet Take 1 tablet by mouth daily 90 tablet 1    glipiZIDE-metformin (METAGLIP) 2.5-500 MG per tablet Take 1 tablet by mouth daily 180 tablet 1    Handicap Placard MISC UNTIL 12/29/2025 1 each 0    NEURONTIN 600 MG tablet Take 1 tablet by mouth 3 times daily.  90 tablet 3    atorvastatin (LIPITOR) 10 MG tablet Take 1 tablet by mouth daily 30 tablet 0    magnesium 30 MG tablet Take 1 tablet by mouth daily 90 tablet 0    allopurinol (ZYLOPRIM) 100 MG tablet Take 2 tablets by mouth daily 90 tablet 0    COLCHICINE PO Take by mouth as needed      furosemide (LASIX) 20 MG tablet Take 20 mg by mouth daily as needed       KRILL OIL PO Take by mouth daily       Coenzyme Q10 (COQ10 PO) Take by mouth daily       levothyroxine (SYNTHROID) 50 MCG tablet Take 50 mcg by mouth Daily Takes an extra 1/2 tab on sundays & thursdays      latanoprost (XALATAN) 0.005 % ophthalmic 02/03/2021 03:05 PM    CALCIUM 8.6 09/15/2020 04:18 AM    CALCIUM 9.2 09/10/2020 08:30 AM    PROT 5.4 (L) 09/15/2020 04:18 AM    PROT 7.2 08/22/2018 09:44 AM    PROT 7.3 07/06/2016 10:33 AM    LABALBU 3.3 (L) 09/15/2020 04:18 AM    LABALBU 4.4 08/22/2018 09:44 AM    LABALBU 4.3 07/06/2016 10:33 AM    BILITOT 0.2 09/15/2020 04:18 AM    BILITOT 0.5 08/22/2018 09:44 AM    BILITOT 0.4 07/06/2016 10:33 AM    ALKPHOS 73 09/15/2020 04:18 AM    ALKPHOS 78 08/22/2018 09:44 AM    ALKPHOS 82 07/06/2016 10:33 AM    AST 22 09/15/2020 04:18 AM    AST 51 (H) 08/22/2018 09:44 AM    AST 26 07/06/2016 10:33 AM    ALT 17 09/15/2020 04:18 AM    ALT 38 (H) 08/22/2018 09:44 AM    ALT 26 07/06/2016 10:33 AM        CBC:   Lab Results   Component Value Date/Time    WBC 8.6 02/03/2021 03:05 PM    RBC 4.33 02/03/2021 03:05 PM    HGB 11.8 02/03/2021 03:05 PM    HCT 37.8 02/03/2021 03:05 PM    MCV 87.3 02/03/2021 03:05 PM    MCH 27.3 02/03/2021 03:05 PM    MCHC 31.2 (L) 02/03/2021 03:05 PM    RDW 15.3 (H) 02/03/2021 03:05 PM     02/03/2021 03:05 PM    MPV 9.9 02/03/2021 03:05 PM       A1C:  Lab Results   Component Value Date/Time    LABA1C 6.8 06/20/2019       Lipid panel:  Lab Results   Component Value Date    CHOL 179 06/20/2019    CHOL 155 08/22/2018    CHOL 180 07/06/2016    TRIG 158 06/20/2019    TRIG 164 08/22/2018    TRIG 128 07/06/2016    HDL 45 06/20/2019    HDL 47 08/22/2018    HDL 55 07/06/2016        Lab Results   Component Value Date/Time    PROT 5.4 (L) 09/15/2020 04:18 AM    PROT 7.2 08/22/2018 09:44 AM    PROT 7.3 07/06/2016 10:33 AM    INR 1.0 09/10/2020 08:30 AM    INR 0.9 07/08/2020 11:10 AM       Lab Results   Component Value Date/Time    MG 1.7 02/03/2021 03:05 PM    MG 1.6 10/20/2019 08:30 PM    MG 1.6 05/04/2019 03:58 PM         Assessment. John Orlando was seen today for knee pain. Diagnoses and all orders for this visit:    Pain of left leg  -     US DUP LOWER EXTREMITY LEFT ELIA;  Future    Pain and swelling of left

## 2021-04-01 ENCOUNTER — TELEPHONE (OUTPATIENT)
Dept: FAMILY MEDICINE CLINIC | Age: 76
End: 2021-04-01

## 2021-04-01 DIAGNOSIS — Z12.31 BREAST CANCER SCREENING BY MAMMOGRAM: Primary | ICD-10-CM

## 2021-04-01 NOTE — TELEPHONE ENCOUNTER
Patient called requesting an order for Mammogram.  Patient requested that it's mailed to her home address, which was confirmed.

## 2021-04-14 ENCOUNTER — TELEPHONE (OUTPATIENT)
Dept: NEUROSURGERY | Age: 76
End: 2021-04-14

## 2021-04-14 DIAGNOSIS — Z98.1 S/P LUMBAR FUSION: ICD-10-CM

## 2021-04-14 DIAGNOSIS — M43.16 SPONDYLOLISTHESIS OF LUMBAR REGION: ICD-10-CM

## 2021-04-14 DIAGNOSIS — M54.16 LUMBAR RADICULOPATHY: Primary | ICD-10-CM

## 2021-04-14 NOTE — TELEPHONE ENCOUNTER
Patient states she would like to speak with Em Almazan, [address]. States she has a few questions.   Pt#  [phone]

## 2021-04-26 ENCOUNTER — HOSPITAL ENCOUNTER (INPATIENT)
Age: 76
LOS: 1 days | Discharge: HOME | DRG: 682 | End: 2021-04-28
Attending: EMERGENCY MEDICINE | Admitting: INTERNAL MEDICINE
Payer: MEDICARE

## 2021-04-26 ENCOUNTER — APPOINTMENT (OUTPATIENT)
Dept: CT IMAGING | Age: 76
DRG: 682 | End: 2021-04-26
Payer: MEDICARE

## 2021-04-26 DIAGNOSIS — R09.89 SUSPECTED PULMONARY EMBOLISM: Primary | ICD-10-CM

## 2021-04-26 DIAGNOSIS — N17.9 AKI (ACUTE KIDNEY INJURY) (HCC): ICD-10-CM

## 2021-04-26 DIAGNOSIS — R77.8 ELEVATED TROPONIN: ICD-10-CM

## 2021-04-26 LAB
ALBUMIN SERPL-MCNC: 3.4 G/DL (ref 3.5–5.2)
ALP BLD-CCNC: 153 U/L (ref 35–104)
ALT SERPL-CCNC: 29 U/L (ref 0–32)
ANION GAP SERPL CALCULATED.3IONS-SCNC: 13 MMOL/L (ref 7–16)
AST SERPL-CCNC: 29 U/L (ref 0–31)
BASOPHILS ABSOLUTE: 0.05 E9/L (ref 0–0.2)
BASOPHILS RELATIVE PERCENT: 0.4 % (ref 0–2)
BILIRUB SERPL-MCNC: 0.6 MG/DL (ref 0–1.2)
BILIRUBIN URINE: NEGATIVE
BLOOD, URINE: NEGATIVE
BUN BLDV-MCNC: 49 MG/DL (ref 6–23)
CALCIUM SERPL-MCNC: 9.8 MG/DL (ref 8.6–10.2)
CHLORIDE BLD-SCNC: 97 MMOL/L (ref 98–107)
CLARITY: CLEAR
CO2: 23 MMOL/L (ref 22–29)
COLOR: YELLOW
CREAT SERPL-MCNC: 1.5 MG/DL (ref 0.5–1)
EKG ATRIAL RATE: 64 BPM
EKG ATRIAL RATE: 64 BPM
EKG P AXIS: 39 DEGREES
EKG P AXIS: 39 DEGREES
EKG P-R INTERVAL: 134 MS
EKG P-R INTERVAL: 134 MS
EKG Q-T INTERVAL: 444 MS
EKG Q-T INTERVAL: 444 MS
EKG QRS DURATION: 76 MS
EKG QRS DURATION: 76 MS
EKG QTC CALCULATION (BAZETT): 458 MS
EKG QTC CALCULATION (BAZETT): 458 MS
EKG R AXIS: 24 DEGREES
EKG R AXIS: 24 DEGREES
EKG T AXIS: -6 DEGREES
EKG T AXIS: -6 DEGREES
EKG VENTRICULAR RATE: 64 BPM
EKG VENTRICULAR RATE: 64 BPM
EOSINOPHILS ABSOLUTE: 0.16 E9/L (ref 0.05–0.5)
EOSINOPHILS RELATIVE PERCENT: 1.3 % (ref 0–6)
GFR AFRICAN AMERICAN: 41
GFR NON-AFRICAN AMERICAN: 34 ML/MIN/1.73
GLUCOSE BLD-MCNC: 164 MG/DL (ref 74–99)
GLUCOSE URINE: NEGATIVE MG/DL
HCT VFR BLD CALC: 28.8 % (ref 34–48)
HEMOGLOBIN: 9.3 G/DL (ref 11.5–15.5)
IMMATURE GRANULOCYTES #: 0.22 E9/L
IMMATURE GRANULOCYTES %: 1.8 % (ref 0–5)
KETONES, URINE: NEGATIVE MG/DL
LACTIC ACID, SEPSIS: 1.2 MMOL/L (ref 0.5–1.9)
LEUKOCYTE ESTERASE, URINE: NEGATIVE
LIPASE: 34 U/L (ref 13–60)
LYMPHOCYTES ABSOLUTE: 1.16 E9/L (ref 1.5–4)
LYMPHOCYTES RELATIVE PERCENT: 9.3 % (ref 20–42)
MCH RBC QN AUTO: 29 PG (ref 26–35)
MCHC RBC AUTO-ENTMCNC: 32.3 % (ref 32–34.5)
MCV RBC AUTO: 89.7 FL (ref 80–99.9)
MONOCYTES ABSOLUTE: 1.27 E9/L (ref 0.1–0.95)
MONOCYTES RELATIVE PERCENT: 10.2 % (ref 2–12)
NEUTROPHILS ABSOLUTE: 9.65 E9/L (ref 1.8–7.3)
NEUTROPHILS RELATIVE PERCENT: 77 % (ref 43–80)
NITRITE, URINE: NEGATIVE
PDW BLD-RTO: 14.3 FL (ref 11.5–15)
PH UA: 6.5 (ref 5–9)
PLATELET # BLD: 311 E9/L (ref 130–450)
PMV BLD AUTO: 9.7 FL (ref 7–12)
POTASSIUM SERPL-SCNC: 4.2 MMOL/L (ref 3.5–5)
PRO-BNP: 754 PG/ML (ref 0–450)
PROTEIN UA: NEGATIVE MG/DL
RBC # BLD: 3.21 E12/L (ref 3.5–5.5)
SODIUM BLD-SCNC: 133 MMOL/L (ref 132–146)
SPECIFIC GRAVITY UA: <=1.005 (ref 1–1.03)
TOTAL PROTEIN: 6.8 G/DL (ref 6.4–8.3)
TROPONIN: 0.03 NG/ML (ref 0–0.03)
UROBILINOGEN, URINE: 1 E.U./DL
WBC # BLD: 12.5 E9/L (ref 4.5–11.5)

## 2021-04-26 PROCEDURE — 6370000000 HC RX 637 (ALT 250 FOR IP): Performed by: INTERNAL MEDICINE

## 2021-04-26 PROCEDURE — 6370000000 HC RX 637 (ALT 250 FOR IP)

## 2021-04-26 PROCEDURE — G0378 HOSPITAL OBSERVATION PER HR: HCPCS

## 2021-04-26 PROCEDURE — 87040 BLOOD CULTURE FOR BACTERIA: CPT

## 2021-04-26 PROCEDURE — 87088 URINE BACTERIA CULTURE: CPT

## 2021-04-26 PROCEDURE — 36415 COLL VENOUS BLD VENIPUNCTURE: CPT

## 2021-04-26 PROCEDURE — 2580000003 HC RX 258: Performed by: INTERNAL MEDICINE

## 2021-04-26 PROCEDURE — 6360000002 HC RX W HCPCS: Performed by: INTERNAL MEDICINE

## 2021-04-26 PROCEDURE — 93005 ELECTROCARDIOGRAM TRACING: CPT | Performed by: EMERGENCY MEDICINE

## 2021-04-26 PROCEDURE — 80053 COMPREHEN METABOLIC PANEL: CPT

## 2021-04-26 PROCEDURE — 96372 THER/PROPH/DIAG INJ SC/IM: CPT

## 2021-04-26 PROCEDURE — 99220 PR INITIAL OBSERVATION CARE/DAY 70 MINUTES: CPT | Performed by: INTERNAL MEDICINE

## 2021-04-26 PROCEDURE — 83880 ASSAY OF NATRIURETIC PEPTIDE: CPT

## 2021-04-26 PROCEDURE — 83605 ASSAY OF LACTIC ACID: CPT

## 2021-04-26 PROCEDURE — 85025 COMPLETE CBC W/AUTO DIFF WBC: CPT

## 2021-04-26 PROCEDURE — 83690 ASSAY OF LIPASE: CPT

## 2021-04-26 PROCEDURE — 93010 ELECTROCARDIOGRAM REPORT: CPT | Performed by: INTERNAL MEDICINE

## 2021-04-26 PROCEDURE — 99284 EMERGENCY DEPT VISIT MOD MDM: CPT

## 2021-04-26 PROCEDURE — 84484 ASSAY OF TROPONIN QUANT: CPT

## 2021-04-26 PROCEDURE — 81003 URINALYSIS AUTO W/O SCOPE: CPT

## 2021-04-26 PROCEDURE — 2580000003 HC RX 258: Performed by: EMERGENCY MEDICINE

## 2021-04-26 PROCEDURE — 70450 CT HEAD/BRAIN W/O DYE: CPT

## 2021-04-26 RX ORDER — ALLOPURINOL 100 MG/1
200 TABLET ORAL DAILY
Status: DISCONTINUED | OUTPATIENT
Start: 2021-04-26 | End: 2021-04-28 | Stop reason: HOSPADM

## 2021-04-26 RX ORDER — SODIUM CHLORIDE 0.9 % (FLUSH) 0.9 %
5-40 SYRINGE (ML) INJECTION PRN
Status: DISCONTINUED | OUTPATIENT
Start: 2021-04-26 | End: 2021-04-28 | Stop reason: HOSPADM

## 2021-04-26 RX ORDER — HEPARIN SODIUM 5000 [USP'U]/ML
5000 INJECTION, SOLUTION INTRAVENOUS; SUBCUTANEOUS EVERY 8 HOURS SCHEDULED
Status: DISCONTINUED | OUTPATIENT
Start: 2021-04-26 | End: 2021-04-26

## 2021-04-26 RX ORDER — LATANOPROST 50 UG/ML
1 SOLUTION/ DROPS OPHTHALMIC NIGHTLY
Status: DISCONTINUED | OUTPATIENT
Start: 2021-04-26 | End: 2021-04-28 | Stop reason: HOSPADM

## 2021-04-26 RX ORDER — LOSARTAN POTASSIUM 25 MG/1
25 TABLET ORAL DAILY
Status: DISCONTINUED | OUTPATIENT
Start: 2021-04-26 | End: 2021-04-28 | Stop reason: HOSPADM

## 2021-04-26 RX ORDER — 0.9 % SODIUM CHLORIDE 0.9 %
1000 INTRAVENOUS SOLUTION INTRAVENOUS ONCE
Status: COMPLETED | OUTPATIENT
Start: 2021-04-26 | End: 2021-04-26

## 2021-04-26 RX ORDER — ONDANSETRON 2 MG/ML
4 INJECTION INTRAMUSCULAR; INTRAVENOUS EVERY 6 HOURS PRN
Status: DISCONTINUED | OUTPATIENT
Start: 2021-04-26 | End: 2021-04-28 | Stop reason: HOSPADM

## 2021-04-26 RX ORDER — ATORVASTATIN CALCIUM 10 MG/1
10 TABLET, FILM COATED ORAL NIGHTLY
COMMUNITY
End: 2021-08-10 | Stop reason: SDUPTHER

## 2021-04-26 RX ORDER — PROMETHAZINE HYDROCHLORIDE 25 MG/1
12.5 TABLET ORAL EVERY 6 HOURS PRN
Status: DISCONTINUED | OUTPATIENT
Start: 2021-04-26 | End: 2021-04-28 | Stop reason: HOSPADM

## 2021-04-26 RX ORDER — ACETAMINOPHEN 325 MG/1
650 TABLET ORAL ONCE
Status: COMPLETED | OUTPATIENT
Start: 2021-04-26 | End: 2021-04-26

## 2021-04-26 RX ORDER — ACETAMINOPHEN 325 MG/1
650 TABLET ORAL EVERY 6 HOURS PRN
Status: DISCONTINUED | OUTPATIENT
Start: 2021-04-26 | End: 2021-04-28 | Stop reason: HOSPADM

## 2021-04-26 RX ORDER — POLYETHYLENE GLYCOL 3350 17 G/17G
17 POWDER, FOR SOLUTION ORAL DAILY PRN
Status: DISCONTINUED | OUTPATIENT
Start: 2021-04-26 | End: 2021-04-28 | Stop reason: HOSPADM

## 2021-04-26 RX ORDER — HEPARIN SODIUM 5000 [USP'U]/ML
INJECTION, SOLUTION INTRAVENOUS; SUBCUTANEOUS
Status: DISPENSED
Start: 2021-04-26 | End: 2021-04-27

## 2021-04-26 RX ORDER — GABAPENTIN 300 MG/1
300 CAPSULE ORAL 3 TIMES DAILY
Status: DISCONTINUED | OUTPATIENT
Start: 2021-04-26 | End: 2021-04-28 | Stop reason: HOSPADM

## 2021-04-26 RX ORDER — GABAPENTIN 600 MG/1
300 TABLET ORAL 3 TIMES DAILY
Status: DISCONTINUED | OUTPATIENT
Start: 2021-04-26 | End: 2021-04-26

## 2021-04-26 RX ORDER — PANTOPRAZOLE SODIUM 40 MG/1
40 TABLET, DELAYED RELEASE ORAL
Status: DISCONTINUED | OUTPATIENT
Start: 2021-04-27 | End: 2021-04-28 | Stop reason: HOSPADM

## 2021-04-26 RX ORDER — ACETAMINOPHEN 650 MG/1
650 SUPPOSITORY RECTAL EVERY 6 HOURS PRN
Status: DISCONTINUED | OUTPATIENT
Start: 2021-04-26 | End: 2021-04-28 | Stop reason: HOSPADM

## 2021-04-26 RX ORDER — ATORVASTATIN CALCIUM 20 MG/1
10 TABLET, FILM COATED ORAL DAILY
Status: DISCONTINUED | OUTPATIENT
Start: 2021-04-26 | End: 2021-04-28 | Stop reason: HOSPADM

## 2021-04-26 RX ORDER — PROPRANOLOL HYDROCHLORIDE 10 MG/1
10 TABLET ORAL EVERY EVENING
Status: DISCONTINUED | OUTPATIENT
Start: 2021-04-26 | End: 2021-04-28 | Stop reason: HOSPADM

## 2021-04-26 RX ORDER — LEVOTHYROXINE SODIUM 0.05 MG/1
75 TABLET ORAL
COMMUNITY
End: 2021-07-02 | Stop reason: SDUPTHER

## 2021-04-26 RX ORDER — SODIUM CHLORIDE 9 MG/ML
INJECTION, SOLUTION INTRAVENOUS CONTINUOUS
Status: ACTIVE | OUTPATIENT
Start: 2021-04-26 | End: 2021-04-27

## 2021-04-26 RX ORDER — SODIUM CHLORIDE 9 MG/ML
25 INJECTION, SOLUTION INTRAVENOUS PRN
Status: DISCONTINUED | OUTPATIENT
Start: 2021-04-26 | End: 2021-04-26

## 2021-04-26 RX ORDER — SODIUM CHLORIDE 0.9 % (FLUSH) 0.9 %
5-40 SYRINGE (ML) INJECTION EVERY 12 HOURS SCHEDULED
Status: DISCONTINUED | OUTPATIENT
Start: 2021-04-26 | End: 2021-04-26

## 2021-04-26 RX ORDER — ACETAMINOPHEN 325 MG/1
TABLET ORAL
Status: COMPLETED
Start: 2021-04-26 | End: 2021-04-26

## 2021-04-26 RX ORDER — OXYBUTYNIN CHLORIDE 5 MG/1
5 TABLET ORAL DAILY
Status: DISCONTINUED | OUTPATIENT
Start: 2021-04-26 | End: 2021-04-28 | Stop reason: HOSPADM

## 2021-04-26 RX ORDER — TRIAMTERENE AND HYDROCHLOROTHIAZIDE 75; 50 MG/1; MG/1
1 TABLET ORAL DAILY
Status: DISCONTINUED | OUTPATIENT
Start: 2021-04-26 | End: 2021-04-28 | Stop reason: HOSPADM

## 2021-04-26 RX ORDER — LEVOTHYROXINE SODIUM 0.05 MG/1
50 TABLET ORAL DAILY
Status: DISCONTINUED | OUTPATIENT
Start: 2021-04-26 | End: 2021-04-28 | Stop reason: HOSPADM

## 2021-04-26 RX ADMIN — SODIUM CHLORIDE: 9 INJECTION, SOLUTION INTRAVENOUS at 23:41

## 2021-04-26 RX ADMIN — ATORVASTATIN CALCIUM 10 MG: 20 TABLET, FILM COATED ORAL at 19:23

## 2021-04-26 RX ADMIN — PROPRANOLOL HYDROCHLORIDE 10 MG: 10 TABLET ORAL at 19:23

## 2021-04-26 RX ADMIN — ACETAMINOPHEN 650 MG: 325 TABLET ORAL at 16:05

## 2021-04-26 RX ADMIN — GABAPENTIN 300 MG: 300 CAPSULE ORAL at 19:23

## 2021-04-26 RX ADMIN — SODIUM CHLORIDE 1000 ML: 9 INJECTION, SOLUTION INTRAVENOUS at 17:19

## 2021-04-26 RX ADMIN — HEPARIN SODIUM 5000 UNITS: 5000 INJECTION INTRAVENOUS; SUBCUTANEOUS at 19:23

## 2021-04-26 ASSESSMENT — ENCOUNTER SYMPTOMS
SHORTNESS OF BREATH: 1
ABDOMINAL PAIN: 0

## 2021-04-26 ASSESSMENT — PAIN DESCRIPTION - ORIENTATION: ORIENTATION: LOWER

## 2021-04-26 ASSESSMENT — PAIN SCALES - GENERAL: PAINLEVEL_OUTOF10: 2

## 2021-04-26 NOTE — ED NOTES
Bed: H1  Expected date:   Expected time:   Means of arrival:   Comments:  Via Huma Noriega RN  04/26/21 1547

## 2021-04-26 NOTE — ED NOTES
Call to lab to check on blood results pending. Will result 15-20 minutes.       Dominik Pabon RN  04/26/21 0436

## 2021-04-26 NOTE — ED PROVIDER NOTES
62-year-old female presenting from home. She has had some shortness of breath and exertional dyspnea. Went to urgent care yesterday for evaluation. They tayo blood work and found that her D-dimer was very elevated. She was staying with family as result of how tired she has been but with the elevated D-dimer she is sent back in for evaluation. She feels short of breath but her oxygen is in the 90s high 90s and does not have any accessory muscle usage or signs of respiratory distress. Is awake, alert, oriented x4. This is gradual onset, persistent, worsening, moderate severity, several days duration, associated with her surgery last year for her back. She felt like that she has not fully recovered from that. History reviewed. No pertinent family history. Past Surgical History:   Procedure Laterality Date    BLEPHAROPLASTY      CATARACT REMOVAL WITH IMPLANT Left 09/12/2017    CATARACT REMOVAL WITH IMPLANT Right 10/09/2018    CATARACT REMOVAL WITH IMPLANT Right 11/01/2018    secondary procedure to correct first cataract     CHOLECYSTECTOMY      COSMETIC SURGERY      abdominoplasty    FOOT SURGERY Left     skin cancer    HYSTERECTOMY      LUMBAR SPINE SURGERY N/A 9/14/2020    L3-L4, L4-L5  POSTERIOR LUMBAR INTERBODY FUSION performed by Claudette Alu, MD at [address] SHERI WALLACE,INTRAOCUL Right 11/1/2018    EYE IOL REMOVAL performed by Leatha Cruz MD at [address] [facility] IO LENS PROSTH W/O ECP Right 10/9/2018    RIGHT EYE CATARACT EMULSIFICATION IOL IMPLANT performed by Leatha Cruz MD at [address], PARTIAL      left lobe removed    TONSILLECTOMY      TUBAL LIGATION         Review of Systems   Constitutional: Positive for fatigue. Respiratory: Positive for shortness of breath. Cardiovascular: Negative for chest pain.    Gastrointestinal: Negative for abdominal pain.   All other systems reviewed and are negative. Physical Exam  Constitutional:       General: She is not in acute distress. Appearance: She is well-developed. HENT:      Head: Normocephalic and atraumatic. Eyes:      Conjunctiva/sclera: Conjunctivae normal.      Pupils: Pupils are equal, round, and reactive to light. Neck:      Musculoskeletal: Normal range of motion. Thyroid: No thyromegaly. Cardiovascular:      Rate and Rhythm: Normal rate and regular rhythm. Pulmonary:      Effort: Pulmonary effort is normal. No respiratory distress. Breath sounds: Normal breath sounds. Abdominal:      General: There is no distension. Palpations: Abdomen is soft. Tenderness: There is no abdominal tenderness. There is no guarding or rebound. Musculoskeletal: Normal range of motion. General: No tenderness. Skin:     General: Skin is warm and dry. Findings: No erythema. Neurological:      Mental Status: She is alert and oriented to person, place, and time. Cranial Nerves: No cranial nerve deficit. Coordination: Coordination normal.          Procedures     [facility]     ED Course as of Apr 26 1913   Mon Apr 26, 2021   1628 Patient has remained at her family's house for last few days. Even despite this she is not eating and not getting out of bed and continue to sleep all the time. She is awake and oriented now but still continues to have the fatigue and weakness. D-dimer was shown to be greater than 6 on a test that shows a normal of up to 0.5. CTA ordered, unable to get that test because of her poor IV access. She has a small functional IV in the mid forearm but will not really a CTA through it.    [SO]      ED Course User Index  [SO] Crys Adhikari,       EKG: Interpreted by me  Sinus rhythm, rate of 64, normal axis, no ST elevation or depressions, T wave flattening throughout the precordial and inferior leads.     ED Course as of Apr 26 1913   Mon Apr 26, 2021 1628 Patient has remained at her family's house for last few days. Even despite this she is not eating and not getting out of bed and continue to sleep all the time. She is awake and oriented now but still continues to have the fatigue and weakness. D-dimer was shown to be greater than 6 on a test that shows a normal of up to 0.5. CTA ordered, unable to get that test because of her poor IV access. She has a small functional IV in the mid forearm but will not really a CTA through it.    [SO]      ED Course User Index  [SO] Catalina Michelle, DO       --------------------------------------------- PAST HISTORY ---------------------------------------------  Past Medical History:  has a past medical history of Anesthesia complication, Cancer ([facility] [address] GERD (gastroesophageal reflux disease), Hyperlipidemia, Hypertension, Sleep apnea, Spinal stenosis, Thyroid disease, Tremors of nervous system, and Type 2 diabetes mellitus without complication ([facility] [address] Past Surgical History:  has a past surgical history that includes Thyroidectomy, partial; skin biopsy; Tonsillectomy; ovarian cyst removal; Cholecystectomy; Cosmetic surgery; blepharoplasty; Hysterectomy; Tubal ligation; Foot surgery (Left); Cataract removal with implant (Left, 09/12/2017); Cataract removal with implant (Right, 10/09/2018); pr xcapsl ctrc rmvl insj io lens prosth w/o ecp (Right, 10/9/2018); Cataract removal with implant (Right, 11/01/2018); pr remv post eye implnt mater,intraocul (Right, 11/1/2018); and Lumbar spine surgery (N/A, 9/14/2020). Social History:  reports that she has never smoked. She has never used smokeless tobacco. She reports that she does not drink alcohol or use drugs. Family History: family history is not on file. The patients home medications have been reviewed.     Allergies: Epinephrine, Ace inhibitors, Adhesive tape, and Propoxyphene    -------------------------------------------------- RESULTS -------------------------------------------------    LABS:  Results for orders placed or performed during the hospital encounter of 04/26/21   CBC auto differential   Result Value Ref Range    WBC 12.5 (H) 4.5 - 11.5 E9/L    RBC 3.21 (L) 3.50 - 5.50 E12/L    Hemoglobin 9.3 (L) 11.5 - 15.5 g/dL    Hematocrit 28.8 (L) 34.0 - 48.0 %    MCV 89.7 80.0 - 99.9 fL    MCH 29.0 26.0 - 35.0 pg    MCHC 32.3 32.0 - 34.5 %    RDW 14.3 11.5 - 15.0 fL    Platelets 582 002 - 413 E9/L    MPV 9.7 7.0 - 12.0 fL    Neutrophils % 77.0 43.0 - 80.0 %    Immature Granulocytes % 1.8 0.0 - 5.0 %    Lymphocytes % 9.3 (L) 20.0 - 42.0 %    Monocytes % 10.2 2.0 - 12.0 %    Eosinophils % 1.3 0.0 - 6.0 %    Basophils % 0.4 0.0 - 2.0 %    Neutrophils Absolute 9.65 (H) 1.80 - 7.30 E9/L    Immature Granulocytes # 0.22 E9/L    Lymphocytes Absolute 1.16 (L) 1.50 - 4.00 E9/L    Monocytes Absolute 1.27 (H) 0.10 - 0.95 E9/L    Eosinophils Absolute 0.16 0.05 - 0.50 E9/L    Basophils Absolute 0.05 0.00 - 0.20 E9/L   Comprehensive metabolic panel   Result Value Ref Range    Sodium 133 132 - 146 mmol/L    Potassium 4.2 3.5 - 5.0 mmol/L    Chloride 97 (L) 98 - 107 mmol/L    CO2 23 22 - 29 mmol/L    Anion Gap 13 7 - 16 mmol/L    Glucose 164 (H) 74 - 99 mg/dL    BUN 49 (H) 6 - 23 mg/dL    CREATININE 1.5 (H) 0.5 - 1.0 mg/dL    GFR Non-African American 34 >=60 mL/min/1.73    GFR African American 41     Calcium 9.8 8.6 - 10.2 mg/dL    Total Protein 6.8 6.4 - 8.3 g/dL    Albumin 3.4 (L) 3.5 - 5.2 g/dL    Total Bilirubin 0.6 0.0 - 1.2 mg/dL    Alkaline Phosphatase 153 (H) 35 - 104 U/L    ALT 29 0 - 32 U/L    AST 29 0 - 31 U/L   Troponin   Result Value Ref Range    Troponin 0.03 0.00 - 0.03 ng/mL   Lactate, Sepsis   Result Value Ref Range    Lactic Acid, Sepsis 1.2 0.5 - 1.9 mmol/L   Urinalysis   Result Value Ref Range    Color, UA Yellow Straw/Yellow    Clarity, UA Clear Clear    Glucose, Ur Negative Negative mg/dL    Bilirubin Urine Negative Negative    Ketones, Urine Negative Negative mg/dL    Specific Gravity, UA <=1.005 1.005 - 1.030    Blood, Urine Negative Negative    pH, UA 6.5 5.0 - 9.0    Protein, UA Negative Negative mg/dL    Urobilinogen, Urine 1.0 <2.0 E.U./dL    Nitrite, Urine Negative Negative    Leukocyte Esterase, Urine Negative Negative   Lipase   Result Value Ref Range    Lipase 34 13 - 60 U/L   Brain Natriuretic Peptide   Result Value Ref Range    Pro- (H) 0 - 450 pg/mL   EKG 12 Lead   Result Value Ref Range    Ventricular Rate 64 BPM    Atrial Rate 64 BPM    P-R Interval 134 ms    QRS Duration 76 ms    Q-T Interval 444 ms    QTc Calculation (Bazett) 458 ms    P Axis 39 degrees    R Axis 24 degrees    T Axis -6 degrees   EKG 12 lead   Result Value Ref Range    Ventricular Rate 64 BPM    Atrial Rate 64 BPM    P-R Interval 134 ms    QRS Duration 76 ms    Q-T Interval 444 ms    QTc Calculation (Bazett) 458 ms    P Axis 39 degrees    R Axis 24 degrees    T Axis -6 degrees       RADIOLOGY:  CT HEAD WO CONTRAST   Final Result   No acute intracranial abnormality. NM LUNG VENT/PERFUSION (VQ)    (Results Pending)       ------------------------- NURSING NOTES AND VITALS REVIEWED ---------------------------  Date / Time Roomed:  4/26/2021 12:20 PM  ED Bed Assignment:  [place] [id number]    The nursing notes within the ED encounter and vital signs as below have been reviewed.      Patient Vitals for the past 24 hrs:   BP Temp Temp src Pulse Resp SpO2 Height Weight   04/26/21 1729 (!) 106/50 -- -- 56 -- -- -- --   04/26/21 1237 (!) 150/67 96.7 °F (35.9 °C) Axillary 72 20 97 % 5' 5.5\" (1.664 m) 205 lb (93 kg)       Oxygen Saturation Interpretation: Normal    ------------------------------------------ PROGRESS NOTES ------------------------------------------  Re-evaluation(s):  Patients symptoms show no change  Repeat physical examination is not changed    Counseling:  I have spoken with the patient and discussed todays results, in addition to providing specific details for the plan of care and counseling regarding the diagnosis and prognosis. Their questions are answered at this time and they are agreeable with the plan of admission.    --------------------------------- ADDITIONAL PROVIDER NOTES ---------------------------------  Consultations:   Spoke with Dr. Lashonda Hull. Discussed case. They will admit the patient. This patient's ED course included: a personal history and physicial examination, re-evaluation prior to disposition, multiple bedside re-evaluations and IV medications    This patient has remained hemodynamically stable during their ED course. Diagnosis:  1. Suspected pulmonary embolism    2. FAY (acute kidney injury) ([facility] [address]    3. Elevated troponin        Disposition:  Patient's disposition: Admit to telemetry  Patient's condition is stable.          Zoya Perez DO  04/26/21 1913

## 2021-04-27 ENCOUNTER — APPOINTMENT (OUTPATIENT)
Dept: GENERAL RADIOLOGY | Age: 76
DRG: 682 | End: 2021-04-27
Payer: MEDICARE

## 2021-04-27 ENCOUNTER — APPOINTMENT (OUTPATIENT)
Dept: INTERVENTIONAL RADIOLOGY/VASCULAR | Age: 76
DRG: 682 | End: 2021-04-27
Payer: MEDICARE

## 2021-04-27 ENCOUNTER — APPOINTMENT (OUTPATIENT)
Dept: NUCLEAR MEDICINE | Age: 76
DRG: 682 | End: 2021-04-27
Payer: MEDICARE

## 2021-04-27 PROBLEM — R09.89 SUSPECTED PULMONARY EMBOLISM: Status: RESOLVED | Noted: 2021-04-26 | Resolved: 2021-04-27

## 2021-04-27 PROBLEM — E03.9 HYPOTHYROIDISM: Status: ACTIVE | Noted: 2021-04-27

## 2021-04-27 PROBLEM — N17.9 AKI (ACUTE KIDNEY INJURY) (HCC): Status: ACTIVE | Noted: 2021-04-27

## 2021-04-27 PROBLEM — I48.91 ATRIAL FIBRILLATION WITH RAPID VENTRICULAR RESPONSE (HCC): Status: ACTIVE | Noted: 2021-04-27

## 2021-04-27 PROBLEM — F32.9 MAJOR DEPRESSIVE DISORDER: Status: ACTIVE | Noted: 2021-04-27

## 2021-04-27 LAB
ALBUMIN SERPL-MCNC: 3.2 G/DL (ref 3.5–5.2)
ALP BLD-CCNC: 154 U/L (ref 35–104)
ALT SERPL-CCNC: 27 U/L (ref 0–32)
ANION GAP SERPL CALCULATED.3IONS-SCNC: 12 MMOL/L (ref 7–16)
AST SERPL-CCNC: 26 U/L (ref 0–31)
BASOPHILS ABSOLUTE: 0.05 E9/L (ref 0–0.2)
BASOPHILS RELATIVE PERCENT: 0.5 % (ref 0–2)
BILIRUB SERPL-MCNC: 0.4 MG/DL (ref 0–1.2)
BUN BLDV-MCNC: 40 MG/DL (ref 6–23)
CALCIUM SERPL-MCNC: 9.8 MG/DL (ref 8.6–10.2)
CHLORIDE BLD-SCNC: 100 MMOL/L (ref 98–107)
CO2: 23 MMOL/L (ref 22–29)
CREAT SERPL-MCNC: 1.3 MG/DL (ref 0.5–1)
D DIMER: 4012 NG/ML DDU
EOSINOPHILS ABSOLUTE: 0.22 E9/L (ref 0.05–0.5)
EOSINOPHILS RELATIVE PERCENT: 2.1 % (ref 0–6)
GFR AFRICAN AMERICAN: 48
GFR NON-AFRICAN AMERICAN: 40 ML/MIN/1.73
GLUCOSE BLD-MCNC: 186 MG/DL (ref 74–99)
HCT VFR BLD CALC: 30.1 % (ref 34–48)
HEMOGLOBIN: 9.2 G/DL (ref 11.5–15.5)
IMMATURE GRANULOCYTES #: 0.23 E9/L
IMMATURE GRANULOCYTES %: 2.2 % (ref 0–5)
LV EF: 65 %
LVEF MODALITY: NORMAL
LYMPHOCYTES ABSOLUTE: 1.31 E9/L (ref 1.5–4)
LYMPHOCYTES RELATIVE PERCENT: 12.7 % (ref 20–42)
MAGNESIUM: 2 MG/DL (ref 1.6–2.6)
MCH RBC QN AUTO: 28.4 PG (ref 26–35)
MCHC RBC AUTO-ENTMCNC: 30.6 % (ref 32–34.5)
MCV RBC AUTO: 92.9 FL (ref 80–99.9)
METER GLUCOSE: 126 MG/DL (ref 74–99)
METER GLUCOSE: 143 MG/DL (ref 74–99)
MONOCYTES ABSOLUTE: 1.28 E9/L (ref 0.1–0.95)
MONOCYTES RELATIVE PERCENT: 12.4 % (ref 2–12)
NEUTROPHILS ABSOLUTE: 7.26 E9/L (ref 1.8–7.3)
NEUTROPHILS RELATIVE PERCENT: 70.1 % (ref 43–80)
PDW BLD-RTO: 14.5 FL (ref 11.5–15)
PLATELET # BLD: 340 E9/L (ref 130–450)
PMV BLD AUTO: 9.5 FL (ref 7–12)
POTASSIUM SERPL-SCNC: 4.7 MMOL/L (ref 3.5–5)
RBC # BLD: 3.24 E12/L (ref 3.5–5.5)
SODIUM BLD-SCNC: 135 MMOL/L (ref 132–146)
TOTAL PROTEIN: 6.5 G/DL (ref 6.4–8.3)
TSH SERPL DL<=0.05 MIU/L-ACNC: 2.31 UIU/ML (ref 0.27–4.2)
WBC # BLD: 10.4 E9/L (ref 4.5–11.5)

## 2021-04-27 PROCEDURE — 84443 ASSAY THYROID STIM HORMONE: CPT

## 2021-04-27 PROCEDURE — 96372 THER/PROPH/DIAG INJ SC/IM: CPT

## 2021-04-27 PROCEDURE — 2500000003 HC RX 250 WO HCPCS: Performed by: NURSE PRACTITIONER

## 2021-04-27 PROCEDURE — 71045 X-RAY EXAM CHEST 1 VIEW: CPT

## 2021-04-27 PROCEDURE — 99233 SBSQ HOSP IP/OBS HIGH 50: CPT | Performed by: INTERNAL MEDICINE

## 2021-04-27 PROCEDURE — 93970 EXTREMITY STUDY: CPT

## 2021-04-27 PROCEDURE — 96375 TX/PRO/DX INJ NEW DRUG ADDON: CPT

## 2021-04-27 PROCEDURE — 6360000004 HC RX CONTRAST MEDICATION: Performed by: INTERNAL MEDICINE

## 2021-04-27 PROCEDURE — 85378 FIBRIN DEGRADE SEMIQUANT: CPT

## 2021-04-27 PROCEDURE — 6370000000 HC RX 637 (ALT 250 FOR IP): Performed by: INTERNAL MEDICINE

## 2021-04-27 PROCEDURE — 2060000000 HC ICU INTERMEDIATE R&B

## 2021-04-27 PROCEDURE — 83735 ASSAY OF MAGNESIUM: CPT

## 2021-04-27 PROCEDURE — 3430000000 HC RX DIAGNOSTIC RADIOPHARMACEUTICAL: Performed by: RADIOLOGY

## 2021-04-27 PROCEDURE — 99222 1ST HOSP IP/OBS MODERATE 55: CPT | Performed by: INTERNAL MEDICINE

## 2021-04-27 PROCEDURE — 6360000002 HC RX W HCPCS: Performed by: INTERNAL MEDICINE

## 2021-04-27 PROCEDURE — APPSS30 APP SPLIT SHARED TIME 16-30 MINUTES: Performed by: NURSE PRACTITIONER

## 2021-04-27 PROCEDURE — G0378 HOSPITAL OBSERVATION PER HR: HCPCS

## 2021-04-27 PROCEDURE — A9540 TC99M MAA: HCPCS | Performed by: RADIOLOGY

## 2021-04-27 PROCEDURE — 2580000003 HC RX 258: Performed by: INTERNAL MEDICINE

## 2021-04-27 PROCEDURE — 2580000003 HC RX 258: Performed by: NURSE PRACTITIONER

## 2021-04-27 PROCEDURE — 78580 LUNG PERFUSION IMAGING: CPT

## 2021-04-27 PROCEDURE — 2500000003 HC RX 250 WO HCPCS: Performed by: INTERNAL MEDICINE

## 2021-04-27 PROCEDURE — 1200000000 HC SEMI PRIVATE

## 2021-04-27 PROCEDURE — 96374 THER/PROPH/DIAG INJ IV PUSH: CPT

## 2021-04-27 PROCEDURE — C8929 TTE W OR WO FOL WCON,DOPPLER: HCPCS

## 2021-04-27 PROCEDURE — 82962 GLUCOSE BLOOD TEST: CPT

## 2021-04-27 PROCEDURE — 36415 COLL VENOUS BLD VENIPUNCTURE: CPT

## 2021-04-27 PROCEDURE — 80053 COMPREHEN METABOLIC PANEL: CPT

## 2021-04-27 PROCEDURE — 85025 COMPLETE CBC W/AUTO DIFF WBC: CPT

## 2021-04-27 RX ORDER — DEXTROSE MONOHYDRATE 25 G/50ML
12.5 INJECTION, SOLUTION INTRAVENOUS PRN
Status: DISCONTINUED | OUTPATIENT
Start: 2021-04-27 | End: 2021-04-28 | Stop reason: HOSPADM

## 2021-04-27 RX ORDER — DEXTROSE MONOHYDRATE 50 MG/ML
100 INJECTION, SOLUTION INTRAVENOUS PRN
Status: DISCONTINUED | OUTPATIENT
Start: 2021-04-27 | End: 2021-04-28 | Stop reason: HOSPADM

## 2021-04-27 RX ORDER — DIGOXIN 0.25 MG/ML
250 INJECTION INTRAMUSCULAR; INTRAVENOUS ONCE
Status: COMPLETED | OUTPATIENT
Start: 2021-04-27 | End: 2021-04-27

## 2021-04-27 RX ORDER — METOPROLOL TARTRATE 5 MG/5ML
2.5 INJECTION INTRAVENOUS ONCE
Status: COMPLETED | OUTPATIENT
Start: 2021-04-27 | End: 2021-04-27

## 2021-04-27 RX ORDER — HEPARIN SODIUM (PORCINE) LOCK FLUSH IV SOLN 100 UNIT/ML 100 UNIT/ML
1 SOLUTION INTRAVENOUS PRN
Status: DISCONTINUED | OUTPATIENT
Start: 2021-04-27 | End: 2021-04-28 | Stop reason: HOSPADM

## 2021-04-27 RX ORDER — SODIUM CHLORIDE 9 MG/ML
25 INJECTION, SOLUTION INTRAVENOUS PRN
Status: DISCONTINUED | OUTPATIENT
Start: 2021-04-27 | End: 2021-04-28 | Stop reason: HOSPADM

## 2021-04-27 RX ORDER — NICOTINE POLACRILEX 4 MG
15 LOZENGE BUCCAL PRN
Status: DISCONTINUED | OUTPATIENT
Start: 2021-04-27 | End: 2021-04-28 | Stop reason: HOSPADM

## 2021-04-27 RX ORDER — HEPARIN SODIUM (PORCINE) LOCK FLUSH IV SOLN 100 UNIT/ML 100 UNIT/ML
1 SOLUTION INTRAVENOUS EVERY 12 HOURS SCHEDULED
Status: DISCONTINUED | OUTPATIENT
Start: 2021-04-27 | End: 2021-04-28 | Stop reason: HOSPADM

## 2021-04-27 RX ORDER — FUROSEMIDE 10 MG/ML
20 INJECTION INTRAMUSCULAR; INTRAVENOUS ONCE
Status: COMPLETED | OUTPATIENT
Start: 2021-04-27 | End: 2021-04-27

## 2021-04-27 RX ORDER — DOCUSATE SODIUM 100 MG/1
100 CAPSULE, LIQUID FILLED ORAL DAILY
Status: DISCONTINUED | OUTPATIENT
Start: 2021-04-27 | End: 2021-04-28 | Stop reason: HOSPADM

## 2021-04-27 RX ORDER — SODIUM CHLORIDE 0.9 % (FLUSH) 0.9 %
5-40 SYRINGE (ML) INJECTION EVERY 12 HOURS SCHEDULED
Status: DISCONTINUED | OUTPATIENT
Start: 2021-04-27 | End: 2021-04-28 | Stop reason: HOSPADM

## 2021-04-27 RX ORDER — SODIUM CHLORIDE 0.9 % (FLUSH) 0.9 %
5-40 SYRINGE (ML) INJECTION PRN
Status: DISCONTINUED | OUTPATIENT
Start: 2021-04-27 | End: 2021-04-28 | Stop reason: HOSPADM

## 2021-04-27 RX ADMIN — METOPROLOL TARTRATE 12.5 MG: 25 TABLET, FILM COATED ORAL at 16:15

## 2021-04-27 RX ADMIN — GABAPENTIN 300 MG: 300 CAPSULE ORAL at 16:15

## 2021-04-27 RX ADMIN — ACETAMINOPHEN 650 MG: 325 TABLET, FILM COATED ORAL at 05:55

## 2021-04-27 RX ADMIN — METOPROLOL TARTRATE 2.5 MG: 5 INJECTION INTRAVENOUS at 12:06

## 2021-04-27 RX ADMIN — ENOXAPARIN SODIUM 90 MG: 100 INJECTION SUBCUTANEOUS at 12:06

## 2021-04-27 RX ADMIN — LEVOTHYROXINE SODIUM 50 MCG: 50 TABLET ORAL at 05:54

## 2021-04-27 RX ADMIN — METOPROLOL TARTRATE 12.5 MG: 25 TABLET, FILM COATED ORAL at 21:53

## 2021-04-27 RX ADMIN — INSULIN LISPRO 1 UNITS: 100 INJECTION, SOLUTION INTRAVENOUS; SUBCUTANEOUS at 22:03

## 2021-04-27 RX ADMIN — GABAPENTIN 300 MG: 300 CAPSULE ORAL at 12:06

## 2021-04-27 RX ADMIN — OXYBUTYNIN CHLORIDE 5 MG: 5 TABLET ORAL at 12:07

## 2021-04-27 RX ADMIN — ACETAMINOPHEN 650 MG: 325 TABLET, FILM COATED ORAL at 18:00

## 2021-04-27 RX ADMIN — FUROSEMIDE 20 MG: 10 INJECTION, SOLUTION INTRAMUSCULAR; INTRAVENOUS at 21:53

## 2021-04-27 RX ADMIN — APIXABAN 5 MG: 5 TABLET, FILM COATED ORAL at 21:53

## 2021-04-27 RX ADMIN — DILTIAZEM HYDROCHLORIDE 5 MG/HR: 5 INJECTION INTRAVENOUS at 13:34

## 2021-04-27 RX ADMIN — ATORVASTATIN CALCIUM 10 MG: 20 TABLET, FILM COATED ORAL at 12:07

## 2021-04-27 RX ADMIN — SODIUM CHLORIDE, PRESERVATIVE FREE 10 ML: 5 INJECTION INTRAVENOUS at 21:54

## 2021-04-27 RX ADMIN — DOCUSATE SODIUM 100 MG: 100 CAPSULE, LIQUID FILLED ORAL at 16:41

## 2021-04-27 RX ADMIN — DIGOXIN 250 MCG: 250 INJECTION, SOLUTION INTRAMUSCULAR; INTRAVENOUS; PARENTERAL at 16:41

## 2021-04-27 RX ADMIN — Medication 3 MILLICURIE: at 09:09

## 2021-04-27 RX ADMIN — ALLOPURINOL 200 MG: 100 TABLET ORAL at 12:07

## 2021-04-27 RX ADMIN — PERFLUTREN 1.65 MG: 6.52 INJECTION, SUSPENSION INTRAVENOUS at 15:17

## 2021-04-27 RX ADMIN — GABAPENTIN 300 MG: 300 CAPSULE ORAL at 21:53

## 2021-04-27 ASSESSMENT — PAIN SCALES - GENERAL
PAINLEVEL_OUTOF10: 0
PAINLEVEL_OUTOF10: 0

## 2021-04-27 NOTE — PROGRESS NOTES
([id number]) Nurse was at bedside when patient complained of dizziness and not feeling normal. Patient noted to have long pause on monitor and went unresponsive for 2-3 seconds before converting to sinus bradycardia which is patients normal rhythm. Cardizem shut off. V.S. normal and patient felt much better. Dr. Jetty Councilman notified and Dr. Claude Norman notified. Will continue to monitor.      Electronically signed by Genie Doshi RN on 4/27/2021 at 6:11 PM

## 2021-04-27 NOTE — H&P
Procedure Laterality Date    BLEPHAROPLASTY      CATARACT REMOVAL WITH IMPLANT Left 09/12/2017    CATARACT REMOVAL WITH IMPLANT Right 10/09/2018    CATARACT REMOVAL WITH IMPLANT Right 11/01/2018    secondary procedure to correct first cataract     CHOLECYSTECTOMY      COSMETIC SURGERY      abdominoplasty    FOOT SURGERY Left     skin cancer    HYSTERECTOMY      LUMBAR SPINE SURGERY N/A 9/14/2020    L3-L4, L4-L5  POSTERIOR LUMBAR INTERBODY FUSION performed by Darius Spears MD at [address] W[place]ER,INTRAOCUL Right 11/1/2018    EYE IOL REMOVAL performed by Barbra Noe MD at [address] [facility] RMVL INSJ IO LENS PROSTH W/O ECP Right 10/9/2018    RIGHT EYE CATARACT EMULSIFICATION IOL IMPLANT performed by Barbra Noe MD at [address] [facility], PARTIAL      left lobe removed    TONSILLECTOMY      TUBAL LIGATION         Medications Prior to Admission:    Prior to Admission medications    Medication Sig Start Date End Date Taking?  Authorizing Provider   atorvastatin (LIPITOR) 10 MG tablet Take 10 mg by mouth nightly   Yes Historical Provider, MD   Cholecalciferol (VITAMIN D3) 125 MCG (5000 UT) TABS Take 10,000 Units by mouth daily   Yes Historical Provider, MD   Coenzyme Q10 (COQ-10) 100 MG CAPS Take 100 mg by mouth daily   Yes Historical Provider, MD   levothyroxine (SYNTHROID) 50 MCG tablet Take 75 mcg by mouth Twice a Week Sunday & Thursday   Yes Historical Provider, MD   COVID-19 mRNA Vacc, PFIZER, 30 MCG/0.3ML SUSP injection Inject 0.3 mLs into the muscle once First Injection: 2/24/2021  Second Injection: 3/17/2021   Yes Historical Provider, MD   CPAP Machine MISC by Does not apply route nightly   Yes Historical Provider, MD   meloxicam (MOBIC) 15 MG tablet Take 1 tablet by mouth daily 3/19/21  Yes Karyn Reilly MD   triamterene-hydroCHLOROthiazide (MAXZIDE) 75-50 MG per tablet Take 1 tablet by mouth daily 2/4/21  Yes Radha Olivo MD   losartan (COZAAR) 25 MG tablet Take 1 tablet by mouth daily 2/3/21  Yes Radha Olivo MD   propranolol (INDERAL) 10 MG tablet Take 1 tablet by mouth every evening 2/3/21  Yes Radha Olivo MD   omeprazole (PRILOSEC) 20 MG delayed release capsule Take 1 capsule by mouth daily 2/3/21  Yes Radha Olivo MD   oxybutynin (DITROPAN) 5 MG tablet Take 1 tablet by mouth daily 2/3/21  Yes Radha Olivo MD   glipiZIDE-metformin (METAGLIP) 2.5-500 MG per tablet Take 1 tablet by mouth daily 1/14/21  Yes Radha Olivo MD   NEURONTIN 600 MG tablet Take 1 tablet by mouth 3 times daily. 12/8/20 12/8/21 Yes Cristi Almonte PA-C   allopurinol (ZYLOPRIM) 100 MG tablet Take 2 tablets by mouth daily 11/2/20  Yes Bijal Wiley MD   furosemide (LASIX) 20 MG tablet Take 20 mg by mouth daily as needed (Swelling)    Yes Historical Provider, MD   levothyroxine (SYNTHROID) 50 MCG tablet Take 50 mcg by mouth Five times weekly Monday, Tuesday, Wednesday, Friday, Saturday   Yes Historical Provider, MD   latanoprost (XALATAN) 0.005 % ophthalmic solution Place 1 drop into the left eye nightly    Historical Provider, MD       Allergies:    Epinephrine, Ace inhibitors, Adhesive tape, and Propoxyphene    Social History:    reports that she has never smoked. She has never used smokeless tobacco. She reports that she does not drink alcohol or use drugs. Family History:   family history is not on file. PHYSICAL EXAM:  Vitals:  /63   Pulse 58   Temp 98.3 °F (36.8 °C) (Oral)   Resp 18   Ht 5' 5.5\" (1.664 m)   Wt 205 lb (93 kg)   SpO2 99%   BMI 33.59 kg/m²   GENERAL: No acute distress, Alert and awake, Afebrile, Appears tired and weak otherwise hemodynamically stable at present. HEENT: PERRLA, no icterus. OP clear and no exudates. NECK: Supple  no carotid/ophthalmic bruits, JVD None.   RESPIRATORY:  Bilateral equal vesicular breath sound with no wheezing. Lung bases are clear. HEART: No tachycardia at bedside and regular rhythm. Normal S1 and S2, No S3 or S4 is audible. No pulsation, thrills, murmur or friction rubs. ABDOMEN: Soft, nondistended, nontender. No hepatomegaly or splenomegaly. No CVA tenderness on the both sides. Bowel sound is present. EXTREMITIES: All peripheral pulses are present. No calf tenderness or swelling. No pedal edema is present. [facility] NEUROLOGY: Alert and awake. No new focal neuro deficit. Bilateral Pupil is equal and reactive to light. CN-ii-xii otherwise grossly intact. Motor and Sensory: Grossly Intact bilaterally with no new focal signs    LABS:  Recent Labs     04/26/21  1351   WBC 12.5*   RBC 3.21*   HGB 9.3*   HCT 28.8*   MCV 89.7   MCH 29.0   MCHC 32.3   RDW 14.3      MPV 9.7     Recent Labs     04/26/21  1351      K 4.2   CL 97*   CO2 23   BUN 49*   CREATININE 1.5*   GLUCOSE 164*   CALCIUM 9.8     No results for input(s): POCGLU in the last 72 hours.   Results for orders placed or performed during the hospital encounter of 04/26/21   CBC auto differential   Result Value Ref Range    WBC 12.5 (H) 4.5 - 11.5 E9/L    RBC 3.21 (L) 3.50 - 5.50 E12/L    Hemoglobin 9.3 (L) 11.5 - 15.5 g/dL    Hematocrit 28.8 (L) 34.0 - 48.0 %    MCV 89.7 80.0 - 99.9 fL    MCH 29.0 26.0 - 35.0 pg    MCHC 32.3 32.0 - 34.5 %    RDW 14.3 11.5 - 15.0 fL    Platelets 083 269 - 426 E9/L    MPV 9.7 7.0 - 12.0 fL    Neutrophils % 77.0 43.0 - 80.0 %    Immature Granulocytes % 1.8 0.0 - 5.0 %    Lymphocytes % 9.3 (L) 20.0 - 42.0 %    Monocytes % 10.2 2.0 - 12.0 %    Eosinophils % 1.3 0.0 - 6.0 %    Basophils % 0.4 0.0 - 2.0 %    Neutrophils Absolute 9.65 (H) 1.80 - 7.30 E9/L    Immature Granulocytes # 0.22 E9/L    Lymphocytes Absolute 1.16 (L) 1.50 - 4.00 E9/L    Monocytes Absolute 1.27 (H) 0.10 - 0.95 E9/L    Eosinophils Absolute 0.16 0.05 - 0.50 E9/L    Basophils Absolute 0.05 0.00 - 0.20 E9/L   Comprehensive metabolic panel   Result Value Ref Range    Sodium 133 132 - 146 mmol/L    Potassium 4.2 3.5 - 5.0 mmol/L    Chloride 97 (L) 98 - 107 mmol/L    CO2 23 22 - 29 mmol/L    Anion Gap 13 7 - 16 mmol/L    Glucose 164 (H) 74 - 99 mg/dL    BUN 49 (H) 6 - 23 mg/dL    CREATININE 1.5 (H) 0.5 - 1.0 mg/dL    GFR Non-African American 34 >=60 mL/min/1.73    GFR African American 41     Calcium 9.8 8.6 - 10.2 mg/dL    Total Protein 6.8 6.4 - 8.3 g/dL    Albumin 3.4 (L) 3.5 - 5.2 g/dL    Total Bilirubin 0.6 0.0 - 1.2 mg/dL    Alkaline Phosphatase 153 (H) 35 - 104 U/L    ALT 29 0 - 32 U/L    AST 29 0 - 31 U/L   Troponin   Result Value Ref Range    Troponin 0.03 0.00 - 0.03 ng/mL   Lactate, Sepsis   Result Value Ref Range    Lactic Acid, Sepsis 1.2 0.5 - 1.9 mmol/L   Urinalysis   Result Value Ref Range    Color, UA Yellow Straw/Yellow    Clarity, UA Clear Clear    Glucose, Ur Negative Negative mg/dL    Bilirubin Urine Negative Negative    Ketones, Urine Negative Negative mg/dL    Specific Gravity, UA <=1.005 1.005 - 1.030    Blood, Urine Negative Negative    pH, UA 6.5 5.0 - 9.0    Protein, UA Negative Negative mg/dL    Urobilinogen, Urine 1.0 <2.0 E.U./dL    Nitrite, Urine Negative Negative    Leukocyte Esterase, Urine Negative Negative   Lipase   Result Value Ref Range    Lipase 34 13 - 60 U/L   Brain Natriuretic Peptide   Result Value Ref Range    Pro- (H) 0 - 450 pg/mL   EKG 12 Lead   Result Value Ref Range    Ventricular Rate 64 BPM    Atrial Rate 64 BPM    P-R Interval 134 ms    QRS Duration 76 ms    Q-T Interval 444 ms    QTc Calculation (Bazett) 458 ms    P Axis 39 degrees    R Axis 24 degrees    T Axis -6 degrees   EKG 12 lead   Result Value Ref Range    Ventricular Rate 64 BPM    Atrial Rate 64 BPM    P-R Interval 134 ms    QRS Duration 76 ms    Q-T Interval 444 ms    QTc Calculation (Bazett) 458 ms    P Axis 39 degrees    R Axis 24 degrees    T Axis -6 degrees     ED Course as of Apr 27 0450   Mon Apr 26, 2021   1628 Patient has remained at her family's house for last few days. Even despite this she is not eating and not getting out of bed and continue to sleep all the time. She is awake and oriented now but still continues to have the fatigue and weakness. D-dimer was shown to be greater than 6 on a test that shows a normal of up to 0.5. CTA ordered, unable to get that test because of her poor IV access. She has a small functional IV in the mid forearm but will not really a CTA through it.    [SO]      ED Course User Index  [SO] Keegan Camargo DO     Radiology: Ct Head Wo Contrast    Result Date: 4/26/2021  EXAMINATION: CT OF THE HEAD WITHOUT CONTRAST  4/26/2021 4:49 pm TECHNIQUE: CT of the head was performed without the administration of intravenous contrast. Dose modulation, iterative reconstruction, and/or weight based adjustment of the mA/kV was utilized to reduce the radiation dose to as low as reasonably achievable. COMPARISON: 08/27/2014 HISTORY: ORDERING SYSTEM PROVIDED HISTORY: fatigue TECHNOLOGIST PROVIDED HISTORY: Has a \"code stroke\" or \"stroke alert\" been called? ->No Reason for exam:->fatigue Decision Support Exception->Emergency Medical Condition (MA) FINDINGS: BRAIN/VENTRICLES: There is no acute intracranial hemorrhage, mass effect or midline shift. No abnormal extra-axial fluid collection. The gray-white differentiation is maintained without evidence of an acute infarct. There is no evidence of hydrocephalus. There is mild chronic ischemic/degenerative changes in the white matter. ORBITS: The visualized portion of the orbits demonstrate no acute abnormality. SINUSES: The visualized paranasal sinuses and mastoid air cells demonstrate no acute abnormality. SOFT TISSUES/SKULL:  No acute abnormality of the visualized skull or soft tissues. No acute intracranial abnormality.      ASSESSMENT:    Present on Admission:   Suspected pulmonary embolism    PLAN:  # SOB - with elevated Di dimer- rule out PE   Leg has no swelling or pain.   CT PE was done done because of FAY / Poor IV access   VQ scan pending   S/p Lovenox one therapeutic dose - pending VQ scan   Plan for Echo and DVT scan of leg  # FAY - secondary to dehydration           Continue IV fluid and encourge PO fluid              Monitor I/O, BUN/Cr accordingly. Avoid Nephrotoxic medications, ACE-i and NSAIDS. US-Renal/Renal consult if fails to improve promptly  # SHIRLEY - on CPAP  # Hypertension   Currently better controlled  Will resume home medications as needed. Monitor vitals and adjust BP meds as needed  # Hypothyroid   No active complaints   Continue home dose of thyroid meds   Will adjust the dose as needed  # Rest of the chronic medical problems are stable and will be managed with appropriately with home medications, placed nursing communication order to verify home medications before giving them to the patient. # Diet: On PO Diet  # IVF's: Yes  # Fall Precaution: Yes  # Disposition: Home/primary residence   # Code Status: Full code by default  # DVT Prophylaxis : SC Heparin or SC Lovenox as on chart  The patient at bedside was counseled about clinical status, laboratory/imaging results, diagnoses, medication side effects, risk, and treatment plan, all questions were answered to patient's satisfaction and verbalized understanding    SIGNATURE: Sydney Rose PATIENT NAME: Noble Kathleen #: Hospitalist on call MRN: [id number]     Disclaimer: Portions of this note may have been generated using Dragon voice recognition software. Reasonable efforts were made to correct any dictation errors that resulted due to the programming of this software but some may still be present.

## 2021-04-27 NOTE — CARE COORDINATION
4/27/21 1038 CM note: NO COVID TESTING. Attempted to meet with patient; however she was off the floor for testing. Patient's family is not comfortable with patient being in room with her roommate d/t her vulgar language. No beds currently open so patient was moved to the \"fishbowl\" area until a bed opens up. Met with pts son, Gretel Hair, to discuss transition of care at discharge. Patient resides alone in a 2 floor with basement home, 3 steps to enter, 12 steps to the 2nd floor, 10 steps to the basement (her daughter has been doing her laundry). Patient is independent with ADLs (patient had back surgery in September but has been recovering on her own for last 3 months), drives short distances but doesn't go out much since covid; family does her grocery shopping. Pts DME includes walker, bone scan \"electronic thing\" that goes around her waist, and cpcp from [facility]. Pts PCP is Dr Severino Peacock and her pharmacy is [facility] in [place]. Patient has a [facility] and SNF at [facility]. Patient was doing outpatient rehab at [facility] but ran out of sessions. Per Gretel Hair patient was going to check with Dr Gabe Kinsey to see if more outpatient therapy could be ordered but that was within the last 1-2 weeks and he is unsure of what came of that. Discharge plan at this time is for patient to go home with her daughter for a few days before returning home by herself. Pts son will provide transportation home at discharge. Per nursing patient's roommate left AMA so patient is being moved back into her room.  Electronically signed by Barrera Day RN on 4/27/2021 at 10:59 AM

## 2021-04-27 NOTE — PLAN OF CARE
Problem: Falls - Risk of:  Goal: Will remain free from falls  Description: Will remain free from falls  Outcome: Met This Shift  Goal: Absence of physical injury  Description: Absence of physical injury  Outcome: Met This Shift     Problem: Breathing Pattern - Ineffective:  Goal: Ability to achieve and maintain a regular respiratory rate will improve  Description: Ability to achieve and maintain a regular respiratory rate will improve  Outcome: Met This Shift     Problem: Safety:  Goal: Free from accidental physical injury  Description: Free from accidental physical injury  Outcome: Met This Shift  Goal: Free from intentional harm  Description: Free from intentional harm  Outcome: Met This Shift     Problem: Daily Care:  Goal: Daily care needs are met  Description: Daily care needs are met  Outcome: Met This Shift

## 2021-04-27 NOTE — CONSULTS
[facility]        CARDIOLOGY CONSULT                         PATIENT SEEN IN CONSULT FOR: A Fib with RVR    Hospital Day: 2     Janiya Duran is a 78year old patient known to Dr. Michele Min [facility] Covering him)        History of Present Illness: This is a 78 y.o. female  has a past medical history of HTN, SHIRLEY -on CPAP, GERD, Hyperlipidemia, Hypertension, Spinal stenosis, Thyroid disease, Tremors of nervous system, and Type 2 diabetes mellitus without complication ([address] presented with SOB for last few days prior to arrival to the hospital. SOB is associated with some cough, nonproductive but no fever or chills reported. Initially SOB was mild, intermittent but gradually getting more persistent. Started gradually. Exacerbated by general activity or exertion. Relieved only partially by rest. Recently patient is increasingly tired and and ambulated only little. She was in A Fib with RVR, and hence Cardiology consulted. PE ruled out and she was started on iv cardizem infusion for rate control. Patient denied any chest pain, hemoptysis, productive cough, fever, ongoing palpitation. No abdominal pain, hematemesis, rectal bleeding, damir, hematuria. No PND or orthopnea.  Compliant with her medications and CPAP.        ROS: Review of rest of 10 systems negative except as mentioned above       PMH:  Past Medical History        Past Medical History:   Diagnosis Date    Anesthesia complication 3956     difficulty breathing post thyroid surgery [facility]    Cancer [facility])       skin     GERD (gastroesophageal reflux disease)      Hyperlipidemia      Hypertension      Sleep apnea       uses cpap    Spinal stenosis      Thyroid disease      Tremors of nervous system       familial    Type 2 diabetes mellitus without complication ([address]              Surgical History:  Past Surgical History         Past Surgical History:   Procedure Laterality Date    BLEPHAROPLASTY        CATARACT REMOVAL WITH IMPLANT Left 09/12/2017    CATARACT REMOVAL WITH IMPLANT Right 10/09/2018    CATARACT REMOVAL WITH IMPLANT Right 11/01/2018     secondary procedure to correct first cataract     CHOLECYSTECTOMY        COSMETIC SURGERY         abdominoplasty    FOOT SURGERY Left       skin cancer    HYSTERECTOMY        LUMBAR SPINE SURGERY N/A 9/14/2020     L3-L4, L4-L5  POSTERIOR LUMBAR INTERBODY FUSION performed by Donna Stubbs MD at [address] POST EYE IMPLNT MATER,INTRAOCUL Right 11/1/2018     EYE IOL REMOVAL performed by Kevin Ashley MD at [address] [place] [address] INSJ IO LENS PROSTH W/O ECP Right 10/9/2018     RIGHT EYE CATARACT EMULSIFICATION IOL IMPLANT performed by Kevin Ashley MD at [facility], PARTIAL         left lobe removed    TONSILLECTOMY        TUBAL LIGATION                Medications Prior to Admission:    Home Medications           Prior to Admission medications    Medication Sig Start Date End Date Taking?  Authorizing Provider   atorvastatin (LIPITOR) 10 MG tablet Take 10 mg by mouth nightly     Yes Historical Provider, MD   Cholecalciferol (VITAMIN D3) 125 MCG (5000 UT) TABS Take 10,000 Units by mouth daily     Yes Historical Provider, MD   Coenzyme Q10 (COQ-10) 100 MG CAPS Take 100 mg by mouth daily     Yes Historical Provider, MD   levothyroxine (SYNTHROID) 50 MCG tablet Take 75 mcg by mouth Twice a Week Sunday & Thursday     Yes Historical Provider, MD GAMBLEID-19 mRNA Vacc, PFIZER, 30 MCG/0.3ML SUSP injection Inject 0.3 mLs into the muscle once First Injection: 2/24/2021  Second Injection: 3/17/2021     Yes Historical Provider, MD   CPAP Machine MISC by Does not apply route nightly     Yes Historical Provider, MD   meloxicam (MOBIC) 15 MG tablet Take 1 tablet by mouth daily 3/19/21   Yes Trace Tovar MD   triamterene-hydroCHLOROthiazide (MAXZIDE) 75-50 MG per tablet Take 1 tablet by mouth daily 2/4/21   Yes Radha Olivo MD   losartan (COZAAR) 25 MG tablet Take 1 tablet by mouth daily 2/3/21   Yes Radha Olivo MD   propranolol (INDERAL) 10 MG tablet Take 1 tablet by mouth every evening 2/3/21   Yes Radha Olivo MD   omeprazole (PRILOSEC) 20 MG delayed release capsule Take 1 capsule by mouth daily 2/3/21   Yes Radha Olivo MD   oxybutynin (DITROPAN) 5 MG tablet Take 1 tablet by mouth daily 2/3/21   Yes Radha Olivo MD   glipiZIDE-metformin (METAGLIP) 2.5-500 MG per tablet Take 1 tablet by mouth daily 1/14/21   Yes Radha Olivo MD   NEURONTIN 600 MG tablet Take 1 tablet by mouth 3 times daily. 12/8/20 12/8/21 Yes Cristi Almonte PA-C   allopurinol (ZYLOPRIM) 100 MG tablet Take 2 tablets by mouth daily 11/2/20   Yes Bijal Wiley MD   furosemide (LASIX) 20 MG tablet Take 20 mg by mouth daily as needed (Swelling)      Yes Historical Provider, MD   levothyroxine (SYNTHROID) 50 MCG tablet Take 50 mcg by mouth Five times weekly Monday, Tuesday, Wednesday, Friday, Saturday     Yes Historical Provider, MD   latanoprost (XALATAN) 0.005 % ophthalmic solution Place 1 drop into the left eye nightly       Historical Provider, MD            Allergies:    Epinephrine, Ace inhibitors, Adhesive tape, and Propoxyphene     Social History:    reports that she has never smoked. She has never used smokeless tobacco. She reports that she does not drink alcohol or use drugs.        Family History:   family history is not on file.      Diagnostics:       Telemetry: A Fib with RVR    12 lead EKG: Reviewed    V/Q - Noted  Strss test 2019  -OK    No intake or output data in the 24 hours ending 04/27/21 1514    Labs:   CBC:   Recent Labs     04/26/21  1351 04/27/21  0546   WBC 12.5* 10.4   HGB 9.3* 9.2*   HCT 28.8* 30.1*    340     BMP:   Recent Labs     04/26/21  1351 04/27/21  0546    135   K 4.2 4.7   CO2 23 23   BUN 49* 40*   CREATININE 1.5* 1.3* LABGLOM 34 40   CALCIUM 9.8 9.8     Mag: No results for input(s): MG in the last 72 hours. Phos: No results for input(s): PHOS in the last 72 hours. TSH: No results for input(s): TSH in the last 72 hours. HgA1c:     BNP: No results for input(s): BNP in the last 72 hours. PT/INR: No results for input(s): PROTIME, INR in the last 72 hours. APTT:No results for input(s): APTT in the last 72 hours.   CARDIAC ENZYMES:  Recent Labs     04/26/21  1351   TROPONINI 0.03     FASTING LIPID PANEL:  Lab Results   Component Value Date    CHOL 179 06/20/2019    HDL 45 06/20/2019    LDLCALC 102 06/20/2019    TRIG 158 06/20/2019     LIVER PROFILE:  Recent Labs     04/26/21  1351 04/27/21  0546   AST 29 26   ALT 29 27   LABALBU 3.4* 3.2*       Current Inpatient Medications:   lidocaine  5 mL Intradermal Once    sodium chloride flush  5-40 mL Intravenous 2 times per day    heparin flush  1 mL Intravenous 2 times per day    enoxaparin  1 mg/kg Subcutaneous BID    metoprolol tartrate  12.5 mg Oral BID    insulin lispro  0-6 Units Subcutaneous TID WC    insulin lispro  0-3 Units Subcutaneous Nightly    docusate sodium  100 mg Oral Daily    allopurinol  200 mg Oral Daily    atorvastatin  10 mg Oral Daily    latanoprost  1 drop Left Eye Nightly    levothyroxine  50 mcg Oral Daily    [Held by provider] losartan  25 mg Oral Daily    [Held by provider] pantoprazole  40 mg Oral QAM AC    oxybutynin  5 mg Oral Daily    [Held by provider] propranolol  10 mg Oral QPM    [Held by provider] triamterene-hydroCHLOROthiazide  1 tablet Oral Daily    gabapentin  300 mg Oral TID       IV Infusions (if any):   sodium chloride      dilTIAZem (CARDIZEM) 125 mg in dextrose 5% 125 mL infusion 5 mg/hr (04/27/21 1334)    dextrose           PHYSICAL EXAM:     CONSTITUTIONAL:   /79   Pulse 154   Temp 98.2 °F (36.8 °C) (Oral)   Resp 23   Ht 5' 5\" (1.651 m)   Wt 204 lb 6.4 oz (92.7 kg)   SpO2 94%   BMI 34.01 kg/m²   Pulse Av.8  Min: 64  Max: 343  Systolic (14SVH), YNH:901 , Min:106 , KNW:355    Diastolic (35TAH), HECTOR:67, Min:50, Max:90    In general, this is a well developed, well nourished who appears stated age. awake, alert, cooperative, no apparent distress  HEENT: eyes -conjunctivae pink,  Throat - Oral mucosa pink and moist.   Neck-  no stridor, no noted enlargement of the thyroid, no carotid bruit. no jugular venous distention   RESPIRATORY: Chest symmetrical and non-tender to palpation. No accessory muscle use. Lung auscultation -  few rhonchi  CARDIOVASCULAR:     Heart Inspection shows no noted pulsations  Heart Palpation - no palpable thrills  Heart Ausculation - Irregularly irregular rate and rhythm, 1/6 systolic murmur, No s3 or rub. No lower extremity edema, no varicosities. Distal pulses palpable, no clubbing or cyanosis   ABDOMEN: Soft, Bowel sounds present. MS: good muscle strength and tone. : Deferred  Rectal Exam: Deferred  SKIN: warm and dry no statis dermatitis or ulcers   NEURO / PSYCH: oriented to person, place        ASSESSMENT/PLAN:      Atrial fibrillation with rapid ventricular response (HCC) - NEW, high [id number] VASc score - On Lovenox. Risk benefits of [address] discussed, agreeable for Eliquis - discontinue her home Aspirin. TSH normal, 2D Echo pending. KHADIJAH DC cardioversion during this admission vs. ater 4 weeks of [address] discussed. Acute CHF - Likely diastolic dysfunction and tachycardia - give Lasix today        Essential hypertension - Controlled      Controlled type 2 diabetes mellitus without complication, without long-term current use of insulin (HCC)      FAY (acute kidney injury) ([facility] [address] - better      Anemia - monitor H/H      Hypothyroidism - On HRT, TSH Ok      SHIRLEY  - On CPAP      Non morbid Obesity - Diet, exercise and weight loss discussed. S/P lumbar fusion              Above recommendations discussed with her.       Electronically signed by Ernesto Ragland MD on 2021 at 3:14 PM  [facility]) Cardiology

## 2021-04-27 NOTE — PROGRESS NOTES
[address] [place], [place] [address]ist   Progress Note    Admitting Date and Time: 4/26/2021 12:20 PM  Admit Dx: Suspected pulmonary embolism [R09.89]    Subjective:    Pt was seen after her procedures. She is currently complaining of heart palpitations. Patient denies any complaints of chest pain or shortness of breath at this time. ROS: denies fever, chills, cp, sob, n/v, HA unless stated above.      lidocaine  5 mL Intradermal Once    sodium chloride flush  5-40 mL Intravenous 2 times per day    heparin flush  1 mL Intravenous 2 times per day    enoxaparin  1 mg/kg Subcutaneous BID    allopurinol  200 mg Oral Daily    atorvastatin  10 mg Oral Daily    latanoprost  1 drop Left Eye Nightly    levothyroxine  50 mcg Oral Daily    [Held by provider] losartan  25 mg Oral Daily    [Held by provider] pantoprazole  40 mg Oral QAM AC    oxybutynin  5 mg Oral Daily    propranolol  10 mg Oral QPM    [Held by provider] triamterene-hydroCHLOROthiazide  1 tablet Oral Daily    gabapentin  300 mg Oral TID     sodium chloride flush, 5-40 mL, PRN  sodium chloride, 25 mL, PRN  heparin flush, 1 mL, PRN  sodium chloride flush, 5-40 mL, PRN  promethazine, 12.5 mg, Q6H PRN    Or  ondansetron, 4 mg, Q6H PRN  polyethylene glycol, 17 g, Daily PRN  acetaminophen, 650 mg, Q6H PRN    Or  acetaminophen, 650 mg, Q6H PRN         Objective:    BP (!) 137/90   Pulse 130   Temp 97.8 °F (36.6 °C) (Oral)   Resp 18   Ht 5' 5.5\" (1.664 m)   Wt 205 lb (93 kg)   SpO2 99%   BMI 33.59 kg/m²   General Appearance: alert and oriented to person, place and time and in no acute distress, obese  Skin: warm and dry  Head: normocephalic and atraumatic  Eyes: pupils equal, round, and reactive to light, extraocular eye movements intact, conjunctivae normal  Neck: neck supple and non tender without mass   Pulmonary/Chest: diminished but clear to auscultation bilaterally, normal air movement, no respiratory distress  Cardiovascular: abnormal rate, abnormal rhythm irregular    Abdomen: soft, non-tender, non-distended, normal bowel sounds, no masses or organomegaly  Extremities: no cyanosis, no clubbing and no edema  Neurologic: no cranial nerve deficit and speech normal      Recent Labs     04/26/21  1351 04/27/21  0546    135   K 4.2 4.7   CL 97* 100   CO2 23 23   BUN 49* 40*   CREATININE 1.5* 1.3*   GLUCOSE 164* 186*   CALCIUM 9.8 9.8       Recent Labs     04/26/21  1351 04/27/21  0546   ALKPHOS 153* 154*   PROT 6.8 6.5   LABALBU 3.4* 3.2*   BILITOT 0.6 0.4   AST 29 26   ALT 29 27       Recent Labs     04/26/21  1351 04/27/21  0546   WBC 12.5* 10.4   RBC 3.21* 3.24*   HGB 9.3* 9.2*   HCT 28.8* 30.1*   MCV 89.7 92.9   MCH 29.0 28.4   MCHC 32.3 30.6*   RDW 14.3 14.5    340   MPV 9.7 9.5       Troponin:    Lab Results   Component Value Date    TROPONINI 0.03 04/26/2021        Radiology:   XR CHEST 1 VIEW   Final Result   No acute cardiopulmonary process. US DUP LOWER EXTREMITIES BILATERAL VENOUS   Final Result   No evidence of DVT in either lower extremity. NM LUNG SCAN PERFUSION ONLY   Final Result   No segmental or subsegmental perfusion defects         CT HEAD WO CONTRAST   Final Result   No acute intracranial abnormality. Assessment:  Principal Problem:    Suspected pulmonary embolism  Active Problems:    Obesity    S/P lumbar fusion    Essential hypertension    Controlled type 2 diabetes mellitus without complication, without long-term current use of insulin (HCC)    FAY (acute kidney injury) ([facility] [address]  Resolved Problems:    * No resolved hospital problems. *      Plan:  1. Suspected pulmonary embolism - NM Perfusion scan negative - Lovenox resumed - elevated D dimer - no evidence of recent DVT per us - will monitor closely      2. Atrial fibrillation with RVR - new onset - IV Lopressor given with no change in heart rated - consult cardiology - started on Cardizem drip - BP and HR parameters given     3.  Acute kidney injury - IVF's continued - BUN/creatinine improving     4. Essential hypertension - continue hold Cozaar - BP controlled and stable     5. Status post lumbar fusion - lumbar incision healing     6. Diabetes mellitus type II - continue dietary control - will monitor BG     7. Hyperlipidemia - on statin     8. Hypothyroidism - on Synthroid     9. Anemia of chronic disease - cbc stable     10. Obstructive sleep apnea - uses CPAP    NOTE: This report was transcribed using voice recognition software. Every effort was made to ensure accuracy; however, inadvertent computerized transcription errors may be present.      Electronically signed by PERLITA De Oliveira CNP on 4/27/2021 at 1:13 PM

## 2021-04-28 VITALS
OXYGEN SATURATION: 95 % | RESPIRATION RATE: 17 BRPM | HEART RATE: 66 BPM | WEIGHT: 204.4 LBS | DIASTOLIC BLOOD PRESSURE: 74 MMHG | SYSTOLIC BLOOD PRESSURE: 148 MMHG | TEMPERATURE: 99.4 F | HEIGHT: 65 IN | BODY MASS INDEX: 34.05 KG/M2

## 2021-04-28 LAB
ANION GAP SERPL CALCULATED.3IONS-SCNC: 12 MMOL/L (ref 7–16)
BUN BLDV-MCNC: 23 MG/DL (ref 6–23)
CALCIUM SERPL-MCNC: 9.4 MG/DL (ref 8.6–10.2)
CHLORIDE BLD-SCNC: 102 MMOL/L (ref 98–107)
CO2: 23 MMOL/L (ref 22–29)
CREAT SERPL-MCNC: 1 MG/DL (ref 0.5–1)
GFR AFRICAN AMERICAN: >60
GFR NON-AFRICAN AMERICAN: 54 ML/MIN/1.73
GLUCOSE BLD-MCNC: 148 MG/DL (ref 74–99)
HBA1C MFR BLD: 6.8 % (ref 4–5.6)
HCT VFR BLD CALC: 28.3 % (ref 34–48)
HEMOGLOBIN: 9 G/DL (ref 11.5–15.5)
MAGNESIUM: 1.5 MG/DL (ref 1.6–2.6)
MCH RBC QN AUTO: 28.7 PG (ref 26–35)
MCHC RBC AUTO-ENTMCNC: 31.8 % (ref 32–34.5)
MCV RBC AUTO: 90.1 FL (ref 80–99.9)
METER GLUCOSE: 152 MG/DL (ref 74–99)
PDW BLD-RTO: 14.6 FL (ref 11.5–15)
PLATELET # BLD: 334 E9/L (ref 130–450)
PMV BLD AUTO: 9.1 FL (ref 7–12)
POTASSIUM SERPL-SCNC: 4.4 MMOL/L (ref 3.5–5)
RBC # BLD: 3.14 E12/L (ref 3.5–5.5)
SODIUM BLD-SCNC: 137 MMOL/L (ref 132–146)
URINE CULTURE, ROUTINE: NORMAL
WBC # BLD: 10.8 E9/L (ref 4.5–11.5)

## 2021-04-28 PROCEDURE — 83036 HEMOGLOBIN GLYCOSYLATED A1C: CPT

## 2021-04-28 PROCEDURE — G0378 HOSPITAL OBSERVATION PER HR: HCPCS

## 2021-04-28 PROCEDURE — 83735 ASSAY OF MAGNESIUM: CPT

## 2021-04-28 PROCEDURE — 80048 BASIC METABOLIC PNL TOTAL CA: CPT

## 2021-04-28 PROCEDURE — 99239 HOSP IP/OBS DSCHRG MGMT >30: CPT | Performed by: INTERNAL MEDICINE

## 2021-04-28 PROCEDURE — 6370000000 HC RX 637 (ALT 250 FOR IP): Performed by: NURSE PRACTITIONER

## 2021-04-28 PROCEDURE — 85027 COMPLETE CBC AUTOMATED: CPT

## 2021-04-28 PROCEDURE — 6370000000 HC RX 637 (ALT 250 FOR IP): Performed by: INTERNAL MEDICINE

## 2021-04-28 PROCEDURE — 82962 GLUCOSE BLOOD TEST: CPT

## 2021-04-28 PROCEDURE — 99232 SBSQ HOSP IP/OBS MODERATE 35: CPT | Performed by: INTERNAL MEDICINE

## 2021-04-28 PROCEDURE — APPSS45 APP SPLIT SHARED TIME 31-45 MINUTES: Performed by: NURSE PRACTITIONER

## 2021-04-28 PROCEDURE — 36415 COLL VENOUS BLD VENIPUNCTURE: CPT

## 2021-04-28 PROCEDURE — 93005 ELECTROCARDIOGRAM TRACING: CPT | Performed by: NURSE PRACTITIONER

## 2021-04-28 RX ORDER — PAROXETINE 10 MG/1
10 TABLET, FILM COATED ORAL DAILY
Qty: 30 TABLET | Refills: 0 | Status: CANCELLED | OUTPATIENT
Start: 2021-04-28

## 2021-04-28 RX ORDER — PAROXETINE HYDROCHLORIDE 20 MG/1
10 TABLET, FILM COATED ORAL DAILY
Status: DISCONTINUED | OUTPATIENT
Start: 2021-04-28 | End: 2021-04-28 | Stop reason: HOSPADM

## 2021-04-28 RX ORDER — PAROXETINE 10 MG/1
10 TABLET, FILM COATED ORAL DAILY
Qty: 30 TABLET | Refills: 0 | Status: SHIPPED | OUTPATIENT
Start: 2021-04-28 | End: 2021-06-18 | Stop reason: SDUPTHER

## 2021-04-28 RX ADMIN — ACETAMINOPHEN 650 MG: 325 TABLET, FILM COATED ORAL at 07:46

## 2021-04-28 RX ADMIN — LEVOTHYROXINE SODIUM 50 MCG: 50 TABLET ORAL at 05:00

## 2021-04-28 RX ADMIN — DOCUSATE SODIUM 100 MG: 100 CAPSULE, LIQUID FILLED ORAL at 09:02

## 2021-04-28 RX ADMIN — ATORVASTATIN CALCIUM 10 MG: 20 TABLET, FILM COATED ORAL at 09:03

## 2021-04-28 RX ADMIN — METOPROLOL TARTRATE 12.5 MG: 25 TABLET, FILM COATED ORAL at 09:05

## 2021-04-28 RX ADMIN — ALLOPURINOL 200 MG: 100 TABLET ORAL at 09:04

## 2021-04-28 RX ADMIN — APIXABAN 5 MG: 5 TABLET, FILM COATED ORAL at 09:05

## 2021-04-28 RX ADMIN — OXYBUTYNIN CHLORIDE 5 MG: 5 TABLET ORAL at 09:04

## 2021-04-28 RX ADMIN — PAROXETINE HYDROCHLORIDE 10 MG: 20 TABLET, FILM COATED ORAL at 12:59

## 2021-04-28 RX ADMIN — GABAPENTIN 300 MG: 300 CAPSULE ORAL at 09:02

## 2021-04-28 ASSESSMENT — PAIN SCALES - GENERAL
PAINLEVEL_OUTOF10: 0
PAINLEVEL_OUTOF10: 2

## 2021-04-28 NOTE — CARE COORDINATION
4/28/21 Discharge review: pt getting ekg- Orders: Eliquis new- pharmacy [facility]- will bring up the patients starter pack. Free 30 day trial directions in soft blue chart- for discharge teaching. Discharge to home.  Electronically signed by Cris Ogden RN-BC on 4/28/2021 at 11:47 AM

## 2021-04-28 NOTE — PROGRESS NOTES
BNP in the last 72 hours. PT/INR: No results for input(s): PROTIME, INR in the last 72 hours. APTT:No results for input(s): APTT in the last 72 hours. CARDIAC ENZYMES:  Recent Labs     21  1351   TROPONINI 0.03     FASTING LIPID PANEL:  Lab Results   Component Value Date    CHOL 179 2019    HDL 45 2019    LDLCALC 102 2019    TRIG 158 2019     LIVER PROFILE:  Recent Labs     21  1351 21  0546   AST 29 26   ALT 29 27   LABALBU 3.4* 3.2*       Current Inpatient Medications:   lidocaine  5 mL Intradermal Once    sodium chloride flush  5-40 mL Intravenous 2 times per day    heparin flush  1 mL Intravenous 2 times per day    metoprolol tartrate  12.5 mg Oral BID    insulin lispro  0-6 Units Subcutaneous TID WC    insulin lispro  0-3 Units Subcutaneous Nightly    docusate sodium  100 mg Oral Daily    apixaban  5 mg Oral BID    allopurinol  200 mg Oral Daily    atorvastatin  10 mg Oral Daily    latanoprost  1 drop Left Eye Nightly    levothyroxine  50 mcg Oral Daily    [Held by provider] losartan  25 mg Oral Daily    [Held by provider] pantoprazole  40 mg Oral QAM AC    oxybutynin  5 mg Oral Daily    [Held by provider] propranolol  10 mg Oral QPM    [Held by provider] triamterene-hydroCHLOROthiazide  1 tablet Oral Daily    gabapentin  300 mg Oral TID       IV Infusions (if any):   sodium chloride      dilTIAZem (CARDIZEM) 125 mg in dextrose 5% 125 mL infusion 5 mg/hr (21 1334)    dextrose           PHYSICAL EXAM:     CONSTITUTIONAL:   /68   Pulse 62   Temp 98.1 °F (36.7 °C) (Oral)   Resp 25   Ht 5' 5\" (1.651 m)   Wt 204 lb 6.4 oz (92.7 kg)   SpO2 95%   BMI 34.01 kg/m²   Pulse  Av.7  Min: 54  Max: 701  Systolic (36XKT), OUF:207 , Min:104 , XWE:162    Diastolic (69GAQ), EHN:58, Min:58, Max:90        In general, this is a well developed, well nourished who appears stated age.  awake, alert, cooperative, no apparent distress  HEENT: eyes -conjunctivae pink,  Throat - Oral mucosa pink and moist.   Neck-  no stridor, no carotid bruit. no jugular venous distention   RESPIRATORY: Chest symmetrical   No accessory muscle use. Lung auscultation -  few rhonchi  CARDIOVASCULAR:     Heart Inspection shows no noted pulsations  Heart Palpation - no palpable thrills  Heart Ausculation - Regular rhythm, 1/6 systolic murmur, No s3 or rub. No lower extremity edema, no varicosities. Distal pulses palpable, no clubbing or cyanosis   ABDOMEN: Soft, Bowel sounds present. MS: good muscle strength and tone. : Deferred  Rectal Exam: Deferred  SKIN: warm and dry no statis dermatitis or ulcers   NEURO / PSYCH: oriented to person, place           ASSESSMENT/PLAN:       Atrial fibrillation with rapid ventricular response (HCC) - NEW, Dx 4/27/2021 - high [id number] VASc score - Risk benefits of OAC discussed, agreeable for Eliquis - Aspirin discontinued. TSH normal. Converted to Sinus yesterday evening after long puase. Out-pt Event monitor if she gets heart racing or dizzy.       Acute CHF - Likely diastolic dysfunction and tachycardia - Given Lasix yesterday        Pericardial effusion - Moderate, large near Right atrium, No tamponade - Repeat Echo 3-6 months or early if she gets more SOB      Essential hypertension - Controlled       Controlled type 2 diabetes mellitus without complication, without long-term current use of insulin (HCC)       FAY (acute kidney injury) ([facility] [address] - better       Anemia - monitor H/H       Hypothyroidism - On HRT, TSH Ok       SHIRLEY  - On CPAP       Non morbid Obesity - Diet, exercise and weight loss discussed.       S/P lumbar fusion          OK to discharge home    F/u by Dr Jeremy Crouch    Echo findings and above recommendations discussed with her and her Son.     D/w Dr Shannon Farr    Electronically signed by Kg Lyon MD on 4/28/2021   [facility]) Cardiology

## 2021-04-28 NOTE — PROGRESS NOTES
CLINICAL PHARMACY NOTE: MEDS TO [address] Select Patient?: No  Total # of Prescriptions Filled: 2   The following medications were delivered to the patient:  · ELIQUIS STARTER PACK   · PAXIL 10 MG   Total # of Interventions Completed: 3  Time Spent (min): 15    Additional Documentation:

## 2021-04-28 NOTE — DISCHARGE SUMMARY
[facility] Physician Discharge Summary       Kristopher Cr MD  [address] [facility]  [phone]    Schedule an appointment as soon as possible for a visit in 1 week  Call to schedule post hospital follow-up appointment or keep the current appointment that you have on May 10th 2021    Mohit Spencer MD  [address] [address]  [phone]    Schedule an appointment as soon as possible for a visit in 1 week  Call to schedule post hospital follow-up appointment      Activity level: As tolerated    Diet: DIET GENERAL; No Added Salt (3-4 GM)    Dispo: Home    Condition at discharge: Stable        Patient ID:  Tyler Kumari  [id number]  78 y.o.  1945    Admit date: 4/26/2021    Discharge date and time:  4/28/2021  11:53 AM    Admission Diagnoses: Principal Problem:    Atrial fibrillation with rapid ventricular response ([address]  Active Problems:    Obesity    S/P lumbar fusion    Essential hypertension    Controlled type 2 diabetes mellitus without complication, without long-term current use of insulin (HCC)    FAY (acute kidney injury) ([address]    Major depressive disorder    Hypothyroidism  Resolved Problems:    Suspected pulmonary embolism      Discharge Diagnoses: Principal Problem:    Atrial fibrillation with rapid ventricular response ([address]  Active Problems:    Obesity    S/P lumbar fusion    Essential hypertension    Controlled type 2 diabetes mellitus without complication, without long-term current use of insulin ([address]    FAY (acute kidney injury) ([address]    Major depressive disorder    Hypothyroidism  Resolved Problems:    Suspected pulmonary embolism      Consults:  IP CONSULT TO CARDIOLOGY    Procedures: Echocardiogram (TTE)    Hospital Course: Patient was admitted with Suspected pulmonary embolism [R09.89]  Atrial fibrillation with rapid ventricular response ([address] [I48.91].  Patient is a 72-year-old female who presented to the ED with complaints of shortness of breath that is worse with exertion. Patient was admitted with suspected pulmonary embolism but however patient was an atrial fibrillation with RVR. During patient brief hospital stay patient received IV Lopressor x1 and digoxin x1. Patient also for short period was started on a Cardizem drip for rate control. Cardiology was consulted for atrial fibrillation with RVR. Patient had an echocardiogram that demonstrated normal left ventricular chamber size. Normal left ventricular systolic function, LVEF is 65%. Moderate left ventricular concentric hypertrophy noted and indeterminate LV diastolic function. Left atrium is mildly enlarged and increased left atrial volume. Interatrial septum not well visualized but appears intact. Normal right ventricle size and function. There is trace tricuspid regurgitation, RVSP 31mmHg. Normal aortic root size. Moderate circumferential pericardial effusion and large effusion near right atrium without tamponade. No intra cardiac mass or thrombus. Pt tachycardic during study. Patient had a 9-second cardiac pause and then self converted to normal sinus rhythm and has remained that way. Patient was started on Eliquis and a starter pack was provided for the patient. Patient has an appointment with her PCP on May 10th 2021 therefore she will use that as her f/u appointment. Patient denies any chest pain, shortness of breath or dizziness therefore she will be discharged home with the following medications and instructions.      Discharge Exam:  Vitals:    04/28/21 0400 04/28/21 0500 04/28/21 0600 04/28/21 0900   BP: 128/74  139/68 (!) 148/74   Pulse: 58 64 62 66   Resp: 24 29 25 17   Temp:    99.4 °F (37.4 °C)   TempSrc:    Oral   SpO2: 95% 93% 95% 95%   Weight:       Height:           General Appearance: alert and oriented to person, place and time, well-developed and well-nourished, in no acute distress  Skin: warm and dry  Head: normocephalic and atraumatic  Eyes: pupils equal, round, and reactive to light and conjunctivae normal  ENT: tympanic membrane, external ear and ear canal normal bilaterally, oropharynx clear and moist with normal mucous membranes  Neck: neck supple and non tender without mass   Pulmonary/Chest: clear to auscultation bilaterally- no wheezes, rales or rhonchi, normal air movement, no respiratory distress  Cardiovascular: normal rate, regular rhythm, normal S1 and S2 and intact distal pulses  Abdomen: soft, non-tender, non-distended, normal bowel sounds, no masses or organomegaly  Extremities: no cyanosis, no clubbing and no edema  Musculoskeletal: normal range of motion, no joint swelling, deformity or tenderness  Neurologic: no cranial nerve deficit, gait and coordination normal and speech normal    I/O last 3 completed shifts: In: 370 [P.O.:360; I.V.:10]  Out: 1950 [Urine:1950]  I/O this shift:  In: -   Out: 200 [Urine:200]      LABS:  Recent Labs     04/26/21  1351 04/27/21  0546 04/28/21  0459    135 137   K 4.2 4.7 4.4   CL 97* 100 102   CO2 23 23 23   BUN 49* 40* 23   CREATININE 1.5* 1.3* 1.0   GLUCOSE 164* 186* 148*   CALCIUM 9.8 9.8 9.4       Recent Labs     04/26/21  1351 04/27/21  0546 04/28/21  0459   WBC 12.5* 10.4 10.8   RBC 3.21* 3.24* 3.14*   HGB 9.3* 9.2* 9.0*   HCT 28.8* 30.1* 28.3*   MCV 89.7 92.9 90.1   MCH 29.0 28.4 28.7   MCHC 32.3 30.6* 31.8*   RDW 14.3 14.5 14.6    340 334   MPV 9.7 9.5 9.1       No results for input(s): POCGLU in the last 72 hours. Troponin:    Lab Results   Component Value Date    TROPONINI 0.03 04/26/2021     HgBA1c:    Lab Results   Component Value Date    LABA1C 6.8 04/28/2021       Imaging:   XR CHEST 1 VIEW   Final Result   No acute cardiopulmonary process. US DUP LOWER EXTREMITIES BILATERAL VENOUS   Final Result   No evidence of DVT in either lower extremity.          NM LUNG SCAN PERFUSION ONLY   Final Result   No segmental or subsegmental perfusion defects         CT HEAD WO CONTRAST   Final Result   No acute intracranial abnormality. Patient Instructions:      Medication List      START taking these medications    * apixaban 5 MG Tabs tablet  Commonly known as: ELIQUIS  Take 1 tablet by mouth 2 times daily     * apixaban starter pack 5 MG Tbpk tablet  Commonly known as: ELIQUIS  Take 1 tablet by mouth See Admin Instructions     PARoxetine 10 MG tablet  Commonly known as: PAXIL  Take 1 tablet by mouth daily         * This list has 2 medication(s) that are the same as other medications prescribed for you. Read the directions carefully, and ask your doctor or other care provider to review them with you. CONTINUE taking these medications    allopurinol 100 MG tablet  Commonly known as: ZYLOPRIM  Take 2 tablets by mouth daily     atorvastatin 10 MG tablet  Commonly known as: LIPITOR     CoQ-10 100 MG Caps     COVID-19 mRNA Vacc (PFIZER) 30 MCG/0.3ML Susp injection     CPAP Machine Misc     glipiZIDE-metFORMIN 2.5-500 MG per tablet  Commonly known as: METAGLIP  Take 1 tablet by mouth daily     latanoprost 0.005 % ophthalmic solution  Commonly known as: XALATAN     * levothyroxine 50 MCG tablet  Commonly known as: SYNTHROID     * levothyroxine 50 MCG tablet  Commonly known as: SYNTHROID     losartan 25 MG tablet  Commonly known as: COZAAR  Take 1 tablet by mouth daily     meloxicam 15 MG tablet  Commonly known as: Mobic  Take 1 tablet by mouth daily     Neurontin 600 MG tablet  Generic drug: gabapentin  Take 1 tablet by mouth 3 times daily.      omeprazole 20 MG delayed release capsule  Commonly known as: PRILOSEC  Take 1 capsule by mouth daily     oxybutynin 5 MG tablet  Commonly known as: DITROPAN  Take 1 tablet by mouth daily     propranolol 10 MG tablet  Commonly known as: INDERAL  Take 1 tablet by mouth every evening     triamterene-hydroCHLOROthiazide 75-50 MG per tablet  Commonly known as: Maxzide  Take 1 tablet by

## 2021-04-29 ENCOUNTER — TELEPHONE (OUTPATIENT)
Dept: CARDIOLOGY CLINIC | Age: 76
End: 2021-04-29

## 2021-04-29 ENCOUNTER — TELEPHONE (OUTPATIENT)
Dept: ADMINISTRATIVE | Age: 76
End: 2021-04-29

## 2021-04-29 ENCOUNTER — TELEPHONE (OUTPATIENT)
Dept: INTERNAL MEDICINE | Age: 76
End: 2021-04-29

## 2021-04-29 LAB
EKG ATRIAL RATE: 56 BPM
EKG P AXIS: 33 DEGREES
EKG P-R INTERVAL: 142 MS
EKG Q-T INTERVAL: 424 MS
EKG QRS DURATION: 80 MS
EKG QTC CALCULATION (BAZETT): 409 MS
EKG R AXIS: 23 DEGREES
EKG T AXIS: 36 DEGREES
EKG VENTRICULAR RATE: 56 BPM

## 2021-04-29 PROCEDURE — 93010 ELECTROCARDIOGRAM REPORT: CPT | Performed by: INTERNAL MEDICINE

## 2021-04-29 NOTE — TELEPHONE ENCOUNTER
I called and talked to her daughter, Mariaa Niño ([phone]) and all questions answered about her Mom's diagnosis and recent hospitalization.     Electronically signed by Debby Noe MD on 4/29/2021 at 4:10 PM

## 2021-04-29 NOTE — TELEPHONE ENCOUNTER
[facility] Transitions Initial Follow Up Call    Outreach made within 2 business days of discharge: Yes    Patient: Adeline Purcell Patient : 1945   MRN: <[id number]>  Reason for Admission: There are no discharge diagnoses documented for the most recent discharge. Discharge Date: 21        Discharge department/facility: Home    TCM Interactive Patient Contact:  Left message for pt to return TCM call.         Appointment with PCP within 7-14 days    Follow Up  Future Appointments   Date Time Provider Jyothi Hdez   2021  2:30 PM Laureano Davila PT [facility] PT [facility]   5/10/2021  1:15 PM [facility]   2021  1:45 PM MARI Pierre [place], [place]

## 2021-04-30 ENCOUNTER — APPOINTMENT (OUTPATIENT)
Dept: GENERAL RADIOLOGY | Age: 76
DRG: 309 | End: 2021-04-30
Payer: MEDICARE

## 2021-04-30 ENCOUNTER — HOSPITAL ENCOUNTER (INPATIENT)
Age: 76
LOS: 1 days | Discharge: ANOTHER HOSPITAL | DRG: 309 | End: 2021-05-01
Attending: EMERGENCY MEDICINE | Admitting: INTERNAL MEDICINE
Payer: MEDICARE

## 2021-04-30 ENCOUNTER — APPOINTMENT (OUTPATIENT)
Dept: CT IMAGING | Age: 76
DRG: 309 | End: 2021-04-30
Payer: MEDICARE

## 2021-04-30 VITALS
BODY MASS INDEX: 34.16 KG/M2 | OXYGEN SATURATION: 99 % | WEIGHT: 205 LBS | HEART RATE: 67 BPM | RESPIRATION RATE: 21 BRPM | SYSTOLIC BLOOD PRESSURE: 140 MMHG | DIASTOLIC BLOOD PRESSURE: 67 MMHG | TEMPERATURE: 98.4 F | HEIGHT: 65 IN

## 2021-04-30 DIAGNOSIS — I48.91 ATRIAL FIBRILLATION WITH RVR (HCC): Primary | ICD-10-CM

## 2021-04-30 DIAGNOSIS — J90 PLEURAL EFFUSION: ICD-10-CM

## 2021-04-30 DIAGNOSIS — R79.89 ELEVATED BRAIN NATRIURETIC PEPTIDE (BNP) LEVEL: ICD-10-CM

## 2021-04-30 LAB
ALBUMIN SERPL-MCNC: 3.1 G/DL (ref 3.5–5.2)
ALP BLD-CCNC: 159 U/L (ref 35–104)
ALT SERPL-CCNC: 20 U/L (ref 0–32)
ANION GAP SERPL CALCULATED.3IONS-SCNC: 12 MMOL/L (ref 7–16)
AST SERPL-CCNC: 17 U/L (ref 0–31)
BACTERIA: ABNORMAL /HPF
BASOPHILS ABSOLUTE: 0.06 E9/L (ref 0–0.2)
BASOPHILS RELATIVE PERCENT: 0.4 % (ref 0–2)
BILIRUB SERPL-MCNC: 0.5 MG/DL (ref 0–1.2)
BILIRUBIN URINE: NEGATIVE
BLOOD, URINE: NEGATIVE
BUN BLDV-MCNC: 14 MG/DL (ref 6–23)
CALCIUM SERPL-MCNC: 9.5 MG/DL (ref 8.6–10.2)
CHLORIDE BLD-SCNC: 102 MMOL/L (ref 98–107)
CLARITY: CLEAR
CO2: 22 MMOL/L (ref 22–29)
COLOR: YELLOW
CREAT SERPL-MCNC: 0.8 MG/DL (ref 0.5–1)
EOSINOPHILS ABSOLUTE: 0.37 E9/L (ref 0.05–0.5)
EOSINOPHILS RELATIVE PERCENT: 2.8 % (ref 0–6)
GFR AFRICAN AMERICAN: >60
GFR NON-AFRICAN AMERICAN: >60 ML/MIN/1.73
GLUCOSE BLD-MCNC: 165 MG/DL (ref 74–99)
GLUCOSE URINE: NEGATIVE MG/DL
HCT VFR BLD CALC: 35.3 % (ref 34–48)
HEMOGLOBIN: 10.3 G/DL (ref 11.5–15.5)
IMMATURE GRANULOCYTES #: 0.37 E9/L
IMMATURE GRANULOCYTES %: 2.8 % (ref 0–5)
KETONES, URINE: NEGATIVE MG/DL
LEUKOCYTE ESTERASE, URINE: NEGATIVE
LYMPHOCYTES ABSOLUTE: 1.55 E9/L (ref 1.5–4)
LYMPHOCYTES RELATIVE PERCENT: 11.6 % (ref 20–42)
MCH RBC QN AUTO: 27.6 PG (ref 26–35)
MCHC RBC AUTO-ENTMCNC: 29.2 % (ref 32–34.5)
MCV RBC AUTO: 94.6 FL (ref 80–99.9)
METER GLUCOSE: 160 MG/DL (ref 74–99)
MONOCYTES ABSOLUTE: 1.25 E9/L (ref 0.1–0.95)
MONOCYTES RELATIVE PERCENT: 9.3 % (ref 2–12)
NEUTROPHILS ABSOLUTE: 9.81 E9/L (ref 1.8–7.3)
NEUTROPHILS RELATIVE PERCENT: 73.1 % (ref 43–80)
NITRITE, URINE: NEGATIVE
PDW BLD-RTO: 14.7 FL (ref 11.5–15)
PH UA: 7 (ref 5–9)
PLATELET # BLD: 364 E9/L (ref 130–450)
PMV BLD AUTO: 9 FL (ref 7–12)
POTASSIUM REFLEX MAGNESIUM: 4.7 MMOL/L (ref 3.5–5)
PRO-BNP: 1035 PG/ML (ref 0–450)
PROTEIN UA: NEGATIVE MG/DL
RBC # BLD: 3.73 E12/L (ref 3.5–5.5)
RBC UA: ABNORMAL /HPF (ref 0–2)
SARS-COV-2, NAAT: NOT DETECTED
SODIUM BLD-SCNC: 136 MMOL/L (ref 132–146)
SPECIFIC GRAVITY UA: 1.01 (ref 1–1.03)
TOTAL PROTEIN: 6.6 G/DL (ref 6.4–8.3)
TROPONIN: <0.01 NG/ML (ref 0–0.03)
TSH SERPL DL<=0.05 MIU/L-ACNC: 3.17 UIU/ML (ref 0.27–4.2)
UROBILINOGEN, URINE: 1 E.U./DL
WBC # BLD: 13.4 E9/L (ref 4.5–11.5)
WBC UA: ABNORMAL /HPF (ref 0–5)

## 2021-04-30 PROCEDURE — 80053 COMPREHEN METABOLIC PANEL: CPT

## 2021-04-30 PROCEDURE — 71275 CT ANGIOGRAPHY CHEST: CPT

## 2021-04-30 PROCEDURE — APPSS45 APP SPLIT SHARED TIME 31-45 MINUTES: Performed by: NURSE PRACTITIONER

## 2021-04-30 PROCEDURE — 99222 1ST HOSP IP/OBS MODERATE 55: CPT | Performed by: INTERNAL MEDICINE

## 2021-04-30 PROCEDURE — 1200000000 HC SEMI PRIVATE

## 2021-04-30 PROCEDURE — 81001 URINALYSIS AUTO W/SCOPE: CPT

## 2021-04-30 PROCEDURE — 84484 ASSAY OF TROPONIN QUANT: CPT

## 2021-04-30 PROCEDURE — 82962 GLUCOSE BLOOD TEST: CPT

## 2021-04-30 PROCEDURE — 36415 COLL VENOUS BLD VENIPUNCTURE: CPT

## 2021-04-30 PROCEDURE — 83880 ASSAY OF NATRIURETIC PEPTIDE: CPT

## 2021-04-30 PROCEDURE — 96361 HYDRATE IV INFUSION ADD-ON: CPT

## 2021-04-30 PROCEDURE — 6370000000 HC RX 637 (ALT 250 FOR IP): Performed by: STUDENT IN AN ORGANIZED HEALTH CARE EDUCATION/TRAINING PROGRAM

## 2021-04-30 PROCEDURE — 84443 ASSAY THYROID STIM HORMONE: CPT

## 2021-04-30 PROCEDURE — 87635 SARS-COV-2 COVID-19 AMP PRB: CPT

## 2021-04-30 PROCEDURE — 85025 COMPLETE CBC W/AUTO DIFF WBC: CPT

## 2021-04-30 PROCEDURE — 6360000004 HC RX CONTRAST MEDICATION: Performed by: RADIOLOGY

## 2021-04-30 PROCEDURE — 2580000003 HC RX 258: Performed by: STUDENT IN AN ORGANIZED HEALTH CARE EDUCATION/TRAINING PROGRAM

## 2021-04-30 PROCEDURE — 71045 X-RAY EXAM CHEST 1 VIEW: CPT

## 2021-04-30 PROCEDURE — 96374 THER/PROPH/DIAG INJ IV PUSH: CPT

## 2021-04-30 PROCEDURE — 2500000003 HC RX 250 WO HCPCS: Performed by: STUDENT IN AN ORGANIZED HEALTH CARE EDUCATION/TRAINING PROGRAM

## 2021-04-30 PROCEDURE — 99285 EMERGENCY DEPT VISIT HI MDM: CPT

## 2021-04-30 RX ORDER — ACETAMINOPHEN 650 MG/1
650 SUPPOSITORY RECTAL EVERY 6 HOURS PRN
Status: CANCELLED | OUTPATIENT
Start: 2021-04-30

## 2021-04-30 RX ORDER — 0.9 % SODIUM CHLORIDE 0.9 %
1000 INTRAVENOUS SOLUTION INTRAVENOUS ONCE
Status: COMPLETED | OUTPATIENT
Start: 2021-04-30 | End: 2021-05-01

## 2021-04-30 RX ORDER — ACETAMINOPHEN 325 MG/1
650 TABLET ORAL EVERY 6 HOURS PRN
Status: CANCELLED | OUTPATIENT
Start: 2021-04-30

## 2021-04-30 RX ORDER — ATORVASTATIN CALCIUM 20 MG/1
10 TABLET, FILM COATED ORAL NIGHTLY
Status: CANCELLED | OUTPATIENT
Start: 2021-04-30

## 2021-04-30 RX ORDER — OXYBUTYNIN CHLORIDE 5 MG/1
5 TABLET ORAL DAILY
Status: CANCELLED | OUTPATIENT
Start: 2021-04-30

## 2021-04-30 RX ORDER — DILTIAZEM HYDROCHLORIDE 5 MG/ML
20 INJECTION INTRAVENOUS ONCE
Status: COMPLETED | OUTPATIENT
Start: 2021-04-30 | End: 2021-04-30

## 2021-04-30 RX ORDER — DEXTROSE MONOHYDRATE 25 G/50ML
12.5 INJECTION, SOLUTION INTRAVENOUS PRN
Status: CANCELLED | OUTPATIENT
Start: 2021-04-30

## 2021-04-30 RX ORDER — PAROXETINE 10 MG/1
10 TABLET, FILM COATED ORAL DAILY
Status: CANCELLED | OUTPATIENT
Start: 2021-04-30

## 2021-04-30 RX ORDER — ACETAMINOPHEN 325 MG/1
650 TABLET ORAL ONCE
Status: COMPLETED | OUTPATIENT
Start: 2021-04-30 | End: 2021-04-30

## 2021-04-30 RX ORDER — ALLOPURINOL 100 MG/1
200 TABLET ORAL DAILY
Status: CANCELLED | OUTPATIENT
Start: 2021-04-30

## 2021-04-30 RX ORDER — SODIUM CHLORIDE 9 MG/ML
25 INJECTION, SOLUTION INTRAVENOUS PRN
Status: CANCELLED | OUTPATIENT
Start: 2021-04-30

## 2021-04-30 RX ORDER — PROMETHAZINE HYDROCHLORIDE 25 MG/1
12.5 TABLET ORAL EVERY 6 HOURS PRN
Status: CANCELLED | OUTPATIENT
Start: 2021-04-30

## 2021-04-30 RX ORDER — POLYETHYLENE GLYCOL 3350 17 G/17G
17 POWDER, FOR SOLUTION ORAL DAILY PRN
Status: CANCELLED | OUTPATIENT
Start: 2021-04-30

## 2021-04-30 RX ORDER — SODIUM CHLORIDE 0.9 % (FLUSH) 0.9 %
5-40 SYRINGE (ML) INJECTION PRN
Status: CANCELLED | OUTPATIENT
Start: 2021-04-30

## 2021-04-30 RX ORDER — LATANOPROST 50 UG/ML
1 SOLUTION/ DROPS OPHTHALMIC NIGHTLY
Status: CANCELLED | OUTPATIENT
Start: 2021-04-30

## 2021-04-30 RX ORDER — NICOTINE POLACRILEX 4 MG
15 LOZENGE BUCCAL PRN
Status: CANCELLED | OUTPATIENT
Start: 2021-04-30

## 2021-04-30 RX ORDER — TRIAMTERENE AND HYDROCHLOROTHIAZIDE 75; 50 MG/1; MG/1
1 TABLET ORAL DAILY
Status: CANCELLED | OUTPATIENT
Start: 2021-04-30

## 2021-04-30 RX ORDER — LOSARTAN POTASSIUM 25 MG/1
25 TABLET ORAL DAILY
Status: CANCELLED | OUTPATIENT
Start: 2021-04-30

## 2021-04-30 RX ORDER — GABAPENTIN 600 MG/1
600 TABLET ORAL 3 TIMES DAILY
Status: CANCELLED | OUTPATIENT
Start: 2021-04-30

## 2021-04-30 RX ORDER — ACETAMINOPHEN 500 MG
1000 TABLET ORAL ONCE
Status: COMPLETED | OUTPATIENT
Start: 2021-04-30 | End: 2021-04-30

## 2021-04-30 RX ORDER — MELOXICAM 7.5 MG/1
15 TABLET ORAL DAILY
Status: CANCELLED | OUTPATIENT
Start: 2021-04-30

## 2021-04-30 RX ORDER — PANTOPRAZOLE SODIUM 40 MG/1
40 TABLET, DELAYED RELEASE ORAL
Status: CANCELLED | OUTPATIENT
Start: 2021-05-01

## 2021-04-30 RX ORDER — LEVOTHYROXINE SODIUM 0.05 MG/1
50 TABLET ORAL DAILY
Status: CANCELLED | OUTPATIENT
Start: 2021-04-30

## 2021-04-30 RX ORDER — DEXTROSE MONOHYDRATE 50 MG/ML
100 INJECTION, SOLUTION INTRAVENOUS PRN
Status: CANCELLED | OUTPATIENT
Start: 2021-04-30

## 2021-04-30 RX ORDER — PROPRANOLOL HYDROCHLORIDE 10 MG/1
10 TABLET ORAL EVERY EVENING
Status: CANCELLED | OUTPATIENT
Start: 2021-04-30

## 2021-04-30 RX ORDER — SODIUM CHLORIDE 0.9 % (FLUSH) 0.9 %
5-40 SYRINGE (ML) INJECTION EVERY 12 HOURS SCHEDULED
Status: CANCELLED | OUTPATIENT
Start: 2021-04-30

## 2021-04-30 RX ORDER — ONDANSETRON 2 MG/ML
4 INJECTION INTRAMUSCULAR; INTRAVENOUS EVERY 6 HOURS PRN
Status: CANCELLED | OUTPATIENT
Start: 2021-04-30

## 2021-04-30 RX ADMIN — APIXABAN 5 MG: 5 TABLET, FILM COATED ORAL at 21:26

## 2021-04-30 RX ADMIN — ACETAMINOPHEN 1000 MG: 500 TABLET ORAL at 11:01

## 2021-04-30 RX ADMIN — SODIUM CHLORIDE 1000 ML: 9 INJECTION, SOLUTION INTRAVENOUS at 09:04

## 2021-04-30 RX ADMIN — ACETAMINOPHEN 650 MG: 325 TABLET ORAL at 19:38

## 2021-04-30 RX ADMIN — DILTIAZEM HYDROCHLORIDE 10 MG/HR: 5 INJECTION INTRAVENOUS at 09:32

## 2021-04-30 RX ADMIN — IOPAMIDOL 75 ML: 755 INJECTION, SOLUTION INTRAVENOUS at 11:36

## 2021-04-30 RX ADMIN — DILTIAZEM HYDROCHLORIDE 20 MG: 5 INJECTION INTRAVENOUS at 09:10

## 2021-04-30 ASSESSMENT — ENCOUNTER SYMPTOMS
ABDOMINAL PAIN: 0
NAUSEA: 0
ABDOMINAL DISTENTION: 0
COUGH: 0
VOMITING: 0
CHEST TIGHTNESS: 0
SHORTNESS OF BREATH: 1
DIARRHEA: 0
PHOTOPHOBIA: 0

## 2021-04-30 ASSESSMENT — PAIN SCALES - GENERAL
PAINLEVEL_OUTOF10: 6
PAINLEVEL_OUTOF10: 7

## 2021-04-30 NOTE — ED NOTES
Pt had sinus pause lasting approx 5 seconds, er physician aware. Pt did convert to nsr following this episode, cardizem was stopped. Pt was awake and alert, neuro assessment wnl. See attached monitor strip.      Sharyle Croon, RN  04/30/21 0269

## 2021-04-30 NOTE — Clinical Note
Patient Class: Inpatient [101]   REQUIRED: Diagnosis: Atrial fibrillation with rapid ventricular response ([facility] [address] [facility] [[id number]]   Estimated Length of Stay: Estimated stay of more than 2 midnights   Admitting Provider: Rhonda Calderón   Telemetry Bed Required?: Yes

## 2021-04-30 NOTE — ED NOTES
Bed: 07  Expected date:   Expected time:   Means of arrival:   Comments:  Via Huma Noriega RN  04/30/21 2259

## 2021-04-30 NOTE — ED PROVIDER NOTES
Akash Smith is a 78year old female with PMH of hypothyroidism, afib, who presented to ED with concenrs for shortness of breath. Patient stated that she woke up around 3 AM and began to have shortness of breath and palpitations. Patient shortness of breath was worse with exertion. And did not improve with rest.  Patient waited till the morning to wake of her daughter to bring her to emergency department for evaluation. Patient does have a history of A. fib that was just recently diagnosed. Patient was hospitalized and suspected to have a pulmonary embolism. Patient was started on Eliquis at that time. Patient felt well following discharge until today. Before patient presented to hospital at time of her last admission. Patient had about 1 to 2 weeks of having increasing tiredness and fatigue with minor activity. Patient's daughter who is a nurse practitioner ordered a D-dimer for patient which was found to be elevated. Patient was sent into emergency department for evaluation of elevated D-dimer. Patient was found to be in A. fib with RVR. Patient was started on a Cardizem drip and admitted to the hospital.  After patient was anticoagulated the plan was to cardiovert patient as she was not responding to chemical cardioversion. Patient then spontaneously converted after she had a pause of 9 seconds per records Patient was then discharged home on propanolol. Patient denies chest pain, fever, chills, nausea, vomiting. The history is provided by the patient and medical records. Shortness of Breath  Associated symptoms: no abdominal pain, no chest pain, no cough, no diaphoresis, no fever, no headaches, no neck pain, no rash and no vomiting    Risk factors: prolonged immobilization    Risk factors: no hx of cancer, no hx of PE/DVT and no tobacco use         Review of Systems   Constitutional: Negative for chills, diaphoresis, fatigue and fever. Eyes: Negative for photophobia and visual disturbance. Respiratory: Positive for shortness of breath. Negative for cough and chest tightness. Cardiovascular: Positive for palpitations. Negative for chest pain and leg swelling. Gastrointestinal: Negative for abdominal distention, abdominal pain, diarrhea, nausea and vomiting. Genitourinary: Negative for dysuria. Musculoskeletal: Negative for neck pain and neck stiffness. Skin: Negative for pallor and rash. Allergic/Immunologic: Negative for immunocompromised state. Neurological: Negative for headaches. Psychiatric/Behavioral: Negative for confusion. Physical Exam  Vitals signs and nursing note reviewed. Constitutional:       General: She is in acute distress. Appearance: She is not ill-appearing, toxic-appearing or diaphoretic. Comments: Mild distress due to dyspnea   HENT:      Head: Normocephalic and atraumatic. Eyes:      General: No scleral icterus. Conjunctiva/sclera: Conjunctivae normal.      Pupils: Pupils are equal, round, and reactive to light. Neck:      Musculoskeletal: Normal range of motion and neck supple. No neck rigidity or muscular tenderness. Cardiovascular:      Rate and Rhythm: Tachycardia present. Rhythm irregular. Pulmonary:      Effort: Pulmonary effort is normal.      Breath sounds: Normal breath sounds. Abdominal:      General: Bowel sounds are normal. There is no distension. Palpations: Abdomen is soft. Tenderness: There is no abdominal tenderness. There is no guarding or rebound. Musculoskeletal:      Right lower leg: No edema. Left lower leg: No edema. Skin:     General: Skin is warm and dry. Capillary Refill: Capillary refill takes less than 2 seconds. Coloration: Skin is not pale. Findings: No erythema or rash. Neurological:      Mental Status: She is alert and oriented to person, place, and time.    Psychiatric:         Mood and Affect: Mood normal.          Procedures     MDM  Number of Diagnoses or Management Options  Atrial fibrillation with RVR (HCC)  Elevated brain natriuretic peptide (BNP) level  Pleural effusion  Diagnosis management comments: Peyton Romero is a 78year old female who presented to ED with concerns for shortness of breath and palpitations. Patient was found to be in afib with RVR. Patient was recently diagnosed with afib and discharged from the hospital in the last week. Patient arrived with 's patient was given bolus of 10mg of cardizem and placed on drip. Patient became briefly hypotensive in the 75'B systolic following the bolus and remained stable on the drip. Patient's daughter wanted mother to be seen by Dr. Анна Frias or be transferred to [facility]. [facility] both full. [facility] was unable to accommodate patient for likely several days. Dr. Анна Frias was contacted and will see patient after she is able to be transferred downtown. Transfer was arranged and plan was for patient to be admitted to [address] [place] [address],[address] pending transfer. Patient remained stable on cardizem drip of 10mg. Patient's HR was in the low 100's patient remained in afib. Patient did have even where she had about 6 second pause and was unresponsive. Patient immediately returned back to baseline. Patient was evaluated and had normal neurological exam following event. Dr. Анна Frias was contacted again and updated see ED coarse. Discussed patient will likely need pacemaker. Updated admitted physician at [address] [place] [address],[address] of status change. Plan will be to admit patient pending bed availability at [facility] ([facility]). Repeat EKG following pause showed sinus rhythm. Patient was well appearing nontoxic at time of re-evaluation not in any distress. Discussed plan with family they are agreeable to plan. ED Course as of Apr 30 1902 Fri Apr 30, 2021   8394 EKG: This EKG is signed and interpreted by me.     Rate: 154  Rhythm: Atrial fibrillation with RVR  Interpretation: non-specific EKG, no ST elevations or depressions, QTc prolonged at 502, normal axis  Comparison: changes compared to previous EKG      [SS]   0858  LVEF is 65%. [SS]   0930   ATTENDING PROVIDER ATTESTATION:     I have personally performed and/or participated in the history, exam, medical decision making, and procedures and agree with all pertinent clinical information unless otherwise noted. I have also reviewed and agree with the past medical, family and social history unless otherwise noted. I have discussed this patient in detail with the resident and provided the instruction and education regarding the evidence-based evaluation and treatment of [unfilled]  History: patient recently discharged after new onset a fib. She spontaneously converted during her hospitalization. She states she has been taking her medications and diuresed quite a bit yesterday. She states it started again at 2 AM.  Mild chest discomfort. My findings: Roberto Carlos Joy is a 78 y.o. female whom is in no distress. Physical exam reveals irregularly irregular heart beat. Lungs CTA. Abdomen is soft and nontender. No peripheral edema. She is alert and oriented. My plan: Symptomatic and supportive care. Will provide rate control, evaluate labs and CTA. Consult to cardiology. Electronically signed by Carlotta Hopkins DO on 4/30/21 at 9:30 AM EDT          [JS]   1120 Case discussed with hospitalist the [facility] clinic. Patient is accepted for transfer but they do not expect a bed to be available over the next few days as they are at full capacity. [SS]   8545 Family requested that patient follow with DR. Hal Camargo who is only at [employer], she will follow patient if she is admitted to [address] [place] [address]  as she does not work at [address] [place] [address]      [SS]    does not have any beds available at this time. Patient be admitted to [facility],[address] pending available bed at [facility]     [SS]   5773 Patient had about 6 second pause, we were called to room, patient was speaking with her daughter and became unresponsive. [SS]   [phone] with Dr. Verna Tolentino, she will follow patient when transferred, she recommended using lower dose cardizem 5mg for drip if needed      [SS]   8945 EKG: This EKG is signed and interpreted by me. Rate: 62  Rhythm: Sinus  Interpretation: non-specific EKG, no ST elevations or depressions, normal intervals, normal axis,   Comparison: changes compared to previous EKG, Qtc improved       [SS]      ED Course User Index  [JS] Valery Hammond DO  [SS] Julian Castrejon MD            --------------------------------------------- PAST HISTORY ---------------------------------------------  Past Medical History:  has a past medical history of Anesthesia complication, [facility] GERD (gastroesophageal reflux disease), Hyperlipidemia, Hypertension, Sleep apnea, Spinal stenosis, Thyroid disease, Tremors of nervous system, and Type 2 diabetes mellitus without complication ([facility] Past Surgical History:  has a past surgical history that includes Thyroidectomy, partial; skin biopsy; Tonsillectomy; ovarian cyst removal; Cholecystectomy; Cosmetic surgery; blepharoplasty; Hysterectomy; Tubal ligation; Foot surgery (Left); Cataract removal with implant (Left, 09/12/2017); Cataract removal with implant (Right, 10/09/2018); pr xcapsl ctrc rmvl insj io lens prosth w/o ecp (Right, 10/9/2018); Cataract removal with implant (Right, 11/01/2018); pr remv post eye implnt mater,intraocul (Right, 11/1/2018); and Lumbar spine surgery (N/A, 9/14/2020). Social History:  reports that she has never smoked. She has never used smokeless tobacco. She reports that she does not drink alcohol or use drugs. Family History: family history is not on file. The patients home medications have been reviewed.     Allergies: Epinephrine, Ace inhibitors, Adhesive tape, and Propoxyphene    -------------------------------------------------- RESULTS -------------------------------------------------    Lab  Results for orders placed or performed during the hospital encounter of 04/30/21   COVID-19, Rapid    Specimen: Nasopharyngeal Swab   Result Value Ref Range    SARS-CoV-2, NAAT Not Detected Not Detected   CBC auto differential   Result Value Ref Range    WBC 13.4 (H) 4.5 - 11.5 E9/L    RBC 3.73 3.50 - 5.50 E12/L    Hemoglobin 10.3 (L) 11.5 - 15.5 g/dL    Hematocrit 35.3 34.0 - 48.0 %    MCV 94.6 80.0 - 99.9 fL    MCH 27.6 26.0 - 35.0 pg    MCHC 29.2 (L) 32.0 - 34.5 %    RDW 14.7 11.5 - 15.0 fL    Platelets 524 421 - 168 E9/L    MPV 9.0 7.0 - 12.0 fL    Neutrophils % 73.1 43.0 - 80.0 %    Immature Granulocytes % 2.8 0.0 - 5.0 %    Lymphocytes % 11.6 (L) 20.0 - 42.0 %    Monocytes % 9.3 2.0 - 12.0 %    Eosinophils % 2.8 0.0 - 6.0 %    Basophils % 0.4 0.0 - 2.0 %    Neutrophils Absolute 9.81 (H) 1.80 - 7.30 E9/L    Immature Granulocytes # 0.37 E9/L    Lymphocytes Absolute 1.55 1.50 - 4.00 E9/L    Monocytes Absolute 1.25 (H) 0.10 - 0.95 E9/L    Eosinophils Absolute 0.37 0.05 - 0.50 E9/L    Basophils Absolute 0.06 0.00 - 0.20 E9/L   Comprehensive Metabolic Panel w/ Reflex to MG   Result Value Ref Range    Sodium 136 132 - 146 mmol/L    Potassium reflex Magnesium 4.7 3.5 - 5.0 mmol/L    Chloride 102 98 - 107 mmol/L    CO2 22 22 - 29 mmol/L    Anion Gap 12 7 - 16 mmol/L    Glucose 165 (H) 74 - 99 mg/dL    BUN 14 6 - 23 mg/dL    CREATININE 0.8 0.5 - 1.0 mg/dL    GFR Non-African American >60 >=60 mL/min/1.73    GFR African American >60     Calcium 9.5 8.6 - 10.2 mg/dL    Total Protein 6.6 6.4 - 8.3 g/dL    Albumin 3.1 (L) 3.5 - 5.2 g/dL    Total Bilirubin 0.5 0.0 - 1.2 mg/dL    Alkaline Phosphatase 159 (H) 35 - 104 U/L    ALT 20 0 - 32 U/L    AST 17 0 - 31 U/L   Troponin   Result Value Ref Range    Troponin <0.01 0.00 - 0.03 ng/mL   Brain Natriuretic Peptide   Result Value Ref Range    Pro-BNP 1,035 (H) 0 - 450 pg/mL   TSH without Reflex   Result Value Ref Range    TSH 3.170 0.270 - 4.200 uIU/mL   Urinalysis with Microscopic   Result Value Ref Range Color, UA Yellow Straw/Yellow    Clarity, UA Clear Clear    Glucose, Ur Negative Negative mg/dL    Bilirubin Urine Negative Negative    Ketones, Urine Negative Negative mg/dL    Specific Gravity, UA 1.015 1.005 - 1.030    Blood, Urine Negative Negative    pH, UA 7.0 5.0 - 9.0    Protein, UA Negative Negative mg/dL    Urobilinogen, Urine 1.0 <2.0 E.U./dL    Nitrite, Urine Negative Negative    Leukocyte Esterase, Urine Negative Negative    WBC, UA 1-3 0 - 5 /HPF    RBC, UA 0-1 0 - 2 /HPF    Bacteria, UA FEW (A) None Seen /HPF   POCT Glucose   Result Value Ref Range    Meter Glucose 160 (H) 74 - 99 mg/dL       Radiology  CTA CHEST W CONTRAST   Final Result   No evidence of pulmonary embolism      No aneurysm formation or dissection of the thoracic aorta. Mild/moderate bilateral pleural effusions. XR CHEST PORTABLE   Final Result   Mild cardiomegaly. There is no evidence of failure or pneumonia.                 ------------------------- NURSING NOTES AND VITALS REVIEWED ---------------------------  Date / Time Roomed:  4/30/2021  8:44 AM  ED Bed Assignment:  07/07    The nursing notes within the ED encounter and vital signs as below have been reviewed.    Patient Vitals for the past 24 hrs:   BP Temp Pulse Resp SpO2 Height Weight   04/30/21 1427 138/64 -- 57 18 98 % -- --   04/30/21 1245 119/69 -- 101 18 98 % -- --   04/30/21 0948 98/61 -- 109 16 98 % -- --   04/30/21 0853 (!) 118/97 98.2 °F (36.8 °C) 154 18 97 % 5' 5\" (1.651 m) 205 lb (93 kg)       Oxygen Saturation Interpretation: Normal      ------------------------------------------ PROGRESS NOTES ------------------------------------------  Re-evaluation(s):  Time: 12pm.  Patients symptoms are improving  Repeat physical examination is improved    Time: 2pm.  Patients symptoms are improving  Repeat physical examination is improved    I have spoken with the patient and discussed todays results, in addition to providing specific details for the plan of care and counseling regarding the diagnosis and prognosis. Their questions are answered at this time and they are agreeable with the plan. I have discussed the risks and benefits of transfer and they wish to proceed with the transfer. --------------------------------- ADDITIONAL PROVIDER NOTES ---------------------------------  Consultations:  Spoke with Hospitalist (Medicine). Discussed case. They will admit this patient. Spoke with Dr. Quinten Luo (Cardiology). Discussed case. They will provide consultation. Reason for transfer: electrophysiology, patient preference, possible pacemaker. This patient's ED course included: a personal history and physicial examination, re-evaluation prior to disposition, multiple bedside re-evaluations, IV medications, cardiac monitoring, continuous pulse oximetry and complex medical decision making and emergency management    This patient has remained hemodynamically stable during their ED course. Please note that the withdrawal or failure to initiate urgent interventions for this patient would likely result in a life threatening deterioration or permanent disability. Accordingly this patient received 30 minutes of critical care time, excluding separately billable procedures. Clinical Impression  1. Atrial fibrillation with RVR (HCC)    2. Elevated brain natriuretic peptide (BNP) level    3. Pleural effusion          Disposition  Patient's disposition: Transfer to [facility]  Transferred by: ground.   Patient's condition is stable          Josie Drummond MD  Resident  04/30/21 5246    Critical Care 30 minutes     [facility],   05/07/21 4788

## 2021-04-30 NOTE — H&P
[address] [place], [place] [address]ist Group   History and Physical      CHIEF COMPLAINT: Shortness of breath and palpitations    History of Present Illness: Magdaline Sacks is a 78 y.o. female with a history of GERD, hyperlipidemia, hypertension, thyroid disease and obstructive sleep apnea, who presents with shortness of breath and palpitation. Patient was recently discharged on April 28, 2021 for new onset atrial fibrillation RVR with shortness of breath. At the time of her discharge patient was weaned off Cardizem drip she has received a dose of IV Lopressor and digoxin. Patient has self converted and she was continued on previous dose of propanolol and her rate was maintained. Patient was seen by cardiology during her brief stay here and at that time no cardioversion was needed as she had self converted to sinus rhythm. Patient presents today in atrial fibrillation with RVR with a heart rate between 105 and 150. Patient denies any dizziness, lightheadedness, chest pain or pressure at this time. Patient denies any alcohol, tobacco, or illicit drug use. Informant(s) for H&P:Provided by patient     REVIEW OF SYSTEMS:  no fevers, chills, cp, sob, n/v, ha, vision/hearing changes, wt changes, hot/cold flashes, other open skin lesions, diarrhea, constipation, dysuria/hematuria unless noted in HPI. Complete ROS performed with the patient and is otherwise negative.       PMH:  Past Medical History:   Diagnosis Date    Anesthesia complication 9545    difficulty breathing post thyroid surgery [facility]    Cancer [facility])     skin     GERD (gastroesophageal reflux disease)     Hyperlipidemia     Hypertension     Sleep apnea     uses cpap    Spinal stenosis     Thyroid disease     Tremors of nervous system     familial    Type 2 diabetes mellitus without complication ([facility] [address]        Surgical History:  Past Surgical History:   Procedure Laterality Date    BLEPHAROPLASTY      CATARACT REMOVAL WITH IMPLANT Left 09/12/2017    CATARACT REMOVAL WITH IMPLANT Right 10/09/2018    CATARACT REMOVAL WITH IMPLANT Right 11/01/2018    secondary procedure to correct first cataract     CHOLECYSTECTOMY      COSMETIC SURGERY      abdominoplasty    FOOT SURGERY Left     skin cancer    HYSTERECTOMY      LUMBAR SPINE SURGERY N/A 9/14/2020    L3-L4, L4-L5  POSTERIOR LUMBAR INTERBODY FUSION performed by Dedra Ross MD at [address] [facility] [address] SHERI WALLACE,INTRAOCUL Right 11/1/2018    EYE IOL REMOVAL performed by Mary Kinsey MD at [address] [facility] INS IO LENS PROSTH W/O ECP Right 10/9/2018    RIGHT EYE CATARACT EMULSIFICATION IOL IMPLANT performed by Mary Kinsey MD at [address] [facility], PARTIAL      left lobe removed    TONSILLECTOMY      TUBAL LIGATION         Medications Prior to Admission:    Prior to Admission medications    Medication Sig Start Date End Date Taking?  Authorizing Provider   apixaban (ELIQUIS) 5 MG TABS tablet Take 1 tablet by mouth 2 times daily 4/28/21   PERLITA Johnson CNP   apixaban starter pack (ELIQUIS) 5 MG TBPK tablet Take 1 tablet by mouth See Admin Instructions 4/28/21   PERLITA Johnson CNP   PARoxetine (PAXIL) 10 MG tablet Take 1 tablet by mouth daily 4/28/21   PERLITA Johnson CNP   atorvastatin (LIPITOR) 10 MG tablet Take 10 mg by mouth nightly    Historical Provider, MD   Cholecalciferol (VITAMIN D3) 125 MCG (5000 UT) TABS Take 10,000 Units by mouth daily    Historical Provider, MD   Coenzyme Q10 (COQ-10) 100 MG CAPS Take 100 mg by mouth daily    Historical Provider, MD   levothyroxine (SYNTHROID) 50 MCG tablet Take 75 mcg by mouth Twice a Week Sunday & Thursday    Historical Provider, MD   COVID-19 mRNA Vacc, PFIZER, 30 MCG/0.3ML SUSP injection Inject 0.3 mLs into the muscle once First Injection: 2/24/2021  Second Injection: 3/17/2021    Historical Provider, MD   CPAP Machine MISC by Does not apply route nightly    Historical Provider, MD   meloxicam (MOBIC) 15 MG tablet Take 1 tablet by mouth daily 3/19/21   Elmer Galvez MD   triamterene-hydroCHLOROthiazide (MAXZIDE) 75-50 MG per tablet Take 1 tablet by mouth daily 2/4/21   Elmer Galvez MD   losartan (COZAAR) 25 MG tablet Take 1 tablet by mouth daily 2/3/21   Elmer Galvez MD   propranolol (INDERAL) 10 MG tablet Take 1 tablet by mouth every evening 2/3/21   Elmer Galvez MD   omeprazole (PRILOSEC) 20 MG delayed release capsule Take 1 capsule by mouth daily 2/3/21   Elmer Galvez MD   oxybutynin (DITROPAN) 5 MG tablet Take 1 tablet by mouth daily 2/3/21   Elmer Galvez MD   glipiZIDE-metformin (METAGLIP) 2.5-500 MG per tablet Take 1 tablet by mouth daily 1/14/21   Elmer Galvez MD   NEURONTIN 600 MG tablet Take 1 tablet by mouth 3 times daily. 12/8/20 12/8/21  Savannah Garay PA-C   allopurinol (ZYLOPRIM) 100 MG tablet Take 2 tablets by mouth daily 11/2/20   Jayden Lanza MD   levothyroxine (SYNTHROID) 50 MCG tablet Take 50 mcg by mouth Five times weekly Monday, Tuesday, Wednesday, Friday, Saturday    Historical Provider, MD   latanoprost (XALATAN) 0.005 % ophthalmic solution Place 1 drop into the left eye nightly    Historical Provider, MD       Allergies:    Epinephrine, Ace inhibitors, Adhesive tape, and Propoxyphene    Social History:    reports that she has never smoked. She has never used smokeless tobacco. She reports that she does not drink alcohol or use drugs. Family History:   family history is not on file.       PHYSICAL EXAM:  Vitals:  /64   Pulse 57   Temp 98.2 °F (36.8 °C)   Resp 18   Ht 5' 5\" (1.651 m)   Wt 205 lb (93 kg)   SpO2 98%   BMI 34.11 kg/m²     General Appearance: alert and oriented to person, place and time and in no acute distress  Skin: warm and dry  Head: normocephalic and atraumatic  Eyes: pupils equal, round, and reactive to light, extraocular eye movements intact, conjunctivae normal  Neck: neck supple and non tender without mass   Pulmonary/Chest: clear to auscultation bilaterally- no wheezes, rales or rhonchi, normal air movement, no respiratory distress  Cardiovascular: normal rate, normal S1 and S2 and no carotid bruits  Abdomen: soft, non-tender, non-distended, normal bowel sounds, no masses or organomegaly  Extremities: no cyanosis, no clubbing and no edema  Neurologic: no cranial nerve deficit and speech normal    LABS:  Recent Labs     04/28/21 0459 04/30/21  0916    136   K 4.4 4.7    102   CO2 23 22   BUN 23 14   CREATININE 1.0 0.8   GLUCOSE 148* 165*   CALCIUM 9.4 9.5       Recent Labs     04/28/21 0459 04/30/21  0916   WBC 10.8 13.4*   RBC 3.14* 3.73   HGB 9.0* 10.3*   HCT 28.3* 35.3   MCV 90.1 94.6   MCH 28.7 27.6   MCHC 31.8* 29.2*   RDW 14.6 14.7    364   MPV 9.1 9.0       No results for input(s): POCGLU in the last 72 hours. Last 3 Troponin:    Lab Results   Component Value Date    TROPONINI <0.01 04/30/2021    TROPONINI 0.03 04/26/2021    TROPONINI <0.01 10/20/2019     Echocardiogram: 04/27/2021   Summary   Normal left ventricular chamber size. Normal left ventricular systolic function, LVEF is 65%. Moderate left ventricular concentric hypertrophy noted. Indeterminate LV diastolic function. Left atrium is mildly enlarged. Increased left atrial volume. Interatrial septum not well visualized but appears intact. Normal right ventricle size and function. There is trace tricuspid regurgitation, RVSP 31mmHg. Normal aortic root size. Moderate circumferential pericardial effusion and large effusion near   right atrium without tamponade. No intra cardiac mass or thrombus. Pt tachycardic during study. No comparison study available. Radiology: Xr Chest Portable    Result Date: 4/30/2021  EXAMINATION: ONE XRAY VIEW OF THE CHEST 4/30/2021 9:41 am COMPARISON: None.  HISTORY: ORDERING SYSTEM PROVIDED HISTORY: afib TECHNOLOGIST PROVIDED HISTORY: Reason for exam:->afib FINDINGS: The heart is mildly enlarged. There are no findings of failure. The lungs are clear. There is no focal infiltrate or effusion. Mild cardiomegaly. There is no evidence of failure or pneumonia. Cta Chest W Contrast    Result Date: 4/30/2021  EXAMINATION: CTA OF THE CHEST 4/30/2021 11:38 am TECHNIQUE: CTA of the chest was performed after the administration of intravenous contrast.  Multiplanar reformatted images are provided for review. MIP images are provided for review. Dose modulation, iterative reconstruction, and/or weight based adjustment of the mA/kV was utilized to reduce the radiation dose to as low as reasonably achievable. COMPARISON: None. HISTORY: ORDERING SYSTEM PROVIDED HISTORY: evalute for PE TECHNOLOGIST PROVIDED HISTORY: Reason for exam:->evalute for PE Decision Support Exception - unselect if not a suspected or confirmed emergency medical condition->Emergency Medical Condition (MA) FINDINGS: There is no acute pulmonary embolus the main PA, right and left main PAs in the lobar, segmental and subsegmental branches to the 3/4 order approximately. There is no aneurysm formation or dissection of the thoracic aorta. Heart has normal size. There is a small pericardial effusion. The inner diameter for the left ventricular cavity is 3.3 cm. For the right ventricular cavities 3.3 cm. Interventricular septal has thickness of 15 mm. Diameter for the ascending aorta is 3.6 by 3.6 cm. The diameter for the main pulmonary artery is 2.9 cm. There is no mediastinal masses or adenopathy. There is no hilar adenopathy. Presence of bilateral pleural effusions of mild-to-moderate degree causing compression atelectasis in the more dependent area of both lower lobes. The degenerative changes are seen throughout the thoracic spine. Upper abdominal structures are not fully covered on this study.   The what is visualized have unremarkable appearance. The patient had previous cholecystectomy. There is no indication for dilatation of the biliary tree pancreatic ductal system although liver and pancreas are not fully covered. Kidneys are not fully covered. The adrenals do not appear to be enlarged. No evidence of pulmonary embolism No aneurysm formation or dissection of the thoracic aorta. Mild/moderate bilateral pleural effusions. EKG: Atrial fibrillation with RVR     ASSESSMENT:      Principal Problem:    Atrial fibrillation with rapid ventricular response (HCC)  Active Problems:    S/P lumbar fusion    Essential hypertension    Controlled type 2 diabetes mellitus without complication, without long-term current use of insulin (HCC)    Major depressive disorder    Hypothyroidism  Resolved Problems:    * No resolved hospital problems. *      PLAN:    1. Atrial fibrillation with RVR - admit telemetry - cardiology consulted - Cardizem drip - cardiac diet with 4 carb control diet - shortness of breath - prn oxygen - last echocardiogram see above EF 65% resumed Eliquis     2. Essential hypertension - blood pressure controlled with antihypertensive medication     3. Type 2 diabetes mellitus -  Oral antihyperglycemic agents held - will place on sliding scale - diet controlled - will monitor     4. Hyperlipidemia - on Lipitor     5. Hypothyroidism - controlled with Synthroid     6. S/p Lumbar fusion - lumbar incision healed     7. Major depressive disorder - restarted Paxil prior to discharge     8. Obesity - encouraged life style modification     9. Obstructive sleep pnea - uses CPAP    Code Status: Full code  DVT prophylaxis: Eliquis     NOTE: This report was transcribed using voice recognition software.  Every effort was made to ensure accuracy; however, inadvertent computerized transcription errors may be present.     Electronically signed by PERLITA Hager CNP on 4/30/2021 at 4:19 PM

## 2021-04-30 NOTE — ED NOTES
Report to Erwin brooke, room [address] [place]aryle [place], [place]  04/30/21 0547

## 2021-05-01 ENCOUNTER — HOSPITAL ENCOUNTER (INPATIENT)
Age: 76
LOS: 3 days | Discharge: HOME HEALTH | DRG: 309 | End: 2021-05-04
Attending: HOSPITALIST | Admitting: HOSPITALIST
Payer: MEDICARE

## 2021-05-01 LAB
ADENOVIRUS BY PCR: NOT DETECTED
ANION GAP SERPL CALCULATED.3IONS-SCNC: 13 MMOL/L (ref 7–16)
BLOOD CULTURE, ROUTINE: NORMAL
BORDETELLA PARAPERTUSSIS BY PCR: NOT DETECTED
BORDETELLA PERTUSSIS BY PCR: NOT DETECTED
BUN BLDV-MCNC: 11 MG/DL (ref 6–23)
C-REACTIVE PROTEIN: 5.3 MG/DL (ref 0–0.4)
CALCIUM SERPL-MCNC: 9.5 MG/DL (ref 8.6–10.2)
CHLAMYDOPHILIA PNEUMONIAE BY PCR: NOT DETECTED
CHLORIDE BLD-SCNC: 102 MMOL/L (ref 98–107)
CHOLESTEROL, TOTAL: 127 MG/DL (ref 0–199)
CO2: 23 MMOL/L (ref 22–29)
CORONAVIRUS 229E BY PCR: NOT DETECTED
CORONAVIRUS HKU1 BY PCR: NOT DETECTED
CORONAVIRUS NL63 BY PCR: NOT DETECTED
CORONAVIRUS OC43 BY PCR: NOT DETECTED
CREAT SERPL-MCNC: 0.8 MG/DL (ref 0.5–1)
D DIMER: 3688 NG/ML DDU
EKG ATRIAL RATE: 65 BPM
EKG P AXIS: 9 DEGREES
EKG P-R INTERVAL: 152 MS
EKG Q-T INTERVAL: 384 MS
EKG QRS DURATION: 84 MS
EKG QTC CALCULATION (BAZETT): 399 MS
EKG R AXIS: 9 DEGREES
EKG T AXIS: 146 DEGREES
EKG VENTRICULAR RATE: 65 BPM
GFR AFRICAN AMERICAN: >60
GFR NON-AFRICAN AMERICAN: >60 ML/MIN/1.73
GLUCOSE BLD-MCNC: 147 MG/DL (ref 74–99)
HBA1C MFR BLD: 6.8 % (ref 4–5.6)
HDLC SERPL-MCNC: 26 MG/DL
HUMAN METAPNEUMOVIRUS BY PCR: NOT DETECTED
HUMAN RHINOVIRUS/ENTEROVIRUS BY PCR: NOT DETECTED
INFLUENZA A BY PCR: NOT DETECTED
INFLUENZA B BY PCR: NOT DETECTED
LDL CHOLESTEROL CALCULATED: 78 MG/DL (ref 0–99)
MAGNESIUM: 1.4 MG/DL (ref 1.6–2.6)
METER GLUCOSE: 156 MG/DL (ref 74–99)
METER GLUCOSE: 157 MG/DL (ref 74–99)
METER GLUCOSE: 194 MG/DL (ref 74–99)
METER GLUCOSE: 202 MG/DL (ref 74–99)
MYCOPLASMA PNEUMONIAE BY PCR: NOT DETECTED
PARAINFLUENZA VIRUS 1 BY PCR: NOT DETECTED
PARAINFLUENZA VIRUS 2 BY PCR: NOT DETECTED
PARAINFLUENZA VIRUS 3 BY PCR: NOT DETECTED
PARAINFLUENZA VIRUS 4 BY PCR: NOT DETECTED
PHOSPHORUS: 3.3 MG/DL (ref 2.5–4.5)
POTASSIUM SERPL-SCNC: 4.1 MMOL/L (ref 3.5–5)
RESPIRATORY SYNCYTIAL VIRUS BY PCR: NOT DETECTED
SARS-COV-2 ANTIBODY, TOTAL: NORMAL
SARS-COV-2, PCR: NOT DETECTED
SODIUM BLD-SCNC: 138 MMOL/L (ref 132–146)
TRIGL SERPL-MCNC: 117 MG/DL (ref 0–149)
TROPONIN: <0.01 NG/ML (ref 0–0.03)
TROPONIN: <0.01 NG/ML (ref 0–0.03)
TSH SERPL DL<=0.05 MIU/L-ACNC: 3.16 UIU/ML (ref 0.27–4.2)
VLDLC SERPL CALC-MCNC: 23 MG/DL

## 2021-05-01 PROCEDURE — 80048 BASIC METABOLIC PNL TOTAL CA: CPT

## 2021-05-01 PROCEDURE — 6360000002 HC RX W HCPCS: Performed by: INTERNAL MEDICINE

## 2021-05-01 PROCEDURE — 6370000000 HC RX 637 (ALT 250 FOR IP): Performed by: FAMILY MEDICINE

## 2021-05-01 PROCEDURE — 80061 LIPID PANEL: CPT

## 2021-05-01 PROCEDURE — 83735 ASSAY OF MAGNESIUM: CPT

## 2021-05-01 PROCEDURE — 85378 FIBRIN DEGRADE SEMIQUANT: CPT

## 2021-05-01 PROCEDURE — 86769 SARS-COV-2 COVID-19 ANTIBODY: CPT

## 2021-05-01 PROCEDURE — 83036 HEMOGLOBIN GLYCOSYLATED A1C: CPT

## 2021-05-01 PROCEDURE — 84484 ASSAY OF TROPONIN QUANT: CPT

## 2021-05-01 PROCEDURE — 0202U NFCT DS 22 TRGT SARS-COV-2: CPT

## 2021-05-01 PROCEDURE — 86140 C-REACTIVE PROTEIN: CPT

## 2021-05-01 PROCEDURE — APPSS45 APP SPLIT SHARED TIME 31-45 MINUTES: Performed by: NURSE PRACTITIONER

## 2021-05-01 PROCEDURE — 36415 COLL VENOUS BLD VENIPUNCTURE: CPT

## 2021-05-01 PROCEDURE — 84100 ASSAY OF PHOSPHORUS: CPT

## 2021-05-01 PROCEDURE — 82962 GLUCOSE BLOOD TEST: CPT

## 2021-05-01 PROCEDURE — 2140000000 HC CCU INTERMEDIATE R&B

## 2021-05-01 PROCEDURE — 6370000000 HC RX 637 (ALT 250 FOR IP): Performed by: INTERNAL MEDICINE

## 2021-05-01 PROCEDURE — 84443 ASSAY THYROID STIM HORMONE: CPT

## 2021-05-01 PROCEDURE — 93005 ELECTROCARDIOGRAM TRACING: CPT | Performed by: INTERNAL MEDICINE

## 2021-05-01 RX ORDER — NICOTINE POLACRILEX 4 MG
15 LOZENGE BUCCAL PRN
Status: DISCONTINUED | OUTPATIENT
Start: 2021-05-01 | End: 2021-05-04 | Stop reason: HOSPADM

## 2021-05-01 RX ORDER — DEXTROSE MONOHYDRATE 25 G/50ML
12.5 INJECTION, SOLUTION INTRAVENOUS PRN
Status: DISCONTINUED | OUTPATIENT
Start: 2021-05-01 | End: 2021-05-04 | Stop reason: HOSPADM

## 2021-05-01 RX ORDER — LEVOTHYROXINE SODIUM 0.05 MG/1
50 TABLET ORAL DAILY
Status: DISCONTINUED | OUTPATIENT
Start: 2021-05-01 | End: 2021-05-04 | Stop reason: HOSPADM

## 2021-05-01 RX ORDER — ATORVASTATIN CALCIUM 10 MG/1
10 TABLET, FILM COATED ORAL NIGHTLY
Status: DISCONTINUED | OUTPATIENT
Start: 2021-05-01 | End: 2021-05-04 | Stop reason: HOSPADM

## 2021-05-01 RX ORDER — PANTOPRAZOLE SODIUM 20 MG/1
20 TABLET, DELAYED RELEASE ORAL
Status: DISCONTINUED | OUTPATIENT
Start: 2021-05-01 | End: 2021-05-04 | Stop reason: HOSPADM

## 2021-05-01 RX ORDER — ALLOPURINOL 100 MG/1
200 TABLET ORAL DAILY
Status: DISCONTINUED | OUTPATIENT
Start: 2021-05-01 | End: 2021-05-04 | Stop reason: HOSPADM

## 2021-05-01 RX ORDER — ALBUTEROL SULFATE 2.5 MG/3ML
2.5 SOLUTION RESPIRATORY (INHALATION) EVERY 6 HOURS PRN
Status: DISCONTINUED | OUTPATIENT
Start: 2021-05-01 | End: 2021-05-04 | Stop reason: HOSPADM

## 2021-05-01 RX ORDER — PAROXETINE 10 MG/1
10 TABLET, FILM COATED ORAL DAILY
Status: DISCONTINUED | OUTPATIENT
Start: 2021-05-01 | End: 2021-05-04 | Stop reason: HOSPADM

## 2021-05-01 RX ORDER — DEXTROSE MONOHYDRATE 50 MG/ML
100 INJECTION, SOLUTION INTRAVENOUS PRN
Status: DISCONTINUED | OUTPATIENT
Start: 2021-05-01 | End: 2021-05-04 | Stop reason: HOSPADM

## 2021-05-01 RX ORDER — MAGNESIUM SULFATE 1 G/100ML
1000 INJECTION INTRAVENOUS PRN
Status: DISCONTINUED | OUTPATIENT
Start: 2021-05-01 | End: 2021-05-04 | Stop reason: HOSPADM

## 2021-05-01 RX ORDER — LATANOPROST 50 UG/ML
1 SOLUTION/ DROPS OPHTHALMIC NIGHTLY
Status: DISCONTINUED | OUTPATIENT
Start: 2021-05-01 | End: 2021-05-04 | Stop reason: HOSPADM

## 2021-05-01 RX ORDER — GABAPENTIN 300 MG/1
600 CAPSULE ORAL 3 TIMES DAILY
Status: DISCONTINUED | OUTPATIENT
Start: 2021-05-01 | End: 2021-05-04 | Stop reason: HOSPADM

## 2021-05-01 RX ORDER — COLCHICINE 0.6 MG/1
0.6 TABLET ORAL DAILY
Status: DISCONTINUED | OUTPATIENT
Start: 2021-05-01 | End: 2021-05-04 | Stop reason: HOSPADM

## 2021-05-01 RX ORDER — OXYBUTYNIN CHLORIDE 5 MG/1
5 TABLET ORAL DAILY
Status: DISCONTINUED | OUTPATIENT
Start: 2021-05-01 | End: 2021-05-04 | Stop reason: HOSPADM

## 2021-05-01 RX ORDER — PROPRANOLOL HYDROCHLORIDE 10 MG/1
10 TABLET ORAL EVERY EVENING
Status: DISCONTINUED | OUTPATIENT
Start: 2021-05-01 | End: 2021-05-04 | Stop reason: HOSPADM

## 2021-05-01 RX ORDER — ACETAMINOPHEN 325 MG/1
650 TABLET ORAL EVERY 4 HOURS PRN
Status: DISCONTINUED | OUTPATIENT
Start: 2021-05-01 | End: 2021-05-04 | Stop reason: HOSPADM

## 2021-05-01 RX ORDER — LOSARTAN POTASSIUM 25 MG/1
25 TABLET ORAL DAILY
Status: DISCONTINUED | OUTPATIENT
Start: 2021-05-01 | End: 2021-05-04 | Stop reason: HOSPADM

## 2021-05-01 RX ADMIN — MAGNESIUM SULFATE HEPTAHYDRATE 1000 MG: 1 INJECTION, SOLUTION INTRAVENOUS at 13:04

## 2021-05-01 RX ADMIN — LOSARTAN POTASSIUM 25 MG: 25 TABLET, FILM COATED ORAL at 09:01

## 2021-05-01 RX ADMIN — PAROXETINE 10 MG: 10 TABLET, FILM COATED ORAL at 09:01

## 2021-05-01 RX ADMIN — ALLOPURINOL 200 MG: 100 TABLET ORAL at 09:01

## 2021-05-01 RX ADMIN — COLCHICINE 0.6 MG: 0.6 TABLET ORAL at 17:19

## 2021-05-01 RX ADMIN — GABAPENTIN 600 MG: 300 CAPSULE ORAL at 14:31

## 2021-05-01 RX ADMIN — ACETAMINOPHEN 650 MG: 325 TABLET ORAL at 11:08

## 2021-05-01 RX ADMIN — ACETAMINOPHEN 650 MG: 325 TABLET ORAL at 03:30

## 2021-05-01 RX ADMIN — LATANOPROST 1 DROP: 50 SOLUTION OPHTHALMIC at 21:28

## 2021-05-01 RX ADMIN — OXYBUTYNIN CHLORIDE 5 MG: 5 TABLET ORAL at 09:01

## 2021-05-01 RX ADMIN — LEVOTHYROXINE SODIUM 50 MCG: 0.05 TABLET ORAL at 06:47

## 2021-05-01 RX ADMIN — MAGNESIUM SULFATE HEPTAHYDRATE 1000 MG: 1 INJECTION, SOLUTION INTRAVENOUS at 14:32

## 2021-05-01 RX ADMIN — GABAPENTIN 600 MG: 300 CAPSULE ORAL at 21:28

## 2021-05-01 RX ADMIN — PANTOPRAZOLE SODIUM 20 MG: 20 TABLET, DELAYED RELEASE ORAL at 06:47

## 2021-05-01 RX ADMIN — PROPRANOLOL HYDROCHLORIDE 10 MG: 10 TABLET ORAL at 17:19

## 2021-05-01 RX ADMIN — ATORVASTATIN CALCIUM 10 MG: 10 TABLET, FILM COATED ORAL at 21:28

## 2021-05-01 RX ADMIN — APIXABAN 5 MG: 5 TABLET, FILM COATED ORAL at 09:01

## 2021-05-01 RX ADMIN — GABAPENTIN 600 MG: 300 CAPSULE ORAL at 09:01

## 2021-05-01 RX ADMIN — APIXABAN 5 MG: 5 TABLET, FILM COATED ORAL at 21:28

## 2021-05-01 ASSESSMENT — PAIN SCALES - GENERAL
PAINLEVEL_OUTOF10: 6
PAINLEVEL_OUTOF10: 0
PAINLEVEL_OUTOF10: 7

## 2021-05-01 NOTE — DISCHARGE SUMMARY
[facility] Physician Discharge Summary       Annel Jessica [facility]  Franne Alpers [phone]    Schedule an appointment as soon as possible for a visit in 1 week  Call to schedule a post hospital follow-up appointment     Iris Benitez MD  [address] [facility] [address] [phone]    Schedule an appointment as soon as possible for a visit in 2 weeks  Call for a post hospital follow up appointment     Chandni Del Castillo MD  [address], [address] [facility] [id number]    Go to  Transferred to [facility]       Activity level: As Tolerated     Diet: Diet NPO Effective Now Exceptions are: Sips of Water with Meds    Dispo:Transferred to [facility] main    Condition at discharge: Stable         Patient ID:  Susan Wu  [id number]  35 y.o.  1945    Admit date: 5/1/2021    Discharge date and time:  5/1/2021  8:38 AM    Admission Diagnoses: Active Problems:    Atrial fibrillation with RVR (HCC)  Resolved Problems:    * No resolved hospital problems. *      Discharge Diagnoses: Active Problems:    Atrial fibrillation with RVR (HCC)  Resolved Problems:    * No resolved hospital problems. *      Consults:  IP CONSULT TO ELECTROPHYSIOLOGY    Procedures: None    Hospital Course: Patient was admitted with Atrial fibrillation with RVR ([facility] [I48.91]. Patient is a 72-year-old female who presented to the ED with complaints of palpitations and shortness of breath. Patient was admitted for atrial fibrillation with RVR. Patient has a history of GERD, hyperlipidemia, hypertension, thyroid disease and obstructive sleep apnea, who presents with shortness of breath and palpitation. Patient was recently discharged on April 28, 2021 for new onset atrial fibrillation RVR with shortness of breath.  At the time of her discharge patient was weaned off Cardizem drip she has received a dose of IV Lopressor and digoxin. Patient has self converted and she was continued on previous dose of propanolol and her rate was maintained. Patient was seen by cardiology during her brief stay here and at that time no cardioversion was needed as she had self converted to sinus rhythm. Patient presents today in atrial fibrillation with RVR with a heart rate between 105 and 150. Patient denies any dizziness, lightheadedness, chest pain or pressure at this time. Patient was transferred to [facility] for evaluation by Dr. Su Andino the electrophysiologist.  Prior to patient leaving patient had been on a Cardizem drip and anticoagulation for her history of atrial fibrillation with RVR. At the time of patient discharge she was stable heart rate was controlled in the 70s to 80s. Patient was made aware that she will need to follow-up with cardiology upon her discharge. Discharge Exam:  Vitals:    05/01/21 0145 05/01/21 0339 05/01/21 0721   BP: (!) 154/76 (!) 146/62 (!) 152/65   Pulse: 70 70 71   Resp: 18 18 18   Temp: 97.8 °F (36.6 °C) 97.6 °F (36.4 °C) 97.3 °F (36.3 °C)   TempSrc: Temporal Temporal Temporal   SpO2:  97% 96%   Weight: 203 lb 3.2 oz (92.2 kg)     Height: 5' 5\" (1.651 m)       General Appearance: alert and oriented to person, place and time and in no acute distress  Skin: warm and dry  Head: normocephalic and atraumatic  Eyes: pupils equal, round, and reactive to light, extraocular eye movements intact, conjunctivae normal  Neck: neck supple and non tender without mass   Pulmonary/Chest: clear to auscultation bilaterally- no wheezes, rales or rhonchi, normal air movement, no respiratory distress  Cardiovascular: abnormal irregular rate (105-140's) and no carotid bruits  Abdomen: soft, non-tender, non-distended, normal bowel sounds   Extremities: no cyanosis, no clubbing and no edema  Neurologic: no cranial nerve deficit and speech normal    No intake/output data recorded.   No intake/output data known as: PAXIL  Take 1 tablet by mouth daily     propranolol 10 MG tablet  Commonly known as: INDERAL  Take 1 tablet by mouth every evening     triamterene-hydroCHLOROthiazide 75-50 MG per tablet  Commonly known as: Maxzide  Take 1 tablet by mouth daily     Vitamin D3 125 MCG (5000 UT) Tabs         * This list has 2 medication(s) that are the same as other medications prescribed for you. Read the directions carefully, and ask your doctor or other care provider to review them with you. Note that more than 30 minutes was spent in preparing discharge papers, discussing discharge with patient, medication review, etc.    Signed:  Electronically signed by PERLITA Cohn CNP on 5/1/2021 at 8:38 AM    NOTE: This report was transcribed using voice recognition software. Every effort was made to ensure accuracy; however, inadvertent computerized transcription errors may be present.

## 2021-05-01 NOTE — CONSULTS
Today's Date: 5/1/2021  Patient Name: Meg Hurt  Date of admission: 5/1/2021  1:22 AM  Patient's age: 78 y.o., 1945  Admission Dx: Atrial fibrillation with RVR ([address] [I48.91]    Reason for Consult:  atrial fibrillation  Requesting Physician: Alina Diaz MD    CHIEF COMPLAINT:  Palpitations,SOB    History Obtained From:  patient    HISTORY OF PRESENT ILLNESS:      The patient is a 78 y.o.  female who is admitted to the hospital for atrial fibrillation. She has a previous medical history of hypertension, diabetes type 2, on glipizide at home, hyperlipidemia. She also has a history of sleep apnea for which she has been on CPAP. The patient is extremely compliant with the use of CPAP. No prior history of palpitations or episodes of atrial fibrillation. She developed symptoms of extreme fatigue, headache and generalized weakness approximately a week before her initial presentation to [facility] on Monday 4/26. She presented to the hospital with shortness of breath and atrial fibrillation and rapid ventricular rate. Laboratory work-up also revealed the presence of acute kidney injury. She converted to sinus rhythm spontaneously on IV Cardizem but did have a 9-second sinus pause on conversion. Echocardiography revealed the presence of a pericardial effusion. She was discharged home on Eliquis along with her other antihypertensive medications. She returned to the emergency room a day later with continuing symptoms of palpitations and was found to be in atrial fibrillation again. She converted spontaneously to sinus rhythm again with IV  Cardizem given for rate control. She was then transferred [facility] for possible antiarrhythmic drug therapy. Of note is her D-dimer level on presentation which was over 4000. PE was ruled out with CTA of the chest.  However this did reveal bilateral pleural effusions. No other cardiac symptoms at present.   No chest meloxicam (MOBIC) 15 MG tablet Take 1 tablet by mouth daily 3/19/21   Aminta Holden MD   triamterene-hydroCHLOROthiazide (MAXZIDE) 75-50 MG per tablet Take 1 tablet by mouth daily 2/4/21   Aminta Holden MD   losartan (COZAAR) 25 MG tablet Take 1 tablet by mouth daily 2/3/21   Aminta Holden MD   propranolol (INDERAL) 10 MG tablet Take 1 tablet by mouth every evening 2/3/21   Aminta Holden MD   omeprazole (PRILOSEC) 20 MG delayed release capsule Take 1 capsule by mouth daily 2/3/21   Aminta Holden MD   oxybutynin (DITROPAN) 5 MG tablet Take 1 tablet by mouth daily 2/3/21   Aminta Holden MD   glipiZIDE-metformin (METAGLIP) 2.5-500 MG per tablet Take 1 tablet by mouth daily 1/14/21   Aminta Holden MD   NEURONTIN 600 MG tablet Take 1 tablet by mouth 3 times daily. 12/8/20 12/8/21  Jose L Lew PA-C   allopurinol (ZYLOPRIM) 100 MG tablet Take 2 tablets by mouth daily 11/2/20   Amparo Wiggins MD   levothyroxine (SYNTHROID) 50 MCG tablet Take 50 mcg by mouth Five times weekly Monday, Tuesday, Wednesday, Friday, Saturday    Historical Provider, MD   latanoprost (XALATAN) 0.005 % ophthalmic solution Place 1 drop into the left eye nightly    Historical Provider, MD       Allergies:  Epinephrine, Ace inhibitors, Adhesive tape, and Propoxyphene    Social History:   reports that she has never smoked. She has never used smokeless tobacco. She reports that she does not drink alcohol or use drugs. Family History: family history is not on file. No h/o sudden cardiac death. No for premature CAD    REVIEW OF SYSTEMS:    · Constitutional: there has been no unanticipated weight loss. There's been Yes change in energy level, Yes change in activity level. · Eyes: No visual changes or diplopia. No scleral icterus. · ENT: No Headaches, hearing loss or vertigo. No mouth sores or sore throat.   · Cardiovascular: No chest pain, Yes dyspnea on exertion, Yes palpitations or No loss of consciousness. No cough, hemoptysis, No pleuritic pain, or phlebitis. · Respiratory: No cough or wheezing, no sputum production. No hematemesis. · Gastrointestinal: No abdominal pain, appetite loss, blood in stools. No change in bowel or bladder habits. · Genitourinary: No dysuria, trouble voiding, or hematuria. · Musculoskeletal:  No gait disturbance, No weakness or joint complaints. · Integumentary: No rash or pruritis. · Neurological: No headache, diplopia, change in muscle strength, numbness or tingling. No change in gait, balance, coordination, mood, affect, memory, mentation, behavior. · Psychiatric: No anxiety, or depression. · Endocrine: No temperature intolerance. No excessive thirst, fluid intake, or urination. No tremor. · Hematologic/Lymphatic: No abnormal bruising or bleeding, blood clots or swollen lymph nodes. · Allergic/Immunologic: No nasal congestion or hives. PHYSICAL EXAM:      BP (!) 152/65   Pulse 71   Temp 97.3 °F (36.3 °C) (Temporal)   Resp 18   Ht 5' 5\" (1.651 m)   Wt 203 lb 3.2 oz (92.2 kg)   SpO2 96%   BMI 33.81 kg/m²    Constitutional and General Appearance: alert, cooperative, no distress and appears stated age  HEENT: PERRL, no cervical lymphadenopathy. No masses palpable. Normal oral mucosa  Respiratory:  · Normal excursion and expansion without use of accessory muscles  · Resp Auscultation: Good respiratory effort. No for increased work of breathing.  On auscultation: clear to auscultation bilaterally  Cardiovascular:  · The apical impulse is not displaced  · Heart tones are crisp and normal. regular S1 and S2.  · Jugular venous pulsation Normal  · The carotid upstroke is normal in amplitude and contour without delay or bruit  · Peripheral pulses are symmetrical and full  Abdomen:  · No masses or tenderness  · Bowel sounds present  Extremities:  ·  No Cyanosis or Clubbing  ·  Lower extremity edema: No  · Skin: Warm and dry  Neurological:  · Alert and pleural and pericardial effusions as well as her symptoms the week prior to her recent hospitalizations  suggest the possibility of possible recent COVID-19 infection. It is also likely that the pericardial effusion led to atrial fibrillation. RECOMMENDATIONS:    Laboratory work-up as ordered  Keep of antiarrhythmic drug therapy at the present time  Colchicine for pericarditis  Continue Eliquis however    Further recommendations will depend on results of tests obtained today and patient's progress in the hospital      Discussed with patient and Nurse.     Krupa Patel MD,FACC

## 2021-05-01 NOTE — H&P
atrial volume. Interatrial septum not well visualized but appears intact. Normal right ventricle size and function. There is trace tricuspid regurgitation, RVSP 31mmHg. Normal aortic root size. Moderate circumferential pericardial effusion and large effusion near   right atrium without tamponade. No intra cardiac mass or thrombus. Pt tachycardic during study. No comparison study available.      ED TRIAGE VITALS  BP: (!) 146/62, Temp: 97.6 °F (36.4 °C), Pulse: 70, Resp: 18, SpO2: 97 %    Vitals:    05/01/21 0145 05/01/21 0339   BP: (!) 154/76 (!) 146/62   Pulse: 70 70   Resp: 18 18   Temp: 97.8 °F (36.6 °C) 97.6 °F (36.4 °C)   TempSrc: Temporal Temporal   SpO2:  97%   Weight: 203 lb 3.2 oz (92.2 kg)    Height: 5' 5\" (1.651 m)          Histories  Past Medical History:   Diagnosis Date    Anesthesia complication 3281    difficulty breathing post thyroid surgery [facility]    Cancer [facility])     skin     GERD (gastroesophageal reflux disease)     Hyperlipidemia     Hypertension     Sleep apnea     uses cpap    Spinal stenosis     Thyroid disease     Tremors of nervous system     familial    Type 2 diabetes mellitus without complication ([address]      Past Surgical History:   Procedure Laterality Date    BLEPHAROPLASTY      CATARACT REMOVAL WITH IMPLANT Left 09/12/2017    CATARACT REMOVAL WITH IMPLANT Right 10/09/2018    CATARACT REMOVAL WITH IMPLANT Right 11/01/2018    secondary procedure to correct first cataract     CHOLECYSTECTOMY      COSMETIC SURGERY      abdominoplasty    FOOT SURGERY Left     skin cancer    HYSTERECTOMY      LUMBAR SPINE SURGERY N/A 9/14/2020    L3-L4, L4-L5  POSTERIOR LUMBAR INTERBODY FUSION performed by Hailey Lanier MD at [address]      [place] [facility] 3 IMPLNT MATER,INTRAOCUL Right 11/1/2018    EYE IOL REMOVAL performed by Mary Ellington MD at [address] [facility] W/O ECP Right 10/9/2018 Take 1 tablet by mouth daily 2/3/21   Bhavik Trinidad MD   glipiZIDE-metformin (METAGLIP) 2.5-500 MG per tablet Take 1 tablet by mouth daily 1/14/21   Bhavik Trinidad MD   NEURONTIN 600 MG tablet Take 1 tablet by mouth 3 times daily.  12/8/20 12/8/21  Walker Calvo PA-C   allopurinol (ZYLOPRIM) 100 MG tablet Take 2 tablets by mouth daily 11/2/20   Cherri Castillo MD   levothyroxine (SYNTHROID) 50 MCG tablet Take 50 mcg by mouth Five times weekly Monday, Tuesday, Wednesday, Friday, Saturday    Historical Provider, MD   latanoprost (XALATAN) 0.005 % ophthalmic solution Place 1 drop into the left eye nightly    Historical Provider, MD       Allergies  Epinephrine, Ace inhibitors, Adhesive tape, and Propoxyphene    Social Hx  Social History     Socioeconomic History    Marital status:      Spouse name: Not on file    Number of children: Not on file    Years of education: Not on file    Highest education level: Not on file   Occupational History    Not on file   Social Needs    Financial resource strain: Not on file    Food insecurity     Worry: Not on file     Inability: Not on file   [employer] needs     Medical: Not on file     Non-medical: Not on file   Tobacco Use    Smoking status: Never Smoker    Smokeless tobacco: Never Used   Substance and Sexual Activity    Alcohol use: No     Comment: rare    Drug use: Never    Sexual activity: Not on file   Lifestyle    Physical activity     Days per week: Not on file     Minutes per session: Not on file    Stress: Not on file   Relationships    Social connections     Talks on phone: Not on file     Gets together: Not on file     Attends Scientologist service: Not on file     Active member of club or organization: Not on file     Attends meetings of clubs or organizations: Not on file     Relationship status: Not on file    Intimate partner violence     Fear of current or ex partner: Not on file     Emotionally abused: Not on file Physically abused: Not on file     Forced sexual activity: Not on file   Other Topics Concern    Not on file   Social History Narrative    Not on file       Review of Systems  All bolded are positive; please see HPI  General:  Fever, chills, diaphoresis, fatigue, malaise, night sweats, weight loss  Psychological:  Anxiety, disorientation, hallucinations. ENT:  Epistaxis, headaches, vertigo, visual changes. Cardiovascular:  Chest pain, irregular heartbeats, palpitations, paroxysmal nocturnal dyspnea. Respiratory:  Shortness of breath, coughing, sputum production, hemoptysis, wheezing, orthopnea.   Gastrointestinal:  Nausea, vomiting, diarrhea, heartburn, constipation, abdominal pain, hematemesis, hematochezia, melena, acholic stools  Genito-Urinary:  Dysuria, urgency, frequency, hematuria  Musculoskeletal:  Joint pain, joint stiffness, joint swelling, muscle pain  Neurology:  Headache, focal neurological deficits, weakness, numbness, paresthesia  Derm:  Rashes, ulcers, excoriations, bruising  Extremities:  Decreased ROM, peripheral edema, mottling    Physical Examination  Vitals:  BP (!) 146/62   Pulse 70   Temp 97.6 °F (36.4 °C) (Temporal)   Resp 18   Ht 5' 5\" (1.651 m)   Wt 203 lb 3.2 oz (92.2 kg)   SpO2 97%   BMI 33.81 kg/m²   General Appearance:  awake, alert, and oriented to person, place, time, and purpose; appears stated age and cooperative; no apparent distress no labored breathing  HEENT:  NCAT; PERRL; conjunctiva pink, sclera clear  Neck:  no adenopathy, bruit, JVD, tenderness, masses, or nodules; supple, symmetrical, trachea midline, thyroid not enlarged  Lung:  clear to auscultation bilaterally; no use of accessory muscles; no rhonchi, rales, or crackles  Heart:  regular rate and regular rhythm without murmur, rub, or gallop  Abdomen:  soft, nontender, nondistended; normoactive bowel sounds; no organomegaly  Extremities:  extremities normal, atraumatic, no cyanosis or edema  Musculokeletal:  no joint swelling, no muscle tenderness. ROM normal in all joints of extremities.    Neurologic:  mental status A&Ox3, thought content appropriate; CN II-XII grossly intact; sensation intact, motor strength 5/5 globally; no slurred speech       Laboratory Data  Recent Results (from the past 24 hour(s))   CBC auto differential    Collection Time: 04/30/21  9:16 AM   Result Value Ref Range    WBC 13.4 (H) 4.5 - 11.5 E9/L    RBC 3.73 3.50 - 5.50 E12/L    Hemoglobin 10.3 (L) 11.5 - 15.5 g/dL    Hematocrit 35.3 34.0 - 48.0 %    MCV 94.6 80.0 - 99.9 fL    MCH 27.6 26.0 - 35.0 pg    MCHC 29.2 (L) 32.0 - 34.5 %    RDW 14.7 11.5 - 15.0 fL    Platelets 228 569 - 394 E9/L    MPV 9.0 7.0 - 12.0 fL    Neutrophils % 73.1 43.0 - 80.0 %    Immature Granulocytes % 2.8 0.0 - 5.0 %    Lymphocytes % 11.6 (L) 20.0 - 42.0 %    Monocytes % 9.3 2.0 - 12.0 %    Eosinophils % 2.8 0.0 - 6.0 %    Basophils % 0.4 0.0 - 2.0 %    Neutrophils Absolute 9.81 (H) 1.80 - 7.30 E9/L    Immature Granulocytes # 0.37 E9/L    Lymphocytes Absolute 1.55 1.50 - 4.00 E9/L    Monocytes Absolute 1.25 (H) 0.10 - 0.95 E9/L    Eosinophils Absolute 0.37 0.05 - 0.50 E9/L    Basophils Absolute 0.06 0.00 - 0.20 E9/L   Comprehensive Metabolic Panel w/ Reflex to MG    Collection Time: 04/30/21  9:16 AM   Result Value Ref Range    Sodium 136 132 - 146 mmol/L    Potassium reflex Magnesium 4.7 3.5 - 5.0 mmol/L    Chloride 102 98 - 107 mmol/L    CO2 22 22 - 29 mmol/L    Anion Gap 12 7 - 16 mmol/L    Glucose 165 (H) 74 - 99 mg/dL    BUN 14 6 - 23 mg/dL    CREATININE 0.8 0.5 - 1.0 mg/dL    GFR Non-African American >60 >=60 mL/min/1.73    GFR African American >60     Calcium 9.5 8.6 - 10.2 mg/dL    Total Protein 6.6 6.4 - 8.3 g/dL    Albumin 3.1 (L) 3.5 - 5.2 g/dL    Total Bilirubin 0.5 0.0 - 1.2 mg/dL    Alkaline Phosphatase 159 (H) 35 - 104 U/L    ALT 20 0 - 32 U/L    AST 17 0 - 31 U/L   Troponin    Collection Time: 04/30/21  9:16 AM   Result Value Ref Range    Troponin <0.01 0.00 - 0.03 ng/mL   Brain Natriuretic Peptide    Collection Time: 04/30/21  9:16 AM   Result Value Ref Range    Pro-BNP 1,035 (H) 0 - 450 pg/mL   TSH without Reflex    Collection Time: 04/30/21  9:16 AM   Result Value Ref Range    TSH 3.170 0.270 - 4.200 uIU/mL   Urinalysis with Microscopic    Collection Time: 04/30/21  9:49 AM   Result Value Ref Range    Color, UA Yellow Straw/Yellow    Clarity, UA Clear Clear    Glucose, Ur Negative Negative mg/dL    Bilirubin Urine Negative Negative    Ketones, Urine Negative Negative mg/dL    Specific Gravity, UA 1.015 1.005 - 1.030    Blood, Urine Negative Negative    pH, UA 7.0 5.0 - 9.0    Protein, UA Negative Negative mg/dL    Urobilinogen, Urine 1.0 <2.0 E.U./dL    Nitrite, Urine Negative Negative    Leukocyte Esterase, Urine Negative Negative    WBC, UA 1-3 0 - 5 /HPF    RBC, UA 0-1 0 - 2 /HPF    Bacteria, UA FEW (A) None Seen /HPF   COVID-19, Rapid    Collection Time: 04/30/21  3:45 PM    Specimen: Nasopharyngeal Swab   Result Value Ref Range    SARS-CoV-2, NAAT Not Detected Not Detected   POCT Glucose    Collection Time: 04/30/21  6:03 PM   Result Value Ref Range    Meter Glucose 160 (H) 74 - 99 mg/dL   Troponin    Collection Time: 05/01/21  5:34 AM   Result Value Ref Range    Troponin <0.01 0.00 - 0.03 ng/mL   POCT Glucose    Collection Time: 05/01/21  6:46 AM   Result Value Ref Range    Meter Glucose 156 (H) 74 - 99 mg/dL       Imaging  Echo Complete    Result Date: 4/27/2021  Transthoracic Echocardiography Report (TTE)  Demographics   Patient Name    Lorna Nina Gender            Female   Medical Record  [id number]     Room Number       [address]  Number   Account #       [de-identified]    Procedure Date    04/27/2021   Corporate ID                 Ordering          Leilani Thompson DO                               Physician   Accession       [id number]   Referring  Number                       Physician   Date of Birth   1945   Sonographer       Marlene Chaves [facility]   Age Normal left ventricular systolic function, LVEF is 65%. Moderate left ventricular concentric hypertrophy noted. Indeterminate LV diastolic function. Left atrium is mildly enlarged. Increased left atrial volume. Interatrial septum not well visualized but appears intact. Normal right ventricle size and function. There is trace tricuspid regurgitation, RVSP 31mmHg. Normal aortic root size. Moderate circumferential pericardial effusion and large effusion near  right atrium without tamponade. No intra cardiac mass or thrombus. Pt tachycardic during study. No comparison study available.    Signature   ----------------------------------------------------------------  Electronically signed by Checo Buckner MD(Interpreting  physician) on 04/27/2021 05:18 PM  ----------------------------------------------------------------  M-Mode/2D Measurements & Calculations   LV Diastolic    LV Systolic Dimension: 2.6   AV Cusp Separation: 1.7 cmLA  Dimension: 4.1  cm                           Dimension: 4 cmAO Root  cm              LV Volume Diastolic: 26.1 ml Dimension: 3.2 cm  LV FS:36.6 %    LV Volume Systolic: 26.0 ml  LV PW           LV EDV/LV EDV Index: 30.1  Diastolic: 1.4  LZ/95 MI/R^3WT ESV/LV ESV  cm              Index: 23.7 ml/12ml/ m^2     RV Diastolic Dimension: 2.8  LV PW Systolic: EF Calculated: 05.0 %        cm  2 cm            LV Mass Index: 113 l/min*m^2 RV Systolic Dimension: 1.9 cm  Septum                                       LA/Aorta: 3.07  Diastolic: 1.5  cm              LVOT: 2 cm                   LA volume/Index: 80.2 ml  Septum  Systolic: 1.8  cm   LV Mass: 228.63  g  Doppler Measurements & Calculations   MV Peak E-Wave:   AV Peak Velocity: 1.52 m/s    LVOT Peak Velocity: 1.29  1.41 m/s          AV Peak Gradient: 9.21 mmHg   m/s  MV Peak A-Wave:   AV Mean Velocity: 1.12 m/s    LVOT Mean Velocity: 0.93  0.01 m/s          AV Mean Gradient: 5.6 mmHg    m/s  MV E/A Ratio:     AV VTI: 25.7 cm LVOT Peak Gradient: 6.7  117.33            AV Area (Continuity):3.62     mmHgLVOT Mean Gradient:  MV Peak Gradient: cm^2                          3.9 mmHg  7 mmHg                                          Estimated RVSP: 31 mmHg  MV Mean Gradient: LVOT VTI: 29.6 cm             Estimated RAP:10 mmHg  2.6 mmHg  MV Mean Velocity: Estimated PASP: 31.03 mmHg  0.71 m/s          Pulm. Vein A Reversal         TR Velocity:2.29 m/s  MV Deceleration   Duration:346 msec             TR Gradient:21.03 mmHg  Time: 290.9 msec  Pulm. Vein D Velocity:0.36    PV Peak Velocity: 1.28 m/s  MV P1/2t: 84.7    m/sPulm. Vein A Reversal      PV Peak Gradient: 6.59  msec              Velocity:0.18 m/s             mmHg  MVA by PHT:2.6    Pulm. Vein S Velocity: 0.29   PV Mean Velocity: 0.88 m/s  cm^2              m/s                           PV Mean Gradient: 3.6 mmHg  MV Area  (continuity): 3.4  cm^2  [email or web address] [id number]    Ct Head Wo Contrast    Result Date: 4/26/2021  EXAMINATION: CT OF THE HEAD WITHOUT CONTRAST  4/26/2021 4:49 pm TECHNIQUE: CT of the head was performed without the administration of intravenous contrast. Dose modulation, iterative reconstruction, and/or weight based adjustment of the mA/kV was utilized to reduce the radiation dose to as low as reasonably achievable. COMPARISON: 08/27/2014 HISTORY: ORDERING SYSTEM PROVIDED HISTORY: fatigue TECHNOLOGIST PROVIDED HISTORY: Has a \"code stroke\" or \"stroke alert\" been called? ->No Reason for exam:->fatigue Decision Support Exception->Emergency Medical Condition (MA) FINDINGS: BRAIN/VENTRICLES: There is no acute intracranial hemorrhage, mass effect or midline shift. No abnormal extra-axial fluid collection. The gray-white differentiation is maintained without evidence of an acute infarct. There is no evidence of hydrocephalus.  There is mild chronic ischemic/degenerative changes in the white matter. ORBITS: The visualized portion of the orbits demonstrate no acute abnormality. SINUSES: The visualized paranasal sinuses and mastoid air cells demonstrate no acute abnormality. SOFT TISSUES/SKULL:  No acute abnormality of the visualized skull or soft tissues. No acute intracranial abnormality. Nm Lung Scan Perfusion Only    Result Date: 4/27/2021  EXAMINATION: NUCLEAR MEDICINE PERFUSION SCAN. 4/27/2021 TECHNIQUE: Ventilation not performed as part of COVID-19 safety precautions. 4.1 millicuries of Tc 54N MAA was administered intravenously prior to planar imaging of the lungs in multiple projections. COMPARISON: Chest radiograph April 27, 2021 1049 hours HISTORY: ORDERING SYSTEM PROVIDED HISTORY: dyspnea, concern for PE TECHNOLOGIST PROVIDED HISTORY: Reason for exam:->dyspnea, concern for PE What reading provider will be dictating this exam?->CRC FINDINGS: PERFUSION: Distribution of radiotracer is homogeneous. No segmental defects identified. CHEST RADIOGRAPH: No focal areas of consolidation or significant effusions on recent chest radiograph. No segmental or subsegmental perfusion defects     Xr Chest Portable    Result Date: 4/30/2021  EXAMINATION: ONE XRAY VIEW OF THE CHEST 4/30/2021 9:41 am COMPARISON: None. HISTORY: ORDERING SYSTEM PROVIDED HISTORY: afib TECHNOLOGIST PROVIDED HISTORY: Reason for exam:->afib FINDINGS: The heart is mildly enlarged. There are no findings of failure. The lungs are clear. There is no focal infiltrate or effusion. Mild cardiomegaly. There is no evidence of failure or pneumonia. Xr Chest 1 View    Result Date: 4/27/2021  EXAMINATION: ONE XRAY VIEW OF THE CHEST 4/27/2021 10:37 am COMPARISON: September 10, 2020 HISTORY: ORDERING SYSTEM PROVIDED HISTORY: post procedure TECHNOLOGIST PROVIDED HISTORY: Reason for exam:->post procedure FINDINGS: Heart is normal size. Lungs are clear.   No infiltrates, consolidates or pleural effusions observed. There is no perihilar vascular congestion. No acute cardiopulmonary process. Cta Chest W Contrast    Result Date: 4/30/2021  EXAMINATION: CTA OF THE CHEST 4/30/2021 11:38 am TECHNIQUE: CTA of the chest was performed after the administration of intravenous contrast.  Multiplanar reformatted images are provided for review. MIP images are provided for review. Dose modulation, iterative reconstruction, and/or weight based adjustment of the mA/kV was utilized to reduce the radiation dose to as low as reasonably achievable. COMPARISON: None. HISTORY: ORDERING SYSTEM PROVIDED HISTORY: evalute for PE TECHNOLOGIST PROVIDED HISTORY: Reason for exam:->evalute for PE Decision Support Exception - unselect if not a suspected or confirmed emergency medical condition->Emergency Medical Condition (MA) FINDINGS: There is no acute pulmonary embolus the main PA, right and left main PAs in the lobar, segmental and subsegmental branches to the 3/4 order approximately. There is no aneurysm formation or dissection of the thoracic aorta. Heart has normal size. There is a small pericardial effusion. The inner diameter for the left ventricular cavity is 3.3 cm. For the right ventricular cavities 3.3 cm. Interventricular septal has thickness of 15 mm. Diameter for the ascending aorta is 3.6 by 3.6 cm. The diameter for the main pulmonary artery is 2.9 cm. There is no mediastinal masses or adenopathy. There is no hilar adenopathy. Presence of bilateral pleural effusions of mild-to-moderate degree causing compression atelectasis in the more dependent area of both lower lobes. The degenerative changes are seen throughout the thoracic spine. Upper abdominal structures are not fully covered on this study. The what is visualized have unremarkable appearance. The patient had previous cholecystectomy.   There is no indication for dilatation of the biliary tree pancreatic ductal system although liver and pancreas are not fully covered. Kidneys are not fully covered. The adrenals do not appear to be enlarged. No evidence of pulmonary embolism No aneurysm formation or dissection of the thoracic aorta. Mild/moderate bilateral pleural effusions. Us Dup Lower Extremities Bilateral Venous    Result Date: 4/27/2021  EXAMINATION: DUPLEX VENOUS ULTRASOUND OF THE BILATERAL LOWER EXTREMITIES, 4/27/2021 9:39 am TECHNIQUE: Duplex ultrasound using B-mode/gray scaled imaging and Doppler spectral analysis and color flow was obtained of the bilateral lower extremities. COMPARISON: None. HISTORY: ORDERING SYSTEM PROVIDED HISTORY: DVT TECHNOLOGIST PROVIDED HISTORY: Reason for exam:->DVT What reading provider will be dictating this exam?->CRC FINDINGS: The visualized veins of the bilateral lower extremities are patent and free of echogenic thrombus. The veins demonstrate good compressibility with normal color flow study and spectral analysis. No evidence of DVT in either lower extremity. Assessment and Plan  Patient is a 78 y.o. female who presented with SOB and palpitations. The active problem list is as follows:    · New onset atrial fibrillation with rapid ventricular rate- EP consult. · Type 2 diabetes mellitus-diabetes well controlled in the past, however most recent hemoglobin A1c was in 2019.  Recheck hemoglobin A1c.  Cover with corrective scale insulin for now. · Essential hypertension-continue home medication regimen  · Hypothyroidism-Continue home levothyroxine. · Hyperlipidemia  · Major depressive disorder-continue home Paxil  · Obesity with BMI 34.01-diet and lifestyle modifications    · Routine labs in the morning. · DVT prophylaxis. · Please see orders for further management and care.         Kaylee Fontenot DO  7:18 AM  5/1/2021

## 2021-05-01 NOTE — ED NOTES
Occupational Therapy Daily Treatment    Visit Count: 3  Plan of Care: 8/6/2019 Through: 10/29/2019  Insurance Information: Eval only then fax  Referred by: ARGELIA Fisher; Next provider visit (if known/scheduled): 9/12/19 Dr. Peace  Medical Diagnosis (from order):       Diagnosis Information             Diagnosis      354.9 (ICD-9-CM) - G56.90 (ICD-10-CM) - Neuropathy of hand, unspecified laterality      249.60, 357.2 (ICD-9-CM) - E08.42 (ICD-10-CM) - Diabetic polyneuropathy associated with diabetes mellitus due to underlying condition (CMS/[facility])                  Treatment Diagnosis: Bilateral hand symptoms with increased pain/symptoms, impaired posture, impaired strength, impaired range of motion, impaired activity tolerance, impaired coordination, impaired sensory integration     Date of Onset: 2017  Diagnosis Precautions: none    SUBJECTIVE   Has been working on the exercises at home.  Still challenging but going ok.  The rosalind taping is going fine as well, but as soon as he takes it off his right pinky finger \"pops\" right back out.    Current Pain (0-10 scale): 7   Functional Change: None.  Patient reports was able to mow the lawn however hands cramp up like a Oswaldo horse.    OBJECTIVE   No objective measures taken this session    Treatment   Fluidotherapy (50183): - patient working on grasping of wooden blocks during fluidotherapy  Prepared area per protocol.    Air speed: 75% Temperature: 105° F   Position: sitting Duration: 15 minutes   Results: no change in symptoms immediately following modality; no adverse reaction to treatment    Neuromuscular Reeducation:  Theraband flexbar strengthening - red: bend down, bend up, twist forward, twist back x 10 bilaterally  Clothespin strengthening - yellow, red: 3 point pinch, lateral pinch with focus on maintaining IP extension    Skilled input: verbal instruction/cues, tactile instruction/cues, posture correction, facilitation, inhibition,  Pt resting comfortably at this time, awaiting transport.       Shaggy Reyes RN  04/30/21 9778 education/instruction on as detailed above    Home Program:   As prior  Clothespin strengthening: lateral pinch with thumb IP extension    Writer verbally educated the patient and received verbal consent from the patient on hand placement, positioning of patient, and techniques to be performed today including therapist position for techniques, hand placement and palpation for techniques as described above and how they are pertinent to the patient's plan of care.       Suggestions for next session as indicated: progress per plan of care    ASSESSMENT   Patient continues to present with signs and symptoms consistent with neuropathy of bilateral hands that has reported functional limitations.  Further assessment today reveal coordination deficits per 9 hold peg test bilaterally.  Improved intrinsic hand function/movement patterns and decreased compensatory patterns with repeated practice during today's treatment.  Continue per POC.  Pain after treatment (patient reported, 0-10 scale): 7  Result of above outlined education: Verbalizes understanding and Demonstrates understanding    THERAPY DAILY BILLING   Insurance: [employer] MEDICAID 2.     Evaluation Procedures:  No evaluation codes were used on this date of service    Timed Procedures:  Neuromuscular Re-Education, 25 minutes    Untimed Procedures:  [place]/Fluidotherapy    Total Treatment Time: 40 minutes    Therapist signature:  HILL Mason

## 2021-05-02 LAB
ANION GAP SERPL CALCULATED.3IONS-SCNC: 10 MMOL/L (ref 7–16)
BUN BLDV-MCNC: 13 MG/DL (ref 6–23)
CALCIUM SERPL-MCNC: 9.5 MG/DL (ref 8.6–10.2)
CHLORIDE BLD-SCNC: 103 MMOL/L (ref 98–107)
CO2: 25 MMOL/L (ref 22–29)
CREAT SERPL-MCNC: 1 MG/DL (ref 0.5–1)
CULTURE, BLOOD 2: NORMAL
GFR AFRICAN AMERICAN: >60
GFR NON-AFRICAN AMERICAN: 54 ML/MIN/1.73
GLUCOSE BLD-MCNC: 158 MG/DL (ref 74–99)
HCT VFR BLD CALC: 32.3 % (ref 34–48)
HEMOGLOBIN: 9.9 G/DL (ref 11.5–15.5)
MAGNESIUM: 1.7 MG/DL (ref 1.6–2.6)
MCH RBC QN AUTO: 28.1 PG (ref 26–35)
MCHC RBC AUTO-ENTMCNC: 30.7 % (ref 32–34.5)
MCV RBC AUTO: 91.8 FL (ref 80–99.9)
METER GLUCOSE: 153 MG/DL (ref 74–99)
METER GLUCOSE: 157 MG/DL (ref 74–99)
METER GLUCOSE: 262 MG/DL (ref 74–99)
METER GLUCOSE: 287 MG/DL (ref 74–99)
PDW BLD-RTO: 15 FL (ref 11.5–15)
PLATELET # BLD: 324 E9/L (ref 130–450)
PMV BLD AUTO: 8.7 FL (ref 7–12)
POTASSIUM SERPL-SCNC: 4.4 MMOL/L (ref 3.5–5)
RBC # BLD: 3.52 E12/L (ref 3.5–5.5)
SODIUM BLD-SCNC: 138 MMOL/L (ref 132–146)
WBC # BLD: 14.2 E9/L (ref 4.5–11.5)

## 2021-05-02 PROCEDURE — 36415 COLL VENOUS BLD VENIPUNCTURE: CPT

## 2021-05-02 PROCEDURE — 6370000000 HC RX 637 (ALT 250 FOR IP): Performed by: FAMILY MEDICINE

## 2021-05-02 PROCEDURE — 85027 COMPLETE CBC AUTOMATED: CPT

## 2021-05-02 PROCEDURE — 2140000000 HC CCU INTERMEDIATE R&B

## 2021-05-02 PROCEDURE — 80048 BASIC METABOLIC PNL TOTAL CA: CPT

## 2021-05-02 PROCEDURE — 83735 ASSAY OF MAGNESIUM: CPT

## 2021-05-02 PROCEDURE — 82962 GLUCOSE BLOOD TEST: CPT

## 2021-05-02 PROCEDURE — 6370000000 HC RX 637 (ALT 250 FOR IP): Performed by: INTERNAL MEDICINE

## 2021-05-02 RX ORDER — FUROSEMIDE 20 MG/1
20 TABLET ORAL DAILY
Status: DISCONTINUED | OUTPATIENT
Start: 2021-05-02 | End: 2021-05-04 | Stop reason: HOSPADM

## 2021-05-02 RX ADMIN — LATANOPROST 1 DROP: 50 SOLUTION OPHTHALMIC at 20:23

## 2021-05-02 RX ADMIN — GABAPENTIN 600 MG: 300 CAPSULE ORAL at 14:55

## 2021-05-02 RX ADMIN — ALLOPURINOL 200 MG: 100 TABLET ORAL at 08:39

## 2021-05-02 RX ADMIN — OXYBUTYNIN CHLORIDE 5 MG: 5 TABLET ORAL at 08:39

## 2021-05-02 RX ADMIN — GABAPENTIN 600 MG: 300 CAPSULE ORAL at 08:39

## 2021-05-02 RX ADMIN — PAROXETINE 10 MG: 10 TABLET, FILM COATED ORAL at 08:39

## 2021-05-02 RX ADMIN — LEVOTHYROXINE SODIUM 50 MCG: 0.05 TABLET ORAL at 06:39

## 2021-05-02 RX ADMIN — APIXABAN 5 MG: 5 TABLET, FILM COATED ORAL at 08:39

## 2021-05-02 RX ADMIN — PROPRANOLOL HYDROCHLORIDE 10 MG: 10 TABLET ORAL at 18:27

## 2021-05-02 RX ADMIN — ACETAMINOPHEN 650 MG: 325 TABLET ORAL at 15:17

## 2021-05-02 RX ADMIN — MAGNESIUM GLUCONATE 500 MG ORAL TABLET 400 MG: 500 TABLET ORAL at 20:23

## 2021-05-02 RX ADMIN — LOSARTAN POTASSIUM 25 MG: 25 TABLET, FILM COATED ORAL at 08:39

## 2021-05-02 RX ADMIN — INSULIN LISPRO 6 UNITS: 100 INJECTION, SOLUTION INTRAVENOUS; SUBCUTANEOUS at 11:29

## 2021-05-02 RX ADMIN — ATORVASTATIN CALCIUM 10 MG: 10 TABLET, FILM COATED ORAL at 20:22

## 2021-05-02 RX ADMIN — PANTOPRAZOLE SODIUM 20 MG: 20 TABLET, DELAYED RELEASE ORAL at 06:39

## 2021-05-02 RX ADMIN — COLCHICINE 0.6 MG: 0.6 TABLET ORAL at 08:39

## 2021-05-02 RX ADMIN — INSULIN LISPRO 6 UNITS: 100 INJECTION, SOLUTION INTRAVENOUS; SUBCUTANEOUS at 20:25

## 2021-05-02 RX ADMIN — FUROSEMIDE 20 MG: 20 TABLET ORAL at 18:26

## 2021-05-02 RX ADMIN — APIXABAN 5 MG: 5 TABLET, FILM COATED ORAL at 20:23

## 2021-05-02 RX ADMIN — GABAPENTIN 600 MG: 300 CAPSULE ORAL at 20:22

## 2021-05-02 ASSESSMENT — PAIN SCALES - GENERAL
PAINLEVEL_OUTOF10: 0
PAINLEVEL_OUTOF10: 0

## 2021-05-02 NOTE — PATIENT CARE CONFERENCE
[facility] Quality Flow/Interdisciplinary Rounds Progress Note        Quality Flow Rounds held on May 2, 2021    Disciplines Attending:  Bedside Nurse, ,  and Nursing Unit [address] Maryam was admitted on 5/1/2021  1:22 AM    Anticipated Discharge Date:  Expected Discharge Date: 05/03/21    Disposition:    Nicolás Score:  Nicolás Scale Score: 20    Readmission Risk              Risk of Unplanned Readmission:        13           Discussed patient goal for the day, patient clinical progression, and barriers to discharge.   The following Goal(s) of the Day/Commitment(s) have been identified:  Labs - Report Results      Reggie Melchor  May 2, 2021

## 2021-05-02 NOTE — PROGRESS NOTES
Electrophysiology progress note         Date:  5/2/2021  Patient: Bill Roger  Admission:  5/1/2021  1:22 AM  Admit DX: Atrial fibrillation with RVR ([address] [I48.91]  Age:  78 y.o., 1945     LOS: 1 day     Reason for evaluation:   Pericardial effusion, paroxysmal atrial fibrillation, hypertension      SUBJECTIVE:    The patient was seen and examined. Notes and labs reviewed. There were no complications over night. Patient's cardiac review of systems: Worsening dyspnea with supine position  The patient is generally feeling unchanged. OBJECTIVE:    Telemetry: Sinus rhythm  BP (!) 148/67   Pulse 76   Temp 97.4 °F (36.3 °C) (Temporal)   Resp 16   Ht 5' 5\" (1.651 m)   Wt 201 lb 6.4 oz (91.4 kg)   SpO2 93%   BMI 33.51 kg/m²     Intake/Output Summary (Last 24 hours) at 5/2/2021 1759  Last data filed at 5/2/2021 1422  Gross per 24 hour   Intake 840 ml   Output 1100 ml   Net -260 ml       EXAM:   CONSTITUTIONAL:  awake, alert, cooperative, no apparent distress, and appears stated age. HEENT: Normal jugular venous pulsations, Head is atraumatic, normocephalic. Eyes and oral mucosa are normal.  LUNGS: Good respiratory effort. No for increased work of breathing. On auscultation: clear to auscultation bilaterally  CARDIOVASCULAR:  Normal apical impulse, regular rate and rhythm, normal S1 and S2, ? Pericardial rub noted today  ABDOMEN: Soft, nontender, nondistended. Monet Kid SKIN: Warm and dry. EXTREMITIES: No lower extremity edema. Motor movement grossly intact. No cyanosis or clubbing.     Current Inpatient Medications:   allopurinol  200 mg Oral Daily    apixaban  5 mg Oral BID    atorvastatin  10 mg Oral Nightly    latanoprost  1 drop Left Eye Nightly    levothyroxine  50 mcg Oral Daily    losartan  25 mg Oral Daily    gabapentin  600 mg Oral TID    pantoprazole  20 mg Oral QAM AC    oxybutynin  5 mg Oral Daily    PARoxetine  10 mg Oral Daily    propranolol  10 mg Oral QPM    insulin lispro  0-10 Units Subcutaneous 4x Daily AC & HS    colchicine  0.6 mg Oral Daily       IV Infusions (if any):   dextrose         Diagnostics:    EKG: normal sinus rhythm, unchanged from previous tracings. ECHO: reviewed. Ejection fraction: 60%  Stress Test: not obtained. Cardiac Angiography: not obtained. Labs:   CBC:   Recent Labs     04/30/21  0916 05/02/21  0506   WBC 13.4* 14.2*   HGB 10.3* 9.9*   HCT 35.3 32.3*    324     BMP:   Recent Labs     05/01/21  1447 05/02/21  0506    138   K 4.1 4.4   CO2 23 25   BUN 11 13   CREATININE 0.8 1.0   LABGLOM >60 54   GLUCOSE 147* 158*     BNP: No results for input(s): BNP in the last 72 hours. PT/INR: No results for input(s): PROTIME, INR in the last 72 hours. APTT:No results for input(s): APTT in the last 72 hours. CARDIAC ENZYMES:  Recent Labs     04/30/21  0916 05/01/21  0534 05/01/21  1059   TROPONINI <0.01 <0.01 <0.01     FASTING LIPID PANEL:  Lab Results   Component Value Date    HDL 26 05/01/2021    LDLCALC 78 05/01/2021    TRIG 117 05/01/2021     LIVER PROFILE:  Recent Labs     04/30/21  0916   AST 17   ALT 20   LABALBU 3.1*       ASSESSMENT:    Patient Active Problem List   Diagnosis    Obesity    Essential hypertension    Controlled type 2 diabetes mellitus without complication, without long-term current use of insulin [facility]    FAY (acute kidney injury) (HCC)--last admission and has resolved     Major depressive disorder    Hypothyroidism    Atrial fibrillation with RVR ([facility]   Atrial fibrillation is  new onset with 2 self-limited episodes in the last week. In addition patient  has sinus node dysfunction   with  6 to 9-second pauses noted on conversion to sinus rhythm on IV Cardizem however. Previous Holter monitors have indicated sinus node dysfunction with resting heart rates between 40 and 60 bpm    Pericardial rub evident on auscultation today.   Pericardial effusion had been noted on echocardiography at [facility] a week ago.  Covid panel as well as antibody completely negative      PLAN:    Cardiac MRI to assess cardiac structures  Avoid antiarrhythmic drug therapy at present since patient has not had recurrent atrial fibrillation so far  Continue colchicine for presumptive pericarditis, possibly resulting in episodes of atrial fibrillation  Continue use of CPAP  Continue treatment of hypertension. Add  low-dose diuretic since patient complains of orthopnea    Please see orders. Discussed with patient and nursing.     Kiara Zavala MD,FACC

## 2021-05-02 NOTE — PLAN OF CARE
Problem: Falls - Risk of:  Goal: Will remain free from falls  Description: Will remain free from falls  5/2/2021 0941 by Victoriano Good RN  Outcome: Met This Shift     Problem: Falls - Risk of:  Goal: Absence of physical injury  Description: Absence of physical injury  5/2/2021 0941 by Victoriano Good RN  Outcome: Met This Shift

## 2021-05-03 LAB
ANION GAP SERPL CALCULATED.3IONS-SCNC: 13 MMOL/L (ref 7–16)
BUN BLDV-MCNC: 15 MG/DL (ref 6–23)
CALCIUM SERPL-MCNC: 9.2 MG/DL (ref 8.6–10.2)
CHLORIDE BLD-SCNC: 97 MMOL/L (ref 98–107)
CO2: 24 MMOL/L (ref 22–29)
CREAT SERPL-MCNC: 0.9 MG/DL (ref 0.5–1)
GFR AFRICAN AMERICAN: >60
GFR NON-AFRICAN AMERICAN: >60 ML/MIN/1.73
GLUCOSE BLD-MCNC: 163 MG/DL (ref 74–99)
HCT VFR BLD CALC: 34.8 % (ref 34–48)
HEMOGLOBIN: 10.8 G/DL (ref 11.5–15.5)
HIV-1 P24 AG: NORMAL
MCH RBC QN AUTO: 28.5 PG (ref 26–35)
MCHC RBC AUTO-ENTMCNC: 31 % (ref 32–34.5)
MCV RBC AUTO: 91.8 FL (ref 80–99.9)
METER GLUCOSE: 167 MG/DL (ref 74–99)
METER GLUCOSE: 171 MG/DL (ref 74–99)
METER GLUCOSE: 173 MG/DL (ref 74–99)
METER GLUCOSE: 271 MG/DL (ref 74–99)
PDW BLD-RTO: 14.7 FL (ref 11.5–15)
PLATELET # BLD: 332 E9/L (ref 130–450)
PMV BLD AUTO: 9.1 FL (ref 7–12)
POTASSIUM SERPL-SCNC: 4.2 MMOL/L (ref 3.5–5)
RAPID HIV 1&2: NORMAL
RBC # BLD: 3.79 E12/L (ref 3.5–5.5)
SODIUM BLD-SCNC: 134 MMOL/L (ref 132–146)
WBC # BLD: 14.8 E9/L (ref 4.5–11.5)

## 2021-05-03 PROCEDURE — 6370000000 HC RX 637 (ALT 250 FOR IP): Performed by: FAMILY MEDICINE

## 2021-05-03 PROCEDURE — 85027 COMPLETE CBC AUTOMATED: CPT

## 2021-05-03 PROCEDURE — 97530 THERAPEUTIC ACTIVITIES: CPT

## 2021-05-03 PROCEDURE — 6370000000 HC RX 637 (ALT 250 FOR IP): Performed by: INTERNAL MEDICINE

## 2021-05-03 PROCEDURE — 97161 PT EVAL LOW COMPLEX 20 MIN: CPT

## 2021-05-03 PROCEDURE — 2140000000 HC CCU INTERMEDIATE R&B

## 2021-05-03 PROCEDURE — 80048 BASIC METABOLIC PNL TOTAL CA: CPT

## 2021-05-03 PROCEDURE — 36415 COLL VENOUS BLD VENIPUNCTURE: CPT

## 2021-05-03 PROCEDURE — 82962 GLUCOSE BLOOD TEST: CPT

## 2021-05-03 PROCEDURE — 93308 TTE F-UP OR LMTD: CPT

## 2021-05-03 RX ADMIN — MAGNESIUM GLUCONATE 500 MG ORAL TABLET 400 MG: 500 TABLET ORAL at 21:00

## 2021-05-03 RX ADMIN — GABAPENTIN 600 MG: 300 CAPSULE ORAL at 21:00

## 2021-05-03 RX ADMIN — GABAPENTIN 600 MG: 300 CAPSULE ORAL at 14:48

## 2021-05-03 RX ADMIN — OXYBUTYNIN CHLORIDE 5 MG: 5 TABLET ORAL at 09:05

## 2021-05-03 RX ADMIN — PANTOPRAZOLE SODIUM 20 MG: 20 TABLET, DELAYED RELEASE ORAL at 05:50

## 2021-05-03 RX ADMIN — LOSARTAN POTASSIUM 25 MG: 25 TABLET, FILM COATED ORAL at 09:05

## 2021-05-03 RX ADMIN — FUROSEMIDE 20 MG: 20 TABLET ORAL at 09:05

## 2021-05-03 RX ADMIN — GABAPENTIN 600 MG: 300 CAPSULE ORAL at 09:06

## 2021-05-03 RX ADMIN — ATORVASTATIN CALCIUM 10 MG: 10 TABLET, FILM COATED ORAL at 21:00

## 2021-05-03 RX ADMIN — INSULIN LISPRO 2 UNITS: 100 INJECTION, SOLUTION INTRAVENOUS; SUBCUTANEOUS at 17:03

## 2021-05-03 RX ADMIN — ACETAMINOPHEN 650 MG: 325 TABLET ORAL at 20:57

## 2021-05-03 RX ADMIN — MAGNESIUM GLUCONATE 500 MG ORAL TABLET 400 MG: 500 TABLET ORAL at 09:05

## 2021-05-03 RX ADMIN — PAROXETINE 10 MG: 10 TABLET, FILM COATED ORAL at 09:08

## 2021-05-03 RX ADMIN — LEVOTHYROXINE SODIUM 50 MCG: 0.05 TABLET ORAL at 05:50

## 2021-05-03 RX ADMIN — ACETAMINOPHEN 650 MG: 325 TABLET ORAL at 10:04

## 2021-05-03 RX ADMIN — INSULIN LISPRO 2 UNITS: 100 INJECTION, SOLUTION INTRAVENOUS; SUBCUTANEOUS at 21:01

## 2021-05-03 RX ADMIN — APIXABAN 5 MG: 5 TABLET, FILM COATED ORAL at 09:06

## 2021-05-03 RX ADMIN — ALLOPURINOL 200 MG: 100 TABLET ORAL at 09:05

## 2021-05-03 RX ADMIN — LATANOPROST 1 DROP: 50 SOLUTION OPHTHALMIC at 21:01

## 2021-05-03 RX ADMIN — APIXABAN 5 MG: 5 TABLET, FILM COATED ORAL at 21:00

## 2021-05-03 RX ADMIN — INSULIN LISPRO 2 UNITS: 100 INJECTION, SOLUTION INTRAVENOUS; SUBCUTANEOUS at 09:09

## 2021-05-03 RX ADMIN — INSULIN LISPRO 6 UNITS: 100 INJECTION, SOLUTION INTRAVENOUS; SUBCUTANEOUS at 11:55

## 2021-05-03 RX ADMIN — COLCHICINE 0.6 MG: 0.6 TABLET ORAL at 09:08

## 2021-05-03 RX ADMIN — PROPRANOLOL HYDROCHLORIDE 10 MG: 10 TABLET ORAL at 17:15

## 2021-05-03 ASSESSMENT — PAIN SCALES - GENERAL: PAINLEVEL_OUTOF10: 0

## 2021-05-03 NOTE — PROGRESS NOTES
Electrophysiology progress note         Date:  5/3/2021  Patient: Tyler Kumari  Admission:  5/1/2021  1:22 AM  Admit DX: Atrial fibrillation with RVR ([address] [I48.91]  Age:  78 y.o., 1945     LOS: 2 days     Reason for evaluation:   atrial fibrillation, pericarditis, pleural effusion    SUBJECTIVE:    The patient was seen and examined. Notes and labs reviewed. There were no complications over night. Patient's cardiac review of systems: negative. The patient is generally feeling exhausted today. No  other cardiac symptoms    OBJECTIVE:    Telemetry: Sinus rhythm  /65   Pulse 69   Temp 97.7 °F (36.5 °C) (Temporal)   Resp 18   Ht 5' 5\" (1.651 m)   Wt 203 lb (92.1 kg)   SpO2 94%   BMI 33.78 kg/m²     Intake/Output Summary (Last 24 hours) at 5/3/2021 1832  Last data filed at 5/3/2021 1520  Gross per 24 hour   Intake 480 ml   Output 3500 ml   Net -3020 ml       EXAM:   CONSTITUTIONAL:  awake, alert, cooperative, no apparent distress, and appears stated age. HEENT: Normal jugular venous pulsations, Head is atraumatic, normocephalic. Eyes and oral mucosa are normal.  LUNGS: Good respiratory effort. No for increased work of breathing. On auscultation: clear to auscultation bilaterally  CARDIOVASCULAR: Laterally displaced PMI, regular rate and rhythm, normal S1 and S2, softer pericardial rub noted again today  ABDOMEN: Soft, nontender, nondistended. SKIN: Warm and dry. EXTREMITIES: No lower extremity edema. Motor movement grossly intact. No cyanosis or clubbing.     Current Inpatient Medications:   furosemide  20 mg Oral Daily    magnesium oxide  400 mg Oral BID    allopurinol  200 mg Oral Daily    apixaban  5 mg Oral BID    atorvastatin  10 mg Oral Nightly    latanoprost  1 drop Left Eye Nightly    levothyroxine  50 mcg Oral Daily    losartan  25 mg Oral Daily    gabapentin  600 mg Oral TID    pantoprazole  20 mg Oral QAM AC    oxybutynin  5 mg Oral Daily    PARoxetine  10 mg Oral Daily  propranolol  10 mg Oral QPM    insulin lispro  0-10 Units Subcutaneous 4x Daily AC & HS    colchicine  0.6 mg Oral Daily       IV Infusions (if any):   dextrose         Diagnostics:    EKG: normal sinus rhythm, unchanged from previous tracings. ECHO: reviewed. Trace pericardial effusion noted near the right ventricle  Ejection fraction: 60%  Stress Test: not obtained. Cardiac Angiography: not obtained. Labs:   CBC:   Recent Labs     05/02/21  0506 05/03/21  0804   WBC 14.2* 14.8*   HGB 9.9* 10.8*   HCT 32.3* 34.8    332     BMP:   Recent Labs     05/02/21  0506 05/03/21  0804    134   K 4.4 4.2   CO2 25 24   BUN 13 15   CREATININE 1.0 0.9   LABGLOM 54 >60   GLUCOSE 158* 163*     BNP: No results for input(s): BNP in the last 72 hours. PT/INR: No results for input(s): PROTIME, INR in the last 72 hours. APTT:No results for input(s): APTT in the last 72 hours. CARDIAC ENZYMES:  Recent Labs     05/01/21  0534 05/01/21  1059   TROPONINI <0.01 <0.01     FASTING LIPID PANEL:  Lab Results   Component Value Date    HDL 26 05/01/2021    LDLCALC 78 05/01/2021    TRIG 117 05/01/2021     LIVER PROFILE:No results for input(s): AST, ALT, LABALBU in the last 72 hours. ASSESSMENT:    Patient Active Problem List   Diagnosis    Obesity    HTN (hypertension)    S/P lumbar fusion    Essential hypertension    Controlled type 2 diabetes mellitus without complication, without long-term current use of insulin ([address]    FAY (acute kidney injury) ([address]    Major depressive disorder    Atrial fibrillation with rapid ventricular response X 2 episodes in the last week. Each of them was self-limited   Pericardial effusion noted on initial echo 1 week ago  Repeat echo today shows a trivial effusion anteriorly now.   Cardiac MRI could not be done due to lack of appropriate anesthesia support  All viral studies completely negative  Possible underlying sinus node dysfunction    PLAN:    Continue colchicine for pericarditis  Systemic anticoagulation for paroxysmal atrial fibrillation  Avoid antiarrhythmic drug therapy at present since atrial fibrillation may resolve completely after resolution of the effusion  Discharge home tomorrow if okay with all      Please see orders. Discussed with patient and nursing.     Crodell Antonio MD,FACC

## 2021-05-03 NOTE — PROGRESS NOTES
Hospitalist Progress Note      Synopsis: Patient admitted on 5/1/2021 for pericardial effusion and paroxysmal atrial fibrillation with suspected pericarditis. Subjective  Feeling okay  Very anxious about upcoming cardiac MRI  Exam:  BP (!) 157/69   Pulse 70   Temp 96.5 °F (35.8 °C) (Temporal)   Resp 18   Ht 5' 5\" (1.651 m)   Wt 203 lb (92.1 kg)   SpO2 94%   BMI 33.78 kg/m²   General appearance: No apparent distress, appears stated age and cooperative. HEENT: Pupils equal, round, and reactive to light. Conjunctivae/corneas clear. Neck: Supple. No jugular venous distention. Trachea midline. Respiratory:  Normal respiratory effort. Clear to auscultation, bilaterally without Rales/Wheezes/Rhonchi. Cardiovascular: Regular rate and rhythm with normal S1/S2 without murmurs, rubs or gallops. Abdomen: Soft, non-tender, non-distended with normal bowel sounds. Musculoskeletal: No clubbing, cyanosis or edema bilaterally. Brisk capillary refill. 2+ lower extremity pulses (dorsalis pedis).    Skin:  No rashes    Neurologic: awake, alert and following commands     Medications:  Reviewed    Infusion Medications    dextrose       Scheduled Medications    furosemide  20 mg Oral Daily    magnesium oxide  400 mg Oral BID    allopurinol  200 mg Oral Daily    apixaban  5 mg Oral BID    atorvastatin  10 mg Oral Nightly    latanoprost  1 drop Left Eye Nightly    levothyroxine  50 mcg Oral Daily    losartan  25 mg Oral Daily    gabapentin  600 mg Oral TID    pantoprazole  20 mg Oral QAM AC    oxybutynin  5 mg Oral Daily    PARoxetine  10 mg Oral Daily    propranolol  10 mg Oral QPM    insulin lispro  0-10 Units Subcutaneous 4x Daily AC & HS    colchicine  0.6 mg Oral Daily     PRN Meds: glucose, dextrose, glucagon (rDNA), dextrose, acetaminophen, magnesium sulfate, albuterol    I/O    Intake/Output Summary (Last 24 hours) at 5/3/2021 0893  Last data filed at 5/3/2021 0753  Gross per 24 hour   Intake 600 ml   Output 3000 ml   Net -2400 ml       Labs:   Recent Labs     04/30/21  0916 05/02/21  0506 05/03/21  0804   WBC 13.4* 14.2* 14.8*   HGB 10.3* 9.9* 10.8*   HCT 35.3 32.3* 34.8    324 332       Recent Labs     04/30/21  0916 05/01/21  1059 05/01/21  1447 05/02/21  0506     --  138 138   K 4.7  --  4.1 4.4     --  102 103   CO2 22  --  23 25   BUN 14  --  11 13   CREATININE 0.8  --  0.8 1.0   CALCIUM 9.5  --  9.5 9.5   PHOS  --  3.3  --   --        Recent Labs     04/30/21  0916   PROT 6.6   ALKPHOS 159*   ALT 20   AST 17   BILITOT 0.5       No results for input(s): INR in the last 72 hours. Recent Labs     04/30/21  0916 05/01/21  0534 05/01/21  1059   TROPONINI <0.01 <0.01 <0.01       Chronic labs:  Lab Results   Component Value Date    CHOL 127 05/01/2021    TRIG 117 05/01/2021    HDL 26 05/01/2021    LDLCALC 78 05/01/2021    TSH 3.160 05/01/2021    INR 1.0 09/10/2020    LABA1C 6.8 (H) 05/01/2021       Radiology:  Imaging studies reviewed today. ASSESSMENT:    Principal Problem:    Atrial fibrillation with RVR (HCC)  Active Problems:    HTN (hypertension)    Essential hypertension    Controlled type 2 diabetes mellitus without complication, without long-term current use of insulin (HCC)    Major depressive disorder    Hypothyroidism  Resolved Problems:    * No resolved hospital problems. *       PLAN:  Continue colchicine for suspected pericarditis  Await cardiac MRI results  Maintaining sinus rhythm currently  Continue CPAP at night  Continue home meds as ordered    Diet: DIET CARDIAC;  Code Status: Prior  PT/OT Eval Status:   ordered  DVT Prophylaxis:   scd  Recommended disposition at discharge:  home w hhc    +++++++++++++++++++++++++++++++++++++++++++++++++  Nixon Bueno MD   [facility].  +++++++++++++++++++++++++++++++++++++++++++++++++  NOTE: This report was transcribed using voice recognition software.  Every effort was made to ensure accuracy; however,

## 2021-05-03 NOTE — PROGRESS NOTES
Hospitalist Progress Note      Synopsis: Patient admitted on 5/1/2021 for pericardial effusion and paroxysmal atrial fibrillation with suspected pericarditis. Subjective  Pt reports feeling slightly better than yesterday  States that she is frustrated that her [facility] trnasfer did not go through     Exam:  BP (!) 115/58   Pulse 71   Temp 98 °F (36.7 °C) (Temporal)   Resp 18   Ht 5' 5\" (1.651 m)   Wt 201 lb 6.4 oz (91.4 kg)   SpO2 94%   BMI 33.51 kg/m²   General appearance: No apparent distress, appears stated age and cooperative. HEENT: Pupils equal, round, and reactive to light. Conjunctivae/corneas clear. Neck: Supple. No jugular venous distention. Trachea midline. Respiratory:  Normal respiratory effort. Clear to auscultation, bilaterally without Rales/Wheezes/Rhonchi. Cardiovascular: Regular rate and rhythm with normal S1/S2 without murmurs, rubs or gallops. Abdomen: Soft, non-tender, non-distended with normal bowel sounds. Musculoskeletal: No clubbing, cyanosis or edema bilaterally. Brisk capillary refill. 2+ lower extremity pulses (dorsalis pedis).    Skin:  No rashes    Neurologic: awake, alert and following commands     Medications:  Reviewed    Infusion Medications    dextrose       Scheduled Medications    furosemide  20 mg Oral Daily    magnesium oxide  400 mg Oral BID    allopurinol  200 mg Oral Daily    apixaban  5 mg Oral BID    atorvastatin  10 mg Oral Nightly    latanoprost  1 drop Left Eye Nightly    levothyroxine  50 mcg Oral Daily    losartan  25 mg Oral Daily    gabapentin  600 mg Oral TID    pantoprazole  20 mg Oral QAM AC    oxybutynin  5 mg Oral Daily    PARoxetine  10 mg Oral Daily    propranolol  10 mg Oral QPM    insulin lispro  0-10 Units Subcutaneous 4x Daily AC & HS    colchicine  0.6 mg Oral Daily     PRN Meds: glucose, dextrose, glucagon (rDNA), dextrose, acetaminophen, magnesium sulfate, albuterol    I/O    Intake/Output Summary (Last 24 hours) at 5/2/2021 2146  Last data filed at 5/2/2021 2038  Gross per 24 hour   Intake 840 ml   Output 2200 ml   Net -1360 ml       Labs:   Recent Labs     04/30/21  0916 05/02/21  0506   WBC 13.4* 14.2*   HGB 10.3* 9.9*   HCT 35.3 32.3*    324       Recent Labs     04/30/21  0916 05/01/21  1059 05/01/21  1447 05/02/21  0506     --  138 138   K 4.7  --  4.1 4.4     --  102 103   CO2 22  --  23 25   BUN 14  --  11 13   CREATININE 0.8  --  0.8 1.0   CALCIUM 9.5  --  9.5 9.5   PHOS  --  3.3  --   --        Recent Labs     04/30/21  0916   PROT 6.6   ALKPHOS 159*   ALT 20   AST 17   BILITOT 0.5       No results for input(s): INR in the last 72 hours. Recent Labs     04/30/21  0916 05/01/21  0534 05/01/21  1059   TROPONINI <0.01 <0.01 <0.01       Chronic labs:  Lab Results   Component Value Date    CHOL 127 05/01/2021    TRIG 117 05/01/2021    HDL 26 05/01/2021    LDLCALC 78 05/01/2021    TSH 3.160 05/01/2021    INR 1.0 09/10/2020    LABA1C 6.8 (H) 05/01/2021       Radiology:  Imaging studies reviewed today. ASSESSMENT:    Principal Problem:    Atrial fibrillation with RVR (HCC)  Active Problems:    HTN (hypertension)    Essential hypertension    Controlled type 2 diabetes mellitus without complication, without long-term current use of insulin (HCC)    Major depressive disorder    Hypothyroidism  Resolved Problems:    * No resolved hospital problems.  *       PLAN:  Continue colchicine for suspected pericarditis  COVID panel and antibody testing negative  Cardiac MRI to assess cardiac structures  Continue CPAP at night  Continue home meds as ordered    Diet: DIET CARDIAC;  Code Status: Prior  PT/OT Eval Status:   ordered  DVT Prophylaxis:   scd  Recommended disposition at discharge:  home w hhc    +++++++++++++++++++++++++++++++++++++++++++++++++  Brenda Alamo MD   [facility].  +++++++++++++++++++++++++++++++++++++++++++++++++  NOTE: This report was transcribed using voice recognition software. Every effort was made to ensure accuracy; however, inadvertent computerized transcription errors may be present.

## 2021-05-03 NOTE — PATIENT CARE CONFERENCE
P Quality Flow/Interdisciplinary Rounds Progress Note        Quality Flow Rounds held on May 3, 2021    Disciplines Attending:  Bedside Nurse, ,  and Nursing Unit 1125 Maryam was admitted on 5/1/2021  1:22 AM    Anticipated Discharge Date:  Expected Discharge Date: 05/03/21    Disposition:    Nicolás Score:  Nicolás Scale Score: 20    Readmission Risk              Risk of Unplanned Readmission:        14           Discussed patient goal for the day, patient clinical progression, and barriers to discharge. The following Goal(s) of the Day/Commitment(s) have been identified:   For Limited Echo       [facility]  May 3, 2021

## 2021-05-03 NOTE — CARE COORDINATION
SOCIAL WORK/CASEMANAGEMENT TRANSITION OF CARE [id number] Estephania Celaya, [address], [place], [phone]): I met with pt in the room this a.m. to complete assessment. Pt lives alone but has been staying with her daughter and plans to return there on discharge then stay with son until her medical issues resolve. The daughter has a 2 story home with 2nd floor bed and bath. There is a 1/2 bath on the first floor. Pt can enter thru the back door with 2 steps. Pt still drives and prior to about 1-2 weeks ago was independent. Back on sept 15, 2020 pt had fusion of her back. Pt said she was active with [facility] but when I called them she was not. Pt would like home therapy for PT and OT and I left a note for pcp for orders and referral was made to [facility]. Pt has a fww she no longer uses, cane that she uses at times, cpap, raised toilet seat . She is diabetic and on oral medications with out a working glucometer and doesn't test her blood sugar. Sw/cm to follow.  Ana Llanes  5/3/2021

## 2021-05-04 ENCOUNTER — TELEPHONE (OUTPATIENT)
Dept: ADMINISTRATIVE | Age: 76
End: 2021-05-04

## 2021-05-04 VITALS
HEART RATE: 66 BPM | OXYGEN SATURATION: 95 % | WEIGHT: 203.4 LBS | BODY MASS INDEX: 33.89 KG/M2 | SYSTOLIC BLOOD PRESSURE: 114 MMHG | HEIGHT: 65 IN | RESPIRATION RATE: 16 BRPM | DIASTOLIC BLOOD PRESSURE: 61 MMHG | TEMPERATURE: 98.1 F

## 2021-05-04 LAB
ANION GAP SERPL CALCULATED.3IONS-SCNC: 14 MMOL/L (ref 7–16)
BUN BLDV-MCNC: 19 MG/DL (ref 6–23)
CALCIUM SERPL-MCNC: 9.2 MG/DL (ref 8.6–10.2)
CHLORIDE BLD-SCNC: 95 MMOL/L (ref 98–107)
CO2: 24 MMOL/L (ref 22–29)
CREAT SERPL-MCNC: 0.9 MG/DL (ref 0.5–1)
GFR AFRICAN AMERICAN: >60
GFR NON-AFRICAN AMERICAN: >60 ML/MIN/1.73
GLUCOSE BLD-MCNC: 152 MG/DL (ref 74–99)
HCT VFR BLD CALC: 33.6 % (ref 34–48)
HEMOGLOBIN: 10.3 G/DL (ref 11.5–15.5)
HEPATITIS B SURFACE ANTIGEN INTERPRETATION: NORMAL
HEPATITIS C ANTIBODY INTERPRETATION: NORMAL
MCH RBC QN AUTO: 28.5 PG (ref 26–35)
MCHC RBC AUTO-ENTMCNC: 30.7 % (ref 32–34.5)
MCV RBC AUTO: 92.8 FL (ref 80–99.9)
METER GLUCOSE: 158 MG/DL (ref 74–99)
METER GLUCOSE: 232 MG/DL (ref 74–99)
PDW BLD-RTO: 14.6 FL (ref 11.5–15)
PLATELET # BLD: 300 E9/L (ref 130–450)
PMV BLD AUTO: 9.1 FL (ref 7–12)
POTASSIUM SERPL-SCNC: 4.1 MMOL/L (ref 3.5–5)
PRO-BNP: 374 PG/ML (ref 0–450)
RBC # BLD: 3.62 E12/L (ref 3.5–5.5)
SODIUM BLD-SCNC: 133 MMOL/L (ref 132–146)
WBC # BLD: 14.1 E9/L (ref 4.5–11.5)

## 2021-05-04 PROCEDURE — 6370000000 HC RX 637 (ALT 250 FOR IP): Performed by: FAMILY MEDICINE

## 2021-05-04 PROCEDURE — 85027 COMPLETE CBC AUTOMATED: CPT

## 2021-05-04 PROCEDURE — 83880 ASSAY OF NATRIURETIC PEPTIDE: CPT

## 2021-05-04 PROCEDURE — 82962 GLUCOSE BLOOD TEST: CPT

## 2021-05-04 PROCEDURE — 6370000000 HC RX 637 (ALT 250 FOR IP): Performed by: INTERNAL MEDICINE

## 2021-05-04 PROCEDURE — 80048 BASIC METABOLIC PNL TOTAL CA: CPT

## 2021-05-04 PROCEDURE — 36415 COLL VENOUS BLD VENIPUNCTURE: CPT

## 2021-05-04 RX ORDER — COLCHICINE 0.6 MG/1
0.6 TABLET ORAL DAILY
Qty: 10 TABLET | Refills: 1 | Status: SHIPPED | OUTPATIENT
Start: 2021-05-05 | End: 2021-05-04

## 2021-05-04 RX ORDER — COLCHICINE 0.6 MG/1
0.6 TABLET ORAL DAILY
Qty: 10 TABLET | Refills: 1 | Status: SHIPPED | OUTPATIENT
Start: 2021-05-05 | End: 2021-12-13

## 2021-05-04 RX ADMIN — ACETAMINOPHEN 650 MG: 325 TABLET ORAL at 02:04

## 2021-05-04 RX ADMIN — INSULIN LISPRO 4 UNITS: 100 INJECTION, SOLUTION INTRAVENOUS; SUBCUTANEOUS at 12:19

## 2021-05-04 RX ADMIN — APIXABAN 5 MG: 5 TABLET, FILM COATED ORAL at 08:41

## 2021-05-04 RX ADMIN — COLCHICINE 0.6 MG: 0.6 TABLET ORAL at 08:42

## 2021-05-04 RX ADMIN — ACETAMINOPHEN 650 MG: 325 TABLET ORAL at 14:47

## 2021-05-04 RX ADMIN — MAGNESIUM GLUCONATE 500 MG ORAL TABLET 400 MG: 500 TABLET ORAL at 08:42

## 2021-05-04 RX ADMIN — INSULIN LISPRO 2 UNITS: 100 INJECTION, SOLUTION INTRAVENOUS; SUBCUTANEOUS at 07:36

## 2021-05-04 RX ADMIN — GABAPENTIN 600 MG: 300 CAPSULE ORAL at 14:09

## 2021-05-04 RX ADMIN — PANTOPRAZOLE SODIUM 20 MG: 20 TABLET, DELAYED RELEASE ORAL at 05:56

## 2021-05-04 RX ADMIN — LOSARTAN POTASSIUM 25 MG: 25 TABLET, FILM COATED ORAL at 08:41

## 2021-05-04 RX ADMIN — ACETAMINOPHEN 650 MG: 325 TABLET ORAL at 05:56

## 2021-05-04 RX ADMIN — LEVOTHYROXINE SODIUM 50 MCG: 0.05 TABLET ORAL at 05:56

## 2021-05-04 RX ADMIN — GABAPENTIN 600 MG: 300 CAPSULE ORAL at 08:41

## 2021-05-04 RX ADMIN — ACETAMINOPHEN 650 MG: 325 TABLET ORAL at 10:37

## 2021-05-04 RX ADMIN — ALLOPURINOL 200 MG: 100 TABLET ORAL at 08:41

## 2021-05-04 RX ADMIN — PAROXETINE 10 MG: 10 TABLET, FILM COATED ORAL at 08:42

## 2021-05-04 RX ADMIN — FUROSEMIDE 20 MG: 20 TABLET ORAL at 08:42

## 2021-05-04 RX ADMIN — OXYBUTYNIN CHLORIDE 5 MG: 5 TABLET ORAL at 08:42

## 2021-05-04 ASSESSMENT — PAIN SCALES - GENERAL
PAINLEVEL_OUTOF10: 0
PAINLEVEL_OUTOF10: 5

## 2021-05-04 NOTE — PATIENT CARE CONFERENCE
[facility] Quality Flow/Interdisciplinary Rounds Progress Note        Quality Flow Rounds held on May 4, 2021    Disciplines Attending:  Bedside Nurse, ,  and Nursing Unit 112Sarina Francisco was admitted on 5/1/2021  1:22 AM    Anticipated Discharge Date:  Expected Discharge Date: 05/03/21    Disposition:    Nicolás Score:  Nicolás Scale Score: 20    Readmission Risk              Risk of Unplanned Readmission:        14           Discussed patient goal for the day, patient clinical progression, and barriers to discharge.   The following Goal(s) of the Day/Commitment(s) have been identified:  Discharge pending Brennan Monroy  May 4, 2021

## 2021-05-04 NOTE — CARE COORDINATION
Venus at [facility] notified discharge order on chart.  Home care orders on chart for PT & OT.

## 2021-05-04 NOTE — PLAN OF CARE
Problem: Falls - Risk of:  Goal: Will remain free from falls  Description: Will remain free from falls  Outcome: Met This Shift     Problem: Musculor/Skeletal Functional Status  Goal: Highest potential functional level  Outcome: Met This Shift

## 2021-05-04 NOTE — PROGRESS NOTES
 propranolol  10 mg Oral QPM    insulin lispro  0-10 Units Subcutaneous 4x Daily AC & HS    colchicine  0.6 mg Oral Daily       IV Infusions (if any):   dextrose         Diagnostics:    EKG: normal sinus rhythm, unchanged from previous tracings. ECHO: reviewed. Ejection fraction: 60%  Stress Test: not obtained. Cardiac Angiography: not obtained. Labs:   CBC:   Recent Labs     05/03/21  0804 05/04/21  0546   WBC 14.8* 14.1*   HGB 10.8* 10.3*   HCT 34.8 33.6*    300     BMP:   Recent Labs     05/03/21  0804 05/04/21  0546    133   K 4.2 4.1   CO2 24 24   BUN 15 19   CREATININE 0.9 0.9   LABGLOM >60 >60   GLUCOSE 163* 152*     BNP: No results for input(s): BNP in the last 72 hours. PT/INR: No results for input(s): PROTIME, INR in the last 72 hours. APTT:No results for input(s): APTT in the last 72 hours. CARDIAC ENZYMES:No results for input(s): CKTOTAL, CKMB, CKMBINDEX, TROPONINI in the last 72 hours. FASTING LIPID PANEL:  Lab Results   Component Value Date    HDL 26 05/01/2021    LDLCALC 78 05/01/2021    TRIG 117 05/01/2021     LIVER PROFILE:No results for input(s): AST, ALT, LABALBU in the last 72 hours. ASSESSMENT:    Patient Active Problem List   Diagnosis    Obesity    Essential hypertension    Controlled type 2 diabetes mellitus without complication, without long-term current use of insulin ([facility]    FAY (acute kidney injury) (HCC)--1 week ago, now resolved    Major depressive disorder    Atrial fibrillation with rapid ventricular response --2 self-limited episodes in the past week  Patient on systemic anticoagulation    Hypothyroidism   Sinus node dysfunction. Resting bradycardia as well as long pauses on conversion to sinus rhythm of 6 and 9 seconds    Pericarditis--patient on colchicine with no symptoms of chest pain today.     PLAN:    Okay for discharge home on Eliquis and colchicine  Continue antihypertensives as at home  Follow-up in the office in 4 to 6 weeks  Antiarrhythmic drug therapy in the future if atrial fibrillation recurs  Patient and her daughter instructed regarding follow-up chest CT/chest x-ray as well as follow-up blood work with Dr. Claire Cabot      Please see orders. Discussed with patient and nursing.     Kari Armenta MD,FACC

## 2021-05-04 NOTE — TELEPHONE ENCOUNTER
Call from [address]. Pt is being discharged today and needs a hospital follow up with Dr Ab Bobo. Pt is currently scheduled to be seen on 5/10 by PCP, however Dr requires 30 minutes for a hospital f/u. Please call Kye Grier at [facility] today to schedule. [facility] unable to schedule in a timely manner.

## 2021-05-05 ENCOUNTER — TELEPHONE (OUTPATIENT)
Dept: INTERNAL MEDICINE | Age: 76
End: 2021-05-05

## 2021-05-05 NOTE — TELEPHONE ENCOUNTER
[address] [facility] Transitions Initial Follow Up Call    Outreach made within 2 business days of discharge: Yes    Patient: Manny Hendricks Patient : 1945   MRN: <[id number]>  Reason for Admission: There are no discharge diagnoses documented for the most recent discharge. Discharge Date: 21      Discharge department/facility: Home    TCM Interactive Patient Contact:  Attempted to call patient. Pt did not answer. Could not leave voicemail due to mailbox being full.     Appointment with PCP within 7-14 days    Follow Up  Future Appointments   Date Time Provider Jyothi Hdez   5/10/2021  1:15 PM Julia Coon MD [facility]   2021  1:45 PM Joy Loza PA-C [address], [address] [facility]

## 2021-05-07 ENCOUNTER — TELEPHONE (OUTPATIENT)
Dept: ADMINISTRATIVE | Age: 76
End: 2021-05-07

## 2021-05-07 NOTE — TELEPHONE ENCOUNTER
Melissa Baron called to cancel appt, she has been in [facility], [facility] and now [facility]; wanted Dr Mickle Seip to be aware; once she is discharged she will call for Ameena to schedule hospital follow up appt

## 2021-05-14 ENCOUNTER — TELEPHONE (OUTPATIENT)
Dept: ADMINISTRATIVE | Age: 76
End: 2021-05-14

## 2021-05-24 ENCOUNTER — VIRTUAL VISIT (OUTPATIENT)
Dept: FAMILY MEDICINE CLINIC | Age: 76
End: 2021-05-24
Payer: MEDICARE

## 2021-05-24 DIAGNOSIS — I30.1 ACUTE VIRAL PERICARDITIS: ICD-10-CM

## 2021-05-24 DIAGNOSIS — J90 PLEURAL EFFUSION: ICD-10-CM

## 2021-05-24 DIAGNOSIS — E11.51 DIABETES TYPE 2 WITH ATHEROSCLEROSIS OF ARTERIES OF EXTREMITIES (HCC): ICD-10-CM

## 2021-05-24 DIAGNOSIS — F32.A DEPRESSION, UNSPECIFIED DEPRESSION TYPE: ICD-10-CM

## 2021-05-24 DIAGNOSIS — I48.91 ATRIAL FIBRILLATION WITH RVR (HCC): Primary | ICD-10-CM

## 2021-05-24 DIAGNOSIS — I10 ESSENTIAL HYPERTENSION: ICD-10-CM

## 2021-05-24 DIAGNOSIS — I70.209 DIABETES TYPE 2 WITH ATHEROSCLEROSIS OF ARTERIES OF EXTREMITIES (HCC): ICD-10-CM

## 2021-05-24 PROCEDURE — 1111F DSCHRG MED/CURRENT MED MERGE: CPT | Performed by: INTERNAL MEDICINE

## 2021-05-24 PROCEDURE — 99214 OFFICE O/P EST MOD 30 MIN: CPT | Performed by: INTERNAL MEDICINE

## 2021-05-24 SDOH — ECONOMIC STABILITY: FOOD INSECURITY: WITHIN THE PAST 12 MONTHS, THE FOOD YOU BOUGHT JUST DIDN'T LAST AND YOU DIDN'T HAVE MONEY TO GET MORE.: NEVER TRUE

## 2021-05-24 SDOH — ECONOMIC STABILITY: FOOD INSECURITY: WITHIN THE PAST 12 MONTHS, YOU WORRIED THAT YOUR FOOD WOULD RUN OUT BEFORE YOU GOT MONEY TO BUY MORE.: NEVER TRUE

## 2021-05-24 NOTE — PROGRESS NOTES
Post-Discharge Transitional Care Management Services or Hospital Follow Up    This was a virtual visit video visit patient could not come to the office  Still feels tired see detailed summary below      Meg Hurt   YOB: 1945    Date of Office Visit:  5/24/2021  Date of Hospital Admission: May 6, 2021  Date of Hospital Discharge: May 11, 2021    Care management risk score Rising risk (score 2-5) and Complex Care (Scores >=6): 1     Non face to face  following discharge, date last encounter closed (first attempt may have been earlier): *No documented post hospital discharge outreach found in the last 14 days *No documented post hospital discharge outreach found in the last 14 days    Call initiated 2 business days of discharge: *No response recorded in the last 14 days    Patient Active Problem List   Diagnosis    Left cataract    Right cataract    Dislocated IOL (intraocular lens)    Back pain at L4-L5 level    Acute midline low back pain with right-sided sciatica    Intractable back pain    Obesity    HTN (hypertension)    Spondylolisthesis of lumbar region    Spondylolisthesis, lumbar region    S/P lumbar fusion    Essential hypertension    Controlled type 2 diabetes mellitus without complication, without long-term current use of insulin ([address]    FAY (acute kidney injury) ([address]    Major depressive disorder    Atrial fibrillation with rapid ventricular response ([address]    Hypothyroidism    Atrial fibrillation with RVR (HCC)       Allergies   Allergen Reactions    Epinephrine Other (See Comments)     Heart racing felt like I couldn't breath    Ace Inhibitors Other (See Comments)     Cough      Adhesive Tape Rash    Propoxyphene Other (See Comments)     ?        Medications listed as ordered at the time of discharge from hospital  Reviewed    Medications marked \"taking\" at this time  Outpatient Medications Marked as Taking for the 5/24/21 encounter (Virtual Visit) with [facility] Magdy Castillo MD   Medication Sig Dispense Refill    metoprolol tartrate (LOPRESSOR) 25 MG tablet Take 12.5 mg by mouth every 12 hours      colchicine (COLCRYS) 0.6 MG tablet Take 1 tablet by mouth daily 10 tablet 1    apixaban (ELIQUIS) 5 MG TABS tablet Take 1 tablet by mouth 2 times daily 60 tablet 0    PARoxetine (PAXIL) 10 MG tablet Take 1 tablet by mouth daily 30 tablet 0        Medications patient taking as of now reconciled against medications ordered at time of hospital discharge: Yes    Chief Complaint   Patient presents with   [address] from Hospital       History of Present illness - Follow up of Hospital diagnosis(es): Atrial fibrillation with fast medical response    Viral pericarditis    Pericardial effusion    Pleural effusion    Inpatient course: Discharge summary reviewed- see chart.     Interval history/Current status:     78 is a female with history of hyperlipidemia, hypertension, hypothyroidism, sleep apnea, type 2 diabetes, depression, was feeling very tired about 4 weeks ago she was thinking she is more depressed her daughter who is a nurse practitioner did some blood work found to have elevated D-dimer she was admitted at [facility] chest was negative for PE, she went into atrial fibrillation she was on Cardizem drip and she converted she was discharged home    2 to 3 days later she developed atrial fibrillation again she went back to the hospital she was discharged again after treatment    Then she was admitted at [facility] with KAMERON fib she was seen by the electrophysiologist Dr. Keila Ramos she was diagnosed with a viral pericarditis they cannot do an MRI because patient is claustrophobic she was started on colchicine    She still has the symptoms so she went to [facility] she was admitted there  She was seen by cardiologist    She had cardiac MRI after intubation and sedation because she is claustrophobic  Pericarditis was seen, there is circumferential pericardial thickening associated with diffuse edema and delayed enhancement no pericardial effusion    Normal biventricular size EF 58%    No valvular abnormality  Bilateral pleural effusions    She was continued on colchicine and ibuprofen was added  Respiratory viral panel was negative for all respiratory viruses including Covid    She was tested for Covid at least 6 times there are negative    Echocardiogram showed EF of 52%    She was started on metoprolol 12.5 twice daily  Diltiazem 60 mg as needed  Continue Motrin 800 3 times daily  Colchicine 0.6 mg daily    Hold losartan while taking Motrin    Patient has appointment to go to a cardiologist [facility] soon      For her depression she was started on Paxil which is helping    She is also on Eliquis for atrial fibrillation        There were no vitals filed for this visit. There is no height or weight on file to calculate BMI. Wt Readings from Last 3 Encounters:   05/04/21 203 lb 6.4 oz (92.3 kg)   04/30/21 205 lb (93 kg)   04/27/21 204 lb 6.4 oz (92.7 kg)     BP Readings from Last 3 Encounters:   05/04/21 114/61   04/30/21 (!) 140/67   04/28/21 (!) 148/74     These readings reviewed from the chart from [facility]    Physical Exam    Patient was seen by the visiting nurse today her blood pressure was stable, heart rate around 60, O2 sat 99% room air,    Her lungs are sounding okay as per patient and the visiting nurse  And the heart rate was regular    Assessment/Plan:  There are no diagnoses linked to this encounter.       Atrial fibrillation with vasodilator response admitted at [facility] twice, admitted to [facility] but once, and then in [facility] clinic      Viral pericarditis with pericardial effusion no signs of tamponade    Depression    Essential hypertension controlled      Diabetes type 2 controlled        Plan continue metoprolol as prescribed by the cardiologist  Continue Eliquis    Continue colchicine and Motrin    She is being followed by the visiting nurse    Patient will make an appointment when she has more energy and doing better    Paxil is helping her depression denies any suicidal ideation      She is staying with her daughter who is a nurse practitioner and she is monitoring closely      This was a virtual visit  I spent 30 minutes reviewing the chart from clinic clinic, reviewing the chart from [facility], and discussing with the patient patient was counseled      Medical Decision Making: high complexity

## 2021-05-25 RX ORDER — TRIAMTERENE AND HYDROCHLOROTHIAZIDE 75; 50 MG/1; MG/1
TABLET ORAL
Qty: 90 TABLET | Refills: 1 | Status: SHIPPED | OUTPATIENT
Start: 2021-05-25 | End: 2022-03-22

## 2021-06-18 RX ORDER — PAROXETINE 10 MG/1
10 TABLET, FILM COATED ORAL DAILY
Qty: 90 TABLET | Refills: 1 | Status: SHIPPED
Start: 2021-06-18 | End: 2021-12-13

## 2021-07-02 NOTE — TELEPHONE ENCOUNTER
Patient called for refill, stating she took her last tablets.     Last seen 5/24/2021  Next appt Visit date not found  [facility]

## 2021-07-06 RX ORDER — LEVOTHYROXINE SODIUM 0.05 MG/1
75 TABLET ORAL DAILY
Qty: 135 TABLET | Refills: 1 | Status: SHIPPED
Start: 2021-07-06 | End: 2022-01-10 | Stop reason: SDUPTHER

## 2021-07-06 NOTE — TELEPHONE ENCOUNTER
Pt left  msg requesting refill of allopurinol.     Last seen 5/24/2021  Next appt Visit date not found  [facility]

## 2021-07-07 RX ORDER — ALLOPURINOL 100 MG/1
200 TABLET ORAL DAILY
Qty: 90 TABLET | Refills: 1 | Status: SHIPPED
Start: 2021-07-07 | End: 2021-10-15 | Stop reason: SDUPTHER

## 2021-08-09 ENCOUNTER — TELEPHONE (OUTPATIENT)
Dept: FAMILY MEDICINE CLINIC | Age: 76
End: 2021-08-09

## 2021-08-09 RX ORDER — GABAPENTIN 300 MG/1
300 CAPSULE ORAL 3 TIMES DAILY
Qty: 21 CAPSULE | Refills: 3 | Status: SHIPPED
Start: 2021-08-09 | End: 2021-12-13

## 2021-08-09 NOTE — TELEPHONE ENCOUNTER
----- Message from Tiesha GenKyoTex sent at 8/9/2021  1:29 PM EDT -----  Subject: Message to Provider    QUESTIONS  Information for Provider? In regard to the last message, patient would   also like discuss worsening tremors and medication refills as well.   ---------------------------------------------------------------------------  --------------  CALL BACK INFO  What is the best way for the office to contact you? OK to leave message on   voicemail  Preferred Call Back Phone Number? [phone]  ---------------------------------------------------------------------------  --------------  SCRIPT ANSWERS  Relationship to Patient?  Self

## 2021-08-09 NOTE — TELEPHONE ENCOUNTER
----- Message from Yony Eduardo sent at 8/9/2021  1:29 PM EDT -----  Subject: Message to Provider    QUESTIONS  Information for Provider? In regard to the last message, patient would   also like discuss worsening tremors and medication refills as well.   ---------------------------------------------------------------------------  --------------  CALL BACK INFO  What is the best way for the office to contact you? OK to leave message on   voicemail  Preferred Call Back Phone Number? [phone]  ---------------------------------------------------------------------------  --------------  SCRIPT ANSWERS  Relationship to Patient?  Self

## 2021-08-09 NOTE — TELEPHONE ENCOUNTER
----- Message from Yan Becker sent at 8/9/2021  1:27 PM EDT -----  Subject: Message to Provider    QUESTIONS  Information for Provider? Patient would like to schedule an appointment   for coughing up yellow drainage, fatigue, and a bad headache. Patient has   also been sneezing. She would like someone to contact her for an   appointment or to call in a script.   ---------------------------------------------------------------------------  --------------  [employer]  What is the best way for the office to contact you? OK to leave message on   voicemail  Preferred Call Back Phone Number? [phone]  ---------------------------------------------------------------------------  --------------  SCRIPT ANSWERS  Relationship to Patient? Self  Are you currently unable to finish sentences due to any difficulty   breathing? No  Are you unable to swallow liquids? No  Are you having fevers (100.4 or greater), chills, or sweats? No  Do you have COPD, asthma or a chronic lung condition? No  Have your symptoms been present for more than 5 days? No  Have you recently (14 days) been seen by a provider for this issue?  Yes

## 2021-08-10 ENCOUNTER — OFFICE VISIT (OUTPATIENT)
Dept: FAMILY MEDICINE CLINIC | Age: 76
End: 2021-08-10
Payer: MEDICARE

## 2021-08-10 VITALS
OXYGEN SATURATION: 96 % | WEIGHT: 196 LBS | DIASTOLIC BLOOD PRESSURE: 78 MMHG | BODY MASS INDEX: 32.62 KG/M2 | SYSTOLIC BLOOD PRESSURE: 114 MMHG | HEART RATE: 64 BPM | TEMPERATURE: 96.3 F

## 2021-08-10 DIAGNOSIS — I48.91 ATRIAL FIBRILLATION WITH RAPID VENTRICULAR RESPONSE (HCC): ICD-10-CM

## 2021-08-10 DIAGNOSIS — I10 ESSENTIAL HYPERTENSION: ICD-10-CM

## 2021-08-10 DIAGNOSIS — I30.1 ACUTE VIRAL PERICARDITIS: ICD-10-CM

## 2021-08-10 DIAGNOSIS — E03.9 ACQUIRED HYPOTHYROIDISM: ICD-10-CM

## 2021-08-10 DIAGNOSIS — G25.0 ESSENTIAL TREMOR: ICD-10-CM

## 2021-08-10 DIAGNOSIS — R05.9 COUGH: Primary | ICD-10-CM

## 2021-08-10 DIAGNOSIS — E11.9 CONTROLLED TYPE 2 DIABETES MELLITUS WITHOUT COMPLICATION, WITHOUT LONG-TERM CURRENT USE OF INSULIN (HCC): ICD-10-CM

## 2021-08-10 PROCEDURE — 99214 OFFICE O/P EST MOD 30 MIN: CPT | Performed by: INTERNAL MEDICINE

## 2021-08-10 RX ORDER — DILTIAZEM HYDROCHLORIDE 60 MG/1
TABLET, FILM COATED ORAL
COMMUNITY
Start: 2021-05-11

## 2021-08-10 RX ORDER — METHYLPREDNISOLONE 4 MG/1
TABLET ORAL
Qty: 1 KIT | Refills: 0 | Status: SHIPPED | OUTPATIENT
Start: 2021-08-10 | End: 2021-08-16

## 2021-08-10 RX ORDER — OXYBUTYNIN CHLORIDE 5 MG/1
5 TABLET ORAL DAILY
Qty: 90 TABLET | Refills: 1 | Status: SHIPPED
Start: 2021-08-10 | End: 2022-01-10 | Stop reason: SDUPTHER

## 2021-08-10 RX ORDER — OMEPRAZOLE 20 MG/1
20 CAPSULE, DELAYED RELEASE ORAL DAILY
Qty: 90 CAPSULE | Refills: 1 | Status: SHIPPED
Start: 2021-08-10 | End: 2022-01-10 | Stop reason: SDUPTHER

## 2021-08-10 RX ORDER — ATORVASTATIN CALCIUM 10 MG/1
10 TABLET, FILM COATED ORAL NIGHTLY
Qty: 90 TABLET | Refills: 1 | Status: SHIPPED | OUTPATIENT
Start: 2021-08-10 | End: 2022-05-31 | Stop reason: SDUPTHER

## 2021-08-10 NOTE — PROGRESS NOTES
Subjective:     Chief Complaint   Patient presents with    Cough    Atrial Fibrillation   Patient had viral pericarditis in May 2021  She was seen locally and then she followed up with the clinic  She was treated with ibuprofen and colchicine she is still on colchicine,  She was seen by cardiologist last week, she had echocardiogram did not show any acute abnormality    She is complaining of cough for the past 3 to 4 days  Denies any fever chills      She is having tremors and jerking movements mostly in the left hand  It happens in both hands    She had fine tremors in the past she was on propranolol which was stopped by the cardiologist and started on metoprolol because of atrial fibrillation    Couple of times it happened she had a jerky movement with the left hand she was cooking and things fell off from the table she could not control    That needs further evaluation      Not happening all the time      Denies any problem with the balance    She had a back surgery she has appointment to see the neurosurgeon patient has been on gabapentin which is helping    Past Medical History:   Diagnosis Date    Anesthesia complication 6563    difficulty breathing post thyroid surgery [facility]    Cancer [facility])     skin     GERD (gastroesophageal reflux disease)     Hyperlipidemia     Hypertension     Sleep apnea     uses cpap    Spinal stenosis     Thyroid disease     Tremors of nervous system     familial    Type 2 diabetes mellitus without complication ([facility]         Social History     Socioeconomic History    Marital status:      Spouse name: Not on file    Number of children: Not on file    Years of education: Not on file    Highest education level: Not on file   Occupational History    Not on file   Tobacco Use    Smoking status: Never Smoker    Smokeless tobacco: Never Used   Vaping Use    Vaping Use: Never used   Substance and Sexual Activity    Alcohol use: No     Comment: rare    Drug use: Never    Sexual activity: Not on file   Other Topics Concern    Not on file   Social History Narrative    Not on file     Social Determinants of Health     Financial Resource Strain: Low Risk     Difficulty of Paying Living Expenses: Not hard at all   Food Insecurity: No Food Insecurity    Worried About Running Out of Food in the Last Year: Never true    [address] in the Last Year: Never true   Transportation Needs:     Lack of Transportation (Medical):      Lack of Transportation (Non-Medical):    Physical Activity:     Days of Exercise per Week:     Minutes of Exercise per Session:    Stress:     Feeling of Stress :    Social Connections:     Frequency of Communication with Friends and Family:     Frequency of Social Gatherings with Friends and Family:     Attends L[employer] day Services:     Active Member of Clubs or Organizations:     Attends Club or Organization Meetings:     Marital Status:    Intimate Partner Violence:     Fear of Current or Ex-Partner:     Emotionally Abused:     Physically Abused:     Sexually Abused:         Past Surgical History:   Procedure Laterality Date    BLEPHAROPLASTY      CATARACT REMOVAL WITH IMPLANT Left 09/12/2017    CATARACT REMOVAL WITH IMPLANT Right 10/09/2018    CATARACT REMOVAL WITH IMPLANT Right 11/01/2018    secondary procedure to correct first cataract     CHOLECYSTECTOMY      COSMETIC SURGERY      abdominoplasty    FOOT SURGERY Left     skin cancer    HYSTERECTOMY      LUMBAR SPINE SURGERY N/A 9/14/2020    L3-L4, L4-L5  POSTERIOR LUMBAR INTERBODY FUSION performed by Vanesa Gaona MD at [address] [place]ERI [place]ACE,INTRAOCUL Right 11/1/2018    EYE IOL REMOVAL performed by Jannice Hammans, MD at [address] [facility] W/O ECP Right 10/9/2018    RIGHT EYE CATARACT EMULSIFICATION IOL IMPLANT performed by Jannice Hammans, MD at [address] [facility] BIOPSY      THYROIDECTOMY, PARTIAL      left lobe removed    TONSILLECTOMY      TUBAL LIGATION          No family history on file. Allergies   Allergen Reactions    Epinephrine Other (See Comments)     Heart racing felt like I couldn't breath    Ace Inhibitors Other (See Comments)     Cough      Adhesive Tape Rash    Propoxyphene Other (See Comments)     ? ROS  No acute distress  Cardiac: Denies any chest pain or palpitation  Vital pericarditis  Atrial fibrillation follows with cardiologist, patient on Eliquis    Also follows with Dr. Bansal  seen by him in 2020    Respiratory: Cough for the past 3 to 4 days  No fever  No dyspnea  GI: No abdominal pain. Denies any nausea vomiting or diarrhea  Endoscopy and colonoscopy July 2019  : Denies any dysuria frequency or hematuria  Neuro: No headache or dizziness  Strength normal both upper and lower extremities  No rigidity  Reflexes normal  Endocrine: History of diabetes  Skin: normal  No recent weight gain or weight loss  Denies any change in vision    Objective:    /78   Pulse 64   Temp 96.3 °F (35.7 °C)   Wt 196 lb (88.9 kg)   SpO2 96%   BMI 32.62 kg/m²     Constitutional: Alert awake and oriented  Eyes: Pupils equal bilaterally. Extraocular muscles intact  Neck: no JVD adenopathy no bruit  Heart:  RRR, no murmurs, gallops, or rubs.   No pericardial rub  Lungs:    no wheeze, rales or rhonchi  Good air entry bilaterally no crackles no rales no pleural rub no pericardial rub  Abd: bowel sounds present, nontender, nondistended, no masses  Extrem:  No clubbing, cyanosis, or edema  Neuro: AAOx3,No Focal deficit  Psychological: no depression or anxiety       Current Outpatient Medications   Medication Sig Dispense Refill    metoprolol tartrate (LOPRESSOR) 25 MG tablet Take 12.5 mg by mouth every 12 hours      oxybutynin (DITROPAN) 5 MG tablet Take 1 tablet by mouth daily 90 tablet 1    omeprazole (PRILOSEC) 20 MG delayed release capsule Take 1 capsule by mouth daily 90 capsule 1    atorvastatin (LIPITOR) 10 MG tablet Take 1 tablet by mouth nightly 90 tablet 1    methylPREDNISolone (MEDROL DOSEPACK) 4 MG tablet Take by mouth. 1 kit 0    gabapentin (NEURONTIN) 300 MG capsule Take 1 capsule by mouth 3 times daily for 7 days. 21 capsule 3    allopurinol (ZYLOPRIM) 100 MG tablet Take 2 tablets by mouth daily 90 tablet 1    PARoxetine (PAXIL) 10 MG tablet Take 1 tablet by mouth daily 90 tablet 1    triamterene-hydroCHLOROthiazide (MAXZIDE) 75-50 MG per tablet TAKE 1 TABLET BY MOUTH EVERY DAY 90 tablet 1    colchicine (COLCRYS) 0.6 MG tablet Take 1 tablet by mouth daily 10 tablet 1    apixaban (ELIQUIS) 5 MG TABS tablet Take 1 tablet by mouth 2 times daily 60 tablet 0    Cholecalciferol (VITAMIN D3) 125 MCG (5000 UT) TABS Take 10,000 Units by mouth daily      Coenzyme Q10 (COQ-10) 100 MG CAPS Take 100 mg by mouth daily      glipiZIDE-metformin (METAGLIP) 2.5-500 MG per tablet Take 1 tablet by mouth daily 180 tablet 1    dilTIAZem (CARDIZEM) 60 MG tablet       levothyroxine (SYNTHROID) 50 MCG tablet Take 1.5 tablets by mouth Daily 135 tablet 1    CPAP Machine MISC by Does not apply route nightly      losartan (COZAAR) 25 MG tablet Take 1 tablet by mouth daily 90 tablet 1    latanoprost (XALATAN) 0.005 % ophthalmic solution Place 1 drop into the left eye nightly       No current facility-administered medications for this visit.         Last 3 BMP  Lab Results   Component Value Date/Time     05/04/2021 05:46 AM     05/03/2021 08:04 AM     05/02/2021 05:06 AM    K 4.1 05/04/2021 05:46 AM    K 4.2 05/03/2021 08:04 AM    K 4.4 05/02/2021 05:06 AM    K 4.7 04/30/2021 09:16 AM    K 4.3 09/10/2020 08:30 AM    CL 95 (L) 05/04/2021 05:46 AM    CL 97 (L) 05/03/2021 08:04 AM     05/02/2021 05:06 AM    CO2 24 05/04/2021 05:46 AM    CO2 24 05/03/2021 08:04 AM    CO2 25 05/02/2021 05:06 AM    BUN 19 05/04/2021 05:46 AM    BUN 15 05/03/2021 08:04 AM    BUN 13 05/02/2021 05:06 AM    CREATININE 0.9 05/04/2021 05:46 AM    CREATININE 0.9 05/03/2021 08:04 AM    CREATININE 1.0 05/02/2021 05:06 AM    CREATININE 1.1 06/20/2019 12:00 AM    GLUCOSE 152 (H) 05/04/2021 05:46 AM    GLUCOSE 163 (H) 05/03/2021 08:04 AM    GLUCOSE 158 (H) 05/02/2021 05:06 AM    CALCIUM 9.2 05/04/2021 05:46 AM    CALCIUM 9.2 05/03/2021 08:04 AM    CALCIUM 9.5 05/02/2021 05:06 AM       Last 3 CMP:    Lab Results   Component Value Date/Time     05/04/2021 05:46 AM     05/03/2021 08:04 AM     05/02/2021 05:06 AM    K 4.1 05/04/2021 05:46 AM    K 4.2 05/03/2021 08:04 AM    K 4.4 05/02/2021 05:06 AM    K 4.7 04/30/2021 09:16 AM    K 4.3 09/10/2020 08:30 AM    CL 95 (L) 05/04/2021 05:46 AM    CL 97 (L) 05/03/2021 08:04 AM     05/02/2021 05:06 AM    CO2 24 05/04/2021 05:46 AM    CO2 24 05/03/2021 08:04 AM    CO2 25 05/02/2021 05:06 AM    BUN 19 05/04/2021 05:46 AM    BUN 15 05/03/2021 08:04 AM    BUN 13 05/02/2021 05:06 AM    CREATININE 0.9 05/04/2021 05:46 AM    CREATININE 0.9 05/03/2021 08:04 AM    CREATININE 1.0 05/02/2021 05:06 AM    CREATININE 1.1 06/20/2019 12:00 AM    GLUCOSE 152 (H) 05/04/2021 05:46 AM    GLUCOSE 163 (H) 05/03/2021 08:04 AM    GLUCOSE 158 (H) 05/02/2021 05:06 AM    CALCIUM 9.2 05/04/2021 05:46 AM    CALCIUM 9.2 05/03/2021 08:04 AM    CALCIUM 9.5 05/02/2021 05:06 AM    PROT 6.6 04/30/2021 09:16 AM    PROT 6.5 04/27/2021 05:46 AM    PROT 6.8 04/26/2021 01:51 PM    LABALBU 3.1 (L) 04/30/2021 09:16 AM    LABALBU 3.2 (L) 04/27/2021 05:46 AM    LABALBU 3.4 (L) 04/26/2021 01:51 PM    BILITOT 0.5 04/30/2021 09:16 AM    BILITOT 0.4 04/27/2021 05:46 AM    BILITOT 0.6 04/26/2021 01:51 PM    ALKPHOS 159 (H) 04/30/2021 09:16 AM    ALKPHOS 154 (H) 04/27/2021 05:46 AM    ALKPHOS 153 (H) 04/26/2021 01:51 PM    AST 17 04/30/2021 09:16 AM    AST 26 04/27/2021 05:46 AM    AST 29 04/26/2021 01:51 PM    ALT 20 04/30/2021 09:16 AM    ALT 27 04/27/2021 05:46 AM ALT 29 04/26/2021 01:51 PM        CBC:   Lab Results   Component Value Date/Time    WBC 14.1 (H) 05/04/2021 05:46 AM    RBC 3.62 05/04/2021 05:46 AM    HGB 10.3 (L) 05/04/2021 05:46 AM    HCT 33.6 (L) 05/04/2021 05:46 AM    MCV 92.8 05/04/2021 05:46 AM    MCH 28.5 05/04/2021 05:46 AM    MCHC 30.7 (L) 05/04/2021 05:46 AM    RDW 14.6 05/04/2021 05:46 AM     05/04/2021 05:46 AM    MPV 9.1 05/04/2021 05:46 AM       A1C:  Lab Results   Component Value Date/Time    LABA1C 6.8 (H) 05/01/2021 10:59 AM       Lipid panel:  Lab Results   Component Value Date    CHOL 127 05/01/2021    CHOL 179 06/20/2019    CHOL 155 08/22/2018    TRIG 117 05/01/2021    TRIG 158 06/20/2019    TRIG 164 08/22/2018    HDL 26 05/01/2021    HDL 45 06/20/2019    HDL 47 08/22/2018        Lab Results   Component Value Date/Time    PROT 6.6 04/30/2021 09:16 AM    PROT 6.5 04/27/2021 05:46 AM    PROT 6.8 04/26/2021 01:51 PM    INR 1.0 09/10/2020 08:30 AM    INR 0.9 07/08/2020 11:10 AM       Lab Results   Component Value Date/Time    MG 1.7 05/02/2021 05:06 AM    MG 1.4 (L) 05/01/2021 10:59 AM    MG 1.5 (L) 04/28/2021 04:59 AM         Assessment. Ashley Castro was seen today for cough and atrial fibrillation. Diagnoses and all orders for this visit:    Cough  -     XR CHEST (2 VW); Future    Essential hypertension    Atrial fibrillation with rapid ventricular response (HCC)    Controlled type 2 diabetes mellitus without complication, without long-term current use of insulin (HCC)    Acquired hypothyroidism    Acute viral pericarditis    Essential tremor    Other orders  -     oxybutynin (DITROPAN) 5 MG tablet; Take 1 tablet by mouth daily  -     omeprazole (PRILOSEC) 20 MG delayed release capsule; Take 1 capsule by mouth daily  -     atorvastatin (LIPITOR) 10 MG tablet; Take 1 tablet by mouth nightly  -     methylPREDNISolone (MEDROL DOSEPACK) 4 MG tablet; Take by mouth.        Patient Active Problem List   Diagnosis    Left cataract    Right cataract  Dislocated IOL (intraocular lens)    Back pain at L4-L5 level    Acute midline low back pain with right-sided sciatica    Intractable back pain    Obesity    Spondylolisthesis of lumbar region    Spondylolisthesis, lumbar region    S/P lumbar fusion    Essential hypertension    Controlled type 2 diabetes mellitus without complication, without long-term current use of insulin (HCC)    FAY (acute kidney injury) ([address]    Major depressive disorder    Atrial fibrillation with rapid ventricular response ([address]    Hypothyroidism       Plan: Cough possibly viral clinical exam normal  Chest x-ray today  If negative and persistent cough try Medrol Dosepak  Call if any change in symptoms    Tremors with jerking movements noted Parkinson disease refer to neurologist  She will call me if she wants to go to [place] to see somebody locally so I can make an appointment  Can no change metoprolol to Inderal patient has A. fib and metoprolol helping    We can consider Mysoline but wait for neurologic input    Hypertension controlled continue losartan 25    Atrial fibrillation appears in sinus rhythm on exam continue Eliquis    Hypothyroidism continue Synthroid      Viral pericarditis resolving patient still on Colcrys she follows with cardiologist in [place]      She will call me if any new symptoms    Return in about 4 months (around 12/10/2021).        Leana Ganser, MD  1:51 PM  8/10/2021     [place]

## 2021-08-11 ENCOUNTER — TELEPHONE (OUTPATIENT)
Dept: FAMILY MEDICINE CLINIC | Age: 76
End: 2021-08-11

## 2021-08-11 RX ORDER — GABAPENTIN 600 MG
300 TABLET ORAL 3 TIMES DAILY
Qty: 135 TABLET | Refills: 1 | Status: SHIPPED
Start: 2021-08-11 | End: 2021-12-13

## 2021-08-11 NOTE — TELEPHONE ENCOUNTER
Patient tested positive for covid and flu.  She lost her sense of smell and has loose stools    She needs a refill on gabapentin

## 2021-08-23 ENCOUNTER — TELEPHONE (OUTPATIENT)
Dept: NEUROSURGERY | Age: 76
End: 2021-08-23

## 2021-08-23 DIAGNOSIS — Z98.1 S/P LUMBAR FUSION: Primary | ICD-10-CM

## 2021-09-08 ENCOUNTER — OFFICE VISIT (OUTPATIENT)
Dept: NEUROSURGERY | Age: 76
End: 2021-09-08
Payer: MEDICARE

## 2021-09-08 VITALS
HEART RATE: 62 BPM | DIASTOLIC BLOOD PRESSURE: 79 MMHG | OXYGEN SATURATION: 97 % | WEIGHT: 196 LBS | RESPIRATION RATE: 18 BRPM | TEMPERATURE: 97.8 F | SYSTOLIC BLOOD PRESSURE: 142 MMHG | BODY MASS INDEX: 32.65 KG/M2 | HEIGHT: 65 IN

## 2021-09-08 DIAGNOSIS — Z98.1 S/P LUMBAR FUSION: ICD-10-CM

## 2021-09-08 DIAGNOSIS — M54.16 LUMBAR RADICULOPATHY: ICD-10-CM

## 2021-09-08 DIAGNOSIS — M43.16 SPONDYLOLISTHESIS OF LUMBAR REGION: Primary | ICD-10-CM

## 2021-09-08 PROCEDURE — 99213 OFFICE O/P EST LOW 20 MIN: CPT | Performed by: PHYSICIAN ASSISTANT

## 2021-09-08 RX ORDER — IVERMECTIN 3 MG/1
TABLET ORAL
COMMUNITY
Start: 2021-08-11 | End: 2021-12-13

## 2021-09-08 NOTE — PROGRESS NOTES
Post Operative Follow-up     This is a 70year old female who presents to the office for a 1 year follow-up s/p L3-L5 PLIF     Subjective: Patient states she has been doing well. Admits to intermittent back ache that is worse at night. Continues to take gabapentin. Lumbar x-rays reviewed.      Physical Exam:              WDWN, no apparent distress              Non-labored breathing               Vitals Stable              Alert and oriented x3              CN 3-12 intact              PERRL              EOMI              WHEELER              Sensation to LT intact bilaterally   Incision healing well with no signs of infection.                 Imagin21 lumbar x-rays:   Impression   Intact lumbar fusion hardware with normal alignment.  Degenerative   spondylotic changes.  Decompressive laminectomy.            Assessment: This is a 78 y.o.  female presenting for a 1 year follow-up s/p L3-L5 PLIF. Stable.      Plan:  -OARRS report reviewed. -Follow-up in neurosurgery clinic PRN  -Call or return to neurosurgery office sooner if symptoms worsen or if new issues arise in the interim.     Electronically signed by Aniyah Lemus PA-C on 2021 at 3:08 PM

## 2021-09-24 ENCOUNTER — CARE COORDINATION (OUTPATIENT)
Dept: CARE COORDINATION | Age: 76
End: 2021-09-24

## 2021-10-18 RX ORDER — ALLOPURINOL 100 MG/1
200 TABLET ORAL DAILY
Qty: 90 TABLET | Refills: 1 | Status: SHIPPED
Start: 2021-10-18 | End: 2022-01-07

## 2021-11-03 ENCOUNTER — TELEPHONE (OUTPATIENT)
Dept: FAMILY MEDICINE CLINIC | Age: 76
End: 2021-11-03

## 2021-11-03 RX ORDER — PAROXETINE HYDROCHLORIDE 20 MG/1
20 TABLET, FILM COATED ORAL DAILY
Qty: 90 TABLET | Refills: 1 | Status: SHIPPED | OUTPATIENT
Start: 2021-11-03 | End: 2022-03-22 | Stop reason: SDUPTHER

## 2021-11-04 ENCOUNTER — TELEPHONE (OUTPATIENT)
Dept: FAMILY MEDICINE CLINIC | Age: 76
End: 2021-11-04

## 2021-11-04 NOTE — TELEPHONE ENCOUNTER
----- Message from Dionna Rich sent at 11/3/2021  5:35 PM EDT -----  Subject: Message to Provider    QUESTIONS  Information for Provider? PT would like a call back from the call she   received earlier   ---------------------------------------------------------------------------  --------------  CALL BACK INFO  What is the best way for the office to contact you? OK to leave message on   voicemail  Preferred Call Back Phone Number?  [phone]  ---------------------------------------------------------------------------  --------------  SCRIPT ANSWERS  undefined

## 2021-12-13 ENCOUNTER — OFFICE VISIT (OUTPATIENT)
Dept: FAMILY MEDICINE CLINIC | Age: 76
End: 2021-12-13
Payer: MEDICARE

## 2021-12-13 VITALS
HEART RATE: 52 BPM | OXYGEN SATURATION: 98 % | DIASTOLIC BLOOD PRESSURE: 70 MMHG | WEIGHT: 210 LBS | SYSTOLIC BLOOD PRESSURE: 124 MMHG | TEMPERATURE: 95 F | BODY MASS INDEX: 34.95 KG/M2

## 2021-12-13 DIAGNOSIS — E03.9 ACQUIRED HYPOTHYROIDISM: ICD-10-CM

## 2021-12-13 DIAGNOSIS — I10 ESSENTIAL HYPERTENSION: Primary | ICD-10-CM

## 2021-12-13 DIAGNOSIS — E11.9 CONTROLLED TYPE 2 DIABETES MELLITUS WITHOUT COMPLICATION, WITHOUT LONG-TERM CURRENT USE OF INSULIN (HCC): ICD-10-CM

## 2021-12-13 DIAGNOSIS — G89.29 CHRONIC BACK PAIN, UNSPECIFIED BACK LOCATION, UNSPECIFIED BACK PAIN LATERALITY: ICD-10-CM

## 2021-12-13 DIAGNOSIS — M54.9 CHRONIC BACK PAIN, UNSPECIFIED BACK LOCATION, UNSPECIFIED BACK PAIN LATERALITY: ICD-10-CM

## 2021-12-13 DIAGNOSIS — I48.91 ATRIAL FIBRILLATION WITH RAPID VENTRICULAR RESPONSE (HCC): ICD-10-CM

## 2021-12-13 PROCEDURE — 99213 OFFICE O/P EST LOW 20 MIN: CPT | Performed by: INTERNAL MEDICINE

## 2021-12-13 RX ORDER — METHOCARBAMOL 500 MG/1
500 TABLET, FILM COATED ORAL 2 TIMES DAILY
Qty: 60 TABLET | Refills: 0 | Status: SHIPPED | OUTPATIENT
Start: 2021-12-13 | End: 2021-12-23

## 2021-12-13 RX ORDER — GABAPENTIN 600 MG
600 TABLET ORAL 2 TIMES DAILY
Qty: 180 TABLET | Refills: 1
Start: 2021-12-13 | End: 2022-01-10 | Stop reason: SDUPTHER

## 2021-12-13 NOTE — PROGRESS NOTES
Subjective:     Chief Complaint   Patient presents with    Back Pain   Patient in follow-up on the chronic back pain, gabapentin is helping, she is doing therapy milligrams 3 times daily occasionally she does take 600 mg helps she wanted a refill,    Follow-up on the depression she denied feeling down and depressed feeling the Paxil to 20 mg did help      Follow-up on hypertension GE reflux      She denies any tremors doing much better    Has history of atrial fibrillation she was seen by cardiologist in [facility], [facility] advised to stay on the Eliquis, she had viral pericarditis in May 2021      Past Medical History:   Diagnosis Date    Anesthesia complication 5652    difficulty breathing post thyroid surgery [facility]    [facility])     skin     GERD (gastroesophageal reflux disease)     Hyperlipidemia     Hypertension     Sleep apnea     uses cpap    Spinal stenosis     Thyroid disease     Tremors of nervous system     familial    Type 2 diabetes mellitus without complication ([facility] [address]         Social History     Socioeconomic History    Marital status:      Spouse name: Not on file    Number of children: Not on file    Years of education: Not on file    Highest education level: Not on file   Occupational History    Not on file   Tobacco Use    Smoking status: Never Smoker    Smokeless tobacco: Never Used   Vaping Use    Vaping Use: Never used   Substance and Sexual Activity    Alcohol use: No     Comment: rare    Drug use: Never    Sexual activity: Not on file   Other Topics Concern    Not on file   Social History Narrative    Not on file     Social Determinants of Health     Financial Resource Strain: Low Risk     Difficulty of Paying Living Expenses: Not hard at all   Food Insecurity: No Food Insecurity    Worried About Running Out of Food in the Last Year: Never true    Briseida of Food in the Last Year: Never true   Transportation Needs:     Lack of Transportation (Medical): Not on file    Lack of Transportation (Non-Medical): Not on file   Physical Activity:     Days of Exercise per Week: Not on file    Minutes of Exercise per Session: Not on file   Stress:     Feeling of Stress : Not on file   Social Connections:     Frequency of Communication with Friends and Family: Not on file    Frequency of Social Gatherings with Friends and Family: Not on file    Attends Hoahaoism Services: Not on file    Active Member of Clubs or Organizations: Not on file    Attends Club or Organization Meetings: Not on file    Marital Status: Not on file   Intimate Partner Violence:     Fear of Current or Ex-Partner: Not on file    Emotionally Abused: Not on file    Physically Abused: Not on file    Sexually Abused: Not on file   Housing Stability:     Unable to Pay for Housing in the Last Year: Not on file    Number of [place] in the Last Year: Not on file    Unstable Housing in the Last Year: Not on file        Past Surgical History:   Procedure Laterality Date    BLEPHAROPLASTY      CATARACT REMOVAL WITH IMPLANT Left 09/12/2017    CATARACT REMOVAL WITH IMPLANT Right 10/09/2018    CATARACT REMOVAL WITH IMPLANT Right 11/01/2018    secondary procedure to correct first cataract     CHOLECYSTECTOMY      COSMETIC SURGERY      abdominoplasty    FOOT SURGERY Left     skin cancer    HYSTERECTOMY      LUMBAR SPINE SURGERY N/A 9/14/2020    L3-L4, L4-L5  POSTERIOR LUMBAR INTERBODY FUSION performed by Destin Simmons MD at [address] [place],INTRAOCUL Right 11/1/2018    EYE IOL REMOVAL performed by Leo Bettencourt MD at [address] [facility] IO LENS PROSTH W/O ECP Right 10/9/2018    RIGHT EYE CATARACT EMULSIFICATION IOL IMPLANT performed by Leo Bettencourt MD at [address] [facility], PARTIAL      left lobe removed    TONSILLECTOMY      TUBAL LIGATION          History reviewed.  No pertinent family history. Allergies   Allergen Reactions    Epinephrine Other (See Comments)     Heart racing felt like I couldn't breath    Ace Inhibitors Other (See Comments)     Cough      Adhesive Tape Rash    Propoxyphene Other (See Comments)     ? ROS  No acute distress  Cardiac: Denies any chest pain or palpitation  Vital pericarditis  Atrial fibrillation follows with cardiologist, patient on Eliquis she sees cardiologist unclear     Also follows with Dr. Ish Lancaster seen by him in 2020     Respiratory: Cough for the past 3 to 4 days  No fever  No dyspnea  Chest x-ray - August 2021  GI: No abdominal pain. Denies any nausea vomiting or diarrhea  Endoscopy and colonoscopy July 2019  : Denies any dysuria frequency or hematuria  Neuro: No headache or dizziness  Strength normal both upper and lower extremities  No rigidity  Reflexes normal  Endocrine: History of diabetes  Skin: normal  No recent weight gain or weight loss  Denies any change in vision    Objective:    /70   Pulse 52   Temp 95 °F (35 °C)   Wt 210 lb (95.3 kg)   SpO2 98%   BMI 34.95 kg/m²     Constitutional: Alert awake and oriented  Eyes: Pupils equal bilaterally. Extraocular muscles intact  Neck: no JVD adenopathy no bruit  Heart:  RRR, no murmurs, gallops, or rubs. Lungs:    no wheeze, rales or rhonchi  Abd: bowel sounds present, nontender, nondistended, no masses  Extrem:  No clubbing, cyanosis, or edema  Neuro: AAOx3,No Focal deficit  Psychological: no depression or anxiety       Current Outpatient Medications   Medication Sig Dispense Refill    NEURONTIN 600 MG tablet Take 1 tablet by mouth 2 times daily for 90 days.  180 tablet 1    methocarbamol (ROBAXIN) 500 MG tablet Take 1 tablet by mouth 2 times daily for 10 days 60 tablet 0    PARoxetine (PAXIL) 20 MG tablet Take 1 tablet by mouth daily 90 tablet 1    allopurinol (ZYLOPRIM) 100 MG tablet Take 2 tablets by mouth daily 90 tablet 1    metoprolol tartrate (LOPRESSOR) 25 MG tablet Take 12.5 mg by mouth every 12 hours      dilTIAZem (CARDIZEM) 60 MG tablet       oxybutynin (DITROPAN) 5 MG tablet Take 1 tablet by mouth daily 90 tablet 1    omeprazole (PRILOSEC) 20 MG delayed release capsule Take 1 capsule by mouth daily 90 capsule 1    atorvastatin (LIPITOR) 10 MG tablet Take 1 tablet by mouth nightly 90 tablet 1    gabapentin (NEURONTIN) 300 MG capsule Take 1 capsule by mouth 3 times daily for 7 days. 21 capsule 3    levothyroxine (SYNTHROID) 50 MCG tablet Take 1.5 tablets by mouth Daily 135 tablet 1    triamterene-hydroCHLOROthiazide (MAXZIDE) 75-50 MG per tablet TAKE 1 TABLET BY MOUTH EVERY DAY 90 tablet 1    apixaban (ELIQUIS) 5 MG TABS tablet Take 1 tablet by mouth 2 times daily 60 tablet 0    Cholecalciferol (VITAMIN D3) 125 MCG (5000 UT) TABS Take 10,000 Units by mouth daily      Coenzyme Q10 (COQ-10) 100 MG CAPS Take 100 mg by mouth daily      CPAP Machine MISC by Does not apply route nightly      glipiZIDE-metformin (METAGLIP) 2.5-500 MG per tablet Take 1 tablet by mouth daily 180 tablet 1    latanoprost (XALATAN) 0.005 % ophthalmic solution Place 1 drop into the left eye nightly      NEURONTIN 600 MG tablet Take 0.5 tablets by mouth 3 times daily for 90 days. 135 tablet 1     No current facility-administered medications for this visit.         Last 3 BMP  Lab Results   Component Value Date/Time     05/04/2021 05:46 AM     05/03/2021 08:04 AM     05/02/2021 05:06 AM    K 4.1 05/04/2021 05:46 AM    K 4.2 05/03/2021 08:04 AM    K 4.4 05/02/2021 05:06 AM    K 4.7 04/30/2021 09:16 AM    K 4.3 09/10/2020 08:30 AM    CL 95 (L) 05/04/2021 05:46 AM    CL 97 (L) 05/03/2021 08:04 AM     05/02/2021 05:06 AM    CO2 24 05/04/2021 05:46 AM    CO2 24 05/03/2021 08:04 AM    CO2 25 05/02/2021 05:06 AM    BUN 19 05/04/2021 05:46 AM    BUN 15 05/03/2021 08:04 AM    BUN 13 05/02/2021 05:06 AM    CREATININE 0.9 05/04/2021 05:46 AM    CREATININE 0.9 05/03/2021 08:04 AM    CREATININE 1.0 05/02/2021 05:06 AM    CREATININE 1.1 06/20/2019 12:00 AM    GLUCOSE 152 (H) 05/04/2021 05:46 AM    GLUCOSE 163 (H) 05/03/2021 08:04 AM    GLUCOSE 158 (H) 05/02/2021 05:06 AM    CALCIUM 9.2 05/04/2021 05:46 AM    CALCIUM 9.2 05/03/2021 08:04 AM    CALCIUM 9.5 05/02/2021 05:06 AM       Last 3 CMP:    Lab Results   Component Value Date/Time     05/04/2021 05:46 AM     05/03/2021 08:04 AM     05/02/2021 05:06 AM    K 4.1 05/04/2021 05:46 AM    K 4.2 05/03/2021 08:04 AM    K 4.4 05/02/2021 05:06 AM    K 4.7 04/30/2021 09:16 AM    K 4.3 09/10/2020 08:30 AM    CL 95 (L) 05/04/2021 05:46 AM    CL 97 (L) 05/03/2021 08:04 AM     05/02/2021 05:06 AM    CO2 24 05/04/2021 05:46 AM    CO2 24 05/03/2021 08:04 AM    CO2 25 05/02/2021 05:06 AM    BUN 19 05/04/2021 05:46 AM    BUN 15 05/03/2021 08:04 AM    BUN 13 05/02/2021 05:06 AM    CREATININE 0.9 05/04/2021 05:46 AM    CREATININE 0.9 05/03/2021 08:04 AM    CREATININE 1.0 05/02/2021 05:06 AM    CREATININE 1.1 06/20/2019 12:00 AM    GLUCOSE 152 (H) 05/04/2021 05:46 AM    GLUCOSE 163 (H) 05/03/2021 08:04 AM    GLUCOSE 158 (H) 05/02/2021 05:06 AM    CALCIUM 9.2 05/04/2021 05:46 AM    CALCIUM 9.2 05/03/2021 08:04 AM    CALCIUM 9.5 05/02/2021 05:06 AM    PROT 6.6 04/30/2021 09:16 AM    PROT 6.5 04/27/2021 05:46 AM    PROT 6.8 04/26/2021 01:51 PM    LABALBU 3.1 (L) 04/30/2021 09:16 AM    LABALBU 3.2 (L) 04/27/2021 05:46 AM    LABALBU 3.4 (L) 04/26/2021 01:51 PM    BILITOT 0.5 04/30/2021 09:16 AM    BILITOT 0.4 04/27/2021 05:46 AM    BILITOT 0.6 04/26/2021 01:51 PM    ALKPHOS 159 (H) 04/30/2021 09:16 AM    ALKPHOS 154 (H) 04/27/2021 05:46 AM    ALKPHOS 153 (H) 04/26/2021 01:51 PM    AST 17 04/30/2021 09:16 AM    AST 26 04/27/2021 05:46 AM    AST 29 04/26/2021 01:51 PM    ALT 20 04/30/2021 09:16 AM    ALT 27 04/27/2021 05:46 AM    ALT 29 04/26/2021 01:51 PM        CBC:   Lab Results   Component Value Date/Time    WBC 14.1 (H) 05/04/2021 05:46 AM    RBC 3.62 05/04/2021 05:46 AM    HGB 10.3 (L) 05/04/2021 05:46 AM    HCT 33.6 (L) 05/04/2021 05:46 AM    MCV 92.8 05/04/2021 05:46 AM    MCH 28.5 05/04/2021 05:46 AM    MCHC 30.7 (L) 05/04/2021 05:46 AM    RDW 14.6 05/04/2021 05:46 AM     05/04/2021 05:46 AM    MPV 9.1 05/04/2021 05:46 AM       A1C:  Lab Results   Component Value Date/Time    LABA1C 6.8 (H) 05/01/2021 10:59 AM       Lipid panel:  Lab Results   Component Value Date    CHOL 127 05/01/2021    CHOL 179 06/20/2019    CHOL 155 08/22/2018    TRIG 117 05/01/2021    TRIG 158 06/20/2019    TRIG 164 08/22/2018    HDL 26 05/01/2021    HDL 45 06/20/2019    HDL 47 08/22/2018        Lab Results   Component Value Date/Time    PROT 6.6 04/30/2021 09:16 AM    PROT 6.5 04/27/2021 05:46 AM    PROT 6.8 04/26/2021 01:51 PM    INR 1.0 09/10/2020 08:30 AM    INR 0.9 07/08/2020 11:10 AM       Lab Results   Component Value Date/Time    MG 1.7 05/02/2021 05:06 AM    MG 1.4 (L) 05/01/2021 10:59 AM    MG 1.5 (L) 04/28/2021 04:59 AM         Assessment. Reginaldo Herron was seen today for back pain. Diagnoses and all orders for this visit:    Essential hypertension    Atrial fibrillation with rapid ventricular response ([address]    Acquired hypothyroidism    Controlled type 2 diabetes mellitus without complication, without long-term current use of insulin (HCC)    Chronic back pain, unspecified back location, unspecified back pain laterality    Other orders  -     NEURONTIN 600 MG tablet; Take 1 tablet by mouth 2 times daily for 90 days. -     methocarbamol (ROBAXIN) 500 MG tablet;  Take 1 tablet by mouth 2 times daily for 10 days       Patient Active Problem List   Diagnosis    Left cataract    Right cataract    Dislocated IOL (intraocular lens)    Back pain at L4-L5 level    Acute midline low back pain with right-sided sciatica    Intractable back pain    Obesity    Spondylolisthesis of lumbar region    Spondylolisthesis, lumbar region    S/P lumbar fusion    Essential hypertension    Controlled type 2 diabetes mellitus without complication, without long-term current use of insulin (HCC)    FAY [facility]) ([facility] [address]    Major depressive disorder    Atrial fibrillation with rapid ventricular response ([facility] [address]    Hypothyroidism       Plan: Chronic back pain doing better with gabapentin prescription for gabapentin 600 mg twice daily given    Hypertension controlled    Depression doing better since Paxil was increased to 20 million      Diabetes type 2 check A1c next time    Chronic back pain doing better      History of tremors improved she is feeling better    History of atrial fibrillation appears in sinus rhythm continue Eliquis and follow with the cardiology    I will be retiring she will see another physician next visit    Check CBC, CMP, lipid, A1c before next    Return in about 4 months (around 4/13/2022).        Isi Garcia MD  3:31 PM  12/13/2021     [place]

## 2022-01-03 ENCOUNTER — TELEPHONE (OUTPATIENT)
Dept: FAMILY MEDICINE CLINIC | Age: 77
End: 2022-01-03

## 2022-01-03 DIAGNOSIS — Z12.31 SCREENING MAMMOGRAM FOR BREAST CANCER: Primary | ICD-10-CM

## 2022-01-03 DIAGNOSIS — M81.0 OSTEOPOROSIS WITHOUT CURRENT PATHOLOGICAL FRACTURE, UNSPECIFIED OSTEOPOROSIS TYPE: ICD-10-CM

## 2022-01-03 NOTE — TELEPHONE ENCOUNTER
Patient called asking for an order for a Dexa Bone Scan and Mammogram and faxed to [facility].     Last seen 12/13/2021  Next appt Visit date not found

## 2022-01-10 RX ORDER — ALLOPURINOL 100 MG/1
TABLET ORAL
Qty: 180 TABLET | Refills: 1 | Status: SHIPPED | OUTPATIENT
Start: 2022-01-10 | End: 2022-07-11 | Stop reason: SDUPTHER

## 2022-01-10 RX ORDER — GABAPENTIN 600 MG
300 TABLET ORAL 3 TIMES DAILY
Qty: 135 TABLET | Refills: 1 | Status: SHIPPED | OUTPATIENT
Start: 2022-01-10 | End: 2022-03-22

## 2022-01-10 RX ORDER — FUROSEMIDE 20 MG/1
20 TABLET ORAL DAILY PRN
Qty: 90 TABLET | Refills: 0 | Status: SHIPPED | OUTPATIENT
Start: 2022-01-10 | End: 2022-08-03 | Stop reason: SDUPTHER

## 2022-01-10 RX ORDER — OXYBUTYNIN CHLORIDE 5 MG/1
5 TABLET ORAL DAILY
Qty: 90 TABLET | Refills: 1 | Status: SHIPPED | OUTPATIENT
Start: 2022-01-10 | End: 2022-04-12

## 2022-01-10 RX ORDER — OMEPRAZOLE 20 MG/1
20 CAPSULE, DELAYED RELEASE ORAL DAILY
Qty: 90 CAPSULE | Refills: 0 | Status: SHIPPED | OUTPATIENT
Start: 2022-01-10 | End: 2022-04-12

## 2022-01-10 RX ORDER — LEVOTHYROXINE SODIUM 0.05 MG/1
75 TABLET ORAL DAILY
Qty: 135 TABLET | Refills: 1 | Status: SHIPPED | OUTPATIENT
Start: 2022-01-10 | End: 2022-10-04 | Stop reason: SDUPTHER

## 2022-01-13 RX ORDER — PAROXETINE 10 MG/1
TABLET, FILM COATED ORAL
Qty: 90 TABLET | Refills: 1 | OUTPATIENT
Start: 2022-01-13

## 2022-01-25 NOTE — CARE COORDINATION
9/16 Care Coordination: Met with Apoorva Branham and her daughter. Updated on no beds in Acute rehab till possible Monday. Option for [facility] given and they do no want. Per daughter will look in to [facility]. They will get back to CM if they want a referral placed. CM/SW will continue to follow for discharge planning. Nimisha Ellis [facility] [phone]    9/16 Care Coordination: Per [employer] and her daughter they would like the referral called to [facility]. Francesco Maurer called with referral, she will review and start precert. CM/SW will continue to follow for discharge planning. Dry COVID sent today. Hens and envelope in soft chart.   Nimisha CARBAJAL,RN--[phone] Patient getting blood drawn at this time.       Ailyn Flower  01/24/22 [id number]

## 2022-03-08 ENCOUNTER — TELEPHONE (OUTPATIENT)
Dept: PRIMARY CARE CLINIC | Age: 77
End: 2022-03-08

## 2022-03-08 NOTE — TELEPHONE ENCOUNTER
----- Message from Arlet Markham sent at 3/7/2022 10:27 AM EST -----  Subject: Appointment Request    Reason for Call: New Patient Request Appointment    QUESTIONS  Type of Appointment? New Patient/New to Provider  Reason for appointment request? No appointments available during search  Additional Information for Provider? Pt would like to make a new pt appt   with Dr. Romina Echevarria if able. Please call pt back  ---------------------------------------------------------------------------  --------------  CALL BACK INFO  What is the best way for the office to contact you? OK to leave message on   voicemail  Preferred Call Back Phone Number? [phone]  ---------------------------------------------------------------------------  --------------  SCRIPT ANSWERS  Relationship to Patient? Self  Specialty Confirmation? Primary Care  Is this the first appointment to establish care for a ? No  Have you been diagnosed with, awaiting test results for, or told that you   are suspected of having COVID-19 (Coronavirus)? (If patient has tested   negative or was tested as a requirement for work, school, or travel and   not based on symptoms, answer no)? Yes  Did your symptoms begin within the past 10 days or was your positive test   result within the past 10 days? No  Within the past 10 days have you developed any of the following symptoms   (answer no if symptoms have been present longer than 10 days or began   more than 10 days ago)? Fever or Chills, Cough, Shortness of breath or   difficulty breathing, Loss of taste or smell, Sore throat, Nasal   congestion, Sneezing or runny nose, Fatigue or generalized body aches   (answer no if pain is specific to a body part e.g. back pain), Diarrhea,   Headache? No  Have you had close contact with someone with COVID-19 in the last 7 days? No  (Service Expert  click yes below to proceed with [employer]   Scheduling)?  Yes

## 2022-03-22 ENCOUNTER — OFFICE VISIT (OUTPATIENT)
Dept: FAMILY MEDICINE CLINIC | Age: 77
End: 2022-03-22
Payer: MEDICARE

## 2022-03-22 VITALS
WEIGHT: 212.2 LBS | HEIGHT: 65 IN | DIASTOLIC BLOOD PRESSURE: 64 MMHG | TEMPERATURE: 97 F | OXYGEN SATURATION: 98 % | SYSTOLIC BLOOD PRESSURE: 128 MMHG | RESPIRATION RATE: 16 BRPM | BODY MASS INDEX: 35.35 KG/M2 | HEART RATE: 52 BPM

## 2022-03-22 DIAGNOSIS — Z00.01 ENCOUNTER FOR ROUTINE ADULT MEDICAL EXAM WITH ABNORMAL FINDINGS: ICD-10-CM

## 2022-03-22 DIAGNOSIS — Z13.220 ENCOUNTER FOR SCREENING FOR LIPOID DISORDERS: ICD-10-CM

## 2022-03-22 DIAGNOSIS — E08.22 DIABETES MELLITUS DUE TO UNDERLYING CONDITION WITH STAGE 3A CHRONIC KIDNEY DISEASE, WITHOUT LONG-TERM CURRENT USE OF INSULIN (HCC): Primary | ICD-10-CM

## 2022-03-22 DIAGNOSIS — I10 ESSENTIAL HYPERTENSION: ICD-10-CM

## 2022-03-22 DIAGNOSIS — Z71.82 EXERCISE COUNSELING: ICD-10-CM

## 2022-03-22 DIAGNOSIS — I48.91 ATRIAL FIBRILLATION WITH RVR (HCC): ICD-10-CM

## 2022-03-22 DIAGNOSIS — E11.51 DIABETES TYPE 2 WITH ATHEROSCLEROSIS OF ARTERIES OF EXTREMITIES (HCC): ICD-10-CM

## 2022-03-22 DIAGNOSIS — I70.209 DIABETES TYPE 2 WITH ATHEROSCLEROSIS OF ARTERIES OF EXTREMITIES (HCC): ICD-10-CM

## 2022-03-22 DIAGNOSIS — F33.1 MODERATE EPISODE OF RECURRENT MAJOR DEPRESSIVE DISORDER (HCC): ICD-10-CM

## 2022-03-22 DIAGNOSIS — N18.31 DIABETES MELLITUS DUE TO UNDERLYING CONDITION WITH STAGE 3A CHRONIC KIDNEY DISEASE, WITHOUT LONG-TERM CURRENT USE OF INSULIN (HCC): Primary | ICD-10-CM

## 2022-03-22 DIAGNOSIS — Z71.3 DIETARY COUNSELING: ICD-10-CM

## 2022-03-22 PROBLEM — Z99.89 OSA ON CPAP: Status: ACTIVE | Noted: 2021-05-28

## 2022-03-22 PROBLEM — N39.46 MIXED INCONTINENCE: Status: ACTIVE | Noted: 2018-08-06

## 2022-03-22 PROBLEM — G47.33 OSA ON CPAP: Status: ACTIVE | Noted: 2021-05-28

## 2022-03-22 PROBLEM — D63.8 ANEMIA OF CHRONIC DISEASE: Status: ACTIVE | Noted: 2021-05-28

## 2022-03-22 PROBLEM — N81.6 RECTOCELE: Status: ACTIVE | Noted: 2018-08-06

## 2022-03-22 PROBLEM — R35.0 URINARY FREQUENCY: Status: ACTIVE | Noted: 2018-08-06

## 2022-03-22 LAB
CREATININE URINE POCT: NORMAL
HBA1C MFR BLD: 6.4 %
MICROALBUMIN/CREAT 24H UR: NORMAL MG/G{CREAT}
MICROALBUMIN/CREAT UR-RTO: NORMAL

## 2022-03-22 PROCEDURE — 99214 OFFICE O/P EST MOD 30 MIN: CPT | Performed by: SURGERY

## 2022-03-22 PROCEDURE — 3044F HG A1C LEVEL LT 7.0%: CPT | Performed by: SURGERY

## 2022-03-22 PROCEDURE — 83036 HEMOGLOBIN GLYCOSYLATED A1C: CPT | Performed by: SURGERY

## 2022-03-22 PROCEDURE — 82044 UR ALBUMIN SEMIQUANTITATIVE: CPT | Performed by: SURGERY

## 2022-03-22 RX ORDER — VALSARTAN 80 MG/1
80 TABLET ORAL DAILY
Qty: 90 TABLET | Refills: 1 | Status: SHIPPED
Start: 2022-03-22 | End: 2022-09-30 | Stop reason: SDUPTHER

## 2022-03-22 RX ORDER — BIOTIN 1 MG
TABLET ORAL
COMMUNITY

## 2022-03-22 RX ORDER — ASPIRIN 81 MG/1
81 TABLET ORAL DAILY
COMMUNITY

## 2022-03-22 RX ORDER — ASCORBIC ACID 500 MG
500 TABLET ORAL DAILY
COMMUNITY

## 2022-03-22 RX ORDER — PAROXETINE 30 MG/1
30 TABLET, FILM COATED ORAL DAILY
Qty: 90 TABLET | Refills: 1 | Status: SHIPPED
Start: 2022-03-22 | End: 2022-09-30 | Stop reason: SDUPTHER

## 2022-03-22 ASSESSMENT — PATIENT HEALTH QUESTIONNAIRE - PHQ9
SUM OF ALL RESPONSES TO PHQ QUESTIONS 1-9: 9
SUM OF ALL RESPONSES TO PHQ9 QUESTIONS 1 & 2: 4
8. MOVING OR SPEAKING SO SLOWLY THAT OTHER PEOPLE COULD HAVE NOTICED. OR THE OPPOSITE, BEING SO FIGETY OR RESTLESS THAT YOU HAVE BEEN MOVING AROUND A LOT MORE THAN USUAL: 0
3. TROUBLE FALLING OR STAYING ASLEEP: 0
10. IF YOU CHECKED OFF ANY PROBLEMS, HOW DIFFICULT HAVE THESE PROBLEMS MADE IT FOR YOU TO DO YOUR WORK, TAKE CARE OF THINGS AT HOME, OR GET ALONG WITH OTHER PEOPLE: 1
5. POOR APPETITE OR OVEREATING: 0
9. THOUGHTS THAT YOU WOULD BE BETTER OFF DEAD, OR OF HURTING YOURSELF: 0
2. FEELING DOWN, DEPRESSED OR HOPELESS: 1
1. LITTLE INTEREST OR PLEASURE IN DOING THINGS: 3
7. TROUBLE CONCENTRATING ON THINGS, SUCH AS READING THE NEWSPAPER OR WATCHING TELEVISION: 2
4. FEELING TIRED OR HAVING LITTLE ENERGY: 3
SUM OF ALL RESPONSES TO PHQ QUESTIONS 1-9: 9
SUM OF ALL RESPONSES TO PHQ QUESTIONS 1-9: 9
6. FEELING BAD ABOUT YOURSELF - OR THAT YOU ARE A FAILURE OR HAVE LET YOURSELF OR YOUR FAMILY DOWN: 0
SUM OF ALL RESPONSES TO PHQ QUESTIONS 1-9: 9

## 2022-03-22 NOTE — PATIENT INSTRUCTIONS
Patient Education         [facility]     Learning About Cutting Calories  How do calories affect your weight? Food gives your body energy. Energy from the food you eat is measured in calories. This energy keeps your heart beating, your brain active, and your muscles working. Your body needs a certain number of calories each day. After your body uses the calories it needs, it stores extra calories as fat. To lose weight safely, you have to eat fewer calories while eating in a healthy way. How many calories do you need each day? The more active you are, the more calories you need. When you are less active, you need fewer calories. How many calories you need each day also depends on several things, including your age and whether you are male or female. Here are some general guidelines for adults:  · Less active women and older adults need 1,600 to 2,000 calories each day. · Active women and less active men need 2,000 to 2,400 calories each day. · Active men need 2,400 to 3,000 calories each day. How can you cut calories and eat healthy meals? Whole grains, vegetables and fruits, and dried beans are good lower-calorie foods. They give you lots of nutrients and fiber. And they fill you up. Sweets, energy drinks, and soda pop are high in calories. They give you few nutrients and no fiber. Try to limit soda pop, fruit juice, and energy drinks. Drink water instead. Some fats can be part of a healthy diet. But cutting back on fats from highly processed foods like fast foods and many snack foods is a good way to lower the calories in your diet. Also, use smaller amounts of fats like butter, margarine, salad dressing, and mayonnaise. Add fresh garlic, lemon, or herbs to your meals to add flavor without adding fat. Meats and dairy products can be a big source of hidden fats. Try to choose lean or low-fat versions of these products.   Fat-free cookies, candies, chips, and frozen treats can still be high in sugar and calories. Some fat-free foods have more calories than regular ones. Eat fat-free treats in moderation, as you would other foods. If your favorite foods are high in fat, salt, sugar, or calories, limit how often you eat them. Eat smaller servings, or look for healthy substitutes. Fill up on fruits, vegetables, and whole grains. Eating at home  · Use meat as a side dish instead of as the main part of your meal.  · Try main dishes that use whole wheat pasta, brown rice, dried beans, or vegetables. · Find ways to cook with little or no fat, such as broiling, steaming, or grilling. · Use cooking spray instead of oil. If you use oil, use a monounsaturated oil, such as canola or olive oil. · Trim fat from meats before you cook them. · Drain off fat after you brown the meat or while you roast it. · Chill soups and stews after you cook them. Then skim the fat off the top after it hardens. Eating out  · Order foods that are broiled or poached rather than fried or breaded. · Cut back on the amount of butter or margarine that you use on bread. · Order sauces, gravies, and salad dressings on the side, and use only a little. · When you order pasta, choose tomato sauce rather than cream sauce. · Ask for salsa with your baked potato instead of sour cream, butter, cheese, or walker. · Order meals in a small size instead of upgrading to a large. · Share an entree, or take part of your food home to eat as another meal.  · Share appetizers and desserts. Where can you learn more? Go to https://Offeessabina.healthGC[employer]. org and sign in to your [employer] account. Enter Y482 in the [facility] box to learn more about \"Learning About Cutting Calories. \"     If you do not have an account, please click on the \"Sign Up Now\" link. Current as of: September 8, 2021               Content Version: 13.1  © 4051-2670 Healthwise, Incorporated. Care instructions adapted under license by [facility] ([facility]).  If you have questions about a medical condition or this instruction, always ask your healthcare professional. [address] any warranty or liability for your use of this information. Patient Education        Low Sodium Diet (2,000 Milligram): Care Instructions  Overview     Limiting sodium can be an important part of managing some health problems. The most common source of sodium is salt. People get most of the salt in their diet from canned, prepared, and packaged foods. Fast food and restaurant meals also are very high in sodium. Your doctor will probably limit your sodium to less than 2,000 milligrams (mg) a day. This limit counts all the sodium in prepared and packaged foods and any salt you add to your food. Follow-up care is a key part of your treatment and safety. Be sure to make and go to all appointments, and call your doctor if you are having problems. It's also a good idea to know your test results and keep a list of the medicines you take. How can you care for yourself at home? Read food labels  · Read labels on cans and food packages. The labels tell you how much sodium is in each serving. Make sure that you look at the serving size. If you eat more than the serving size, you have eaten more sodium. · Food labels also tell you the Percent Daily Value for sodium. Choose products with low Percent Daily Values for sodium. · Be aware that sodium can come in forms other than salt, including monosodium glutamate (MSG), sodium citrate, and sodium bicarbonate (baking soda). MSG is often added to Asian food. When you eat out, you can sometimes ask for food without MSG or added salt. Buy low-sodium foods  · Buy foods that are labeled \"unsalted\" (no salt added), \"sodium-free\" (less than 5 mg of sodium per serving), or \"low-sodium\" (140 mg or less of sodium per serving). Foods labeled \"reduced-sodium\" and \"light sodium\" may still have too much sodium.  Be sure to read the label to see how much sodium you are getting. · Buy fresh vegetables, or frozen vegetables without added sauces. Buy low-sodium versions of canned vegetables, soups, and other canned goods. Prepare low-sodium meals  · Cut back on the amount of salt you use in cooking. This will help you adjust to the taste. Do not add salt after cooking. One teaspoon of salt has about 2,300 mg of sodium. · Take the salt shaker off the table. · Flavor your food with garlic, lemon juice, onion, vinegar, herbs, and spices. Do not use soy sauce, lite soy sauce, steak sauce, onion salt, garlic salt, celery salt, or ketchup on your food. · Use low-sodium salad dressings, sauces, and ketchup. Or make your own salad dressings and sauces without adding salt. · Use less salt (or none) when recipes call for it. You can often use half the salt a recipe calls for without losing flavor. Other foods such as rice, pasta, and grains do not need added salt. · Rinse canned vegetables, and cook them in fresh water. This removes some--but not all--of the salt. · Avoid water that is naturally high in sodium or that has been treated with water softeners, which add sodium. If you buy bottled water, read the label and choose a sodium-free brand. Avoid high-sodium foods  · Avoid eating:  ? Smoked, cured, salted, and canned meat, fish, and poultry. ? Ham, walker, hot dogs, and luncheon meats. ? Regular, hard, and processed cheese and regular peanut butter. ? Crackers with salted tops, and other salted snack foods such as pretzels, chips, and salted popcorn. ? Frozen prepared meals, unless labeled low-sodium. ? Canned and dried soups, broths, and bouillon, unless labeled sodium-free or low-sodium. ? Canned vegetables, unless labeled sodium-free or low-sodium. ? Western Dotty fries, pizza, tacos, and other fast foods. ? Pickles, olives, ketchup, and other condiments, especially soy sauce, unless labeled sodium-free or low-sodium. Where can you learn more?   Go to https://chpepiceweb.healthPulse. org and sign in to your [employer] account. Enter X573 in the [facility] box to learn more about \"Low Sodium Diet (2,000 Milligram): Care Instructions. \"     If you do not have an account, please click on the \"Sign Up Now\" link. Current as of: September 8, 2021               Content Version: 13.1  © 2006-2021 Healthwise, Incorporated. Care instructions adapted under license by [facility] ([facility]). If you have questions about a medical condition or this instruction, always ask your healthcare professional. [address] any warranty or liability for your use of this information.

## 2022-03-22 NOTE — PROGRESS NOTES
Xenia Gitelman (:  1945) is a 78 y.o. female,New patient, here for evaluation of the following chief complaint(s):  Establish Care, Diabetes, and Health Maintenance (Due for Diabetic foot/eye, Tdap, Shingles, Pneumoccal, AWV)         ASSESSMENT/PLAN:  1. Diabetes mellitus due to underlying condition with stage 3a chronic kidney disease, without long-term current use of insulin (HCC)  -     dapagliflozin (FARXIGA) 5 MG tablet; Take 1 tablet by mouth every morning, Disp-90 tablet, R-1Normal  -     metFORMIN (GLUCOPHAGE) 500 MG tablet; Take 1 tablet by mouth 2 times daily (with meals), Disp-60 tablet, R-0Normal  - Continue to monitor CKD. 2. Essential hypertension  -     valsartan (DIOVAN) 80 MG tablet; Take 1 tablet by mouth daily, Disp-90 tablet, R-1Normal  -     CBC with Auto Differential; Future  -     Comprehensive Metabolic Panel; Future  3. Moderate episode of recurrent major depressive disorder (HCC)  -     PARoxetine (PAXIL) 30 MG tablet; Take 1 tablet by mouth daily, Disp-90 tablet, R-1Normal  -     TSH; Future  -     T4, Free; Future  - As noted in HPI suboptimal control. Increase Paxil to 30 mg. Consider adjuvant. 4. Encounter for screening for lipoid disorders  -     LIPID PANEL; Future  5. Dietary counseling  Counseled the patient on diet, continue to make positive choices. It is important to focus on meeting food group needs with nutrient dense foods and stay within caloric limits. Nutrient dense foods will provide you with vitamins, minerals, and other health promoting nutrients. You should avoid added sugars, saturated fats, hydrogenated oils, and limit sodium intake (this isn't just table salt. .. turn the package over, read the label, stay under 2300 mg or 2.3 g of total sodium daily). Track your calories, this will help you get an understanding of what a proper portion size is.       Every day you should eat these:  -Veggies of all types  -Fruits  -Grains (strive for at least half of these to be whole-grain)  -Lean protein (poultry, fish, legumes, nuts)    Stick to the plate diet.    -75% of your dinner plate should be leafy green vegetables, dark green, red and orange vegetables. Do not use high-calorie dressing is a ranch or dressings that are filled with added sugars. 25% of your dinner plate should be whole grains. The remaining 25% of your dinner plate should be a lean protein. Limit your red meat intake to once per week and no more than 4 ounces in any serving.  -No need for second helpings. Limit or avoid these foods:  -Added sugars (less than 10% of your total calories in any given day should be from sugars)  -Saturated fats and hydrogenated oils (less than 10% of your total calories in any given day should be from these)  -Sodium (less than 2300 mg/day)  -Alcoholic beverages (even in moderation, alcohol can cause long-term health issues involving hypertension, electrolyte abnormalities, liver disease and even cancers of the mouth and throat)    Stay hydrated. Unless instructed otherwise by your physician, you should strive to drink at least eight 8 ounce glasses of water daily, some of these being fortified with electrolytes (such as a Pedialyte, or low calorie sports drink). Again, avoid added sugars. 6. Exercise counseling  Counseled the patient on importance of cardiovascular and resistance training. Make every attempt to engage in a level of activity, sustained for at least 30 minutes, where you saturate your undergarments with sweat. This should be done, at a minimum, three times per week. 7. Atrial fibrillation with RVR ([address]  Reviewed has blood in atria scores with patient, risk outweighs benefit, shared decision making, continue Eliquis. No change to rate control. 8. 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  Encounter for routine adult medical exam with abnormal findings  All personal, family, social, surgical, medical history is reviewed and updated with patient. Allergies, medications updated. List of specialists follows with updated. DM/HM updated. Return in about 2 weeks (around 4/5/2022) for 30 minutes, continued visit for other chronic complaints . Subjective   SUBJECTIVE/OBJECTIVE:  HPI:    Followed with Dr. Levar Rizvi. HTN: maxzide, more than 135,000mg of lifetime dose. Afib: on eliquis, metoprolol - was told to hold her cardizem and only take it if she goes into atrial fibrillation or her heart rate exceeds 140bpm sustained with atrial fibrillation, then call 911. Follows with cardiology at [address] [place] [address]. Spondylolyses/back pain: follows with Dr. Melodie Cardoza. 300mg TID gabapentin, has been weaning herself down over the last couple months. Depression: PHQ-9 Total Score: 9 (3/22/2022  2:57 PM)  Thoughts that you would be better off dead, or of hurting yourself in some way: 0 (3/22/2022  2:57 PM)    On Paxil daily. Feels this benefits her. Her PHQ9 score fluctuates week to week and just so happens to be higher now than if we were to ask weeks prior.       Preventative:  Health Maintenance   Topic Date Due    Diabetic foot exam  Never done    Depression Monitoring  Never done    DTaP/Tdap/Td vaccine (1 - Tdap) Never done    Shingles Vaccine (1 of 2) Never done    Pneumococcal 65+ years Vaccine (2 of 2 - PPSV23) 11/04/2017    Annual Wellness Visit (AWV)  Never done    Diabetic retinal exam  11/01/2019    COVID-19 Vaccine (3 - Booster for Pfizer series) 08/17/2021    Lipid screen  05/01/2022    TSH testing  05/01/2022    Potassium monitoring  05/04/2022    Creatinine monitoring  05/04/2022    A1C test (Diabetic or Prediabetic)  03/22/2023    Colorectal Cancer Screen  07/08/2029    DEXA (modify frequency per FRAX score)  Completed    Flu vaccine  Completed    Hepatitis C screen  Completed    Hepatitis A vaccine  Aged Out    Hib vaccine  Aged Out    Meningococcal (ACWY) vaccine  Aged Out           ROS:    Denies 10pt ROS other than noted in HPI. Objective       PHYSICAL EXAM:    /64   Pulse 52   Temp 97 °F (36.1 °C) (Temporal)   Resp 16   Ht 5' 5\" (1.651 m)   Wt 212 lb 3.2 oz (96.3 kg)   SpO2 98%   BMI 35.31 kg/m²     AVSS    GA: Well-groomed, appears well, no acute distress. HEENT: Atraumatic normocephalic. Extraocular muscles are grossly intact. Pupils are equal round reactive to light. Conjunctiva pink and moist.  Hearing is grossly intact. Nares patent without external drainage. Buccal mucosa pink and moist.  Posterior oropharynx clear without lesion or exudate. NECK: Trachea is midline, supple, nontender, no lymphadenopathy. CARDIO: Regular rate and rhythm without murmur rub or gallop. Cap refill 2+. Radial pulses 2+ bilaterally. RESPIRATORY: Clear to auscultation bilaterally without wheezes rales or rhonchi. Normal inspiratory and expiratory effort. Normoxic on room air    ABD: Rounded, normoactive bowel sounds. Soft, nontender, no organomegaly. MSK: Structurally appropriate for age. No gross deficit. Muscle strength 5 out of 5 bilateral upper extremities, 4+ out of 5 in hip flexion on the right knee extension on the right is 5 out of 5 as well as flexion. Hip flexion on the left 4+ out of 5 knee extension knee flexion on the left 4+ out of 5 limited by pain. NEURO: Alert, no gross deficit. Cranial nerves II through XII intact without focal deficit. Romberg is negative. PSYCH:  Mood is normal and congruent with affect. No signs of psychomotor retardation or agitation. Thought content seems normal, speech is fluent and non-pressured. SKIN: Generally warm pink and dry. An electronic signature was used to authenticate this note.     --Omar Go, DO

## 2022-03-31 DIAGNOSIS — I10 ESSENTIAL HYPERTENSION: ICD-10-CM

## 2022-03-31 DIAGNOSIS — F33.1 MODERATE EPISODE OF RECURRENT MAJOR DEPRESSIVE DISORDER (HCC): ICD-10-CM

## 2022-03-31 DIAGNOSIS — Z13.220 ENCOUNTER FOR SCREENING FOR LIPOID DISORDERS: ICD-10-CM

## 2022-03-31 LAB
ALBUMIN SERPL-MCNC: 4.1 G/DL (ref 3.5–5.2)
ALP BLD-CCNC: 96 U/L (ref 35–104)
ALT SERPL-CCNC: 16 U/L (ref 0–32)
ANION GAP SERPL CALCULATED.3IONS-SCNC: 13 MMOL/L (ref 7–16)
AST SERPL-CCNC: 16 U/L (ref 0–31)
BASOPHILS ABSOLUTE: 0.05 E9/L (ref 0–0.2)
BASOPHILS RELATIVE PERCENT: 0.8 % (ref 0–2)
BILIRUB SERPL-MCNC: 0.3 MG/DL (ref 0–1.2)
BUN BLDV-MCNC: 17 MG/DL (ref 6–23)
CALCIUM SERPL-MCNC: 9.6 MG/DL (ref 8.6–10.2)
CHLORIDE BLD-SCNC: 102 MMOL/L (ref 98–107)
CHOLESTEROL, TOTAL: 198 MG/DL (ref 0–199)
CO2: 25 MMOL/L (ref 22–29)
CREAT SERPL-MCNC: 1 MG/DL (ref 0.5–1)
EOSINOPHILS ABSOLUTE: 0.15 E9/L (ref 0.05–0.5)
EOSINOPHILS RELATIVE PERCENT: 2.4 % (ref 0–6)
GFR AFRICAN AMERICAN: >60
GFR NON-AFRICAN AMERICAN: 54 ML/MIN/1.73
GLUCOSE BLD-MCNC: 133 MG/DL (ref 74–99)
HCT VFR BLD CALC: 37.9 % (ref 34–48)
HDLC SERPL-MCNC: 48 MG/DL
HEMOGLOBIN: 11.9 G/DL (ref 11.5–15.5)
IMMATURE GRANULOCYTES #: 0.03 E9/L
IMMATURE GRANULOCYTES %: 0.5 % (ref 0–5)
LDL CHOLESTEROL CALCULATED: 112 MG/DL (ref 0–99)
LYMPHOCYTES ABSOLUTE: 1.51 E9/L (ref 1.5–4)
LYMPHOCYTES RELATIVE PERCENT: 24.6 % (ref 20–42)
MCH RBC QN AUTO: 29.3 PG (ref 26–35)
MCHC RBC AUTO-ENTMCNC: 31.4 % (ref 32–34.5)
MCV RBC AUTO: 93.3 FL (ref 80–99.9)
MONOCYTES ABSOLUTE: 0.48 E9/L (ref 0.1–0.95)
MONOCYTES RELATIVE PERCENT: 7.8 % (ref 2–12)
NEUTROPHILS ABSOLUTE: 3.93 E9/L (ref 1.8–7.3)
NEUTROPHILS RELATIVE PERCENT: 63.9 % (ref 43–80)
PDW BLD-RTO: 14.9 FL (ref 11.5–15)
PLATELET # BLD: 150 E9/L (ref 130–450)
PMV BLD AUTO: 10.3 FL (ref 7–12)
POTASSIUM SERPL-SCNC: 4.3 MMOL/L (ref 3.5–5)
RBC # BLD: 4.06 E12/L (ref 3.5–5.5)
SODIUM BLD-SCNC: 140 MMOL/L (ref 132–146)
T4 FREE: 1.54 NG/DL (ref 0.93–1.7)
TOTAL PROTEIN: 6.9 G/DL (ref 6.4–8.3)
TRIGL SERPL-MCNC: 190 MG/DL (ref 0–149)
TSH SERPL DL<=0.05 MIU/L-ACNC: 2.67 UIU/ML (ref 0.27–4.2)
VLDLC SERPL CALC-MCNC: 38 MG/DL
WBC # BLD: 6.2 E9/L (ref 4.5–11.5)

## 2022-04-12 ENCOUNTER — OFFICE VISIT (OUTPATIENT)
Dept: FAMILY MEDICINE CLINIC | Age: 77
End: 2022-04-12
Payer: MEDICARE

## 2022-04-12 VITALS
DIASTOLIC BLOOD PRESSURE: 76 MMHG | RESPIRATION RATE: 16 BRPM | SYSTOLIC BLOOD PRESSURE: 134 MMHG | HEART RATE: 57 BPM | BODY MASS INDEX: 36.57 KG/M2 | HEIGHT: 65 IN | OXYGEN SATURATION: 96 % | TEMPERATURE: 97.4 F | WEIGHT: 219.5 LBS

## 2022-04-12 DIAGNOSIS — N18.31 CHRONIC KIDNEY DISEASE, STAGE 3A (HCC): ICD-10-CM

## 2022-04-12 DIAGNOSIS — F33.1 MODERATE EPISODE OF RECURRENT MAJOR DEPRESSIVE DISORDER (HCC): Primary | ICD-10-CM

## 2022-04-12 DIAGNOSIS — M1A.9XX0 CHRONIC GOUT WITHOUT TOPHUS, UNSPECIFIED CAUSE, UNSPECIFIED SITE: ICD-10-CM

## 2022-04-12 DIAGNOSIS — F33.1 MODERATE EPISODE OF RECURRENT MAJOR DEPRESSIVE DISORDER (HCC): ICD-10-CM

## 2022-04-12 DIAGNOSIS — M54.41 ACUTE MIDLINE LOW BACK PAIN WITH RIGHT-SIDED SCIATICA: ICD-10-CM

## 2022-04-12 DIAGNOSIS — N32.81 OAB (OVERACTIVE BLADDER): ICD-10-CM

## 2022-04-12 DIAGNOSIS — Z23 NEED FOR SHINGLES VACCINE: ICD-10-CM

## 2022-04-12 DIAGNOSIS — E55.9 VITAMIN D DEFICIENCY, UNSPECIFIED: ICD-10-CM

## 2022-04-12 DIAGNOSIS — G47.33 OSA ON CPAP: ICD-10-CM

## 2022-04-12 DIAGNOSIS — I48.0 PAROXYSMAL ATRIAL FIBRILLATION (HCC): ICD-10-CM

## 2022-04-12 DIAGNOSIS — T45.2X1S POISONING BY VITAMIN D, ACCIDENTAL OR UNINTENTIONAL, SEQUELA: ICD-10-CM

## 2022-04-12 DIAGNOSIS — I10 ESSENTIAL HYPERTENSION: ICD-10-CM

## 2022-04-12 DIAGNOSIS — Z99.89 OSA ON CPAP: ICD-10-CM

## 2022-04-12 LAB — URIC ACID, SERUM: 5.2 MG/DL (ref 2.4–5.7)

## 2022-04-12 PROCEDURE — 90732 PPSV23 VACC 2 YRS+ SUBQ/IM: CPT | Performed by: SURGERY

## 2022-04-12 PROCEDURE — 99214 OFFICE O/P EST MOD 30 MIN: CPT | Performed by: SURGERY

## 2022-04-12 PROCEDURE — G0009 ADMIN PNEUMOCOCCAL VACCINE: HCPCS | Performed by: SURGERY

## 2022-04-12 RX ORDER — ZOSTER VACCINE RECOMBINANT, ADJUVANTED 50 MCG/0.5
0.5 KIT INTRAMUSCULAR SEE ADMIN INSTRUCTIONS
Qty: 0.5 ML | Refills: 0 | Status: SHIPPED
Start: 2022-04-12 | End: 2022-09-16

## 2022-04-12 RX ORDER — OMEPRAZOLE 20 MG/1
20 CAPSULE, DELAYED RELEASE ORAL DAILY
Qty: 90 CAPSULE | Refills: 0 | Status: SHIPPED
Start: 2022-04-12 | End: 2022-09-07 | Stop reason: SDUPTHER

## 2022-04-12 NOTE — PATIENT INSTRUCTIONS
BMR = 1400 Calories/day  Sedentary: little or no exercise 1800 calories/day      Eat between 1400 and 1800 Calories per day. Carbs limit to 45 to 60g per meal  Fats limit to 60g per day (no more than 20g of saturated fats)  Cholesterol limit to no more than 300mg per day  Sodium 2000mg or less per day    Flypeeps (application on phone or at [email or web address])            Patient Education        Learning About the Low FODMAP Diet for Irritable Bowel Syndrome (IBS)  What is the low-FODMAP diet? A low-FODMAP diet is used to find out if certain foods make irritable bowel syndrome (IBS) worse. You stop eating high-FODMAP foods for 2 to 6 weeks. Thenyou slowly add them back to see how your body reacts. This is called an elimination diet. A dietitian or doctor can help you followthis diet. FODMAPs are types of carbohydrates that can be hard for your body to digest.They are in many types of foods. FODMAP stands for:   F ermentable.  O ligosaccharides.  D isaccharides. Ardyth Moritz M onosaccharides.  A nd p olyols. If you have IBS, foods that are high in FODMAPs may make your symptoms worse. When you are on this diet, you can still eat carbohydrates that are low in FODMAPs. This includes certain fruits, vegetables, grains, and low-lactosedairy products. What is it used for? This diet is used to help manage symptoms of irritable bowel syndrome (IBS). The diet limits foods that are high in FODMAPs. High-FODMAP foods can be hard to digest. They pull more fluid into your intestines. They are also easily fermented. This can lead to bloating, bellypain, gas, and diarrhea. The low-FODMAP diet can help you figure out what foods to avoid. And it canhelp you find foods that are easier to digest.  This diet can help with IBS symptoms. But it's not a cure. You will still needto manage your condition. How does it work? At first, you won't eat any high-FODMAP foods for a few weeks.   It can be helpful to work with a dietitian who is trained in the [address] when you try this diet. They can help you find recipes and FODMAP foodlists to use while you are on the diet. After 2 to 6 weeks, you will start to try high-FODMAP foods again. You will add those foods back to your diet, one at a time. Your doctor or dietitian willprobably have you wait a few days before you add each new food. Keep a food diary. You can write down the foods you try and note how they [facility] feel. After a few weeks, you may have a better idea of what foods you should avoidand what foods you can eat without triggering IBS symptoms. What are the risks? There is some risk of not getting all of the vitamins and nutrients you need onthe low-FODMAP diet. These include:   Folate.  Thiamin.  Vitamin B6.   Calcium.  Vitamin D. Your dietitian or doctor can help you find other sources of these if needed. This diet may limit your fiber intake. If you need more fiber, ask your doctoror dietitian about low-FODMAP fiber sources. What foods are on the low-FODMAP diet? Here is a guide to foods that you can eat, plus the foods that you shouldavoid, when you are on the low-FODMAP diet. Grains  Okay to eat: Foods made from grains like arrowroot, buckwheat, cornmeal, millet, and oats. You can also eat potato flour, quinoa, rice, sorghum, tapioca, and teff. Cereals, pasta, breads, corn tortillas and baked goods made from these grainsare also okay. (These grains may be labeled \"gluten-free. \")  Avoid: Grains like wheat, barley, and rye. Avoid ingredients such as bulgur, couscous, durum, and semolina. And avoid cereals, breads, and pastas made fromthese grains. Avoid chickpea, lentil, and pea flour. Proteins  Okay to eat: Most meat, fish, and eggs without high-FODMAP sauces. You can have small amounts of almonds or hazelnuts (10 nuts). Macadamia nuts, peanuts, pecans, pine nuts, and walnuts are also okay.  You can also eat xochitl and pumpkin seeds,tofu, and tempeh. Avoid: Beans, chickpeas, lentils, and soybeans. Avoid pistachio and cashew nuts. Avoid fatty or fried meats. And some sausages may have high-FODMAP ingredients. Dairy  Okay to eat: Lactose-free dairy milks. Rice milk and almond milk are okay. So are lactose-free yogurts, kefirs, ice creams, and sorbet from low-FODMAP fruits and sweeteners. (These are often labeled \"lactose-free. \") You can have small amounts (2 Tbsp) of cottage, cream, or ricotta cheese. Hard cheeses like cheddar, Linwood Hind, ROSS, and Swiss are okay. So are small amounts (1 oz) ofaged or ripened cheeses like Brie, blue, and feta. Avoid: Milk, including cow, goat, and sheep. Avoid condensed or evaporated milk, buttermilk, custard, cream, sour cream, yogurt, and ice cream. Avoid soy milk. (Check sauces for dairy ingredients.)  Vegetables  Okay to eat: Bamboo shoots, bell peppers, bok nazia, broccoli, cabbage (red or white), and cucumbers. Eggplant, green beans, lettuce, olives, parsnips, and potatoes are okay to eat. So are pumpkin, rutabaga, seaweed, sprouts, Swiss chard, and spinach. You can eat scallions (green part only) and yellow or spaghetti squash. You can eat tomatoes, turnips, watercress, yams, and zucchini. You can also have small amounts of artichoke hearts (from can, 1 oz), carrots, corn (½cob), and sweet potato (½ cup). Avoid: Artichokes, asparagus, [place] sprouts, laverne cabbage, cauliflower, and celery. And avoid garlic, leeks, mushrooms, okra, onions, scallions (whitepart), shallots, and peas. Fruits  Okay to eat: Bananas, blueberries, cantaloupe, grapes, and honeydew. Kiwi, jagdish, limes, oranges, passion fruit, papaya, and pineapple are also okay. You can eat plantain, raspberries, rhubarb, star fruit, strawberries, tangelo, and tangerine. You can also have small amounts of dried banana chips (up to 10chips), dried cranberries (1 Tbsp), and shredded coconut (up to ¼ cup).   Avoid: Apples, applesauce, apricots, avocados, blackberries, boysenberries, and cherries. Also avoid dates, figs, grapefruit, guava, lychee, and mangoes. Don't eat nectarines, peaches, pears, persimmon, plums, prunes, tamarillo, orwatermelon. And limit most canned and dried fruits. Oils, spices, condiments, and sweeteners  Okay to eat: Vegetable oils (including garlic infused), butter, ghee, lard, and margarine. You can have most fresh herbs like basil, chives, coriander, gretchen, parsley, rosemary, and thyme. You can have salt, jams made from low-FODMAP fruits, mayonnaise, and mustard. Soy sauce, hot sauce (no garlic), tamari, and vinegar are also okay. Sweeteners that are okay include sugar (sucrose), powdered (confectioner's) sugar, brown sugar, glucose, and maple syrup. You can alsohave some artificial sweeteners like aspartame, saccharine, and stevia. Avoid: Chutneys, hummus, jellies, garlic sauces, and gravies made with onion or garlic. Avoid pickles, relish, some salad dressings and soup stocks, salsa, and tomato paste. And avoid sauces and other foods with high fructose corn syrup, honey, molasses, and agave. Avoid artificial sweeteners (isomalt, mannitol, malitol, sorbitol, and xylitol). Avoid corn syrup solids, fructose, fruit juiceconcentrate, and polydextrose. Other foods and drinks  Okay to have: Water, soda water, tonic, soft drinks sweetened with sugar, ½ cup of low-FODMAP fruit juice, and most teas and alcohols. You can also eat foods madewith baking powder and soda, cocoa, and gelatin. Avoid: Juices from high-FODMAP fruits and vegetables. And avoid fortified russell, chamomile and fennel teas, chicory-based drinks and coffee substitutes, andbouillon cubes. Follow-up care is a key part of your treatment and safety. Be sure to make and go to all appointments, and call your doctor if you are having problems. It's also a good idea to know your test results and keep alist of the medicines you take. Where can you learn more?   Go to [email or web address] and sign in to your [employer] account. Enter L235 in the [facility] box to learn more about \"Learning About the Low FODMAP Diet for Irritable Bowel Syndrome (IBS). \"     If you do not have an account, please click on the \"Sign Up Now\" link. Current as of: September 8, 2021               Content Version: 13.2  © 2006-2022 Healthwise[employer] Incorporated. Care instructions adapted under license by [facility] ([facility]). If you have questions about a medical condition or this instruction, always ask your healthcare professional. [address] any warranty or liability for your use of this information.

## 2022-04-12 NOTE — PROGRESS NOTES
Marya Zamora (:  1945) is a 78 y.o. female,New patient, here for evaluation of the following chief complaint(s):  Swelling, Diabetes, Other (Other Chronic Conditions), and Health Maintenance (Due for Diabetic foot/eye, Tdap, Shingles,Pneumococcal, AWV, Covid Booster)         ASSESSMENT/PLAN:    As noted in HPI    Moderate episode of recurrent major depressive disorder (HCC)   Acute midline low back pain with right-sided sciatica   Chronic kidney disease, stage 3a ([address]   Need for shingles vaccine   Chronic gout without tophus, unspecified cause, unspecified site   OAB (overactive bladder)   SHIRLEY on CPAP   Poisoning by vitamin D, accidental or unintentional, sequela   Vitamin D deficiency, unspecified   Essential hypertension   Paroxysmal atrial fibrillation ([address]       Return in about 3 months (around 2022) for AWV. Subjective   SUBJECTIVE/OBJECTIVE:  HPI:    Followed with Dr. Katie Lund. HTN: maxzide, more than 135,000mg of lifetime dose - Discontinued last visit. Start valsartan, normotensive since then. Comprehensive metabolic panel within normal limits today for the patient's chronic kidney disease. Liver function within normal limits. She has concern of her peripheral edema since discontinuing the hydrochlorothiazide, apparently this helped. She was encouraged that if she begins taking the Farsi get that this will have a similar diuretic effect and this should improve. Otherwise she should be relying on compression stockings and moving daily in order to get rid of some of this (this is due to venous insufficiency).     Afib: on eliquis, metoprolol - was told to hold her cardizem and only take it if she goes into atrial fibrillation or her heart rate exceeds 140bpm sustained with atrial fibrillation, then call 911. Follows with cardiology at [address] [place] [address].       At last visit, Reviewed has blood in atria scores with patient, risk outweighs benefit, shared decision making, continue Eliquis.   No change to rate control. Spondylolyses/back pain: follows with Dr. Holland Parson. 300mg TID gabapentin, has been weaning herself down over the last couple months. No changes to medications. Will continue to appreciate recommendations from pain management.      Depression: PHQ-9 Total Score: 9 (3/22/2022  2:57 PM)  Thoughts that you would be better off dead, or of hurting yourself in some way: 0 (3/22/2022  2:57 PM)     On Paxil daily. Feels this benefits her. Her PHQ9 score fluctuates week to week and just so happens to be higher now than if we were to ask weeks prior. PHQ9 4    DM2:  Had questions about Artice Can [ did not start yet ], needed concerns addressed/reinforced about safety. Was not taking metformin with meals as directed. a1c 6.4%. Sees ophthalmology in May     Needs to see podiatry for diabetic foot exam    GERD:  EGD in past, esophagitis, gastritis. If she is not on PPI she has reflux. Will follow with GI again in next couple months. Appreciate their recommendations (would like to switch her from PPI to H2, she will discuss this with them). SHIRLEY: uses CPAP nightly, therapeutic. Restorative sleep. No headaches waking up. No need for DME at this point. OAB:  Takes ditropan (IR) once daily. .. Feels like this is ineffective. Saw urogynecologist and he agreed with medical management. No prolapse noted, no cystocele, no issue with constipation (so long as she takes her bowel regimen of stool softener, stays hydrated). Will switch to myrbetriq.     Preventative:  Health Maintenance   Topic Date Due    Annual Wellness Visit (AWV)  Never done    Diabetic foot exam  Never done    Shingles vaccine (1 of 2) Never done    Diabetic retinal exam  11/01/2019    COVID-19 Vaccine (3 - Booster for Pfizer series) 08/17/2021    DTaP/Tdap/Td vaccine (1 - Tdap) 04/12/2023 (Originally 1/20/1964)    A1C test (Diabetic or Prediabetic)  03/22/2023    Depression Monitoring  03/22/2023   

## 2022-04-13 LAB — VITAMIN D 25-HYDROXY: >120 NG/ML (ref 30–100)

## 2022-04-14 DIAGNOSIS — E08.22 DIABETES MELLITUS DUE TO UNDERLYING CONDITION WITH STAGE 3A CHRONIC KIDNEY DISEASE, WITHOUT LONG-TERM CURRENT USE OF INSULIN (HCC): ICD-10-CM

## 2022-04-14 DIAGNOSIS — N18.31 DIABETES MELLITUS DUE TO UNDERLYING CONDITION WITH STAGE 3A CHRONIC KIDNEY DISEASE, WITHOUT LONG-TERM CURRENT USE OF INSULIN (HCC): ICD-10-CM

## 2022-05-10 ENCOUNTER — CARE COORDINATION (OUTPATIENT)
Dept: CARE COORDINATION | Age: 77
End: 2022-05-10

## 2022-05-10 NOTE — CARE COORDINATION
-Attempted to reach pt to offer enrollment into care coordination program, however no answer.  -Detailed HIPAA compliant VM left introducing self, reason for call, and brief explanation of program.  -Left ACM's contact information, requesting call back. -Mailed Intro Letter via My chart.

## 2022-05-18 DIAGNOSIS — R92.2 BREAST DENSITY: Primary | ICD-10-CM

## 2022-05-18 NOTE — PROGRESS NOTES
[place] radiologist recommendation \"7 mm oval oval asymmetry posteriorly alone the plane of the nipple\"

## 2022-05-31 RX ORDER — ATORVASTATIN CALCIUM 10 MG/1
10 TABLET, FILM COATED ORAL NIGHTLY
Qty: 90 TABLET | Refills: 1 | Status: SHIPPED | OUTPATIENT
Start: 2022-05-31

## 2022-06-10 ENCOUNTER — HOSPITAL ENCOUNTER (OUTPATIENT)
Dept: ULTRASOUND IMAGING | Age: 77
Discharge: HOME | End: 2022-06-10
Payer: MEDICARE

## 2022-06-10 ENCOUNTER — HOSPITAL ENCOUNTER (OUTPATIENT)
Dept: MAMMOGRAPHY | Age: 77
End: 2022-06-10
Payer: MEDICARE

## 2022-06-10 DIAGNOSIS — R92.2 BREAST DENSITY: ICD-10-CM

## 2022-06-10 PROCEDURE — 76642 ULTRASOUND BREAST LIMITED: CPT

## 2022-06-22 ENCOUNTER — INITIAL CONSULT (OUTPATIENT)
Dept: SURGERY | Age: 77
End: 2022-06-22

## 2022-06-22 VITALS
HEART RATE: 56 BPM | OXYGEN SATURATION: 98 % | HEIGHT: 65 IN | SYSTOLIC BLOOD PRESSURE: 154 MMHG | DIASTOLIC BLOOD PRESSURE: 84 MMHG | TEMPERATURE: 96.8 F | WEIGHT: 213 LBS | BODY MASS INDEX: 35.49 KG/M2

## 2022-06-22 DIAGNOSIS — L98.7 EXCESS SKIN: Primary | ICD-10-CM

## 2022-06-22 PROCEDURE — MISCP1 PLASTIC CONSULT I: Performed by: PLASTIC SURGERY

## 2022-06-22 NOTE — PROGRESS NOTES
Department of Plastic Surgery - Adult  Attending Consult Note        CHIEF COMPLAINT:  Symptomatic Macromastia    History Obtained From:  patient    HISTORY OF PRESENT ILLNESS:                The patient is a 78 y.o. female who presents with complaints of bilateral lateral upper chest wall tissue redundancy and fullness as well as abdominal protuberance. Patient states that she had an abdominoplasty 40 years prior and has since gained weight and has had abdominal surgeries as well including hysterectomy. She states currently her weight fluctuates around the 210 pound range but is having difficulties with losing weight through healthy diet and exercise. She states that she does not like the appearance of her abdomen as she states there is cobblestoning to the skin and underlying tissue and the abdomen is protuberant. She states she does have irritable bowel syndrome as well without any concerns for abdominal pain or difficulties with bowel movements. She would like to discuss her options for surgical intervention bit liposuction or further abdominoplasty to her abdominal concerns. She also brings to my attention the fullness to her bilateral lateral upper chest wall redundancy lateral to her breast and inferior to her axilla. She states she has recently had a mammogram as well as ultrasound which was benign to her bilateral breast.  She would like to discuss her options on surgical intervention at this juncture as well. She does bring to my attention also that her hemoglobin A1c is 6.4 after her last lab draw. She is currently on Eliquis for her history of A. fib. She is on a CPAP machine nightly she does not see a pulmonologist but follows with her primary care physician for this concern.      Past Medical History:    Past Medical History:   Diagnosis Date    Anesthesia complication 3898    difficulty breathing post thyroid surgery [facility]    Cancer [facility])     skin     GERD (gastroesophageal reflux disease)     Hyperlipidemia     Hypertension     Sleep apnea     uses cpap    Spinal stenosis     Thyroid disease     Tremors of nervous system     familial    Type 2 diabetes mellitus without complication ([facility] [address]      Past Surgical History:    Past Surgical History:   Procedure Laterality Date    BLEPHAROPLASTY      CATARACT EXTRACTION W/  INTRAOCULAR LENS IMPLANT Left 09/12/2017    CATARACT EXTRACTION W/  INTRAOCULAR LENS IMPLANT Right 10/09/2018    CATARACT EXTRACTION W/  INTRAOCULAR LENS IMPLANT Right 11/01/2018    secondary procedure to correct first cataract     CHOLECYSTECTOMY      COSMETIC SURGERY      abdominoplasty    FOOT SURGERY Left     skin cancer    HYSTERECTOMY      LUMBAR SPINE SURGERY N/A 9/14/2020    L3-L4, L4-L5  POSTERIOR LUMBAR INTERBODY FUSION performed by Isabella Kong MD at [address] [place],INTRAOCUL Right 11/1/2018    EYE IOL REMOVAL performed by Zohreh Olivo MD at [address] [facility] INS IO LENS PROSTH W/O ECP Right 10/9/2018    RIGHT EYE CATARACT EMULSIFICATION IOL IMPLANT performed by Zohreh Oliov MD at [address] [facility], PARTIAL      left lobe removed    TONSILLECTOMY      TUBAL LIGATION       Current Medications:      Current Outpatient Medications   Medication Sig Dispense Refill    atorvastatin (LIPITOR) 10 MG tablet Take 1 tablet by mouth nightly 90 tablet 1    metFORMIN (GLUCOPHAGE) 500 MG tablet TAKE 1 TABLET BY MOUTH TWICE A DAY WITH MEALS 90 tablet 1    zoster recombinant adjuvanted vaccine (SHINGRIX) 50 MCG/0.5ML SUSR injection Inject 0.5 mLs into the muscle See Admin Instructions 1 dose now and repeat in 2-6 months 0.5 mL 0    mirabegron (MYRBETRIQ) 25 MG TB24 Take 1 tablet by mouth daily 30 tablet 5    omeprazole (PRILOSEC) 20 MG delayed release capsule Take 1 capsule by mouth daily 90 capsule 0    aspirin 81 MG EC tablet Take 81 mg by mouth daily      vitamin C (ASCORBIC ACID) 500 MG tablet Take 500 mg by mouth daily      Biotin 1000 MCG TABS Take by mouth      zinc 50 MG CAPS Take by mouth      Misc Natural Products (OSTEO BI-FLEX TRIPLE STRENGTH PO) Take by mouth      valsartan (DIOVAN) 80 MG tablet Take 1 tablet by mouth daily 90 tablet 1    PARoxetine (PAXIL) 30 MG tablet Take 1 tablet by mouth daily 90 tablet 1    allopurinol (ZYLOPRIM) 100 MG tablet TAKE 2 TABLETS BY MOUTH EVERY  tablet 1    furosemide (LASIX) 20 MG tablet Take 1 tablet by mouth daily as needed (Swelling) Take 20 mg by mouth daily as needed (Swelling) 90 tablet 0    levothyroxine (SYNTHROID) 50 MCG tablet Take 1.5 tablets by mouth Daily 135 tablet 1    metoprolol tartrate (LOPRESSOR) 25 MG tablet Take 12.5 mg by mouth every 12 hours      dilTIAZem (CARDIZEM) 60 MG tablet       apixaban (ELIQUIS) 5 MG TABS tablet Take 1 tablet by mouth 2 times daily 60 tablet 0    Coenzyme Q10 (COQ-10) 100 MG CAPS Take 100 mg by mouth daily      CPAP Machine MISC by Does not apply route nightly      latanoprost (XALATAN) 0.005 % ophthalmic solution Place 1 drop into the left eye nightly      dapagliflozin (FARXIGA) 5 MG tablet Take 1 tablet by mouth every morning (Patient not taking: Reported on 4/12/2022) 90 tablet 1     No current facility-administered medications for this visit.        Allergies:  Epinephrine, Ace inhibitors, Adhesive tape, and Propoxyphene          Social History:   Social History     Socioeconomic History    Marital status:      Spouse name: Not on file    Number of children: Not on file    Years of education: Not on file    Highest education level: Not on file   Occupational History    Not on file   Tobacco Use    Smoking status: Never Smoker    Smokeless tobacco: Never Used   Vaping Use    Vaping Use: Never used   Substance and Sexual Activity    Alcohol use: No     Comment: rare    Drug use: Never    Sexual activity: Not on file   Other Topics Concern    Not on file   Social History Narrative    Not on file     Social Determinants of Health     Financial Resource Strain:     Difficulty of Paying Living Expenses: Not on file   Food Insecurity:     Worried About [address] in the Last Year: Not on file    Briseida of Food in the Last Year: Not on file   Transportation Needs:     Lack of Transportation (Medical): Not on file    Lack of Transportation (Non-Medical): Not on file   Physical Activity:     Days of Exercise per Week: Not on file    Minutes of Exercise per Session: Not on file   Stress:     Feeling of Stress : Not on file   Social Connections:     Frequency of Communication with Friends and Family: Not on file    Frequency of Social Gatherings with Friends and Family: Not on file    Attends Synagogue Services: Not on file    Active Member of [address] [place] [address] or Organizations: Not on file    Attends Club or Organization Meetings: Not on file    Marital Status: Not on file   Intimate Partner Violence:     Fear of Current or Ex-Partner: Not on file    Emotionally Abused: Not on file    Physically Abused: Not on file    Sexually Abused: Not on file   Housing Stability:     Unable to Pay for Housing in the Last Year: Not on file    Number of [place] in the Last Year: Not on file    Unstable Housing in the Last Year: Not on file     Family History:   No family history on file. REVIEW OF SYSTEMS:    CONSTITUTIONAL:  negative for  fevers, chills, sweats and fatigue  EYES: negative for dipolpia or acute vision loss.    RESPIRATORY:  negative for  dry cough, cough with sputum, dyspnea, wheezing and chest pain  CARDIOVASCULAR:  negative for  chest pain, dyspnea, palpitations, syncope  GASTROINTESTINAL:  negative for nausea, vomiting, change in bowel habits, diarrhea, constipation and abdominal pain  EXTREMITIES: negative for edema  MUSCULOSKELETAL: negative for muscle weakness  SKIN: negative for itching or rashes. BEHAVIOR/PSYCH:  negative for poor appetite, increased appetite, decreased sleep and poor concentration  All other review of systems negative    PHYSICAL EXAM:    VITALS:  There were no vitals taken for this visit. CONSTITUTIONAL:  awake, alert, cooperative, no apparent distress, and appears stated age  EYES: PERRLA, EOMI, no signs of occular infection  LUNGS:  No increased work of breathing, good air exchange,   CARDIOVASCULAR:   regular rate and rhythm, EXTREMITIES: no signs of clubbing or cyanosis. MUSCULOSKELETAL: negative for flaccid muscle tone or spastic movements. SKIN: gross examination reveals no signs of rashes, or diaphoresis. NEURO: Cranial nerves II-XII grossly intact. No signs of agitated mood. BREAST: Rash is not noted, There are no masses palpated to b/l breast, no axillary lymphadenopathy, no nipple discharge. No breast pain. There are no previous scars. Bilateral Nipple sensation  intact, there is noted skin and subcutaneous tissue redundancy to the bilateral lateral breast mound approximately 15 cm x 8 cm x 6 cm no palpable masses are appreciated to this tissue redundancy no pain or tenderness no signs of infection    Abdomen: Previous abdominoplasty scar noted with scar tethering the abdomen is firm on palpation with no pain or tenderness x4 quadrants there is noted protuberance of the abdomen consistent with visceral fat. No signs of infection are appreciated no masses or fluid collections on palpation.     DATA:    Radiology Review:    EXAMINATION:   TARGETED ULTRASOUND OF THE LEFT BREAST       6/10/2022       COMPARISON:   Comparison is made with the patient's outside images of 5/10/2022 and   4/19/2021.       HISTORY:   ORDERING SYSTEM PROVIDED HISTORY: Breast density   TECHNOLOGIST PROVIDED HISTORY:   Reason for JIMX:->3MZ neoplasm of uncertain behavior   What reading provider will be dictating this exam?->CRC       FINDINGS:   There are scattered areas of fibroglandular density.       The patient's outside images were reviewed. Marcio Wagoner is a 7 mm, round, nodular   focus seen within the nipple plane within the central aspect of the left   breast approximately 6 cm from the nipple.       The mammographic abnormality corresponds to a cyst within the left breast   centrally within the nipple plane measuring 0.7 x 0.8 cm.  This cyst contains   a single septation.       A smaller 4 mm simple cyst is seen within the left breast at 9 o'clock   position.       A 2nd septated cyst is seen at the 9 o'clock position which measures 6 mm.       There is no suspicious mass seen within the left breast.  There are no   abnormally dilated ducts.       No lymphadenopathy is seen within the left axilla.           Impression   1. The mammographic abnormality seen within the nipple plane 6 cm from the   nipple on the patient's outside images represents a cyst with a single   septation.  No further evaluation of this cyst is recommended.  Specifically,   biopsy is not recommended. 2. 4 mm simple cyst within the left breast at the 9 o'clock position. 3. Septated cyst within the right breast 9 o'clock position measuring 6 mm. 4. The patient should return to annual screening mammography.       BIRADS:   BIRADS - CATEGORY 2       Benign Findings.  Normal interval follow-up is recommended in 12 months.       OVERALL ASSESSMENT - BENIGN       A letter of notification will be sent to the patient regarding the results.       The [address] [place] [address] St of Radiology recommends annual mammograms for women 40   years and older.           Breast Measurements were taken and are noted in the media section. IMPRESSION/RECOMMENDATIONS:      Diagnosis  -) Bilateral lateral upper chest wall tissue redundancy  -) Abdominal lipodystrophy.     Explained to the patient at this time she is not a surgical candidate for any abdominal cosmetic procedures as her concern for her abdomen is visceral fat which can only be addressed through healthy diet and exercise. Explained to the patient that although her scar does have some contracture from her previous abdominoplasty 40 years prior there is no need for surgical intervention to this area second to her history of multiple comorbidities. I also addressed with her today the bilateral lateral upper chest wall tissue redundancy which also is not medically necessary at this time and the risk of stopping her Eliquis in light of her need for CPAP her diabetes and her chronic kidney disease may put her at higher risk for postoperative complication. Patient voices understanding appreciation for her discussion today      FU - PRN      This document is generated, in part, by voice recognition software and thus  syntax and grammatical errors are possible.     Cherry Queen MD  9:56 AM  6/30/2022

## 2022-07-11 RX ORDER — ALLOPURINOL 100 MG/1
TABLET ORAL
Qty: 180 TABLET | Refills: 1 | Status: SHIPPED
Start: 2022-07-11 | End: 2022-07-22 | Stop reason: SDUPTHER

## 2022-07-11 NOTE — TELEPHONE ENCOUNTER
Patient called for refill. Patient stated she is finishing up RX for [address], which is cost prohibitive. Patient stated she informed Dr. Roselyn Byrd and was going to discuss at next appt, but had to RS to 8/3/22. Patient stated she will be out of [address] and is unsure if she should wait to discuss at next appt, or if there is an alternative Dr. Roselyn Byrd would prescribe before. Please advise.     Last seen 4/12/2022  Next appt 8/3/2022  [facility]

## 2022-07-11 NOTE — TELEPHONE ENCOUNTER
Ramila Morales, DO  You 20 minutes ago (3:39 PM)     SM    That's fine.  We will consider another diabetic medication as appropriate at next visit.  If the patient was faithful to my diet recommendations, then there will not be a need for another medication. I called patient and informed RX was sent and that Dr. Deepika Pereira will discuss alternative RX at next appt.

## 2022-07-18 DIAGNOSIS — N18.31 DIABETES MELLITUS DUE TO UNDERLYING CONDITION WITH STAGE 3A CHRONIC KIDNEY DISEASE, WITHOUT LONG-TERM CURRENT USE OF INSULIN (HCC): ICD-10-CM

## 2022-07-18 DIAGNOSIS — E08.22 DIABETES MELLITUS DUE TO UNDERLYING CONDITION WITH STAGE 3A CHRONIC KIDNEY DISEASE, WITHOUT LONG-TERM CURRENT USE OF INSULIN (HCC): ICD-10-CM

## 2022-07-22 DIAGNOSIS — M1A.9XX0 CHRONIC GOUT WITHOUT TOPHUS, UNSPECIFIED CAUSE, UNSPECIFIED SITE: Primary | ICD-10-CM

## 2022-07-22 RX ORDER — ALLOPURINOL 100 MG/1
TABLET ORAL
Qty: 180 TABLET | Refills: 1 | Status: SHIPPED | OUTPATIENT
Start: 2022-07-22

## 2022-07-28 ENCOUNTER — TELEPHONE (OUTPATIENT)
Dept: PHARMACY | Facility: CLINIC | Age: 77
End: 2022-07-28

## 2022-07-28 NOTE — TELEPHONE ENCOUNTER
[facility] CLINICAL PHARMACY: ADHERENCE REVIEW  Identified care gap per [place]: [facility]: ACE/ARB, Diabetes, and Statin adherence    Last Visit: 4/12/22    Patient not found in [facility]    [address] Records claims through 7/5/22 (Prior Year [place] = FAILED; YTD [place] =  100%; Potential Fail Date: 11/12/22 ):   Valsartan 80mg due to refill on 9/16/22. Last filled 90 day supply. BP Readings from Last 3 Encounters:   06/22/22 (!) 154/84   04/12/22 134/76   03/22/22 128/64     Estimated Creatinine Clearance: 56 mL/min (based on SCr of 1 mg/dL). DIABETES ADHERENCE    Insurance Records claims through 7/5/22 (Prior Year [place] = PASSED; YTD [place] =  100%; Potential Fail Date: 9/8/22 ):     Recent update shows she filled Metformin  500mg on time for a 90ds    Lab Results   Component Value Date    LABA1C 6.4 03/22/2022    LABA1C 6.8 (H) 05/01/2021    LABA1C 6.8 (H) 04/28/2021     NOTE A1c <9%    [address] Records claims through 7/5/22 (Prior Year [facility] = PASSED; YTD [place] = Filled only once; Potential Fail Date: 8/3/22 ):   Atorvastatin 10mg due to refill on 6/4/22. Last filled 90 day supply. Prescription was sent in on 5/31 after pharmacy request.  Padma Elizondo why it was not filled.     Lab Results   Component Value Date    CHOL 198 03/31/2022    TRIG 190 (H) 03/31/2022    HDL 48 03/31/2022    LDLCALC 112 (H) 03/31/2022     ALT   Date Value Ref Range Status   03/31/2022 16 0 - 32 U/L Final     AST   Date Value Ref Range Status   03/31/2022 16 0 - 31 U/L Final     The 10-year ASCVD risk score (Maris Lopez et al., 2013) is: 48.5%    Values used to calculate the score:      Age: 78 years      Sex: Female      Is Non- : No      Diabetic: Yes      Tobacco smoker: No      Systolic Blood Pressure: 907 mmHg      Is BP treated: Yes      HDL Cholesterol: 48 mg/dL      Total Cholesterol: 198 mg/dL     PLAN  The following are interventions that have been identified:   - Patient overdue refilling Atorvastatin 10mg and active on home medication list.     Attempting to reach patient to review. Left message asking for return call. MyCare message also sent      Future Appointments   Date Time Provider Jyothi Hdez   8/3/2022  3:15 PM DO Cecille Ashmojonel Jordan, PharmD, [address]  Department, toll free: [phone]

## 2022-07-29 NOTE — TELEPHONE ENCOUNTER
Called pharmacy and had them fill Atorvastatin for 90 d/s. Billed through [facility]. Spoke to patient. Advised that pharmacy had filled Atorvastatin and was waiting for . Patient stated she would  this weekend. Stated she had more left over because she was not taking some days. Encouraged patient to take as prescribed. Patient stated she would talk to her provider about possibly taking less of medication at upcoming appointment on 8/3 but still would go and  medication this weekend.        Mau Trevizo, [address] [place]   Phone: [phone]       For Pharmacy [address] in place:  No  Recommendation Provided To: Patient/Caregiver: 1 via Telephone and Pharmacy: 1  Intervention Detail: Adherence Monitorin  Gap Closed?: Yes   Intervention Accepted By: Patient/Caregiver: 1 and Pharmacy: 1  Time Spent (min): 15

## 2022-08-03 ENCOUNTER — OFFICE VISIT (OUTPATIENT)
Dept: FAMILY MEDICINE CLINIC | Age: 77
End: 2022-08-03
Payer: MEDICARE

## 2022-08-03 VITALS
TEMPERATURE: 97.8 F | BODY MASS INDEX: 35.59 KG/M2 | DIASTOLIC BLOOD PRESSURE: 72 MMHG | WEIGHT: 213.6 LBS | OXYGEN SATURATION: 97 % | SYSTOLIC BLOOD PRESSURE: 134 MMHG | HEIGHT: 65 IN | RESPIRATION RATE: 16 BRPM | HEART RATE: 67 BPM

## 2022-08-03 DIAGNOSIS — N18.31 DIABETES MELLITUS DUE TO UNDERLYING CONDITION WITH STAGE 3A CHRONIC KIDNEY DISEASE, WITHOUT LONG-TERM CURRENT USE OF INSULIN (HCC): ICD-10-CM

## 2022-08-03 DIAGNOSIS — Z79.899 LONG TERM CURRENT USE OF DIURETIC: ICD-10-CM

## 2022-08-03 DIAGNOSIS — Z59.9 FINANCIAL DIFFICULTIES: ICD-10-CM

## 2022-08-03 DIAGNOSIS — N18.31 CHRONIC KIDNEY DISEASE, STAGE 3A (HCC): ICD-10-CM

## 2022-08-03 DIAGNOSIS — E08.22 DIABETES MELLITUS DUE TO UNDERLYING CONDITION WITH STAGE 3A CHRONIC KIDNEY DISEASE, WITHOUT LONG-TERM CURRENT USE OF INSULIN (HCC): ICD-10-CM

## 2022-08-03 DIAGNOSIS — Z99.89 OSA ON CPAP: ICD-10-CM

## 2022-08-03 DIAGNOSIS — I73.9 PERIPHERAL VASCULAR DISEASE (HCC): ICD-10-CM

## 2022-08-03 DIAGNOSIS — I10 ESSENTIAL HYPERTENSION: ICD-10-CM

## 2022-08-03 DIAGNOSIS — Z00.00 INITIAL MEDICARE ANNUAL WELLNESS VISIT: Primary | ICD-10-CM

## 2022-08-03 DIAGNOSIS — I48.91 ATRIAL FIBRILLATION WITH RVR (HCC): ICD-10-CM

## 2022-08-03 DIAGNOSIS — G47.33 OSA ON CPAP: ICD-10-CM

## 2022-08-03 PROCEDURE — 1123F ACP DISCUSS/DSCN MKR DOCD: CPT | Performed by: SURGERY

## 2022-08-03 PROCEDURE — G0438 PPPS, INITIAL VISIT: HCPCS | Performed by: SURGERY

## 2022-08-03 RX ORDER — FUROSEMIDE 20 MG/1
20 TABLET ORAL DAILY PRN
Qty: 90 TABLET | Refills: 0 | Status: SHIPPED | OUTPATIENT
Start: 2022-08-03

## 2022-08-03 SDOH — ECONOMIC STABILITY: FOOD INSECURITY: WITHIN THE PAST 12 MONTHS, YOU WORRIED THAT YOUR FOOD WOULD RUN OUT BEFORE YOU GOT MONEY TO BUY MORE.: NEVER TRUE

## 2022-08-03 SDOH — ECONOMIC STABILITY: FOOD INSECURITY: WITHIN THE PAST 12 MONTHS, THE FOOD YOU BOUGHT JUST DIDN'T LAST AND YOU DIDN'T HAVE MONEY TO GET MORE.: NEVER TRUE

## 2022-08-03 SDOH — ECONOMIC STABILITY - INCOME SECURITY: PROBLEM RELATED TO HOUSING AND ECONOMIC CIRCUMSTANCES, UNSPECIFIED: Z59.9

## 2022-08-03 ASSESSMENT — PATIENT HEALTH QUESTIONNAIRE - PHQ9
6. FEELING BAD ABOUT YOURSELF - OR THAT YOU ARE A FAILURE OR HAVE LET YOURSELF OR YOUR FAMILY DOWN: 0
SUM OF ALL RESPONSES TO PHQ QUESTIONS 1-9: 2
7. TROUBLE CONCENTRATING ON THINGS, SUCH AS READING THE NEWSPAPER OR WATCHING TELEVISION: 1
3. TROUBLE FALLING OR STAYING ASLEEP: 0
SUM OF ALL RESPONSES TO PHQ QUESTIONS 1-9: 2
9. THOUGHTS THAT YOU WOULD BE BETTER OFF DEAD, OR OF HURTING YOURSELF: 0
SUM OF ALL RESPONSES TO PHQ QUESTIONS 1-9: 2
4. FEELING TIRED OR HAVING LITTLE ENERGY: 1
SUM OF ALL RESPONSES TO PHQ QUESTIONS 1-9: 2
8. MOVING OR SPEAKING SO SLOWLY THAT OTHER PEOPLE COULD HAVE NOTICED. OR THE OPPOSITE, BEING SO FIGETY OR RESTLESS THAT YOU HAVE BEEN MOVING AROUND A LOT MORE THAN USUAL: 0
5. POOR APPETITE OR OVEREATING: 0
1. LITTLE INTEREST OR PLEASURE IN DOING THINGS: 0
10. IF YOU CHECKED OFF ANY PROBLEMS, HOW DIFFICULT HAVE THESE PROBLEMS MADE IT FOR YOU TO DO YOUR WORK, TAKE CARE OF THINGS AT HOME, OR GET ALONG WITH OTHER PEOPLE: 0
2. FEELING DOWN, DEPRESSED OR HOPELESS: 0
SUM OF ALL RESPONSES TO PHQ9 QUESTIONS 1 & 2: 0

## 2022-08-03 ASSESSMENT — SOCIAL DETERMINANTS OF HEALTH (SDOH): HOW HARD IS IT FOR YOU TO PAY FOR THE VERY BASICS LIKE FOOD, HOUSING, MEDICAL CARE, AND HEATING?: NOT HARD AT ALL

## 2022-08-03 ASSESSMENT — LIFESTYLE VARIABLES
HOW OFTEN DO YOU HAVE A DRINK CONTAINING ALCOHOL: MONTHLY OR LESS
HOW MANY STANDARD DRINKS CONTAINING ALCOHOL DO YOU HAVE ON A TYPICAL DAY: 1 OR 2

## 2022-08-03 NOTE — PROGRESS NOTES
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  Recommended screening schedule for the next 5-10 years is provided to the patient in written form: see Patient Instructions/AVS.       Consider watchman procedure. Return in 4 months (on 12/3/2022) for 4mo for diabetic check,  Medicare Annual Wellness Visit in 1 year. Subjective       Patient's complete Health Risk Assessment and screening values have been reviewed and are found in Flowsheets. The following problems were reviewed today and where indicated follow up appointments were made and/or referrals ordered.     Positive Risk Factor Screenings with Interventions:             General Health and ACP:  General  In general, how would you say your health is?: Good  In the past 7 days, have you experienced any of the following: New or Increased Pain, New or Increased Fatigue, Loneliness, Social Isolation, Stress or Anger?: No  Do you get the social and emotional support that you need?: Yes  Do you have a Living Will?: Yes    Advance Directives       Power of  Living Will ACP-Advance Directive ACP-Power of Umair Church on 09/18/20 Not on File Not on File Filed          General Health Risk Interventions:  Poor self-assessment of health status: diet and exercise counseling    Health Habits/Nutrition:  Physical Activity: Inactive    Days of Exercise per Week: 0 days    Minutes of Exercise per Session: 0 min     Have you lost any weight without trying in the past 3 months?: No  Body mass index: (!) 35.54  Have you seen the dentist within the past year?: Yes  Health Habits/Nutrition Interventions:  Nutritional issues:  educational materials for healthy, well-balanced diet provided    Hearing/Vision:  Do you or your family notice any trouble with your hearing that hasn't been managed with hearing aids?: (!) Yes  Do you have difficulty driving, watching TV, or doing any of your daily activities because of your eyesight?: No  Have you had an eye exam within the past year?: Yes  No results found. Hearing/Vision Interventions:  Hearing concerns:  patient declines any further evaluation/treatment for hearing issues     ADLs:  In the past 7 days, did you need help from others to perform any of the following everyday activities: Eating, dressing, grooming, bathing, toileting, or walking/balance?: No  In the past 7 days, did you need help from others to take care of any of the following: Laundry, housekeeping, banking/finances, shopping, telephone use, food preparation, transportation, or taking medications?: (!) Yes  Select all that apply: [employer], Housekeeping  ADL Interventions:  Has cleaning lady that has not been helping her (not related to her functional status \"I'm sick of doing it\")          Objective   Vitals:    08/03/22 1536   BP: 134/72   Pulse: 67   Resp: 16   Temp: 97.8 °F (36.6 °C)   TempSrc: Temporal   SpO2: 97%   Weight: 213 lb 9.6 oz (96.9 kg)   Height: 5' 5\" (1.651 m)      Body mass index is 35.54 kg/m².              Allergies   Allergen Reactions    Epinephrine Other (See Comments)     Heart racing felt like I couldn't breath    Ace Inhibitors Other (See Comments)     Cough      Adhesive Tape Rash    Propoxyphene Other (See Comments)     ? Prior to Visit Medications    Medication Sig Taking?  Authorizing Provider   furosemide (LASIX) 20 MG tablet Take 1 tablet by mouth daily as needed (Swelling) Take 20 mg by mouth daily as needed (Swelling) Yes Mayo Medina DO   allopurinol (ZYLOPRIM) 100 MG tablet TAKE 2 TABLETS BY MOUTH EVERY DAY Yes Mayo Medina DO   metFORMIN (GLUCOPHAGE) 500 MG tablet TAKE 1 TABLET BY MOUTH TWICE A DAY WITH MEALS Yes Mayo Medina DO   atorvastatin (LIPITOR) 10 MG tablet Take 1 tablet by mouth nightly Yes Mayo Medina DO   zoster recombinant adjuvanted vaccine [facility]) 50 MCG/0.5ML SUSR injection Inject 0.5 mLs into the muscle See Admin Instructions 1 dose now and repeat in 2-6 months Yes Mayo Medina DO   omeprazole (PRILOSEC) 20 MG delayed release capsule Take 1 capsule by mouth daily Yes Mayo Medina DO   aspirin 81 MG EC tablet Take 81 mg by mouth daily Yes Historical Provider, MD   vitamin C (ASCORBIC ACID) 500 MG tablet Take 500 mg by mouth daily Yes Historical Provider, MD   Biotin 1000 MCG TABS Take by mouth Yes Historical Provider, MD   zinc 50 MG CAPS Take by mouth Yes Historical Provider, MD   Misc Natural Products (OSTEO BI-FLEX TRIPLE STRENGTH PO) Take by mouth Yes Historical Provider, MD   valsartan (DIOVAN) 80 MG tablet Take 1 tablet by mouth daily Yes Mayo Medina DO   PARoxetine (PAXIL) 30 MG tablet Take 1 tablet by mouth daily Yes Mayo Medina DO   levothyroxine (SYNTHROID) 50 MCG tablet Take 1.5 tablets by mouth Daily Yes Claudia Duverney, MD   metoprolol tartrate (LOPRESSOR) 25 MG tablet Take 12.5 mg by mouth every 12 hours Yes Historical Provider, MD   apixaban (ELIQUIS) 5 MG TABS tablet Take 1 tablet by mouth 2 times daily Yes Rj Veras APRN - CNP   Coenzyme Q10 (COQ-10) 100 MG CAPS Take 100 mg by mouth daily Yes Historical Provider, MD   CPAP Machine MISC by Does not apply route nightly Yes Historical Provider, MD   dapagliflozin (FARXIGA) 5 MG tablet Take 1 tablet by mouth every morning  Patient not taking: No sig reported  Hardy Sahu DO   dilTIAZem (CARDIZEM) 60 MG tablet   Historical Provider, MD   latanoprost (XALATAN) 0.005 % ophthalmic solution Place 1 drop into the left eye nightly  Patient not taking: Reported on 8/3/2022  Historical Provider, MD [place] (Including outside providers/suppliers regularly involved in providing care):   Patient Care Team:  Hardy Sahu DO as PCP - General (Family Medicine)  Hardy Sahu DO as PCP - [facility] Empaneled Provider     Reviewed and updated this visit:  Tobacco  Allergies  Meds  Problems  Med Hx  Surg Hx  Soc Hx  Fam Hx                 Advance Care Planning   Advanced Care Planning: Discussed the patients choices for care and treatment in case of a health event that adversely affects decision-making abilities. Also discussed the patients long-term treatment options. Reviewed with the patient the appropriate state-specific advance directive documents. Reviewed the process of designating a competent adult as an Agent (or -in-fact) that could take make health care decisions for the patient if incompetent. Patient was asked to complete the declaration forms, either acknowledge the forms by a public notary or an eligible witness and provide a signed copy to the practice office. Time spent (minutes): 5       Cardiovascular Disease Risk Counseling: Assessed the patient's risk to develop cardiovascular disease and reviewed main risk factors.    Reviewed steps to reduce disease risk including:   Quitting tobacco use, reducing amount smoked, or not starting the habit  Making healthy food choices  Being physically active and gradualy increasing activity levels   Reduce weight and determine a healthy BMI goal  Monitor

## 2022-08-03 NOTE — PATIENT INSTRUCTIONS
Personalized Preventive Plan for Callum De La Garza - 8/3/2022  Medicare offers a range of preventive health benefits. Some of the tests and screenings are paid in full while other may be subject to a deductible, co-insurance, and/or copay. Some of these benefits include a comprehensive review of your medical history including lifestyle, illnesses that may run in your family, and various assessments and screenings as appropriate. After reviewing your medical record and screening and assessments performed today your provider may have ordered immunizations, labs, imaging, and/or referrals for you. A list of these orders (if applicable) as well as your Preventive Care list are included within your After Visit Summary for your review. Other Preventive Recommendations:    A preventive eye exam performed by an eye specialist is recommended every 1-2 years to screen for glaucoma; cataracts, macular degeneration, and other eye disorders. A preventive dental visit is recommended every 6 months. Try to get at least 150 minutes of exercise per week or 10,000 steps per day on a pedometer . Order or download the FREE \"Exercise & Physical Activity: Your Everyday Guide\" from [employer]. Call 8-770.614.8379 or search [employer] online. You need 5385-6006 mg of calcium and 6562-0956 IU of vitamin D per day. It is possible to meet your calcium requirement with diet alone, but a vitamin D supplement is usually necessary to meet this goal.  When exposed to the sun, use a sunscreen that protects against both UVA and UVB radiation with an SPF of 30 or greater. Reapply every 2 to 3 hours or after sweating, drying off with a towel, or swimming. Always wear a seat belt when traveling in a car.  Always wear a helmet when riding a bicycle or motorcycle.      ______________________________________________________    Alternative to Eliquis    [email or web address]

## 2022-08-04 ENCOUNTER — TELEPHONE (OUTPATIENT)
Dept: INTERNAL MEDICINE | Age: 77
End: 2022-08-04

## 2022-08-04 NOTE — TELEPHONE ENCOUNTER
Initiated call to pt per consult from Dr. Kori Rogel on 8.3.22 regarding financial concerns related to prescription costs Elvin Apgar and Eliquis)  Pt open, engaged and receptive to contact. Pt is retired and lives alone in home in [place], [place]. Pt reports continued increase of prescription costs, especially Eliquis and Farxiga; other meds are manageable   Pt is family of 1 with income from social security and pension. Discussed PAP program and qualifications; pt will gather info needed (expense printout from her pharmacy to meet 3% out of pocket for Eliquis  as well as proof of income for both meds. Pt also expresses concerns re: uncovered items (medications) during inpatient stay at [facility] which she owes significant amount.   Offered to review and she will send LSW info  Provided pt with JANAE Potts of PAP name and number and referral given to PAP to expect call once she gathers info  Pt has LSW name and number to call with update and for further needs

## 2022-09-07 ENCOUNTER — HOSPITAL ENCOUNTER (OUTPATIENT)
Dept: INTERVENTIONAL RADIOLOGY/VASCULAR | Age: 77
Discharge: HOME | End: 2022-09-09
Payer: MEDICARE

## 2022-09-07 ENCOUNTER — HOSPITAL ENCOUNTER (OUTPATIENT)
Age: 77
Discharge: HOME | End: 2022-09-07
Payer: MEDICARE

## 2022-09-07 DIAGNOSIS — E55.9 VITAMIN D DEFICIENCY, UNSPECIFIED: ICD-10-CM

## 2022-09-07 DIAGNOSIS — Z79.899 LONG TERM CURRENT USE OF DIURETIC: ICD-10-CM

## 2022-09-07 DIAGNOSIS — I73.9 PERIPHERAL VASCULAR DISEASE (HCC): ICD-10-CM

## 2022-09-07 DIAGNOSIS — T45.2X1S POISONING BY VITAMIN D, ACCIDENTAL OR UNINTENTIONAL, SEQUELA: ICD-10-CM

## 2022-09-07 LAB
ANION GAP SERPL CALCULATED.3IONS-SCNC: 13 MMOL/L (ref 7–16)
BUN BLDV-MCNC: 17 MG/DL (ref 6–23)
CALCIUM SERPL-MCNC: 9.5 MG/DL (ref 8.6–10.2)
CHLORIDE BLD-SCNC: 102 MMOL/L (ref 98–107)
CO2: 22 MMOL/L (ref 22–29)
CREAT SERPL-MCNC: 1 MG/DL (ref 0.5–1)
GFR AFRICAN AMERICAN: >60
GFR NON-AFRICAN AMERICAN: 54 ML/MIN/1.73
GLUCOSE BLD-MCNC: 140 MG/DL (ref 74–99)
POTASSIUM SERPL-SCNC: 4 MMOL/L (ref 3.5–5)
SODIUM BLD-SCNC: 137 MMOL/L (ref 132–146)

## 2022-09-07 PROCEDURE — 36415 COLL VENOUS BLD VENIPUNCTURE: CPT

## 2022-09-07 PROCEDURE — 93922 UPR/L XTREMITY ART 2 LEVELS: CPT

## 2022-09-07 PROCEDURE — 82306 VITAMIN D 25 HYDROXY: CPT

## 2022-09-07 PROCEDURE — 80048 BASIC METABOLIC PNL TOTAL CA: CPT

## 2022-09-07 RX ORDER — OMEPRAZOLE 20 MG/1
20 CAPSULE, DELAYED RELEASE ORAL DAILY
Qty: 90 CAPSULE | Refills: 0 | Status: SHIPPED | OUTPATIENT
Start: 2022-09-07

## 2022-09-07 NOTE — TELEPHONE ENCOUNTER
----- Message from Juno Jose LPN sent at 1/2/9286 11:21 AM EDT -----  Subject: Refill Request    QUESTIONS  Name of Medication? omeprazole (PRILOSEC) 20 MG delayed release capsule  Patient-reported dosage and instructions? 1 daily  How many days do you have left? 3  Preferred Pharmacy? [facility]/PHARMACY #[id number]  Pharmacy phone number (if available)? [phone]  ---------------------------------------------------------------------------  --------------,  Name of Medication? Other - oxybutin 5 mg--not on current med list  Patient-reported dosage and instructions? 1 daily  How many days do you have left? 3  Preferred Pharmacy? [facility]/PHARMACY #[id number]  Pharmacy phone number (if available)? [phone]  ---------------------------------------------------------------------------  --------------,  Name of Medication? metoprolol tartrate (LOPRESSOR) 25 MG tablet  Patient-reported dosage and instructions? 1/2 tab every 12 hours   How many days do you have left? 4  Preferred Pharmacy? [facility]/PHARMACY #[id number]  Pharmacy phone number (if available)? [phone]  Additional Information for Provider? Metoprolol is usually filled by   cardiology but she was hoping we could fill it for her   ---------------------------------------------------------------------------  --------------  [address]  What is the best way for the office to contact you? OK to leave message on   voicemail  Preferred Call Back Phone Number? [phone]  ---------------------------------------------------------------------------  --------------  SCRIPT ANSWERS  Relationship to Patient?  Self

## 2022-09-08 DIAGNOSIS — E07.89 OTHER SPECIFIED DISORDERS OF THYROID: ICD-10-CM

## 2022-09-08 DIAGNOSIS — E67.3 HYPERVITAMINOSIS D: Primary | ICD-10-CM

## 2022-09-08 LAB — VITAMIN D 25-HYDROXY: >120 NG/ML (ref 30–100)

## 2022-09-09 ENCOUNTER — TELEPHONE (OUTPATIENT)
Dept: FAMILY MEDICINE CLINIC | Age: 77
End: 2022-09-09

## 2022-09-09 DIAGNOSIS — E67.3 HYPERVITAMINOSIS D: Primary | ICD-10-CM

## 2022-09-09 NOTE — TELEPHONE ENCOUNTER
----- Message from Daniele Tsai DO sent at 9/8/2022  5:47 PM EDT -----  Make sure she is not taking any vitamin d supplement or calcium with d. NO vitamin d fortified milk or drinks. Must avoid snacks like cheese and yogurt containing the same. Needs more laboratory evaluation including checking ionized calcium, phosphorous,  and parathyroid hormone (PTH), TSH, 24hr calcium (this one is urine). Needs to see an endocrinologist of choice. Mina up endo referral thanks!    - - - - - - - - - - - - - - - - - - - - - - - - - -- -- - - - - - -    Called patient about lab results. Patient states that he OsteoBiflex has Vitamin D in it. Patient agrees to dc it. Patient states she is avoiding any drinks/food that are high in Vitamin D. Patient agrees to complete additional labs. Patient would like referral to an endocrinologist with [facility]. Will mina up.

## 2022-09-15 RX ORDER — OXYBUTYNIN CHLORIDE 5 MG/1
5 TABLET ORAL DAILY
Qty: 90 TABLET | Refills: 0 | Status: SHIPPED | OUTPATIENT
Start: 2022-09-15

## 2022-09-16 ENCOUNTER — OFFICE VISIT (OUTPATIENT)
Dept: FAMILY MEDICINE CLINIC | Age: 77
End: 2022-09-16
Payer: MEDICARE

## 2022-09-16 VITALS
RESPIRATION RATE: 18 BRPM | HEIGHT: 65 IN | OXYGEN SATURATION: 97 % | SYSTOLIC BLOOD PRESSURE: 128 MMHG | WEIGHT: 217.3 LBS | HEART RATE: 60 BPM | BODY MASS INDEX: 36.21 KG/M2 | DIASTOLIC BLOOD PRESSURE: 80 MMHG

## 2022-09-16 DIAGNOSIS — E67.3 HYPERVITAMINOSIS D: Primary | ICD-10-CM

## 2022-09-16 DIAGNOSIS — I48.0 PAROXYSMAL ATRIAL FIBRILLATION (HCC): ICD-10-CM

## 2022-09-16 DIAGNOSIS — R10.12 LUQ ABDOMINAL PAIN: ICD-10-CM

## 2022-09-16 PROCEDURE — 93000 ELECTROCARDIOGRAM COMPLETE: CPT | Performed by: SURGERY

## 2022-09-16 PROCEDURE — 1123F ACP DISCUSS/DSCN MKR DOCD: CPT | Performed by: SURGERY

## 2022-09-16 PROCEDURE — 99215 OFFICE O/P EST HI 40 MIN: CPT | Performed by: SURGERY

## 2022-09-16 NOTE — PATIENT INSTRUCTIONS
If any confusion, apathy, agitated, irritability, and in severe cases, stupor and comma. Gastrointestinal symptoms include abdominal pain, nausea, vomiting, constipation, peptic ulcers, and pancreatitis (from malignant calcifications). Renal symptoms manifest polyuria, polydipsia, and nephrolithiasis. Severe hypercalcemia can also lead to cardiac arrhythmias. Report to ED. Removal of all fortified vitamin d products from diet. Get labs. We'll get approval for CT scan. Will get in with endocrinology.

## 2022-09-16 NOTE — PROGRESS NOTES
foods, no raw foods, no exotic pets). -not related to change or frequency in bowel movements  -advised on bland diet. Hydration. If no improvement by Monday return and we will have approval for CT abd pelvis at that time, if improved we can cancel this  -if diarrhea then we can consider an infectious process and possible prescribe antibiotic    Hypervitaminosis D  -did not stop supplementation entirely as directed   -DEXA is normal  -BMP is without hypercalcemia  -did not obtain other labs ordered yet  -complaint of confusion but attributes this to long haul covid, denies any apathy, agitated/irritability.  -associated gastrointestinal symptoms include luq abdominal pain, constipation (she has issues with constipation since her surgery). -reports polyuria but no polydipsia  -denies any confusion, apathy, agitated, irritability.   -no palpitations, cp, sob, molina  -associated neurologic symptoms include weakness, fatigue, decreased appetite, and generalized bone pain.     -counseled extensively on alarm signs and symptoms and to report to the ED if they develop  -could not get EKG today due to equipment error and was offered the chance to report to ED to ensure protection of cardiac membrane and no issue with calcium balance, declined, will reorder this pending bmp and calcium      Afib/Cardiac:    Per [facility]  1. Continue present medical therapy including daily metoprolol and apixaban for anticoagulation with as needed short acting diltiazem for breakthrough symptoms. 2. I will repeat labs today including a CBC with iron studies, CMP, inflammatory markers and an CRYSTAL panel. 3. I will have her return in approximately 2 months for blood work, and ECG and echocardiogram at our [facility] facility to reassess her pericardium after she completes her colchicine treatment. I will communicate her results to her via [address]  Additional recommendations and follow-up planning will be made at that time.      Preventative:  Health Maintenance   Topic Date Due    Diabetic foot exam  Never done    Diabetic retinal exam  11/01/2019    COVID-19 Vaccine (3 - Booster for Pfizer series) 08/17/2021    Flu vaccine (1) 09/01/2022    Shingles vaccine (2 of 2) 09/12/2022    DTaP/Tdap/Td vaccine (1 - Tdap) 04/12/2023 (Originally 1/20/1964)    A1C test (Diabetic or Prediabetic)  03/22/2023    Lipids  03/31/2023    Depression Monitoring  08/03/2023    Annual Wellness Visit (AWV)  08/04/2023    Colorectal Cancer Screen  07/08/2029    DEXA (modify frequency per FRAX score)  Completed    Pneumococcal 65+ years Vaccine  Completed    Hepatitis C screen  Completed    Hepatitis A vaccine  Aged Out    Hib vaccine  Aged Out    Meningococcal (ACWY) vaccine  Aged Out           ROS:    Denies 10pt ROS other than noted in HPI. Objective       PHYSICAL EXAM:    /80   Pulse 60   Resp 18   Ht 5' 5\" (1.651 m)   Wt 217 lb 4.8 oz (98.6 kg)   SpO2 97%   BMI 36.16 kg/m²     AVSS      GA: Well-groomed, appears well, no acute distress. HEENT: Atraumatic normocephalic. Extraocular muscles are grossly intact. Pupils are equal round reactive to light. Conjunctiva pink and moist.  Hearing is grossly intact. Nares patent without external drainage. Buccal mucosa pink and moist.  Posterior oropharynx clear without lesion or exudate. NECK: Trachea is midline, supple, nontender, no lymphadenopathy. CARDIO: Regular rate and rhythm without murmur rub or gallop. Cap refill 2+. Radial pulses 2+ bilaterally. RESPIRATORY: Clear to auscultation bilaterally without wheezes rales or rhonchi. Normal inspiratory and expiratory effort. Normoxic on room air. ABD: Rounded, normoactive bowel sounds. abdominal tenderness LUQ without rebound, rigidity, or guarding. MSK: Structurally appropriate for age. No gross deficit. MSR 5-/5 throughout. NEURO: Alert, no gross deficit.      PSYCH:  Mood is normal and congruent with affect. No signs of psychomotor retardation or agitation. Thought content seems normal, speech is fluent and non-pressured. SKIN: Generally warm pink and dry. Normal turgor. On this date 9/16/2022 I have spent 53 minutes reviewing previous notes, test results and face to face with the patient discussing the diagnosis and importance of compliance with the treatment plan as well as documenting on the day of the visit. An electronic signature was used to authenticate this note.     --Cherelle Byrne, DO

## 2022-09-20 ENCOUNTER — HOSPITAL ENCOUNTER (OUTPATIENT)
Age: 77
Discharge: HOME | End: 2022-09-20
Payer: MEDICARE

## 2022-09-20 DIAGNOSIS — R10.12 LUQ ABDOMINAL PAIN: ICD-10-CM

## 2022-09-20 DIAGNOSIS — E07.89 OTHER SPECIFIED DISORDERS OF THYROID: ICD-10-CM

## 2022-09-20 DIAGNOSIS — E67.3 HYPERVITAMINOSIS D: ICD-10-CM

## 2022-09-20 LAB
CALCIUM IONIZED: 1.29 MMOL/L (ref 1.15–1.33)
LIPASE: 29 U/L (ref 13–60)
PARATHYROID HORMONE INTACT: 32 PG/ML (ref 15–65)
PHOSPHORUS: 2.8 MG/DL (ref 2.5–4.5)
TSH SERPL DL<=0.05 MIU/L-ACNC: 3.1 UIU/ML (ref 0.27–4.2)

## 2022-09-20 PROCEDURE — 82330 ASSAY OF CALCIUM: CPT

## 2022-09-20 PROCEDURE — 84100 ASSAY OF PHOSPHORUS: CPT

## 2022-09-20 PROCEDURE — 84443 ASSAY THYROID STIM HORMONE: CPT

## 2022-09-20 PROCEDURE — 82652 VIT D 1 25-DIHYDROXY: CPT

## 2022-09-20 PROCEDURE — 36415 COLL VENOUS BLD VENIPUNCTURE: CPT

## 2022-09-20 PROCEDURE — 83970 ASSAY OF PARATHORMONE: CPT

## 2022-09-20 PROCEDURE — 83690 ASSAY OF LIPASE: CPT

## 2022-09-22 ENCOUNTER — TELEPHONE (OUTPATIENT)
Dept: FAMILY MEDICINE CLINIC | Age: 77
End: 2022-09-22

## 2022-09-22 NOTE — TELEPHONE ENCOUNTER
----- Message from Ian Cruz DO sent at 9/21/2022  5:26 PM EDT -----  See how she is doing. Everything is normal and no cause for concern so far. If she is totally off ALL vitamin D containing supplements (even multivitamin) then she just has a high level of D. I think this is benign without being able to find other abnormalities on exam or in blood work. Of course, we can always ring the endocrinologist and get them to weigh in. .. which is probably what I would do for reassurance.       __________________________________    Called patient to see how she was doing and to discuss results. No answer, left voicemail.

## 2022-09-23 ENCOUNTER — HOSPITAL ENCOUNTER (OUTPATIENT)
Age: 77
Setting detail: SPECIMEN
Discharge: HOME | End: 2022-09-23
Payer: MEDICARE

## 2022-09-23 DIAGNOSIS — E67.3 HYPERVITAMINOSIS D: ICD-10-CM

## 2022-09-23 LAB
24HR URINE VOLUME (ML): 2400 ML
CALCIUM 24 HOUR URINE: 55 MG/24 HR (ref 100–300)
CREATININE 24 HOUR URINE: 840 MG/24H (ref 720–1510)
Lab: 24 HOURS
VITAMIN D 1,25-DIHYDROXY: 36.3 PG/ML (ref 19.9–79.3)

## 2022-09-23 PROCEDURE — 82340 ASSAY OF CALCIUM IN URINE: CPT

## 2022-09-26 ENCOUNTER — TELEPHONE (OUTPATIENT)
Dept: FAMILY MEDICINE CLINIC | Age: 77
End: 2022-09-26

## 2022-09-26 NOTE — TELEPHONE ENCOUNTER
----- Message from Daniele Tsai DO sent at 9/23/2022  4:06 PM EDT -----  A low level of urine calcium may be due to:    Disorders in which the body does not absorb nutrients from food well  Disorders in which the kidney handles calcium abnormally  Parathyroid glands in the neck do not produce enough PTH (hypoparathyroidism)  Use of a thiazide diuretic  Very low level of vitamin D    We know that the last three are not accurate. I think she should see an endocrinologist for reassurance.       __________________________________    Called patient and informed her of results. Patient agreeable to seeing an endocrinologist. Referral sent to [facility] (9/16/2022) and [facility] (9/9/2022).  Patient is awaiting call to see who can get her in the soonest.

## 2022-09-26 NOTE — TELEPHONE ENCOUNTER
Called patient to discuss lab results. Patient states she is doing well. Informed patient to D/C all Vitamin D supplements and any supplements containing Vitamin D. Patient agreeable. Referrals for endo placed, patient is waiting for call.

## 2022-09-26 NOTE — TELEPHONE ENCOUNTER
----- Message from Lencho Apple, DO sent at 9/21/2022  5:26 PM EDT -----  See how she is doing. Everything is normal and no cause for concern so far. If she is totally off ALL vitamin D containing supplements (even multivitamin) then she just has a high level of D. I think this is benign without being able to find other abnormalities on exam or in blood work. Of course, we can always ring the endocrinologist and get them to weigh in. .. which is probably what I would do for reassurance.

## 2022-09-29 ENCOUNTER — HOSPITAL ENCOUNTER (OUTPATIENT)
Dept: CT IMAGING | Age: 77
Discharge: HOME | End: 2022-09-29
Payer: MEDICARE

## 2022-09-29 DIAGNOSIS — E67.3 HYPERVITAMINOSIS D: ICD-10-CM

## 2022-09-29 DIAGNOSIS — R10.12 LUQ ABDOMINAL PAIN: ICD-10-CM

## 2022-09-29 PROCEDURE — 74150 CT ABDOMEN W/O CONTRAST: CPT

## 2022-09-30 DIAGNOSIS — I10 ESSENTIAL HYPERTENSION: ICD-10-CM

## 2022-09-30 DIAGNOSIS — F33.1 MODERATE EPISODE OF RECURRENT MAJOR DEPRESSIVE DISORDER (HCC): ICD-10-CM

## 2022-09-30 RX ORDER — VALSARTAN 80 MG/1
80 TABLET ORAL DAILY
Qty: 90 TABLET | Refills: 1 | Status: SHIPPED | OUTPATIENT
Start: 2022-09-30

## 2022-09-30 RX ORDER — PAROXETINE 30 MG/1
30 TABLET, FILM COATED ORAL DAILY
Qty: 90 TABLET | Refills: 1 | Status: SHIPPED | OUTPATIENT
Start: 2022-09-30

## 2022-10-03 ENCOUNTER — TELEPHONE (OUTPATIENT)
Dept: FAMILY MEDICINE CLINIC | Age: 77
End: 2022-10-03

## 2022-10-03 NOTE — TELEPHONE ENCOUNTER
----- Message from Vonna Mcardle, DO sent at 9/29/2022  7:12 PM EDT -----  CT scan does not reveal anything acute. Nothing to suggest endocrinopathy (issue with adrenal glands, pancreas). The bones show degenerative change (arthritis). She needs to be on a bowel regimen as she had stool throughout her whole colon. The recommended amount of dietary fiber is 20 to 35 grams per day (g/d) and this can be obtained from whole wheat bread, unrefined cereals, citrus fruits, and vegetables (make sure you do not increase fiber intake more than 5gm per day in a week). Start with benefiber or similar daily. If no soft bowel movement closer to III/IV on the chart, then add in MiraLAX one to two capfuls daily until soft bowel movement. If by the fifth day there is no soft movement, then take an over the counter laxative/stool softener like Senna. Make sure to stay hydrated. Stay hydrated. Unless instructed otherwise by your physician, you should strive to drink at least eight 8 ounce glasses of water daily (64oz total), some of these being fortified with electrolytes (such as a Pedialyte, low calorie sports drink, or at mealtime). Avoid added sugars. [image]      If there is no improvement with this, then we should consider atypical medications to help rebalance the serotonin and other neurotransmitters in the gut (Elavil, SSRIs). ___________________________________    Called patient to inform her of CT Scan Results. No answer, left voicemail for patient to return call.

## 2022-10-04 RX ORDER — LEVOTHYROXINE SODIUM 0.05 MG/1
75 TABLET ORAL DAILY
Qty: 135 TABLET | Refills: 1 | Status: SHIPPED | OUTPATIENT
Start: 2022-10-04

## 2022-10-18 DIAGNOSIS — N18.31 DIABETES MELLITUS DUE TO UNDERLYING CONDITION WITH STAGE 3A CHRONIC KIDNEY DISEASE, WITHOUT LONG-TERM CURRENT USE OF INSULIN (HCC): ICD-10-CM

## 2022-10-18 DIAGNOSIS — E08.22 DIABETES MELLITUS DUE TO UNDERLYING CONDITION WITH STAGE 3A CHRONIC KIDNEY DISEASE, WITHOUT LONG-TERM CURRENT USE OF INSULIN (HCC): ICD-10-CM

## 2022-10-19 NOTE — TELEPHONE ENCOUNTER
10/19/22 :  I will prescribe a 60 day supply of this prescription. Patient needs to schedule an office visit with PCP prior to refills for this or any other medications.

## 2022-10-24 ENCOUNTER — TELEPHONE (OUTPATIENT)
Dept: FAMILY MEDICINE CLINIC | Age: 77
End: 2022-10-24

## 2022-10-24 NOTE — TELEPHONE ENCOUNTER
----- Message from Osmani Vasquez DO sent at 9/29/2022  7:12 PM EDT -----  CT scan does not reveal anything acute. Nothing to suggest endocrinopathy (issue with adrenal glands, pancreas). The bones show degenerative change (arthritis). She needs to be on a bowel regimen as she had stool throughout her whole colon. The recommended amount of dietary fiber is 20 to 35 grams per day (g/d) and this can be obtained from whole wheat bread, unrefined cereals, citrus fruits, and vegetables (make sure you do not increase fiber intake more than 5gm per day in a week). Start with benefiber or similar daily. If no soft bowel movement closer to III/IV on the chart, then add in MiraLAX one to two capfuls daily until soft bowel movement. If by the fifth day there is no soft movement, then take an over the counter laxative/stool softener like Senna. Make sure to stay hydrated. Stay hydrated. Unless instructed otherwise by your physician, you should strive to drink at least eight 8 ounce glasses of water daily (64oz total), some of these being fortified with electrolytes (such as a Pedialyte, low calorie sports drink, or at mealtime). Avoid added sugars. [image]      If there is no improvement with this, then we should consider atypical medications to help rebalance the serotonin and other neurotransmitters in the gut (Elavil, SSRIs). ____________________________________      Britton Palafox and informed patient of results. Patient verbalized understanding.

## 2022-10-31 ENCOUNTER — TELEPHONE (OUTPATIENT)
Dept: PHARMACY | Facility: CLINIC | Age: 77
End: 2022-10-31

## 2022-10-31 NOTE — TELEPHONE ENCOUNTER
[facility] CLINICAL PHARMACY: ADHERENCE REVIEW  Identified care gap per Geronimo: fills at [facility]: Statin adherence    Last Visit: 9/16/22    Patient prescribed:  Atorvastatin 10 mg nightly  Metformin 500 mg twice daily with meals  Valsartan 80 mg daily    ASSESSMENT  ACE/ARB ADHERENCE  Insurance Records claims through 10/24/22 (JARED Rutherford =  94%; Passed in 2022): Valsartan last filled for 90 day supply. Next refill due: 12/29/22    BP Readings from Last 3 Encounters:   09/16/22 128/80   08/03/22 134/72   06/22/22 (!) 154/84     Estimated Creatinine Clearance: 56 mL/min (based on SCr of 1 mg/dL). DIABETES ADHERENCE  Insurance Records claims through 10/24/22 (JARED Rutherford =  97%; Potential Fail Date: 12/13/22 ):   Metformin last filled for 90 day supply. Next refill due: 10/17/22    Per Reconciled Dispense Report:  METFORMIN  MG TABLET 07/19/2022 90 180 each MARTA MARQUEZ [id number]- [facility]. .. Per [facility] Portal:  Last filled 10/25/22 for 60 days; has 0 refills left - per provider note, needs appointment for further refills (appointment scheduled for 12/5/22). Lab Results   Component Value Date    LABA1C 6.4 03/22/2022    LABA1C 6.8 (H) 05/01/2021    LABA1C 6.8 (H) 04/28/2021     NOTE A1c <9%      STATIN ADHERENCE  Insurance Records claims through 10/24/22 (JARED Rutherford =  76%; Potential Fail Date: 11/2/22 ):   Atorvastatin last filled for 90 day supply. Next refill due: 10/27/22    Per Reconciled Dispense Report:  ATORVASTATIN 10 MG TABLET 07/29/2022 90 90 each MARTA MARQUEZ [facility]pharm[facility] #[id number]- [place]. .. Per [facility] Portal:  Same as above; has 1 refill left. Per [facility]:   Atorvastatin - same as above.  Billed through [employer]     Lab Results   Component Value Date    CHOL 198 03/31/2022    TRIG 190 (H) 03/31/2022    HDL 48 03/31/2022    LDLCALC 112 (H) 03/31/2022     ALT   Date Value Ref Range Status   03/31/2022 16 0 - 32 U/L Final     AST   Date Value Ref Range Status   03/31/2022 16 0 - 31 U/L Final     The 10-year ASCVD risk score (Sydney PIRES, et al., 2019) is: 36.6%    Values used to calculate the score:      Age: 78 years      Sex: Female      Is Non- : No      Diabetic: Yes      Tobacco smoker: No      Systolic Blood Pressure: 862 mmHg      Is BP treated: Yes      HDL Cholesterol: 48 mg/dL      Total Cholesterol: 198 mg/dL       PLAN  The following are interventions that have been identified:   - Patient overdue refilling atorvastatin and active on home medication list.   [facility] will dispense refill of atorvastatin for patient today. Attempting to reach patient to review. Left message asking for return call.  and [employer] message sent to patient      Future Appointments   Date Time Provider Jyothi Hdez   2022  2:00 PM Alcira Raymond DO [facility]   2023  2:00 PM Alcira Raymond DO [facility]       Cathern Heimlich, PharmD, [address] [place] [address], toll free: [phone], option 1    =======================================================  For Pharmacy [address] [place] [address] in place:  No  Recommendation Provided To: Patient/Caregiver: 1 via Telephone and [employer] Message and Pharmacy: 1  Intervention Detail: Adherence Monitorin and Refill(s) Provided  Gap Closed?: No   Intervention Accepted By: Patient/Caregiver: 0 and Pharmacy: 1  Time Spent (min): 30

## 2022-12-05 ENCOUNTER — OFFICE VISIT (OUTPATIENT)
Dept: FAMILY MEDICINE CLINIC | Age: 77
End: 2022-12-05
Payer: MEDICARE

## 2022-12-05 VITALS
DIASTOLIC BLOOD PRESSURE: 86 MMHG | RESPIRATION RATE: 18 BRPM | BODY MASS INDEX: 35.87 KG/M2 | OXYGEN SATURATION: 96 % | TEMPERATURE: 97.1 F | SYSTOLIC BLOOD PRESSURE: 128 MMHG | HEIGHT: 65 IN | WEIGHT: 215.3 LBS | HEART RATE: 52 BPM

## 2022-12-05 DIAGNOSIS — N18.31 DIABETES MELLITUS DUE TO UNDERLYING CONDITION WITH STAGE 3A CHRONIC KIDNEY DISEASE, WITHOUT LONG-TERM CURRENT USE OF INSULIN (HCC): ICD-10-CM

## 2022-12-05 DIAGNOSIS — E08.22 DIABETES MELLITUS DUE TO UNDERLYING CONDITION WITH STAGE 3A CHRONIC KIDNEY DISEASE, WITHOUT LONG-TERM CURRENT USE OF INSULIN (HCC): ICD-10-CM

## 2022-12-05 DIAGNOSIS — E66.01 SEVERE OBESITY (BMI 35.0-39.9) WITH COMORBIDITY (HCC): ICD-10-CM

## 2022-12-05 DIAGNOSIS — E11.9 ENCOUNTER FOR DIABETIC FOOT EXAM (HCC): ICD-10-CM

## 2022-12-05 DIAGNOSIS — N32.81 OAB (OVERACTIVE BLADDER): ICD-10-CM

## 2022-12-05 DIAGNOSIS — I48.0 PAROXYSMAL ATRIAL FIBRILLATION (HCC): Primary | ICD-10-CM

## 2022-12-05 DIAGNOSIS — K21.9 GASTROESOPHAGEAL REFLUX DISEASE, UNSPECIFIED WHETHER ESOPHAGITIS PRESENT: ICD-10-CM

## 2022-12-05 LAB — HBA1C MFR BLD: 7.1 %

## 2022-12-05 PROCEDURE — 83036 HEMOGLOBIN GLYCOSYLATED A1C: CPT | Performed by: SURGERY

## 2022-12-05 PROCEDURE — 3074F SYST BP LT 130 MM HG: CPT | Performed by: SURGERY

## 2022-12-05 PROCEDURE — 99214 OFFICE O/P EST MOD 30 MIN: CPT | Performed by: SURGERY

## 2022-12-05 PROCEDURE — 1123F ACP DISCUSS/DSCN MKR DOCD: CPT | Performed by: SURGERY

## 2022-12-05 PROCEDURE — 3051F HG A1C>EQUAL 7.0%<8.0%: CPT | Performed by: SURGERY

## 2022-12-05 PROCEDURE — 3078F DIAST BP <80 MM HG: CPT | Performed by: SURGERY

## 2022-12-05 RX ORDER — OMEPRAZOLE 20 MG/1
CAPSULE, DELAYED RELEASE ORAL
Qty: 90 CAPSULE | Refills: 0 | Status: SHIPPED | OUTPATIENT
Start: 2022-12-05

## 2022-12-05 RX ORDER — OXYBUTYNIN CHLORIDE 5 MG/1
5 TABLET ORAL DAILY
Qty: 90 TABLET | Refills: 3 | Status: SHIPPED | OUTPATIENT
Start: 2022-12-05

## 2022-12-05 RX ORDER — OMEPRAZOLE 20 MG/1
20 CAPSULE, DELAYED RELEASE ORAL DAILY
Qty: 90 CAPSULE | Refills: 3 | Status: SHIPPED | OUTPATIENT
Start: 2022-12-05

## 2022-12-05 NOTE — PROGRESS NOTES
Madi Marshall (:  1945) is a 78 y.o. female,Established patient, here for evaluation of the following chief complaint(s):  Diabetes, Follow-up, and Health Maintenance (Due for Diabetic Foot, Diabetic Eye, Covid Booster, Shingles )         ASSESSMENT/PLAN:  1. Paroxysmal atrial fibrillation (HCC)  -     apixaban (ELIQUIS) 5 MG TABS tablet; Take 1 tablet by mouth 2 times daily, Disp-180 tablet, R-3Normal  -     metoprolol tartrate (LOPRESSOR) 25 MG tablet; Take 0.5 tablets by mouth in the morning and 0.5 tablets in the evening., Disp-90 tablet, R-3Normal  Stable  No change  She will ask her cards about watchman (she no longer wants blood thinner, easy bruising, cold intolerance, fearful of falls)  2. Diabetes mellitus due to underlying condition with stage 3a chronic kidney disease, without long-term current use of insulin (HCC)  -     POCT glycosylated hemoglobin (Hb A1C)  -     metFORMIN (GLUCOPHAGE) 500 MG tablet; TAKE 1 TABLET BY MOUTH TWICE A DAY WITH MEALS, Disp-180 tablet, R-3Normal  -     [facility] [place] [place], [facility], [place]  Abnormal foot exam today  3. Gastroesophageal reflux disease, unspecified whether esophagitis present  -     omeprazole (PRILOSEC) 20 MG delayed release capsule; Take 1 capsule by mouth daily, Disp-90 capsule, R-3Normal  -No food within 3 hours of laying to bed (same goes for medications with the exception of sleep aids that are 1 hour prior to bed)  -No drink within 1 hour of laying to bed  -Elevate the head of the bed by 30 degrees (1/2 heigh cinder blocks work well)   -Keep food diary and know/avoid triggers  No breakthrough  4. OAB (overactive bladder)  -     oxybutynin (DITROPAN) 5 MG tablet; Take 1 tablet by mouth daily, Disp-90 tablet, R-3Normal  Bladder training  kegals  5. Severe obesity (BMI 35.0-39. 9) with comorbidity ([address]  Diet and exercise counseling    No follow-ups on file.          Subjective   SUBJECTIVE/OBJECTIVE:  HPI:    DM2:  -metformin 500mg twice daily always with meal  -6.4% last visit  -7.1% today     -Fasting charlee  Does not check  -Fasting BG  Does not check  -Fasting zenith  Does not check    Eye exams:  last checked one month prior Dr. Mauricio Males       Preventative:  Health Maintenance   Topic Date Due    Diabetic foot exam  Never done    Diabetic retinal exam  11/01/2019    COVID-19 Vaccine (3 - Booster for Pfizer series) 05/12/2021    Shingles vaccine (2 of 2) 09/12/2022    DTaP/Tdap/Td vaccine (1 - Tdap) 04/12/2023 (Originally 1/20/1964)    Lipids  03/31/2023    Depression Monitoring  08/03/2023    Annual Wellness Visit (AWV)  08/04/2023    A1C test (Diabetic or Prediabetic)  12/05/2023    Colorectal Cancer Screen  07/08/2029    DEXA (modify frequency per FRAX score)  Completed    Flu vaccine  Completed    Pneumococcal 65+ years Vaccine  Completed    Hepatitis C screen  Completed    Hepatitis A vaccine  Aged Out    Hib vaccine  Aged Out    Meningococcal (ACWY) vaccine  Aged Out           ROS:    Denies 10pt ROS other than noted in HPI. Objective       PHYSICAL EXAM:    /86   Pulse 52   Temp 97.1 °F (36.2 °C) (Temporal)   Resp 18   Ht 5' 5\" (1.651 m)   Wt 215 lb 4.8 oz (97.7 kg)   SpO2 96%   BMI 35.83 kg/m²     AVSS    GA: Well-groomed, appears well, no acute distress. HEENT: Atraumatic normocephalic. Extraocular muscles are grossly intact. Pupils are equal round reactive to light. Conjunctiva pink and moist.  Hearing is grossly intact. Nares patent without external drainage. Buccal mucosa pink and moist.  Posterior oropharynx clear without lesion or exudate. NECK: Trachea is midline, supple, nontender, no lymphadenopathy. CARDIO: Regular rate and rhythm without murmur rub or gallop. Cap refill 2+. Radial pulses 2+ bilaterally. RESPIRATORY: Clear to auscultation bilaterally without wheezes rales or rhonchi. Normal inspiratory and expiratory effort.   Normoxic on room air    ABD: Rounded, normoactive bowel sounds. Soft, nontender, no organomegaly. MSK: Structurally appropriate for age. No gross deficit. NEURO: Alert, no gross deficit. PSYCH:  Mood is normal and congruent with affect. No signs of psychomotor retardation or agitation. Thought content seems normal, speech is fluent and non-pressured. SKIN: Generally warm pink and dry. Visual inspection:  Deformity/amputation: absent  Skin lesions/pre-ulcerative calluses: absent  Edema: right- trace, left- trace    Sensory exam:  Monofilament sensation: abnormal - diminished to 10g monofilament  (minimum of 5 random plantar locations tested, avoiding callused areas - > 1 area with absence of sensation is + for neuropathy)    Plus at least one of the following:  Pulses: normal,   Pinprick: Intact  Proprioception: Intact  Vibration (128 Hz): Intact             An electronic signature was used to authenticate this note.     --Ora Smaller, DO

## 2022-12-05 NOTE — PATIENT INSTRUCTIONS
-Carbohydrates limit to 40 to 50g per meal (120g to 150g per day)  -Fats limit to 60g per day (total fat intake should be 30% to 35% of your total daily calories, so restrict to whichever number is lower)   -Of the fats you do eat, never eat trans fats, never eat unsaturated fats, limit your saturated fat intake to less than 20g per day (or 7% of your total daily calories, so restrict to whichever number is lower)  -Cholesterol limit to no more than 300mg per day (200mg per day if you have elevated triglycerides or total cholesterol)  -Sodium less than 2000mg per day    [employer] or similar application (or track by journal) to monitor calories and macronutrients. This is the most critical component to a heart healthy, balanced diet: honesty, keeping oneself accountable, ensure you are tracking daily goals and meeting them.     Food is medicine!                _____________________________________________________

## 2022-12-12 NOTE — PROGRESS NOTES
C/o constipation and is requesting a stool softener. Dr. Leonarda Haskins notified. New orders received for Colace QD. Yes

## 2023-02-07 RX ORDER — ATORVASTATIN CALCIUM 10 MG/1
10 TABLET, FILM COATED ORAL NIGHTLY
Qty: 90 TABLET | Refills: 1 | Status: SHIPPED | OUTPATIENT
Start: 2023-02-07

## 2023-04-01 DIAGNOSIS — F33.1 MODERATE EPISODE OF RECURRENT MAJOR DEPRESSIVE DISORDER (HCC): ICD-10-CM

## 2023-04-01 DIAGNOSIS — I10 ESSENTIAL HYPERTENSION: ICD-10-CM

## 2023-04-03 RX ORDER — LEVOTHYROXINE SODIUM 0.05 MG/1
TABLET ORAL
Qty: 135 TABLET | Refills: 1 | Status: SHIPPED | OUTPATIENT
Start: 2023-04-03

## 2023-04-03 RX ORDER — PAROXETINE 30 MG/1
TABLET, FILM COATED ORAL
Qty: 90 TABLET | Refills: 1 | Status: SHIPPED | OUTPATIENT
Start: 2023-04-03

## 2023-04-03 RX ORDER — VALSARTAN 80 MG/1
TABLET ORAL
Qty: 90 TABLET | Refills: 1 | Status: SHIPPED | OUTPATIENT
Start: 2023-04-03

## 2023-04-27 DIAGNOSIS — M1A.9XX0 CHRONIC GOUT WITHOUT TOPHUS, UNSPECIFIED CAUSE, UNSPECIFIED SITE: ICD-10-CM

## 2023-04-28 RX ORDER — ALLOPURINOL 100 MG/1
TABLET ORAL
Qty: 180 TABLET | Refills: 1 | Status: SHIPPED | OUTPATIENT
Start: 2023-04-28

## 2023-04-28 NOTE — TELEPHONE ENCOUNTER
Last Appointment:  12/5/2022  Future Appointments   Date Time Provider Jyothi Hdez   5/9/2023  3:30 PM DO [facility]   8/9/2023  2:00 PM [facility]

## 2023-05-09 ENCOUNTER — OFFICE VISIT (OUTPATIENT)
Dept: FAMILY MEDICINE CLINIC | Age: 78
End: 2023-05-09
Payer: MEDICARE

## 2023-05-09 VITALS
HEIGHT: 65 IN | BODY MASS INDEX: 33.99 KG/M2 | TEMPERATURE: 96.7 F | SYSTOLIC BLOOD PRESSURE: 110 MMHG | HEART RATE: 45 BPM | DIASTOLIC BLOOD PRESSURE: 60 MMHG | WEIGHT: 204 LBS | OXYGEN SATURATION: 98 %

## 2023-05-09 DIAGNOSIS — N32.81 OAB (OVERACTIVE BLADDER): ICD-10-CM

## 2023-05-09 DIAGNOSIS — I35.0 NONRHEUMATIC AORTIC VALVE STENOSIS: ICD-10-CM

## 2023-05-09 DIAGNOSIS — R53.83 OTHER FATIGUE: ICD-10-CM

## 2023-05-09 DIAGNOSIS — E11.51 DIABETES TYPE 2 WITH ATHEROSCLEROSIS OF ARTERIES OF EXTREMITIES (HCC): ICD-10-CM

## 2023-05-09 DIAGNOSIS — I51.7 LEFT ATRIAL DILATION: ICD-10-CM

## 2023-05-09 DIAGNOSIS — R01.1 NEWLY RECOGNIZED HEART MURMUR: Primary | ICD-10-CM

## 2023-05-09 DIAGNOSIS — N81.6 RECTOCELE: ICD-10-CM

## 2023-05-09 DIAGNOSIS — I70.209 DIABETES TYPE 2 WITH ATHEROSCLEROSIS OF ARTERIES OF EXTREMITIES (HCC): ICD-10-CM

## 2023-05-09 DIAGNOSIS — R32 URINARY INCONTINENCE, UNSPECIFIED TYPE: ICD-10-CM

## 2023-05-09 DIAGNOSIS — I27.20 MILD PULMONARY HYPERTENSION (HCC): ICD-10-CM

## 2023-05-09 DIAGNOSIS — R93.1 ABNORMAL ECHOCARDIOGRAM: ICD-10-CM

## 2023-05-09 LAB — HBA1C MFR BLD: 6.7 %

## 2023-05-09 PROCEDURE — 1123F ACP DISCUSS/DSCN MKR DOCD: CPT | Performed by: SURGERY

## 2023-05-09 PROCEDURE — 83036 HEMOGLOBIN GLYCOSYLATED A1C: CPT | Performed by: SURGERY

## 2023-05-09 PROCEDURE — 3078F DIAST BP <80 MM HG: CPT | Performed by: SURGERY

## 2023-05-09 PROCEDURE — 3044F HG A1C LEVEL LT 7.0%: CPT | Performed by: SURGERY

## 2023-05-09 PROCEDURE — 3074F SYST BP LT 130 MM HG: CPT | Performed by: SURGERY

## 2023-05-09 PROCEDURE — 99212 OFFICE O/P EST SF 10 MIN: CPT | Performed by: SURGERY

## 2023-05-09 SDOH — ECONOMIC STABILITY: FOOD INSECURITY: WITHIN THE PAST 12 MONTHS, THE FOOD YOU BOUGHT JUST DIDN'T LAST AND YOU DIDN'T HAVE MONEY TO GET MORE.: NEVER TRUE

## 2023-05-09 SDOH — ECONOMIC STABILITY: HOUSING INSECURITY
IN THE LAST 12 MONTHS, WAS THERE A TIME WHEN YOU DID NOT HAVE A STEADY PLACE TO SLEEP OR SLEPT IN A SHELTER (INCLUDING NOW)?: NO

## 2023-05-09 SDOH — ECONOMIC STABILITY: INCOME INSECURITY: HOW HARD IS IT FOR YOU TO PAY FOR THE VERY BASICS LIKE FOOD, HOUSING, MEDICAL CARE, AND HEATING?: NOT HARD AT ALL

## 2023-05-09 SDOH — ECONOMIC STABILITY: FOOD INSECURITY: WITHIN THE PAST 12 MONTHS, YOU WORRIED THAT YOUR FOOD WOULD RUN OUT BEFORE YOU GOT MONEY TO BUY MORE.: NEVER TRUE

## 2023-05-09 ASSESSMENT — PATIENT HEALTH QUESTIONNAIRE - PHQ9
7. TROUBLE CONCENTRATING ON THINGS, SUCH AS READING THE NEWSPAPER OR WATCHING TELEVISION: 0
3. TROUBLE FALLING OR STAYING ASLEEP: 3
8. MOVING OR SPEAKING SO SLOWLY THAT OTHER PEOPLE COULD HAVE NOTICED. OR THE OPPOSITE, BEING SO FIGETY OR RESTLESS THAT YOU HAVE BEEN MOVING AROUND A LOT MORE THAN USUAL: 2
SUM OF ALL RESPONSES TO PHQ9 QUESTIONS 1 & 2: 0
SUM OF ALL RESPONSES TO PHQ QUESTIONS 1-9: 8
9. THOUGHTS THAT YOU WOULD BE BETTER OFF DEAD, OR OF HURTING YOURSELF: 0
10. IF YOU CHECKED OFF ANY PROBLEMS, HOW DIFFICULT HAVE THESE PROBLEMS MADE IT FOR YOU TO DO YOUR WORK, TAKE CARE OF THINGS AT HOME, OR GET ALONG WITH OTHER PEOPLE: 0
2. FEELING DOWN, DEPRESSED OR HOPELESS: 0
SUM OF ALL RESPONSES TO PHQ QUESTIONS 1-9: 8
1. LITTLE INTEREST OR PLEASURE IN DOING THINGS: 0
5. POOR APPETITE OR OVEREATING: 0
SUM OF ALL RESPONSES TO PHQ QUESTIONS 1-9: 8
6. FEELING BAD ABOUT YOURSELF - OR THAT YOU ARE A FAILURE OR HAVE LET YOURSELF OR YOUR FAMILY DOWN: 0
4. FEELING TIRED OR HAVING LITTLE ENERGY: 3
SUM OF ALL RESPONSES TO PHQ QUESTIONS 1-9: 8

## 2023-05-09 NOTE — PROGRESS NOTES
Regan Oglesby (:  1945) is a 70 y.o. female,Established patient, here for evaluation of the following chief complaint(s):  Follow-up and Diabetes         ASSESSMENT/PLAN:  1. 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 Rectocele  -     [facility] - Yissel Walsh MD, Urology, [place]  3. Urinary incontinence, unspecified type  -     BHAVNA Walsh MD, Urology, [place]  4. OAB (overactive bladder)      Return for keep  .          Subjective   SUBJECTIVE/OBJECTIVE:  HPI:      Diabetes:    Medications:  -metformin 500mg BID (not always taking with meal)    Last A1c 7.1%  A1c today 6.7%    No results found for: Kinga Browning  Lab Results   Component Value Date    WBC 6.2 2022    HGB 11.9 2022    HCT 37.9 2022    MCV 93.3 2022     2022     Lab Results   Component Value Date     2022    K 4.0 2022     2022    CO2 22 2022    BUN 17 2022    CREATININE 1.0 2022    GLUCOSE 140 (H) 2022    CALCIUM 9.5 2022    PROT 6.9 2022    LABALBU 4.1 2022    BILITOT 0.3 2022    ALKPHOS 96 2022    AST 16 2022    ALT 16 2022    LABGLOM 54 2022    GFRAA >60 2022       Fasting charlee  Not checking / no hypoglycemia s/s  Fasting average Not checking  Fasting zenith  Not checking    no microvascular complications (retinopathy, nephropathy, and neuropathy)  has macrovascular complications (high blood pressure and kidney disease)    somewhat counting/restricting carbohydrates    Foot exam: dr Sushma Gonzalez exam: no retinopathy    Down 10#      Urinary Incontinence  Urgency   Sneeze cough causes leak  Plans her day around urination   Does pelvic floor exercises   Saw [facility] - they said there is no surgical issue but did say there was a rectocele    This was about just prior to covid  Was put on oxybutynin

## 2023-05-09 NOTE — PATIENT INSTRUCTIONS
Swelling:  -compression stocking  -follow diet advice  -horse chestnut seed extract OTC supplement  -walking  -elevation of legs at end of day (unkink the hose)

## 2023-07-12 ENCOUNTER — OFFICE VISIT (OUTPATIENT)
Dept: FAMILY MEDICINE CLINIC | Age: 78
End: 2023-07-12
Payer: MEDICARE

## 2023-07-12 VITALS
SYSTOLIC BLOOD PRESSURE: 146 MMHG | TEMPERATURE: 97.5 F | OXYGEN SATURATION: 97 % | DIASTOLIC BLOOD PRESSURE: 62 MMHG | WEIGHT: 213 LBS | HEART RATE: 47 BPM | BODY MASS INDEX: 35.45 KG/M2

## 2023-07-12 DIAGNOSIS — E78.00 ELEVATED CHOLESTEROL: ICD-10-CM

## 2023-07-12 DIAGNOSIS — F33.1 MODERATE EPISODE OF RECURRENT MAJOR DEPRESSIVE DISORDER (HCC): Primary | ICD-10-CM

## 2023-07-12 DIAGNOSIS — Z12.31 SCREENING MAMMOGRAM FOR BREAST CANCER: ICD-10-CM

## 2023-07-12 DIAGNOSIS — I48.0 PAROXYSMAL ATRIAL FIBRILLATION (HCC): ICD-10-CM

## 2023-07-12 DIAGNOSIS — I10 ESSENTIAL HYPERTENSION: ICD-10-CM

## 2023-07-12 PROCEDURE — 1123F ACP DISCUSS/DSCN MKR DOCD: CPT | Performed by: SURGERY

## 2023-07-12 PROCEDURE — 3078F DIAST BP <80 MM HG: CPT | Performed by: SURGERY

## 2023-07-12 PROCEDURE — 99215 OFFICE O/P EST HI 40 MIN: CPT | Performed by: SURGERY

## 2023-07-12 PROCEDURE — 3077F SYST BP >= 140 MM HG: CPT | Performed by: SURGERY

## 2023-07-12 RX ORDER — VALSARTAN AND HYDROCHLOROTHIAZIDE 80; 12.5 MG/1; MG/1
1 TABLET, FILM COATED ORAL DAILY
Qty: 90 TABLET | Refills: 1 | Status: SHIPPED | OUTPATIENT
Start: 2023-07-12

## 2023-07-12 RX ORDER — DILTIAZEM HYDROCHLORIDE 60 MG/1
60 TABLET, FILM COATED ORAL DAILY PRN
Qty: 120 TABLET | Refills: 0
Start: 2023-07-12

## 2023-07-12 RX ORDER — ARIPIPRAZOLE 5 MG/1
5 TABLET ORAL DAILY
Qty: 30 TABLET | Refills: 3 | Status: SHIPPED | OUTPATIENT
Start: 2023-07-12

## 2023-07-12 ASSESSMENT — PATIENT HEALTH QUESTIONNAIRE - PHQ9
SUM OF ALL RESPONSES TO PHQ QUESTIONS 1-9: 15
10. IF YOU CHECKED OFF ANY PROBLEMS, HOW DIFFICULT HAVE THESE PROBLEMS MADE IT FOR YOU TO DO YOUR WORK, TAKE CARE OF THINGS AT HOME, OR GET ALONG WITH OTHER PEOPLE: 2
SUM OF ALL RESPONSES TO PHQ QUESTIONS 1-9: 15
3. TROUBLE FALLING OR STAYING ASLEEP: 3
8. MOVING OR SPEAKING SO SLOWLY THAT OTHER PEOPLE COULD HAVE NOTICED. OR THE OPPOSITE, BEING SO FIGETY OR RESTLESS THAT YOU HAVE BEEN MOVING AROUND A LOT MORE THAN USUAL: 0
5. POOR APPETITE OR OVEREATING: 3
SUM OF ALL RESPONSES TO PHQ QUESTIONS 1-9: 15
7. TROUBLE CONCENTRATING ON THINGS, SUCH AS READING THE NEWSPAPER OR WATCHING TELEVISION: 1
SUM OF ALL RESPONSES TO PHQ9 QUESTIONS 1 & 2: 5
SUM OF ALL RESPONSES TO PHQ QUESTIONS 1-9: 15
2. FEELING DOWN, DEPRESSED OR HOPELESS: 2
4. FEELING TIRED OR HAVING LITTLE ENERGY: 3
6. FEELING BAD ABOUT YOURSELF - OR THAT YOU ARE A FAILURE OR HAVE LET YOURSELF OR YOUR FAMILY DOWN: 0
9. THOUGHTS THAT YOU WOULD BE BETTER OFF DEAD, OR OF HURTING YOURSELF: 0
1. LITTLE INTEREST OR PLEASURE IN DOING THINGS: 3

## 2023-07-12 NOTE — PROGRESS NOTES
Angelita Lopez (:  1945) is a 70 y.o. female,Established patient, here for evaluation of the following chief complaint(s):  Depression, Hypertension (Discuss diuretic ), and Health Maintenance (Request mammogram script )         ASSESSMENT/PLAN:  1. Moderate episode of recurrent major depressive disorder (HCC)  -     ARIPiprazole (ABILIFY) 5 MG tablet; Take 1 tablet by mouth daily, Disp-30 tablet, R-3Normal  -     CBC with Auto Differential; Future  -     Comprehensive Metabolic Panel; Future  -     TSH; Future  -     External Referral To Psychiatry  2. Paroxysmal atrial fibrillation (HCC)  -     dilTIAZem (CARDIZEM) 60 MG tablet; Take 1 tablet by mouth daily as needed (afib, pill in pocket), Disp-120 tablet, R-0NO PRINT  3. Essential hypertension  -     valsartan-hydroCHLOROthiazide (DIOVAN-HCT) 80-12.5 MG per tablet; Take 1 tablet by mouth daily, Disp-90 tablet, R-1Normal  4. Elevated cholesterol  -     Lipid, Fasting; Future  5. Screening mammogram for breast cancer  -     [facility]ISON DIGITAL SCREEN BILATERAL PER PROTOCOL; Future    See hpi  Extensive counseling today      She has not needed pill in pocket for afib. She is going back to [facility]. She wants to discuss watchman with them. No bleeding diathesis. Hemorrhages atria chadsvasc scores reviewed with patient, discussed indications of watchman. Recheck ASCVD risk    Mammo slip provided. Recommend she stick with same detector. No follow-ups on file.          Subjective   SUBJECTIVE/OBJECTIVE:  HPI:    PSYCH  PHQ-9 Total Score: 15 (2023 11:33 AM)  Thoughts that you would be better off dead, or of hurting yourself in some way: 0 (2023 11:33 AM)    Lots of personal stressors  Maxed on Paxil 30mg daily  Discussed taper washout and transition to more novel medication versus adjuvant and referral for psych  Will do the latter with Abilify       HTN  Metoprolol 12.5mg BID  Valsartan 80mg daily  She does not want to remain on lasix  She would

## 2023-08-09 ENCOUNTER — TELEPHONE (OUTPATIENT)
Dept: FAMILY MEDICINE CLINIC | Age: 78
End: 2023-08-09

## 2023-08-09 NOTE — TELEPHONE ENCOUNTER
Patient states she is feeling a tiny bit better on the Abilify, and she's hoping it will keep getting better.  She apologized for missing her appt yesterday, she was in too much pain (back) Message routed for informational purpose  Patient resched AWV  Last seen 7/12/2023  Next appt 11/30/2023

## 2023-08-22 RX ORDER — ATORVASTATIN CALCIUM 10 MG/1
10 TABLET, FILM COATED ORAL NIGHTLY
Qty: 90 TABLET | Refills: 0 | Status: SHIPPED | OUTPATIENT
Start: 2023-08-22

## 2023-09-21 ENCOUNTER — TELEPHONE (OUTPATIENT)
Dept: FAMILY MEDICINE CLINIC | Age: 78
End: 2023-09-21

## 2023-09-21 NOTE — TELEPHONE ENCOUNTER
CPAP machine for 8-9 years   Approved to receive another one. Need new order for CPAP.  She uses [facility]

## 2023-10-03 NOTE — TELEPHONE ENCOUNTER
Order for cpap from completed. Put on dr [employer] desk to sign. I will manually fax to [facility] once signed.

## 2023-11-01 DIAGNOSIS — G47.33 OSA ON CPAP: ICD-10-CM

## 2023-11-01 DIAGNOSIS — I70.209 DIABETES TYPE 2 WITH ATHEROSCLEROSIS OF ARTERIES OF EXTREMITIES (HCC): ICD-10-CM

## 2023-11-01 DIAGNOSIS — Z51.81 ENCOUNTER FOR MONITORING STATIN THERAPY: ICD-10-CM

## 2023-11-01 DIAGNOSIS — I27.20 MILD PULMONARY HYPERTENSION (HCC): ICD-10-CM

## 2023-11-01 DIAGNOSIS — N18.31 CHRONIC KIDNEY DISEASE, STAGE 3A (HCC): ICD-10-CM

## 2023-11-01 DIAGNOSIS — I10 ESSENTIAL HYPERTENSION: Primary | ICD-10-CM

## 2023-11-01 DIAGNOSIS — Z79.899 ENCOUNTER FOR MONITORING STATIN THERAPY: ICD-10-CM

## 2023-11-01 DIAGNOSIS — M1A.9XX0 CHRONIC GOUT WITHOUT TOPHUS, UNSPECIFIED CAUSE, UNSPECIFIED SITE: ICD-10-CM

## 2023-11-01 DIAGNOSIS — I48.0 PAROXYSMAL ATRIAL FIBRILLATION (HCC): ICD-10-CM

## 2023-11-01 DIAGNOSIS — Z51.81 THERAPEUTIC DRUG MONITORING: ICD-10-CM

## 2023-11-01 DIAGNOSIS — E11.51 DIABETES TYPE 2 WITH ATHEROSCLEROSIS OF ARTERIES OF EXTREMITIES (HCC): ICD-10-CM

## 2023-11-01 DIAGNOSIS — E03.9 ACQUIRED HYPOTHYROIDISM: ICD-10-CM

## 2023-11-01 RX ORDER — ALLOPURINOL 100 MG/1
TABLET ORAL
Qty: 180 TABLET | Refills: 3 | Status: SHIPPED | OUTPATIENT
Start: 2023-11-01

## 2023-11-14 DIAGNOSIS — F33.1 MODERATE EPISODE OF RECURRENT MAJOR DEPRESSIVE DISORDER (HCC): ICD-10-CM

## 2023-11-14 NOTE — TELEPHONE ENCOUNTER
Patient request abilify and paxil refills  She would like abilify to go to [facility], she will save over $100 with good rx at [facility], paxil send to [facility]     Medication pended    Last Appointment   7/12/2023  Next Appointment  11/30/2023

## 2023-11-15 RX ORDER — PAROXETINE 30 MG/1
30 TABLET, FILM COATED ORAL DAILY
Qty: 90 TABLET | Refills: 3 | Status: SHIPPED | OUTPATIENT
Start: 2023-11-15

## 2023-11-15 RX ORDER — ARIPIPRAZOLE 5 MG/1
5 TABLET ORAL DAILY
Qty: 90 TABLET | Refills: 3 | Status: SHIPPED
Start: 2023-11-15 | End: 2023-11-30 | Stop reason: SDUPTHER

## 2023-11-16 DIAGNOSIS — Z12.31 SCREENING MAMMOGRAM FOR BREAST CANCER: ICD-10-CM

## 2023-11-21 RX ORDER — ATORVASTATIN CALCIUM 10 MG/1
10 TABLET, FILM COATED ORAL
Qty: 90 TABLET | Refills: 3 | Status: SHIPPED | OUTPATIENT
Start: 2023-11-21

## 2023-11-26 DIAGNOSIS — K21.9 GASTRO-ESOPHAGEAL REFLUX DISEASE WITHOUT ESOPHAGITIS: ICD-10-CM

## 2023-11-26 DIAGNOSIS — N32.81 OAB (OVERACTIVE BLADDER): ICD-10-CM

## 2023-11-27 RX ORDER — OMEPRAZOLE 20 MG/1
CAPSULE, DELAYED RELEASE ORAL
Qty: 90 CAPSULE | Refills: 3 | Status: SHIPPED | OUTPATIENT
Start: 2023-11-27

## 2023-11-27 RX ORDER — OXYBUTYNIN CHLORIDE 5 MG/1
5 TABLET ORAL DAILY
Qty: 90 TABLET | Refills: 3 | Status: SHIPPED | OUTPATIENT
Start: 2023-11-27

## 2023-11-30 ENCOUNTER — HOSPITAL ENCOUNTER (OUTPATIENT)
Age: 78
Discharge: HOME | End: 2023-11-30
Payer: MEDICARE

## 2023-11-30 ENCOUNTER — OFFICE VISIT (OUTPATIENT)
Dept: FAMILY MEDICINE CLINIC | Age: 78
End: 2023-11-30
Payer: MEDICARE

## 2023-11-30 VITALS
SYSTOLIC BLOOD PRESSURE: 138 MMHG | WEIGHT: 217 LBS | HEIGHT: 65 IN | HEART RATE: 59 BPM | BODY MASS INDEX: 36.15 KG/M2 | DIASTOLIC BLOOD PRESSURE: 62 MMHG | TEMPERATURE: 97.5 F | OXYGEN SATURATION: 99 %

## 2023-11-30 DIAGNOSIS — J21.0 RSV (ACUTE BRONCHIOLITIS DUE TO RESPIRATORY SYNCYTIAL VIRUS): ICD-10-CM

## 2023-11-30 DIAGNOSIS — F33.1 MODERATE EPISODE OF RECURRENT MAJOR DEPRESSIVE DISORDER (HCC): ICD-10-CM

## 2023-11-30 DIAGNOSIS — Z23 NEED FOR COVID-19 VACCINE: ICD-10-CM

## 2023-11-30 DIAGNOSIS — D48.9 NEOPLASM OF UNCERTAIN BEHAVIOR: ICD-10-CM

## 2023-11-30 DIAGNOSIS — N18.31 STAGE 3A CHRONIC KIDNEY DISEASE (HCC): ICD-10-CM

## 2023-11-30 DIAGNOSIS — E78.00 ELEVATED CHOLESTEROL: ICD-10-CM

## 2023-11-30 DIAGNOSIS — N18.31 DIABETES MELLITUS DUE TO UNDERLYING CONDITION WITH STAGE 3A CHRONIC KIDNEY DISEASE, WITHOUT LONG-TERM CURRENT USE OF INSULIN (HCC): ICD-10-CM

## 2023-11-30 DIAGNOSIS — E08.22 DIABETES MELLITUS DUE TO UNDERLYING CONDITION WITH STAGE 3A CHRONIC KIDNEY DISEASE, WITHOUT LONG-TERM CURRENT USE OF INSULIN (HCC): ICD-10-CM

## 2023-11-30 DIAGNOSIS — C44.91 CANCER OF THE SKIN, BASAL CELL: ICD-10-CM

## 2023-11-30 DIAGNOSIS — I48.0 PAROXYSMAL ATRIAL FIBRILLATION (HCC): ICD-10-CM

## 2023-11-30 DIAGNOSIS — Z00.00 MEDICARE ANNUAL WELLNESS VISIT, SUBSEQUENT: Primary | ICD-10-CM

## 2023-11-30 PROBLEM — N39.46 MIXED STRESS AND URGE URINARY INCONTINENCE: Status: ACTIVE | Noted: 2023-01-25

## 2023-11-30 PROBLEM — E11.22 TYPE 2 DIABETES MELLITUS WITH CHRONIC KIDNEY DISEASE (HCC): Status: ACTIVE | Noted: 2021-03-19

## 2023-11-30 PROBLEM — I10 HYPERTENSION: Status: ACTIVE | Noted: 2020-09-15

## 2023-11-30 PROBLEM — E78.2 MIXED HYPERLIPIDEMIA: Status: ACTIVE | Noted: 2023-01-25

## 2023-11-30 PROBLEM — E11.21 MICROALBUMINURIC DIABETIC NEPHROPATHY (HCC): Status: ACTIVE | Noted: 2023-01-25

## 2023-11-30 PROBLEM — N18.9 CHRONIC KIDNEY DISEASE: Status: ACTIVE | Noted: 2023-01-25

## 2023-11-30 LAB
ALBUMIN SERPL-MCNC: 4.2 G/DL (ref 3.5–5.2)
ALP SERPL-CCNC: 110 U/L (ref 35–104)
ALT SERPL-CCNC: 14 U/L (ref 0–32)
ANION GAP SERPL CALCULATED.3IONS-SCNC: 10 MMOL/L (ref 7–16)
AST SERPL-CCNC: 16 U/L (ref 0–31)
BASOPHILS # BLD: 0.05 K/UL (ref 0–0.2)
BASOPHILS NFR BLD: 1 % (ref 0–2)
BILIRUB SERPL-MCNC: 0.4 MG/DL (ref 0–1.2)
BUN SERPL-MCNC: 22 MG/DL (ref 6–23)
CALCIUM SERPL-MCNC: 9.5 MG/DL (ref 8.6–10.2)
CHLORIDE SERPL-SCNC: 101 MMOL/L (ref 98–107)
CHOLEST SERPL-MCNC: 192 MG/DL
CO2 SERPL-SCNC: 28 MMOL/L (ref 22–29)
CREAT SERPL-MCNC: 1 MG/DL (ref 0.5–1)
EOSINOPHIL # BLD: 0.15 K/UL (ref 0.05–0.5)
EOSINOPHILS RELATIVE PERCENT: 2 % (ref 0–6)
ERYTHROCYTE [DISTWIDTH] IN BLOOD BY AUTOMATED COUNT: 14 % (ref 11.5–15)
GFR SERPL CREATININE-BSD FRML MDRD: 58 ML/MIN/1.73M2
GLUCOSE SERPL-MCNC: 148 MG/DL (ref 74–99)
HCT VFR BLD AUTO: 37.8 % (ref 34–48)
HDLC SERPL-MCNC: 53 MG/DL
HGB BLD-MCNC: 12 G/DL (ref 11.5–15.5)
IMM GRANULOCYTES # BLD AUTO: 0.03 K/UL (ref 0–0.58)
IMM GRANULOCYTES NFR BLD: 1 % (ref 0–5)
LDLC SERPL CALC-MCNC: 106 MG/DL
LYMPHOCYTES NFR BLD: 1.72 K/UL (ref 1.5–4)
LYMPHOCYTES RELATIVE PERCENT: 26 % (ref 20–42)
MCH RBC QN AUTO: 29.9 PG (ref 26–35)
MCHC RBC AUTO-ENTMCNC: 31.7 G/DL (ref 32–34.5)
MCV RBC AUTO: 94.3 FL (ref 80–99.9)
MONOCYTES NFR BLD: 0.49 K/UL (ref 0.1–0.95)
MONOCYTES NFR BLD: 7 % (ref 2–12)
NEUTROPHILS NFR BLD: 63 % (ref 43–80)
NEUTS SEG NFR BLD: 4.21 K/UL (ref 1.8–7.3)
PLATELET # BLD AUTO: 157 K/UL (ref 130–450)
PMV BLD AUTO: 10.4 FL (ref 7–12)
POTASSIUM SERPL-SCNC: 4.5 MMOL/L (ref 3.5–5)
PROT SERPL-MCNC: 7 G/DL (ref 6.4–8.3)
RBC # BLD AUTO: 4.01 M/UL (ref 3.5–5.5)
SODIUM SERPL-SCNC: 139 MMOL/L (ref 132–146)
TRIGL SERPL-MCNC: 164 MG/DL
TSH SERPL DL<=0.05 MIU/L-ACNC: 2.82 UIU/ML (ref 0.27–4.2)
VLDLC SERPL CALC-MCNC: 33 MG/DL
WBC OTHER # BLD: 6.7 K/UL (ref 4.5–11.5)

## 2023-11-30 PROCEDURE — 3075F SYST BP GE 130 - 139MM HG: CPT | Performed by: SURGERY

## 2023-11-30 PROCEDURE — 1123F ACP DISCUSS/DSCN MKR DOCD: CPT | Performed by: SURGERY

## 2023-11-30 PROCEDURE — 80053 COMPREHEN METABOLIC PANEL: CPT

## 2023-11-30 PROCEDURE — 80061 LIPID PANEL: CPT

## 2023-11-30 PROCEDURE — G0439 PPPS, SUBSEQ VISIT: HCPCS | Performed by: SURGERY

## 2023-11-30 PROCEDURE — 36415 COLL VENOUS BLD VENIPUNCTURE: CPT

## 2023-11-30 PROCEDURE — 85025 COMPLETE CBC W/AUTO DIFF WBC: CPT

## 2023-11-30 PROCEDURE — 3078F DIAST BP <80 MM HG: CPT | Performed by: SURGERY

## 2023-11-30 PROCEDURE — 84443 ASSAY THYROID STIM HORMONE: CPT

## 2023-11-30 RX ORDER — ARIPIPRAZOLE 10 MG/1
TABLET ORAL
Qty: 30 TABLET | Refills: 11 | Status: SHIPPED | OUTPATIENT
Start: 2023-11-30

## 2023-11-30 RX ORDER — ASCORBATE CALCIUM 500 MG
TABLET ORAL
COMMUNITY

## 2023-11-30 RX ORDER — B-COMPLEX WITH VITAMIN C
TABLET ORAL DAILY
COMMUNITY

## 2023-11-30 ASSESSMENT — COLUMBIA-SUICIDE SEVERITY RATING SCALE - C-SSRS
7. DID THIS OCCUR IN THE LAST THREE MONTHS: NO
4. HAVE YOU HAD THESE THOUGHTS AND HAD SOME INTENTION OF ACTING ON THEM?: NO
5. HAVE YOU STARTED TO WORK OUT OR WORKED OUT THE DETAILS OF HOW TO KILL YOURSELF? DO YOU INTEND TO CARRY OUT THIS PLAN?: NO
3. HAVE YOU BEEN THINKING ABOUT HOW YOU MIGHT KILL YOURSELF?: NO

## 2023-11-30 ASSESSMENT — PATIENT HEALTH QUESTIONNAIRE - PHQ9
SUM OF ALL RESPONSES TO PHQ9 QUESTIONS 1 & 2: 6
4. FEELING TIRED OR HAVING LITTLE ENERGY: 3
1. LITTLE INTEREST OR PLEASURE IN DOING THINGS: 3
3. TROUBLE FALLING OR STAYING ASLEEP: 0
SUM OF ALL RESPONSES TO PHQ QUESTIONS 1-9: 10
6. FEELING BAD ABOUT YOURSELF - OR THAT YOU ARE A FAILURE OR HAVE LET YOURSELF OR YOUR FAMILY DOWN: 0
8. MOVING OR SPEAKING SO SLOWLY THAT OTHER PEOPLE COULD HAVE NOTICED. OR THE OPPOSITE, BEING SO FIGETY OR RESTLESS THAT YOU HAVE BEEN MOVING AROUND A LOT MORE THAN USUAL: 0
10. IF YOU CHECKED OFF ANY PROBLEMS, HOW DIFFICULT HAVE THESE PROBLEMS MADE IT FOR YOU TO DO YOUR WORK, TAKE CARE OF THINGS AT HOME, OR GET ALONG WITH OTHER PEOPLE: 0
SUM OF ALL RESPONSES TO PHQ QUESTIONS 1-9: 10
5. POOR APPETITE OR OVEREATING: 1
9. THOUGHTS THAT YOU WOULD BE BETTER OFF DEAD, OR OF HURTING YOURSELF: 0
2. FEELING DOWN, DEPRESSED OR HOPELESS: 3
7. TROUBLE CONCENTRATING ON THINGS, SUCH AS READING THE NEWSPAPER OR WATCHING TELEVISION: 0

## 2023-11-30 NOTE — PROGRESS NOTES
Medicare Annual Wellness Visit    Ben Lerma is here for Medicare AWV and Health Maintenance (Due(Tdap, Covid, RSV vaccine))    Assessment & Plan   Medicare annual wellness visit, subsequent  All personal, family, social, surgical, medical history is reviewed and updated with patient. Allergies, medications updated. List of specialists follows with updated. DM/HM updated. Paroxysmal atrial fibrillation (HCC)  Diabetes mellitus due to underlying condition with stage 3a chronic kidney disease, without long-term current use of insulin (HCC)  Moderate episode of recurrent major depressive disorder (HCC)  -     ARIPiprazole (ABILIFY) 10 MG tablet; One tablet nightly., Disp-30 tablet, R-11Normal  -     External Referral To Counseling Services  Stage 3a chronic kidney disease ([address])  -     Basic Metabolic Panel; Future  Cancer of the skin, basal cell  -     External Referral To Dermatology  Neoplasm of uncertain behavior  -     External Referral To Dermatology  Need for COVID-19 vaccine  -     COVID-19 Subunit Vacc-Novavax (NOVAVAX COVID-19 VACCINE) 5 MCG/0.5ML SUSP; Inject 0.5 mLs into the muscle once for 1 dose, Disp-0.5 mL, R-0Print  RSV (acute bronchiolitis due to respiratory syncytial virus)  -     respiratory syncytial vaccine, adjuvanted (AREXVY) 120 MCG/0.5ML injection; Inject 0.5 mLs into the muscle once for 1 dose, Disp-0.5 mL, R-0Print    Recommendations for Preventive Services Due: see orders and patient instructions/AVS.  Recommended screening schedule for the next 5-10 years is provided to the patient in written form: see Patient Instructions/AVS.     Return for Medicare Annual Wellness Visit in 1 year. Subjective         CKD3a  No nsaids  Tylenol only  Stay hydrated.   Unless instructed otherwise by your physician, you should strive to drink at least eight 8 ounce glasses of water daily (64oz total), some of these being fortified with electrolytes (such as a Pedialyte, low calorie sports drink, or

## 2023-11-30 NOTE — PATIENT INSTRUCTIONS
one thing and you pay close attention to that one thing. For example, you may sit quietly and notice your emotions or how your food tastes and smells. When you're present, you focus on the things that are happening right now. You let go of your thoughts about the past and the future. When you dwell on the past or the future, you miss moments that can heal and strengthen you. You may miss moments like hearing a child laugh or seeing a friendly face when you think you're all alone. When you're accepting, you don't  the present moment. Instead you accept your thoughts and feelings as they come. You can practice anytime, anywhere, and in any way you choose. You can practice in many ways. Here are a few ideas:  While doing your chores, like washing the dishes, let your mind focus on what's in your hand. What does the dish feel like? Is the water warm or cold? Go outside and take a few deep breaths. What is the air like? Is it warm or cold? When you can, take some time at the start of your day to sit alone and think. Take a slow walk by yourself. Count your steps while you breathe in and out. Try yoga breathing exercises, stretches, and poses to strengthen and relax your muscles. At work, if you can, try to stop for a few moments each hour. Note how your body feels. Let yourself regroup and let your mind settle before you return to what you were doing. If you struggle with anxiety or \"worry thoughts,\" imagine your mind as a blue gautam and your worry thoughts as clouds. Now imagine those worry thoughts floating across your mind's gautam. Just let them pass by as you watch. Follow-up care is a key part of your treatment and safety. Be sure to make and go to all appointments, and call your doctor if you are having problems. It's also a good idea to know your test results and keep a list of the medicines you take. Where can you learn more?   Go to http://www.gomes.com/ and enter M676 to learn more

## 2023-12-11 DIAGNOSIS — N18.31 DIABETES MELLITUS DUE TO UNDERLYING CONDITION WITH STAGE 3A CHRONIC KIDNEY DISEASE, WITHOUT LONG-TERM CURRENT USE OF INSULIN (HCC): ICD-10-CM

## 2023-12-11 DIAGNOSIS — E08.22 DIABETES MELLITUS DUE TO UNDERLYING CONDITION WITH STAGE 3A CHRONIC KIDNEY DISEASE, WITHOUT LONG-TERM CURRENT USE OF INSULIN (HCC): ICD-10-CM

## 2023-12-11 NOTE — TELEPHONE ENCOUNTER
Metformin 500 mg   Lasix 20 mg once daily prn   BARBARA gAuilar Qualmayur    Last Appointment   11/30/2023  Next Appointment  6/27/2024

## 2023-12-12 RX ORDER — FUROSEMIDE 20 MG/1
20 TABLET ORAL DAILY PRN
Qty: 30 TABLET | Refills: 3 | Status: SHIPPED | OUTPATIENT
Start: 2023-12-12

## 2024-01-03 DIAGNOSIS — I48.0 PAROXYSMAL ATRIAL FIBRILLATION (HCC): ICD-10-CM

## 2024-01-03 DIAGNOSIS — F33.1 MODERATE EPISODE OF RECURRENT MAJOR DEPRESSIVE DISORDER (HCC): ICD-10-CM

## 2024-01-03 RX ORDER — ARIPIPRAZOLE 10 MG/1
TABLET ORAL
Qty: 30 TABLET | Refills: 11 | Status: SHIPPED | OUTPATIENT
Start: 2024-01-03

## 2024-01-03 RX ORDER — APIXABAN 5 MG/1
5 TABLET, FILM COATED ORAL 2 TIMES DAILY
Qty: 60 TABLET | Refills: 11 | Status: SHIPPED | OUTPATIENT
Start: 2024-01-03

## 2024-01-03 NOTE — TELEPHONE ENCOUNTER
Abilify 10 mg [facility]  Patient gets a better price at this pharmacy    Last Appointment   11/30/2023  Next Appointment  6/27/2024

## 2024-01-12 DIAGNOSIS — I10 ESSENTIAL HYPERTENSION: ICD-10-CM

## 2024-01-12 RX ORDER — VALSARTAN AND HYDROCHLOROTHIAZIDE 80; 12.5 MG/1; MG/1
1 TABLET, FILM COATED ORAL DAILY
Qty: 90 TABLET | Refills: 3 | Status: SHIPPED | OUTPATIENT
Start: 2024-01-12

## 2024-02-19 ENCOUNTER — TELEPHONE (OUTPATIENT)
Dept: FAMILY MEDICINE CLINIC | Age: 79
End: 2024-02-19

## 2024-02-19 DIAGNOSIS — M54.41 CHRONIC BILATERAL LOW BACK PAIN WITH RIGHT-SIDED SCIATICA: Primary | ICD-10-CM

## 2024-02-19 DIAGNOSIS — M62.838 MUSCLE SPASM: ICD-10-CM

## 2024-02-19 DIAGNOSIS — G89.29 CHRONIC BILATERAL LOW BACK PAIN WITH RIGHT-SIDED SCIATICA: Primary | ICD-10-CM

## 2024-02-19 RX ORDER — PREDNISONE 50 MG/1
50 TABLET ORAL DAILY
Qty: 5 TABLET | Refills: 0 | Status: SHIPPED | OUTPATIENT
Start: 2024-02-19 | End: 2024-02-24

## 2024-02-19 RX ORDER — BACLOFEN 10 MG/1
5 TABLET ORAL NIGHTLY PRN
Qty: 15 TABLET | Refills: 0 | Status: SHIPPED | OUTPATIENT
Start: 2024-02-19

## 2024-02-19 NOTE — TELEPHONE ENCOUNTER
Patient notified  She states she will have to think about seeing a chiropractor at this time. She's a little nervous about seeing one due to her history with seeing one in the past due to her back surgery.    Referral and xray script mailed to patient.

## 2024-02-19 NOTE — TELEPHONE ENCOUNTER
C/o right side lower back pain travels down to buttock for 1 week. Feels muscular.  She states her back has become stiff and the pain had her in tears.   Pain almost all the time tylenol usually takes care of the pain. She needed help getting up.    She had a few muscle relaxer's(Robaxin) and oxycodone that she took.  She took an Oxycodone tablet yesterday night.  Also taking Ibprofen 800 mg, advised by daughter who is a NP.    Does not want to go to ED because her daughter notified her that all they will give her is a shot.     Hx: 2020 back surgery    Request steroid pack to [facility].

## 2024-02-22 RX ORDER — LEVOTHYROXINE SODIUM 0.05 MG/1
TABLET ORAL
Qty: 135 TABLET | Refills: 1 | Status: SHIPPED | OUTPATIENT
Start: 2024-02-22

## 2024-02-24 DIAGNOSIS — I48.0 PAROXYSMAL ATRIAL FIBRILLATION (HCC): ICD-10-CM

## 2024-02-26 NOTE — TELEPHONE ENCOUNTER
Last Appointment:  11/30/2023  Future Appointments   Date Time Provider Department Center   6/27/2024  3:00 PM Kaushal Douglas, DO [facility]   12/5/2024  3:00 PM Kaushal Douglas, DO [facility]

## 2024-03-04 ENCOUNTER — TELEPHONE (OUTPATIENT)
Dept: FAMILY MEDICINE CLINIC | Age: 79
End: 2024-03-04

## 2024-03-04 NOTE — TELEPHONE ENCOUNTER
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    If chiropractic is not helping, she can see pain management here at [place].

## 2024-03-07 NOTE — TELEPHONE ENCOUNTER
Left message for patient to call back and to notify results sent to her my chart.  My chart message sent to patient to notify of results.

## 2024-04-19 RX ORDER — FUROSEMIDE 20 MG/1
20 TABLET ORAL DAILY PRN
Qty: 90 TABLET | Refills: 1 | Status: SHIPPED | OUTPATIENT
Start: 2024-04-19

## 2024-06-27 ENCOUNTER — OFFICE VISIT (OUTPATIENT)
Dept: FAMILY MEDICINE CLINIC | Age: 79
End: 2024-06-27

## 2024-06-27 VITALS
HEART RATE: 59 BPM | DIASTOLIC BLOOD PRESSURE: 58 MMHG | TEMPERATURE: 97.1 F | SYSTOLIC BLOOD PRESSURE: 104 MMHG | OXYGEN SATURATION: 98 % | WEIGHT: 204 LBS | BODY MASS INDEX: 33.95 KG/M2

## 2024-06-27 DIAGNOSIS — G25.0 ESSENTIAL TREMOR: Primary | ICD-10-CM

## 2024-06-27 DIAGNOSIS — M54.41 CHRONIC BILATERAL LOW BACK PAIN WITH RIGHT-SIDED SCIATICA: ICD-10-CM

## 2024-06-27 DIAGNOSIS — J30.9 ALLERGIC RHINITIS, UNSPECIFIED SEASONALITY, UNSPECIFIED TRIGGER: ICD-10-CM

## 2024-06-27 DIAGNOSIS — E66.09 CLASS 1 OBESITY DUE TO EXCESS CALORIES WITH SERIOUS COMORBIDITY AND BODY MASS INDEX (BMI) OF 33.0 TO 33.9 IN ADULT: ICD-10-CM

## 2024-06-27 DIAGNOSIS — I27.20 MILD PULMONARY HYPERTENSION (HCC): ICD-10-CM

## 2024-06-27 DIAGNOSIS — N18.31 DIABETES MELLITUS DUE TO UNDERLYING CONDITION WITH STAGE 3A CHRONIC KIDNEY DISEASE, WITHOUT LONG-TERM CURRENT USE OF INSULIN (HCC): ICD-10-CM

## 2024-06-27 DIAGNOSIS — G89.29 CHRONIC BILATERAL LOW BACK PAIN WITH RIGHT-SIDED SCIATICA: ICD-10-CM

## 2024-06-27 DIAGNOSIS — M62.838 MUSCLE SPASM: ICD-10-CM

## 2024-06-27 DIAGNOSIS — E08.22 DIABETES MELLITUS DUE TO UNDERLYING CONDITION WITH STAGE 3A CHRONIC KIDNEY DISEASE, WITHOUT LONG-TERM CURRENT USE OF INSULIN (HCC): ICD-10-CM

## 2024-06-27 DIAGNOSIS — I70.209 DIABETES TYPE 2 WITH ATHEROSCLEROSIS OF ARTERIES OF EXTREMITIES (HCC): ICD-10-CM

## 2024-06-27 DIAGNOSIS — I48.0 PAROXYSMAL ATRIAL FIBRILLATION (HCC): ICD-10-CM

## 2024-06-27 DIAGNOSIS — M43.16 SPONDYLOLISTHESIS OF LUMBAR REGION: ICD-10-CM

## 2024-06-27 DIAGNOSIS — N18.31 STAGE 3A CHRONIC KIDNEY DISEASE (HCC): ICD-10-CM

## 2024-06-27 DIAGNOSIS — F33.1 MODERATE EPISODE OF RECURRENT MAJOR DEPRESSIVE DISORDER (HCC): ICD-10-CM

## 2024-06-27 DIAGNOSIS — M43.16 SPONDYLOLISTHESIS, LUMBAR REGION: ICD-10-CM

## 2024-06-27 DIAGNOSIS — E11.51 DIABETES TYPE 2 WITH ATHEROSCLEROSIS OF ARTERIES OF EXTREMITIES (HCC): ICD-10-CM

## 2024-06-27 RX ORDER — PRIMIDONE 50 MG/1
TABLET ORAL
Qty: 90 TABLET | Refills: 3 | Status: SHIPPED | OUTPATIENT
Start: 2024-06-27

## 2024-06-27 RX ORDER — FLUTICASONE PROPIONATE 50 MCG
SPRAY, SUSPENSION (ML) NASAL
Qty: 48 G | Refills: 11 | Status: SHIPPED | OUTPATIENT
Start: 2024-06-27

## 2024-06-27 RX ORDER — ARIPIPRAZOLE 15 MG/1
TABLET ORAL
Qty: 90 TABLET | Refills: 3 | Status: SHIPPED | OUTPATIENT
Start: 2024-06-27

## 2024-06-27 RX ORDER — BACLOFEN 10 MG/1
5 TABLET ORAL NIGHTLY PRN
Qty: 15 TABLET | Refills: 0 | Status: SHIPPED | OUTPATIENT
Start: 2024-06-27

## 2024-06-27 RX ORDER — PAROXETINE HYDROCHLORIDE 40 MG/1
40 TABLET, FILM COATED ORAL DAILY
Qty: 90 TABLET | Refills: 3 | Status: SHIPPED | OUTPATIENT
Start: 2024-06-27

## 2024-06-27 SDOH — ECONOMIC STABILITY: INCOME INSECURITY: HOW HARD IS IT FOR YOU TO PAY FOR THE VERY BASICS LIKE FOOD, HOUSING, MEDICAL CARE, AND HEATING?: NOT HARD AT ALL

## 2024-06-27 SDOH — ECONOMIC STABILITY: FOOD INSECURITY: WITHIN THE PAST 12 MONTHS, YOU WORRIED THAT YOUR FOOD WOULD RUN OUT BEFORE YOU GOT MONEY TO BUY MORE.: NEVER TRUE

## 2024-06-27 SDOH — ECONOMIC STABILITY: FOOD INSECURITY: WITHIN THE PAST 12 MONTHS, THE FOOD YOU BOUGHT JUST DIDN'T LAST AND YOU DIDN'T HAVE MONEY TO GET MORE.: NEVER TRUE

## 2024-06-27 ASSESSMENT — PATIENT HEALTH QUESTIONNAIRE - PHQ9
10. IF YOU CHECKED OFF ANY PROBLEMS, HOW DIFFICULT HAVE THESE PROBLEMS MADE IT FOR YOU TO DO YOUR WORK, TAKE CARE OF THINGS AT HOME, OR GET ALONG WITH OTHER PEOPLE: SOMEWHAT DIFFICULT
SUM OF ALL RESPONSES TO PHQ QUESTIONS 1-9: 15
6. FEELING BAD ABOUT YOURSELF - OR THAT YOU ARE A FAILURE OR HAVE LET YOURSELF OR YOUR FAMILY DOWN: NEARLY EVERY DAY
2. FEELING DOWN, DEPRESSED OR HOPELESS: NEARLY EVERY DAY
4. FEELING TIRED OR HAVING LITTLE ENERGY: NEARLY EVERY DAY
SUM OF ALL RESPONSES TO PHQ QUESTIONS 1-9: 15
8. MOVING OR SPEAKING SO SLOWLY THAT OTHER PEOPLE COULD HAVE NOTICED. OR THE OPPOSITE, BEING SO FIGETY OR RESTLESS THAT YOU HAVE BEEN MOVING AROUND A LOT MORE THAN USUAL: NEARLY EVERY DAY
SUM OF ALL RESPONSES TO PHQ QUESTIONS 1-9: 15
9. THOUGHTS THAT YOU WOULD BE BETTER OFF DEAD, OR OF HURTING YOURSELF: NOT AT ALL
7. TROUBLE CONCENTRATING ON THINGS, SUCH AS READING THE NEWSPAPER OR WATCHING TELEVISION: NOT AT ALL
1. LITTLE INTEREST OR PLEASURE IN DOING THINGS: NEARLY EVERY DAY
SUM OF ALL RESPONSES TO PHQ QUESTIONS 1-9: 15
3. TROUBLE FALLING OR STAYING ASLEEP: NOT AT ALL
5. POOR APPETITE OR OVEREATING: NOT AT ALL
SUM OF ALL RESPONSES TO PHQ9 QUESTIONS 1 & 2: 6

## 2024-06-27 NOTE — PATIENT INSTRUCTIONS
Coronary Calcium Scoring is a convenient and non-invasive way of evaluating whether you may be at an increased risk for a heart attack. Using the CT, information is obtained about the presence, location and extent of calcified plaque in the coronary arteries - the vessels that supply oxygen containing blood to the heart muscle. This disease of the vessel wall is also known as Coronary Artery Disease (CAD). The goal is to determine if CAD is present and to what extent, even if there are no symptoms. The entire procedure including the actual CT scanning is completed in about 15 minutes and requires no injection of contrast material.       Who should get screened?    Family history of heart disease  High cholesterol  High blood pressure  Smoking  Lack of physical activity  Older than age 55  Diabetic  Overweight  Postmenopausal women  Reduced Price $79  Fee must be paid at the time of the exam. * Screening is not covered by insurance.    THE CORONARY ARTERY CALCIUM SCORING IS OFFERED AT:    [address]  Phone: [phone]  Fax: [phone]    Once you have your physician order, call to schedule your appointment at [phone].

## 2024-06-27 NOTE — PROGRESS NOTES
Charisse Sullivan (:  1945) is a 79 y.o. female,Established patient, here for evaluation of the following chief complaint(s):  6 Month Follow-Up (Tremors getting worse. Runny nose for 1 year. Denies any other symptoms), Discuss Medications (Request 90 day supply to be printed for Abilify(Alt med needed) Going off of Atorvastatin), and Orders (Request Wheeled Rollator with Seat)      Assessment & Plan   ASSESSMENT/PLAN:  1. Essential tremor  -     primidone (MYSOLINE) 50 MG tablet; Start with 1/2 tablet nightly.  If tolerated, may increase to one full tablet nightly after a week., Disp-90 tablet, R-3Normal  -     TSH; Future  -     Comprehensive Metabolic Panel; Future  -     Copper Urine; Future  -     Ceruloplasmin; Future  2. Mild pulmonary hypertension (HCC)    Charisse is complaining of fatigue. She is denying traditional symptoms of pulmonary hypertension. Apart from female gender and obesity, she has no other risk factors for pulmonary hypertension. Her physical examination and ECG are unremarkable. Her prior echocardiogram showed a normal calculated right ventricular systolic pressure. There were no other echocardiographic findings to suggest pulmonary hypertension, such as right atrial or ventricular enlargement.     Based on this, I feel that Charisse has a low likelihood of severe pulmonary hypertension. At this time, I did not recommend performing right-heart catheterization. She reports she is scheduled for a repeat echocardiogram. I am happy to either review the echocardiogram that has been ordered and once it has been performed or to schedule it here at our facility. I instructed Charisse to speak with Dr. Douglas and let me know how she wishes to proceed.         3. Paroxysmal atrial fibrillation (HCC)  Eliquis 5mg BID  Trauma / fall report to ED  Monitor for ss bleeding  Stable  No change  Has rate control with lopressor - we discussed at length each visit interaction with this and prozac, potential to

## 2024-07-03 ENCOUNTER — TELEPHONE (OUTPATIENT)
Dept: FAMILY MEDICINE CLINIC | Age: 79
End: 2024-07-03

## 2024-07-03 DIAGNOSIS — M54.41 CHRONIC BILATERAL LOW BACK PAIN WITH RIGHT-SIDED SCIATICA: ICD-10-CM

## 2024-07-03 DIAGNOSIS — M62.838 MUSCLE SPASM: ICD-10-CM

## 2024-07-03 DIAGNOSIS — G89.29 CHRONIC BILATERAL LOW BACK PAIN WITH RIGHT-SIDED SCIATICA: ICD-10-CM

## 2024-07-03 RX ORDER — BACLOFEN 10 MG/1
TABLET ORAL
Qty: 15 TABLET | Refills: 0 | OUTPATIENT
Start: 2024-07-03

## 2024-07-03 NOTE — TELEPHONE ENCOUNTER
Pt needs a History and Physical for her upcoming MRI with anesthesia. This needs completed between 8/12/24-9/12/24. They would like this returned closer to 8/12/24. Her MRI is scheduled 9/12/24.    Last seen 6/27/2024  Next appt 10/30/2024    Requested Prescriptions      No prescriptions requested or ordered in this encounter

## 2024-07-05 ENCOUNTER — TELEPHONE (OUTPATIENT)
Dept: FAMILY MEDICINE CLINIC | Age: 79
End: 2024-07-05

## 2024-07-05 NOTE — TELEPHONE ENCOUNTER
Per Dr. Douglas-  It is QHS and as prescribed on the bottle; however, there is no harm in how she took it if she did take it TID, I would stick with one full tablet QHS now.    I called pt and advised PCP note. She stated she not stared this medication yet and will take as directed.

## 2024-07-05 NOTE — TELEPHONE ENCOUNTER
Pt wanted to clarify dosage for primidone (MYSOLINE) 50 MG tablet. She stated she was told by PCP to take .5 tablet 3 times daily and if tolerated take 1 full tablet 3 times daily after a week. I read the medication directions to pt that state .5 tablet at night, not 3 times daily and 1 full tablet once nightly if tolerated. Please advise correct dosage.    Last seen 6/27/2024  Next appt 9/4/2024    Requested Prescriptions      No prescriptions requested or ordered in this encounter

## 2024-07-05 NOTE — TELEPHONE ENCOUNTER
Called pt and LMOM to advise soonest available appt is 9/04 at 3 pm. This appt has been held pending pt return call.    Called pt back and scheduled appt.

## 2024-07-15 ENCOUNTER — HOSPITAL ENCOUNTER (OUTPATIENT)
Age: 79
Discharge: HOME | End: 2024-07-15
Payer: MEDICARE

## 2024-07-15 DIAGNOSIS — N18.32 CHRONIC KIDNEY DISEASE (CKD) STAGE G3B/A2, MODERATELY DECREASED GLOMERULAR FILTRATION RATE (GFR) BETWEEN 30-44 ML/MIN/1.73 SQUARE METER AND ALBUMINURIA CREATININE RATIO BETWEEN 30-299 MG/G (HCC): Primary | ICD-10-CM

## 2024-07-15 DIAGNOSIS — G25.0 ESSENTIAL TREMOR: ICD-10-CM

## 2024-07-15 DIAGNOSIS — F33.1 MODERATE EPISODE OF RECURRENT MAJOR DEPRESSIVE DISORDER (HCC): ICD-10-CM

## 2024-07-15 LAB
ALBUMIN SERPL-MCNC: 4.1 G/DL (ref 3.5–5.2)
ALP SERPL-CCNC: 99 U/L (ref 35–104)
ALT SERPL-CCNC: 13 U/L (ref 0–32)
ANION GAP SERPL CALCULATED.3IONS-SCNC: 11 MMOL/L (ref 7–16)
AST SERPL-CCNC: 16 U/L (ref 0–31)
BILIRUB SERPL-MCNC: 0.2 MG/DL (ref 0–1.2)
BUN SERPL-MCNC: 32 MG/DL (ref 6–23)
CALCIUM SERPL-MCNC: 9.6 MG/DL (ref 8.6–10.2)
CHLORIDE SERPL-SCNC: 100 MMOL/L (ref 98–107)
CO2 SERPL-SCNC: 26 MMOL/L (ref 22–29)
CREAT SERPL-MCNC: 1.5 MG/DL (ref 0.5–1)
GFR, ESTIMATED: 35 ML/MIN/1.73M2
GLUCOSE SERPL-MCNC: 131 MG/DL (ref 74–99)
POTASSIUM SERPL-SCNC: 4.6 MMOL/L (ref 3.5–5)
PROT SERPL-MCNC: 6.7 G/DL (ref 6.4–8.3)
SODIUM SERPL-SCNC: 137 MMOL/L (ref 132–146)
TSH SERPL DL<=0.05 MIU/L-ACNC: 2.09 UIU/ML (ref 0.27–4.2)

## 2024-07-15 PROCEDURE — 82390 ASSAY OF CERULOPLASMIN: CPT

## 2024-07-15 PROCEDURE — 80053 COMPREHEN METABOLIC PANEL: CPT

## 2024-07-15 PROCEDURE — 84443 ASSAY THYROID STIM HORMONE: CPT

## 2024-07-15 PROCEDURE — 82525 ASSAY OF COPPER: CPT

## 2024-07-15 PROCEDURE — 36415 COLL VENOUS BLD VENIPUNCTURE: CPT

## 2024-07-17 LAB — CERULOPLASMIN SERPL-MCNC: 26 MG/DL (ref 16–45)

## 2024-07-18 ENCOUNTER — TELEPHONE (OUTPATIENT)
Dept: FAMILY MEDICINE CLINIC | Age: 79
End: 2024-07-18

## 2024-07-18 NOTE — TELEPHONE ENCOUNTER
----- Message from Kaushal Douglas, DO sent at 7/17/2024 12:19 PM EDT -----  Let's make sure she sees the nephrologist and gets the labs done I ordered, thanks!

## 2024-07-18 NOTE — TELEPHONE ENCOUNTER
----- Message from Kaushal Douglas DO sent at 7/15/2024  4:36 PM EDT -----   Is she taking any NSAIDs?     Renal function is worse   Stay hydrated.  Unless instructed otherwise by your physician, you should strive to drink at least eight 8 ounce glasses of water daily (64oz total), some of these being fortified with electrolytes (such as a Pedialyte, low calorie sports drink, or at mealtime).  Avoid added sugars.   NO NSAIDs   CKD Stages 3b  Dietary protein restriction providing 0.55 to 0.6 grams of dietary protein per kilogram of body weight per day      Needs to see nephrologist (I referred to Dr Khan)  I ordered labs for her to have done

## 2024-07-18 NOTE — TELEPHONE ENCOUNTER
Reached patient after 3 attempts. She has been staying at her sons    Per patient she has been taking IBP 400mg last few days off and on for her back.     Patient notified of results and to have labs done and to see the nephrologist.

## 2024-07-19 LAB
COLLECT DURATION TIME SPEC: 1230 H
COPPER, URINE /24 HR: ABNORMAL UG/D (ref 3–45)
COPPER, URINE /VOLUME: 4.5 UG/DL
COPPER, URINE RATIO CREATININE: 25.1 UG/G CRT (ref 10–45)
CREAT 24H UR-MCNC: 179 MG/DL
CREATININE URINE /24 HR: ABNORMAL MG/D (ref 500–1400)
SPECIMEN VOL ?TM UR: ABNORMAL ML

## 2024-07-22 ENCOUNTER — HOSPITAL ENCOUNTER (OUTPATIENT)
Age: 79
Discharge: HOME | End: 2024-07-22
Payer: MEDICARE

## 2024-07-22 LAB
ANION GAP SERPL CALCULATED.3IONS-SCNC: 12 MMOL/L (ref 7–16)
BUN SERPL-MCNC: 25 MG/DL (ref 6–23)
CALCIUM SERPL-MCNC: 9.8 MG/DL (ref 8.6–10.2)
CHLORIDE SERPL-SCNC: 101 MMOL/L (ref 98–107)
CO2 SERPL-SCNC: 24 MMOL/L (ref 22–29)
CREAT SERPL-MCNC: 1.1 MG/DL (ref 0.5–1)
CREAT UR-MCNC: 160.5 MG/DL (ref 29–226)
CREAT UR-MCNC: 162.5 MG/DL (ref 29–226)
GFR, ESTIMATED: 51 ML/MIN/1.73M2
GLUCOSE SERPL-MCNC: 119 MG/DL (ref 74–99)
MICROALBUMIN UR-MCNC: 91 MG/L (ref 0–19)
MICROALBUMIN/CREAT UR-RTO: 56 MCG/MG CREAT (ref 0–30)
POTASSIUM SERPL-SCNC: 4.4 MMOL/L (ref 3.5–5)
SODIUM SERPL-SCNC: 137 MMOL/L (ref 132–146)
TOTAL PROTEIN, URINE: 27 MG/DL (ref 0–12)
URINE TOTAL PROTEIN CREATININE RATIO: 0.17 (ref 0–0.2)
UUN UR-MCNC: 1104 MG/DL (ref 800–1666)

## 2024-07-22 PROCEDURE — 82570 ASSAY OF URINE CREATININE: CPT

## 2024-07-22 PROCEDURE — 82043 UR ALBUMIN QUANTITATIVE: CPT

## 2024-07-22 PROCEDURE — 84540 ASSAY OF URINE/UREA-N: CPT

## 2024-07-22 PROCEDURE — 80048 BASIC METABOLIC PNL TOTAL CA: CPT

## 2024-07-22 PROCEDURE — 84156 ASSAY OF PROTEIN URINE: CPT

## 2024-07-22 PROCEDURE — 36415 COLL VENOUS BLD VENIPUNCTURE: CPT

## 2024-08-12 ENCOUNTER — OFFICE VISIT (OUTPATIENT)
Dept: PAIN MANAGEMENT | Age: 79
End: 2024-08-12
Payer: MEDICARE

## 2024-08-12 VITALS
TEMPERATURE: 97.3 F | HEIGHT: 65 IN | DIASTOLIC BLOOD PRESSURE: 70 MMHG | BODY MASS INDEX: 33.99 KG/M2 | OXYGEN SATURATION: 97 % | WEIGHT: 204 LBS | SYSTOLIC BLOOD PRESSURE: 122 MMHG | HEART RATE: 52 BPM | RESPIRATION RATE: 18 BRPM

## 2024-08-12 DIAGNOSIS — M47.816 LUMBAR SPONDYLOSIS: ICD-10-CM

## 2024-08-12 DIAGNOSIS — M47.816 LUMBAR FACET ARTHROPATHY: ICD-10-CM

## 2024-08-12 DIAGNOSIS — G89.4 CHRONIC PAIN SYNDROME: Primary | ICD-10-CM

## 2024-08-12 DIAGNOSIS — M51.9 LUMBAR DISC DISORDER: ICD-10-CM

## 2024-08-12 PROCEDURE — 99204 OFFICE O/P NEW MOD 45 MIN: CPT | Performed by: PAIN MEDICINE

## 2024-08-12 PROCEDURE — 1123F ACP DISCUSS/DSCN MKR DOCD: CPT | Performed by: PAIN MEDICINE

## 2024-08-12 PROCEDURE — 3078F DIAST BP <80 MM HG: CPT | Performed by: PAIN MEDICINE

## 2024-08-12 PROCEDURE — 3074F SYST BP LT 130 MM HG: CPT | Performed by: PAIN MEDICINE

## 2024-08-12 RX ORDER — ARIPIPRAZOLE 10 MG/1
10 TABLET ORAL NIGHTLY
COMMUNITY
Start: 2024-07-05 | End: 2024-08-13 | Stop reason: ALTCHOICE

## 2024-08-12 RX ORDER — FINASTERIDE 5 MG/1
5 TABLET, FILM COATED ORAL DAILY
COMMUNITY
Start: 2024-07-23

## 2024-08-12 RX ORDER — TIZANIDINE 2 MG/1
2 TABLET ORAL DAILY PRN
Qty: 30 TABLET | Refills: 0 | Status: SHIPPED
Start: 2024-08-12 | End: 2024-08-13 | Stop reason: ALTCHOICE

## 2024-08-12 RX ORDER — PAROXETINE 30 MG/1
40 TABLET, FILM COATED ORAL
COMMUNITY
Start: 2024-08-12 | End: 2024-08-13 | Stop reason: ALTCHOICE

## 2024-08-12 NOTE — PROGRESS NOTES
0           [facility]         [facility] [address], [place] [address]      [phone]          Consult Note      Patient:  Charisse Sullivan DOB 1945    Date of Service:  24    Requesting Physician:  Kaushal Douglas DO    Reason for Consult:      Patient presents with complaints of low back pain that started a long time ago and has been progressively getting worse.  Pain is described as sharp/constant.  Pain is aggravated by standing/walking. Pain is relieved by sitting.    HISTORY OF PRESENT ILLNESS:      Pain does not radiate. She  does not have numbness, tingling and does not have bladder or bowel dysfunction.    She has been on anticoagulation medications to include Eliquis. The patient  has not been on herbal supplements.  The patient is diabetic.    Imaging:   Lumbar spine Xray   Multilevel degenerative changes seen throughout the lumbar spine with posterior fusion involving the L3, L4, and L5 levels.    Previous treatments: Physical Therapy, Nerve block, Epidural Steroid Injection, Surgery L3,4,5 PLIF, and medications..      Opioid Agreement:  Renewal date:N/A    Past Medical History:   Diagnosis Date    Anesthesia complication     difficulty breathing post thyroid surgery [facility] [place]    Cancer ([facility])     skin     GERD (gastroesophageal reflux disease)     Hyperlipidemia     Hypertension     Sleep apnea     uses cpap    Spinal stenosis     Thyroid disease     Tremors of nervous system     familial    Type 2 diabetes mellitus without complication ([facility])        Past Surgical History:   Procedure Laterality Date    BLEPHAROPLASTY      CATARACT REMOVAL WITH IMPLANT Left 2017    CATARACT REMOVAL WITH IMPLANT Right 10/09/2018    CATARACT REMOVAL WITH IMPLANT Right 2018    secondary procedure to correct first cataract     CHOLECYSTECTOMY      COSMETIC SURGERY      abdominoplasty    FOOT SURGERY Left     skin cancer    HYSTERECTOMY (CERVIX

## 2024-08-12 NOTE — PROGRESS NOTES
Charisse Sullivan presents to the [facility] on 8/12/2024. Charisse is complaining of pain in her right lower back/buttocks. The pain is constant. The pain is described as sharp and nauseating. Pain is rated on her best day at a 3, on her worst day at a 10, and on average at a 7 on the VAS scale. She took her last dose of Tylenol and baclofen  today.      Any procedures since your last visit: No    She is  on NSAIDS and  is  on anticoagulation medications to include ASA and Eliquis and is managed by Kaushal Douglas DO       Pacemaker or defibrillator: No     Medication Contract and Consent for Opioid Use Documents Filed       Patient Documents       Type of Document Status Date Received Received By Description    Medication Contract Received 9/4/2020  9:35 AM LINDSAY RICE Opioid medication contract with Dr Perez                       Resp 18   Ht 1.651 m (5' 5\")   Wt 92.5 kg (204 lb)   BMI 33.95 kg/m²      No LMP recorded. Patient has had a hysterectomy.

## 2024-08-13 ENCOUNTER — HOSPITAL ENCOUNTER (OUTPATIENT)
Dept: PREADMISSION TESTING | Age: 79
Discharge: HOME | End: 2024-08-13

## 2024-08-13 ENCOUNTER — TELEPHONE (OUTPATIENT)
Dept: FAMILY MEDICINE CLINIC | Age: 79
End: 2024-08-13

## 2024-08-13 VITALS — HEIGHT: 65 IN | WEIGHT: 204 LBS | BODY MASS INDEX: 33.99 KG/M2

## 2024-08-13 RX ORDER — PAROXETINE HYDROCHLORIDE 40 MG/1
40 TABLET, FILM COATED ORAL EVERY MORNING
COMMUNITY

## 2024-08-13 RX ORDER — BACLOFEN 10 MG/1
10 TABLET ORAL PRN
COMMUNITY

## 2024-08-13 NOTE — TELEPHONE ENCOUNTER
Patient called to inform Dr. Douglas that she increased Primidone from the 1/2 tablet that she was started on, to 1 tablet daily for the last 2-3 wks.  Patient informed me that she is still getting tremors, less frequently than before, but some are still bad.      Last seen 6/27/2024  Next appt 9/4/2024    Requested Prescriptions      No prescriptions requested or ordered in this encounter      [facility]

## 2024-08-13 NOTE — PROGRESS NOTES
[facility] PRE-ADMISSION TESTING INSTRUCTIONS    The Preadmission Testing patient is instructed accordingly using the following criteria (check applicable):    ARRIVAL INSTRUCTIONS:  [x] Parking the day of Surgery is located in the Main Entrance lot.  Upon entering the door, make an immediate right to the surgery reception desk    [x] Bring photo ID and insurance card    [x] Bring in a copy of Living will or Durable Power of  papers.    [x] Please be sure to arrange for responsible adult to provide transportation to and from the hospital    [x] Please arrange for responsible adult to be with you for the 24 hour period post procedure due to having anesthesia    [x] If you awake am of surgery not feeling well or have temperature >100 please call [phone]    GENERAL INSTRUCTIONS:    [x] May have clear liquids until 4 hours prior to surgery. Examples include water, fruit juices (no pulp), jello, popsicles, black coffee or tea, beef or chicken broth.             No gum, candy or mints.    [x] You may brush your teeth, but do not swallow any water    [x] Take medications as instructed with 1-2 oz of water    [] Stop herbal supplements and vitamins 5 days prior to procedure    [] Follow preop dosing of blood thinners per physician instructions    [] Take 1/2 dose of evening insulin, but no insulin after midnight    [x] No oral diabetic medications after midnight    [x] If diabetic and have low blood sugar or feel symptomatic, take 1-2oz apple juice only    [] Bring inhalers day of surgery    [] Bring C-PAP/ Bi-Pap day of surgery    [] Bring urine specimen day of surgery    [x] Shower or bath with soap, lather and rinse well, AM of Surgery, no lotion, powders or creams to surgical site    [] Follow bowel prep as instructed per surgeon    [x] No tobacco products within 24 hours of surgery     [x] No alcohol or illegal drug use within 24 hours of surgery.    [x] Jewelry, body piercing's,

## 2024-08-13 NOTE — PROGRESS NOTES
Informed pt that she will need history/physical done by pcp prior to mri. Pt states she has appointment with Dr. Douglas on 9/4/2024

## 2024-08-14 NOTE — TELEPHONE ENCOUNTER
I returned patient's call and left a detailed  msg informing her, as noted by Dr. Douglas.        Per Dr. Douglas -   I think it is a good idea for her to see a neurologist at some point.

## 2024-08-14 NOTE — TELEPHONE ENCOUNTER
Left detailed message on patients mobile number to notify of Dr Douglas's recommendations.   Also that I reached out to [facility] prior auth dept regarding MRI L-spine.     They have not started the auth at this time. They have several auths that [facility] are working on prior to getting to her order.    If patient has any further questions she can call  [facility] auth department at [phone].

## 2024-08-15 ENCOUNTER — TELEPHONE (OUTPATIENT)
Dept: FAMILY MEDICINE CLINIC | Age: 79
End: 2024-08-15

## 2024-08-15 RX ORDER — TIZANIDINE 4 MG/1
4 TABLET ORAL
COMMUNITY

## 2024-08-15 NOTE — TELEPHONE ENCOUNTER
Prescribed Tizanidine 2 mg QHS prn from pain management.  She started Tizanidine two nights ago.    Counseled by the pharmacist and was notified to be careful because it can make her sleepy, drop her blood pressure, and make her lightheaded. She was told that she is already on several medications that can drop her bp, including Allopurinol.    She decreased her Allopurinol 100 mg from 2 tablets daily to 1 tablet daily.  Patient wanted to know if that was okay to decrease her Allopurinol, while taking Tizanidine?

## 2024-08-21 ENCOUNTER — TELEPHONE (OUTPATIENT)
Dept: FAMILY MEDICINE CLINIC | Age: 79
End: 2024-08-21

## 2024-08-21 DIAGNOSIS — E11.51 DIABETES TYPE 2 WITH ATHEROSCLEROSIS OF ARTERIES OF EXTREMITIES (HCC): ICD-10-CM

## 2024-08-21 DIAGNOSIS — I70.209 DIABETES TYPE 2 WITH ATHEROSCLEROSIS OF ARTERIES OF EXTREMITIES (HCC): ICD-10-CM

## 2024-08-21 NOTE — TELEPHONE ENCOUNTER
Patient notified of 8/20/24 CT Cardiac calcium score.   CAC is 179    Moderate intensity statin, ASA 81 mg     Patient would like to know if she should resume 10 mg of Atorvastatin?

## 2024-08-22 ENCOUNTER — HOSPITAL ENCOUNTER (EMERGENCY)
Age: 79
Discharge: HOME | End: 2024-08-22
Attending: EMERGENCY MEDICINE
Payer: MEDICARE

## 2024-08-22 ENCOUNTER — APPOINTMENT (OUTPATIENT)
Dept: CT IMAGING | Age: 79
End: 2024-08-22
Payer: MEDICARE

## 2024-08-22 ENCOUNTER — APPOINTMENT (OUTPATIENT)
Dept: GENERAL RADIOLOGY | Age: 79
End: 2024-08-22
Payer: MEDICARE

## 2024-08-22 VITALS
DIASTOLIC BLOOD PRESSURE: 82 MMHG | HEART RATE: 91 BPM | TEMPERATURE: 97.6 F | OXYGEN SATURATION: 98 % | SYSTOLIC BLOOD PRESSURE: 160 MMHG | RESPIRATION RATE: 18 BRPM

## 2024-08-22 DIAGNOSIS — S09.90XA CLOSED HEAD INJURY, INITIAL ENCOUNTER: ICD-10-CM

## 2024-08-22 DIAGNOSIS — S40.012A CONTUSION OF MULTIPLE SITES OF LEFT SHOULDER AND UPPER ARM, INITIAL ENCOUNTER: Primary | ICD-10-CM

## 2024-08-22 DIAGNOSIS — S80.02XA CONTUSION OF LEFT KNEE, INITIAL ENCOUNTER: ICD-10-CM

## 2024-08-22 DIAGNOSIS — S40.022A CONTUSION OF MULTIPLE SITES OF LEFT SHOULDER AND UPPER ARM, INITIAL ENCOUNTER: Primary | ICD-10-CM

## 2024-08-22 PROCEDURE — 70450 CT HEAD/BRAIN W/O DYE: CPT

## 2024-08-22 PROCEDURE — 73030 X-RAY EXAM OF SHOULDER: CPT

## 2024-08-22 PROCEDURE — 73130 X-RAY EXAM OF HAND: CPT

## 2024-08-22 PROCEDURE — 99284 EMERGENCY DEPT VISIT MOD MDM: CPT

## 2024-08-22 PROCEDURE — 6370000000 HC RX 637 (ALT 250 FOR IP): Performed by: EMERGENCY MEDICINE

## 2024-08-22 PROCEDURE — 72125 CT NECK SPINE W/O DYE: CPT

## 2024-08-22 PROCEDURE — 73110 X-RAY EXAM OF WRIST: CPT

## 2024-08-22 PROCEDURE — 73560 X-RAY EXAM OF KNEE 1 OR 2: CPT

## 2024-08-22 PROCEDURE — 73070 X-RAY EXAM OF ELBOW: CPT

## 2024-08-22 RX ORDER — HYDROCODONE BITARTRATE AND ACETAMINOPHEN 5; 325 MG/1; MG/1
1 TABLET ORAL ONCE
Status: COMPLETED | OUTPATIENT
Start: 2024-08-22 | End: 2024-08-22

## 2024-08-22 RX ORDER — HYDROCODONE BITARTRATE AND ACETAMINOPHEN 5; 325 MG/1; MG/1
1 TABLET ORAL EVERY 6 HOURS PRN
Qty: 12 TABLET | Refills: 0 | Status: SHIPPED | OUTPATIENT
Start: 2024-08-22 | End: 2024-08-25

## 2024-08-22 RX ORDER — LEVOTHYROXINE SODIUM 0.05 MG/1
TABLET ORAL
Qty: 135 TABLET | Refills: 1 | Status: SHIPPED | OUTPATIENT
Start: 2024-08-22

## 2024-08-22 RX ORDER — LIDOCAINE 50 MG/G
1 PATCH TOPICAL EVERY 24 HOURS
Qty: 10 PATCH | Refills: 0 | Status: SHIPPED | OUTPATIENT
Start: 2024-08-22 | End: 2024-09-01

## 2024-08-22 RX ORDER — CYCLOBENZAPRINE HCL 5 MG
5 TABLET ORAL ONCE
Status: COMPLETED | OUTPATIENT
Start: 2024-08-22 | End: 2024-08-22

## 2024-08-22 RX ADMIN — HYDROCODONE BITARTRATE AND ACETAMINOPHEN 1 TABLET: 5; 325 TABLET ORAL at 19:46

## 2024-08-22 RX ADMIN — HYDROCODONE BITARTRATE AND ACETAMINOPHEN 1 TABLET: 5; 325 TABLET ORAL at 22:44

## 2024-08-22 RX ADMIN — CYCLOBENZAPRINE HYDROCHLORIDE 5 MG: 5 TABLET, FILM COATED ORAL at 22:44

## 2024-08-22 ASSESSMENT — LIFESTYLE VARIABLES
HOW MANY STANDARD DRINKS CONTAINING ALCOHOL DO YOU HAVE ON A TYPICAL DAY: PATIENT DOES NOT DRINK
HOW OFTEN DO YOU HAVE A DRINK CONTAINING ALCOHOL: NEVER

## 2024-08-22 ASSESSMENT — PAIN - FUNCTIONAL ASSESSMENT: PAIN_FUNCTIONAL_ASSESSMENT: 0-10

## 2024-08-22 ASSESSMENT — PAIN DESCRIPTION - ORIENTATION
ORIENTATION: LEFT

## 2024-08-22 ASSESSMENT — PAIN SCALES - GENERAL
PAINLEVEL_OUTOF10: 7
PAINLEVEL_OUTOF10: 8
PAINLEVEL_OUTOF10: 6

## 2024-08-22 ASSESSMENT — PAIN DESCRIPTION - LOCATION
LOCATION: SHOULDER
LOCATION: SHOULDER
LOCATION: ARM;LEG

## 2024-08-22 NOTE — ED PROVIDER NOTES
[facility] EMERGENCY DEPARTMENT  EMERGENCY DEPARTMENT ENCOUNTER        Pt Name: Charisse Sullivan  MRN: [id number]  Birthdate 1945  Date of evaluation: 8/22/2024  Provider: Kathrin Christiansen DO  PCP: Kaushal Douglas DO  Note Started: 7:40 PM EDT 8/22/24    CHIEF COMPLAINT       Chief Complaint   Patient presents with    Fall    Shoulder Injury     Pt comes to the ED with c/o fall after she tripped and fell coming into her house. Pt states that she landed on her left side and c/o left shoulder pain. Pt takes eliquis, -LOC.        HISTORY OF PRESENT ILLNESS: 1 or more Elements       Charisse Sullivan is a 79 y.o. female who presents to the emergency department the chief plaint of fall.  The patient states that she tripped and fell coming in her house.  Patient is complaining of left shoulder, elbow, knee as well as hand pain.  The patient states that she did strike her head.  She did not lose consciousness.  Symptoms are moderate in severity.  Worse with palpation and attempted movement.  She denies any numbness, weakness or paresthesias.    Nursing Notes were all reviewed and agreed with or any disagreements were addressed in the HPI.    REVIEW OF SYSTEMS :      Review of Systems   Constitutional:  Negative for chills and fatigue.   HENT:  Negative for congestion.    Eyes:  Negative for redness.   Respiratory:  Negative for shortness of breath.    Cardiovascular:  Negative for chest pain.   Gastrointestinal:  Negative for abdominal pain, nausea and vomiting.   Genitourinary:  Negative for dysuria.   Musculoskeletal:  Positive for arthralgias and myalgias.   Skin:  Negative for rash.   Neurological:  Negative for light-headedness.   Psychiatric/Behavioral:  Negative for confusion.    All other systems reviewed and are negative.      Positives and Pertinent negatives as per HPI.     SURGICAL HISTORY     Past Surgical History:   Procedure Laterality Date    APPENDECTOMY      BLEPHAROPLASTY

## 2024-08-22 NOTE — TELEPHONE ENCOUNTER
Last seen 6/27/2024  Next appt 9/4/2024    Requested Prescriptions     Pending Prescriptions Disp Refills    levothyroxine (SYNTHROID) 50 MCG tablet [Pharmacy Med Name: LEVOTHYROXINE 50 MCG TABLET] 135 tablet 1     Sig: TAKE 1 AND 1/2 TABLETS DAILY BY MOUTH

## 2024-08-23 DIAGNOSIS — K21.9 GASTRO-ESOPHAGEAL REFLUX DISEASE WITHOUT ESOPHAGITIS: ICD-10-CM

## 2024-08-23 ASSESSMENT — ENCOUNTER SYMPTOMS
NAUSEA: 0
EYE REDNESS: 0
SHORTNESS OF BREATH: 0
ABDOMINAL PAIN: 0
VOMITING: 0

## 2024-08-26 RX ORDER — FUROSEMIDE 20 MG/1
TABLET ORAL
Qty: 90 TABLET | Refills: 1 | OUTPATIENT
Start: 2024-08-26

## 2024-08-26 NOTE — TELEPHONE ENCOUNTER
Last seen 6/27/2024  Next appt 9/4/2024    Requested Prescriptions     Pending Prescriptions Disp Refills    furosemide (LASIX) 20 MG tablet [Pharmacy Med Name: FUROSEMIDE 20 MG TABLET] 90 tablet 1     Sig: TAKE 1 TABLET BY MOUTH DAILY AS NEEDED (SWELLING) IF USED MORE THAN 3 TIMES/WEEK, CALL THE OFFICE    omeprazole (PRILOSEC) 20 MG delayed release capsule [Pharmacy Med Name: OMEPRAZOLE DR 20 MG CAPSULE] 90 capsule 3     Sig: TAKE 1 CAPSULE BY MOUTH EVERY DAY

## 2024-09-04 ENCOUNTER — OFFICE VISIT (OUTPATIENT)
Dept: FAMILY MEDICINE CLINIC | Age: 79
End: 2024-09-04

## 2024-09-04 VITALS
OXYGEN SATURATION: 98 % | HEART RATE: 50 BPM | WEIGHT: 195 LBS | TEMPERATURE: 97 F | HEIGHT: 65 IN | SYSTOLIC BLOOD PRESSURE: 128 MMHG | DIASTOLIC BLOOD PRESSURE: 60 MMHG | BODY MASS INDEX: 32.49 KG/M2

## 2024-09-04 DIAGNOSIS — M43.16 SPONDYLOLISTHESIS OF LUMBAR REGION: ICD-10-CM

## 2024-09-04 DIAGNOSIS — G25.9 MOVEMENT DISORDER: Primary | ICD-10-CM

## 2024-09-04 DIAGNOSIS — R25.1 TREMOR: ICD-10-CM

## 2024-09-04 DIAGNOSIS — R25.8 BRADYKINESIA: ICD-10-CM

## 2024-09-04 DIAGNOSIS — G47.33 OSA (OBSTRUCTIVE SLEEP APNEA): ICD-10-CM

## 2024-09-04 DIAGNOSIS — M47.816 LUMBAR SPONDYLOSIS: ICD-10-CM

## 2024-09-04 DIAGNOSIS — R26.2 DIFFICULTY IN WALKING: ICD-10-CM

## 2024-09-04 DIAGNOSIS — M75.102 TEAR OF LEFT ROTATOR CUFF, UNSPECIFIED TEAR EXTENT, UNSPECIFIED WHETHER TRAUMATIC: ICD-10-CM

## 2024-09-04 DIAGNOSIS — M51.9 LUMBAR DISC DISORDER: ICD-10-CM

## 2024-09-04 RX ORDER — GABAPENTIN 100 MG/1
100 CAPSULE ORAL 3 TIMES DAILY
Qty: 270 CAPSULE | Refills: 1 | Status: SHIPPED | OUTPATIENT
Start: 2024-09-04 | End: 2025-03-03

## 2024-09-04 RX ORDER — ACETAMINOPHEN 500 MG
1000 TABLET ORAL 2 TIMES DAILY
Qty: 120 TABLET | Refills: 0 | Status: SHIPPED | OUTPATIENT
Start: 2024-09-04

## 2024-09-04 NOTE — PROGRESS NOTES
Annual Wellness Visit (Medicare Advantage)  01/01/2024    Flu vaccine (1) 08/01/2024    COVID-19 Vaccine (3 - 2023-24 season) 09/01/2024    Lipids  11/30/2024    Depression Monitoring  06/27/2025    DTaP/Tdap/Td vaccine (2 - Td or Tdap) 01/20/2028    DEXA (modify frequency per FRAX score)  Completed    Shingles vaccine  Completed    Pneumococcal 65+ years Vaccine  Completed    Respiratory Syncytial Virus (RSV) Pregnant or age 60 yrs+  Completed    Hepatitis C screen  Completed    Hepatitis A vaccine  Aged Out    Hepatitis B vaccine  Aged Out    Hib vaccine  Aged Out    Polio vaccine  Aged Out    Meningococcal (ACWY) vaccine  Aged Out    A1C test (Diabetic or Prediabetic)  Discontinued    Diabetic Alb to Cr ratio (uACR) test  Discontinued    Diabetic retinal exam  Discontinued    Breast cancer screen  Discontinued    Colorectal Cancer Screen  Discontinued           ROS:    Denies 10pt ROS other than noted in HPI.         Objective       PHYSICAL EXAM:    /60   Pulse 50   Temp 97 °F (36.1 °C) (Infrared)   Ht 1.651 m (5' 5\")   Wt 88.5 kg (195 lb)   SpO2 98%   BMI 32.45 kg/m²     AVSS    GA: Well-groomed, appears well, no acute distress.    HEENT: Atraumatic normocephalic.  Extraocular muscles are grossly intact.  Pupils are equal round reactive to light.  Conjunctiva pink and moist.  Hearing is grossly intact.  Nares patent without external drainage.  Buccal mucosa pink and moist.  Posterior oropharynx clear without lesion or exudate.    NECK: Trachea is midline, supple, nontender, no lymphadenopathy.    CARDIO: Regular rate and rhythm without murmur rub or gallop.  Cap refill 2+.  Radial pulses 2+ bilaterally.    RESPIRATORY: Clear to auscultation bilaterally without wheezes rales or rhonchi.  Normal inspiratory and expiratory effort.  Normoxic on room air    ABD: Rounded, normoactive bowel sounds.  Soft, nontender, no organomegaly.    MSK: Structurally appropriate for age.  No gross deficit.  LEFT

## 2024-09-04 NOTE — PATIENT INSTRUCTIONS
Primidone reduction:  Initial Reduction: Reduce to 1/2 of the original dose in the second week.  Final Reduction: Lower to 1/4 of the original dose in the third week before stopping completely.

## 2024-09-06 DIAGNOSIS — N32.81 OAB (OVERACTIVE BLADDER): ICD-10-CM

## 2024-09-06 RX ORDER — OXYBUTYNIN CHLORIDE 5 MG/1
5 TABLET ORAL DAILY
Qty: 90 TABLET | Refills: 3 | Status: SHIPPED | OUTPATIENT
Start: 2024-09-06

## 2024-09-06 NOTE — TELEPHONE ENCOUNTER
Last seen 9/4/2024  Next appt 10/30/2024    Requested Prescriptions     Pending Prescriptions Disp Refills    oxyBUTYnin (DITROPAN) 5 MG tablet [Pharmacy Med Name: OXYBUTYNIN 5 MG TABLET] 90 tablet 3     Sig: TAKE 1 TABLET BY MOUTH EVERY DAY

## 2024-09-09 RX ORDER — TIZANIDINE 2 MG/1
2 TABLET ORAL DAILY PRN
Qty: 30 TABLET | Refills: 0 | Status: SHIPPED | OUTPATIENT
Start: 2024-09-09 | End: 2024-10-09

## 2024-09-10 ENCOUNTER — TELEPHONE (OUTPATIENT)
Dept: PHARMACY | Facility: CLINIC | Age: 79
End: 2024-09-10

## 2024-09-24 ENCOUNTER — TELEPHONE (OUTPATIENT)
Dept: PULMONOLOGY | Age: 79
End: 2024-09-24

## 2024-09-27 RX ORDER — TIZANIDINE 2 MG/1
2 TABLET ORAL DAILY PRN
Qty: 90 TABLET | Refills: 1 | OUTPATIENT
Start: 2024-09-27 | End: 2024-10-27

## 2024-10-08 ENCOUNTER — TELEPHONE (OUTPATIENT)
Dept: INTERVENTIONAL RADIOLOGY/VASCULAR | Age: 79
End: 2024-10-08

## 2024-10-08 NOTE — TELEPHONE ENCOUNTER
Special Procedures Pre-Procedure Telephone Call    Patient Name: Charisse Sullivan  MRN: [id number]  : 1945  Date of Procedure: 10/16/2024  Planned Procedure: MRI with LMAC    Pre procedure telephone call initiated to verify patient and procedure details.    This nurse provided a HIPAA-compliant voicemail on the contact number provided in the patient's demographic information. The voicemail included the following details:    Patient arrival time to radiology registration: 0630  The patient has been advised to arrive at [facility], which is situated in the parking lot of the Emergency Department. Guidance provided to assist in navigating to the radiology registration area.  The patient was advised to begin fasting after midnight before procedure. They were also informed that they may take their morning medications with a small sip of water.  Recommended that the patient wears comfortable and easily removable clothing on the day of the procedure for their convenience and ease.  The patient requires a responsible and sober  to provide transportation and assume care for them at the time of discharge.  Verified patient's medications, allergies, medical history, and assessed the need for any special accommodations or arrangements.  Provided the patient with the department's contact number, [phone], for any inquiries or further assistance they may require.

## 2024-10-16 ENCOUNTER — HOSPITAL ENCOUNTER (OUTPATIENT)
Dept: MRI IMAGING | Age: 79
Discharge: HOME | End: 2024-10-18
Payer: MEDICARE

## 2024-10-16 ENCOUNTER — ANESTHESIA EVENT (OUTPATIENT)
Dept: MRI IMAGING | Age: 79
End: 2024-10-16
Payer: MEDICARE

## 2024-10-16 ENCOUNTER — ANESTHESIA (OUTPATIENT)
Dept: MRI IMAGING | Age: 79
End: 2024-10-16
Payer: MEDICARE

## 2024-10-16 VITALS
RESPIRATION RATE: 16 BRPM | HEART RATE: 59 BPM | OXYGEN SATURATION: 95 % | DIASTOLIC BLOOD PRESSURE: 75 MMHG | SYSTOLIC BLOOD PRESSURE: 189 MMHG

## 2024-10-16 VITALS
RESPIRATION RATE: 18 BRPM | HEART RATE: 59 BPM | TEMPERATURE: 97.5 F | DIASTOLIC BLOOD PRESSURE: 76 MMHG | SYSTOLIC BLOOD PRESSURE: 173 MMHG | OXYGEN SATURATION: 95 %

## 2024-10-16 DIAGNOSIS — R25.8 BRADYKINESIA: ICD-10-CM

## 2024-10-16 DIAGNOSIS — R25.1 TREMOR: ICD-10-CM

## 2024-10-16 DIAGNOSIS — G25.9 MOVEMENT DISORDER: ICD-10-CM

## 2024-10-16 DIAGNOSIS — M43.16 SPONDYLOLISTHESIS, LUMBAR REGION: ICD-10-CM

## 2024-10-16 DIAGNOSIS — G89.29 CHRONIC BILATERAL LOW BACK PAIN WITH RIGHT-SIDED SCIATICA: ICD-10-CM

## 2024-10-16 DIAGNOSIS — M54.41 CHRONIC BILATERAL LOW BACK PAIN WITH RIGHT-SIDED SCIATICA: ICD-10-CM

## 2024-10-16 DIAGNOSIS — M75.102 TEAR OF LEFT ROTATOR CUFF, UNSPECIFIED TEAR EXTENT, UNSPECIFIED WHETHER TRAUMATIC: ICD-10-CM

## 2024-10-16 DIAGNOSIS — M43.16 SPONDYLOLISTHESIS OF LUMBAR REGION: ICD-10-CM

## 2024-10-16 PROCEDURE — 2580000003 HC RX 258

## 2024-10-16 PROCEDURE — 2500000003 HC RX 250 WO HCPCS

## 2024-10-16 PROCEDURE — 7100000011 HC PHASE II RECOVERY - ADDTL 15 MIN

## 2024-10-16 PROCEDURE — 7100000010 HC PHASE II RECOVERY - FIRST 15 MIN

## 2024-10-16 PROCEDURE — 72148 MRI LUMBAR SPINE W/O DYE: CPT

## 2024-10-16 PROCEDURE — 6360000002 HC RX W HCPCS

## 2024-10-16 PROCEDURE — 70551 MRI BRAIN STEM W/O DYE: CPT

## 2024-10-16 PROCEDURE — 73221 MRI JOINT UPR EXTREM W/O DYE: CPT

## 2024-10-16 RX ORDER — SODIUM CHLORIDE 9 MG/ML
INJECTION, SOLUTION INTRAVENOUS
Status: DISCONTINUED | OUTPATIENT
Start: 2024-10-16 | End: 2024-10-16 | Stop reason: SDUPTHER

## 2024-10-16 RX ORDER — DEXAMETHASONE SODIUM PHOSPHATE 10 MG/ML
INJECTION, EMULSION INTRAMUSCULAR; INTRAVENOUS
Status: DISCONTINUED | OUTPATIENT
Start: 2024-10-16 | End: 2024-10-16 | Stop reason: SDUPTHER

## 2024-10-16 RX ORDER — MIDAZOLAM HYDROCHLORIDE 1 MG/ML
INJECTION INTRAMUSCULAR; INTRAVENOUS
Status: DISCONTINUED | OUTPATIENT
Start: 2024-10-16 | End: 2024-10-16 | Stop reason: SDUPTHER

## 2024-10-16 RX ORDER — KETAMINE HYDROCHLORIDE 10 MG/ML
INJECTION, SOLUTION INTRAMUSCULAR; INTRAVENOUS
Status: DISCONTINUED | OUTPATIENT
Start: 2024-10-16 | End: 2024-10-16 | Stop reason: SDUPTHER

## 2024-10-16 RX ORDER — ONDANSETRON 2 MG/ML
INJECTION INTRAMUSCULAR; INTRAVENOUS
Status: DISCONTINUED | OUTPATIENT
Start: 2024-10-16 | End: 2024-10-16 | Stop reason: SDUPTHER

## 2024-10-16 RX ORDER — GLYCOPYRROLATE 1 MG/5 ML
SYRINGE (ML) INTRAVENOUS
Status: DISCONTINUED | OUTPATIENT
Start: 2024-10-16 | End: 2024-10-16 | Stop reason: SDUPTHER

## 2024-10-16 RX ORDER — PROPOFOL 10 MG/ML
INJECTION, EMULSION INTRAVENOUS
Status: DISCONTINUED | OUTPATIENT
Start: 2024-10-16 | End: 2024-10-16 | Stop reason: SDUPTHER

## 2024-10-16 RX ADMIN — DEXAMETHASONE SODIUM PHOSPHATE 10 MG: 10 INJECTION, EMULSION INTRAMUSCULAR; INTRAVENOUS at 07:30

## 2024-10-16 RX ADMIN — MIDAZOLAM 1 MG: 1 INJECTION INTRAMUSCULAR; INTRAVENOUS at 07:39

## 2024-10-16 RX ADMIN — MIDAZOLAM 1 MG: 1 INJECTION INTRAMUSCULAR; INTRAVENOUS at 07:30

## 2024-10-16 RX ADMIN — Medication 30 MG: at 07:39

## 2024-10-16 RX ADMIN — Medication 0.2 MG: at 07:30

## 2024-10-16 RX ADMIN — ONDANSETRON 4 MG: 2 INJECTION, SOLUTION INTRAMUSCULAR; INTRAVENOUS at 07:30

## 2024-10-16 RX ADMIN — PROPOFOL 40 MCG/KG/MIN: 10 INJECTION, EMULSION INTRAVENOUS at 07:59

## 2024-10-16 RX ADMIN — SODIUM CHLORIDE: 9 INJECTION, SOLUTION INTRAVENOUS at 07:16

## 2024-10-16 ASSESSMENT — LIFESTYLE VARIABLES: SMOKING_STATUS: 0

## 2024-10-16 NOTE — ANESTHESIA PRE PROCEDURE
up study.   Left ventricle size is normal.   No regional wall motion abnormalities seen.   Normal ejection fraction.   Structurally normal mitral valve.   Structurally normal aortic valve.   Aortic valve opens well.   Normal tricuspid valve structure and function.   There is a trivial localized near right ventricle pericardial effusion   noted.      Signature  Anesthesia Evaluation  Patient summary reviewed and Nursing notes reviewed   no history of anesthetic complications:   Airway: Mallampati: III  TM distance: >3 FB   Neck ROM: full  Mouth opening: > = 3 FB   Dental:      Comment: Pt claims she has crowns and has had several chipped teeth filled in the past, but denies any chipped, cracked or loose teeth currently     Pulmonary: breath sounds clear to auscultation  (+)     sleep apnea: on CPAP,           (-) not a current smoker          Patient did not smoke on day of surgery.                 Cardiovascular:  Exercise tolerance: good (>4 METS)  (+) hypertension:, dysrhythmias: atrial fibrillation, pulmonary hypertension: mild, hyperlipidemia      ECG reviewed  Rhythm: regular  Rate: normal  Echocardiogram reviewed         Beta Blocker:  Dose within 24 Hrs         Neuro/Psych:   (+) neuromuscular disease (midline low back pain with right-sided sciatica, Lumbar disc disorder Lumbar spondylosis):, psychiatric history:depression/anxiety              ROS comment: Chronic pain syndrome, S/P lumbar fusion, tremors GI/Hepatic/Renal:   (+) GERD:, renal disease: ARF         ROS comment: overactive bladder.   Endo/Other:    (+) Diabetes, hypothyroidism, blood dyscrasia (on eliquis): anticoagulation therapy:., malignancy/cancer (skin cancer).          Pt had no PAT visit        ROS comment: Gout Abdominal:       Abdomen: soft.      Vascular: negative vascular ROS.         Other Findings:         Anesthesia Plan      MAC     ASA 3       Induction: intravenous.    MIPS: Prophylactic antiemetics administered.  Anesthetic plan

## 2024-10-16 NOTE — PROCEDURES
0845 return fro MRI with anesthesia, easily arouseable, vss. Continue to monitor  0900 continues to be drowsy, easily arousable.  Vss  0920 awake and alert vss, eating and drinking    0930 d/c to home with daughter

## 2024-10-16 NOTE — ANESTHESIA POSTPROCEDURE EVALUATION
Department of Anesthesiology  Postprocedure Note    Patient: Charisse Sullivan  MRN: [id number]  YOB: 1945  Date of evaluation: 10/16/2024    Procedure Summary       Date: 10/16/24 Room / Location: [facility] MRI; [facility] Special Procedures; [facility] Radiology    Anesthesia Start: 0725 Anesthesia Stop: 0847    Procedure: MRI SHOULDER LEFT WO CONTRAST Diagnosis:       Tear of left rotator cuff, unspecified tear extent, unspecified whether traumatic      Unspecified rotator cuff tear or rupture of left shoulder, not specified as traumatic    Scheduled Providers: Diana Huber APRN - CRNA Responsible Provider: Bobby Becker DO    Anesthesia Type: MAC ASA Status: 3            Anesthesia Type: MAC    Moraima Phase I:      Moraima Phase II:      Anesthesia Post Evaluation    Patient location during evaluation: bedside  Patient participation: complete - patient cannot participate  Level of consciousness: awake and alert  Airway patency: patent  Nausea & Vomiting: no nausea and no vomiting  Cardiovascular status: blood pressure returned to baseline  Respiratory status: acceptable  Hydration status: euvolemic  Multimodal analgesia pain management approach    No notable events documented.

## 2024-10-17 RX ORDER — BACLOFEN 10 MG/1
5 TABLET ORAL 3 TIMES DAILY PRN
COMMUNITY

## 2024-10-17 RX ORDER — ZINC GLUCONATE 50 MG
50 TABLET ORAL DAILY
COMMUNITY

## 2024-10-17 RX ORDER — VALSARTAN AND HYDROCHLOROTHIAZIDE 80; 12.5 MG/1; MG/1
1 TABLET, FILM COATED ORAL DAILY
COMMUNITY

## 2024-10-17 RX ORDER — PRIMIDONE 50 MG/1
50 TABLET ORAL NIGHTLY
COMMUNITY

## 2024-10-22 ENCOUNTER — TELEPHONE (OUTPATIENT)
Dept: FAMILY MEDICINE CLINIC | Age: 79
End: 2024-10-22

## 2024-10-22 DIAGNOSIS — R53.1 GENERALIZED WEAKNESS: Primary | ICD-10-CM

## 2024-10-22 NOTE — TELEPHONE ENCOUNTER
Insurance no longer covering Home health for PT and OT    Patient feels she still needs therapy based off of her recent MRI results.     Request new referral for PT & OT Expand Home Health  Referral pended

## 2024-10-29 ENCOUNTER — OFFICE VISIT (OUTPATIENT)
Dept: PAIN MANAGEMENT | Age: 79
End: 2024-10-29
Payer: MEDICARE

## 2024-10-29 VITALS
HEART RATE: 43 BPM | HEIGHT: 65 IN | DIASTOLIC BLOOD PRESSURE: 74 MMHG | RESPIRATION RATE: 18 BRPM | WEIGHT: 195 LBS | OXYGEN SATURATION: 98 % | SYSTOLIC BLOOD PRESSURE: 132 MMHG | BODY MASS INDEX: 32.49 KG/M2

## 2024-10-29 DIAGNOSIS — M47.816 LUMBAR SPONDYLOSIS: ICD-10-CM

## 2024-10-29 DIAGNOSIS — G89.4 CHRONIC PAIN SYNDROME: Primary | ICD-10-CM

## 2024-10-29 DIAGNOSIS — M51.9 LUMBAR DISC DISORDER: ICD-10-CM

## 2024-10-29 DIAGNOSIS — M47.816 LUMBAR FACET ARTHROPATHY: ICD-10-CM

## 2024-10-29 PROCEDURE — 99214 OFFICE O/P EST MOD 30 MIN: CPT | Performed by: PAIN MEDICINE

## 2024-10-29 RX ORDER — SODIUM CHLORIDE 0.9 % (FLUSH) 0.9 %
5-40 SYRINGE (ML) INJECTION PRN
OUTPATIENT
Start: 2024-10-29

## 2024-10-29 RX ORDER — SODIUM CHLORIDE 9 MG/ML
INJECTION, SOLUTION INTRAVENOUS PRN
OUTPATIENT
Start: 2024-10-29

## 2024-10-29 RX ORDER — SODIUM CHLORIDE 0.9 % (FLUSH) 0.9 %
5-40 SYRINGE (ML) INJECTION EVERY 12 HOURS SCHEDULED
OUTPATIENT
Start: 2024-10-29

## 2024-10-29 NOTE — PROGRESS NOTES
Charisse Sullivan presents to the [facility] on 10/29/2024. Charisse is complaining of pain in her right lower back/buttocks. The pain is constant. The pain is described as sharp, burning and tight. Pain is rated on her best day at a 3, on her worst day at a 8, and on average at a 7 on the VAS scale. She took her last dose of  tylenol and Gabapentin today and Baclofen occasionally.    Any procedures since your last visit: No    She is not on NSAIDS and  is  on anticoagulation medications to include ASA and Eliquis and is managed by Kaushal Douglas DO       Pacemaker or defibrillator: No     Medication Contract and Consent for Opioid Use Documents Filed       Patient Documents       Type of Document Status Date Received Received By Description    Medication Contract Received 9/4/2020  9:35 AM LINDSAY RICE Opioid medication contract with Dr Perez                       Resp 18   Ht 1.651 m (5' 5\")   Wt 88.5 kg (195 lb)   BMI 32.45 kg/m²      No LMP recorded. Patient has had a hysterectomy.  
Quadriceps5/5           Right Gastrocnemius5/5    Left Gastrocnemius5/5  Right Ant Tibialis5/5  Left Ant Tibialis5/5  Gait:antalgic with a cane     Dermatology:     Skin:no unusual rashes and no skin lesions     Impression:  Low back pain with no radicular symptoms with h/o L3,4,5 PLIF in 2020  Plan:  Follow up on her low back pain with no acute issues.  Independently reviewed lumbar spine MRI including actual images and discussed with the patient.  Discussed LESI L5-S1 including R/B/A, patient agrees. Reviewed medications list will need clearance for Eliquis and ASA.  Discontinue  Zanaflex 2 mg QHS PRN not helping.  OARRS report reviewed 10/2024.  Patient encouraged to stay active and to lose weight  Treatment plan discussed with the patient including medications and procedure side effects     We discussed with the patient that combining opioids, benzodiazepines, alcohol, illicit drugs or sleep aids increases the risk of respiratory depression including death. We discussed that these medications may cause drowsiness, sedation or dizziness and have counseled the patient not to drive or operate machinery. We have discussed that these medications will be prescribed only by one provider. We have discussed with the patient about age related risk factors and have thoroughly discussed the importance of taking these medications as prescribed. The patient verbalizes understanding.    lizette Eddy M.D.

## 2024-10-30 ENCOUNTER — OFFICE VISIT (OUTPATIENT)
Dept: FAMILY MEDICINE CLINIC | Age: 79
End: 2024-10-30

## 2024-10-30 ENCOUNTER — OFFICE VISIT (OUTPATIENT)
Dept: PULMONOLOGY | Age: 79
End: 2024-10-30
Payer: MEDICARE

## 2024-10-30 VITALS
HEIGHT: 65 IN | HEART RATE: 50 BPM | BODY MASS INDEX: 31.49 KG/M2 | TEMPERATURE: 97.2 F | DIASTOLIC BLOOD PRESSURE: 76 MMHG | SYSTOLIC BLOOD PRESSURE: 132 MMHG | OXYGEN SATURATION: 98 % | WEIGHT: 189 LBS

## 2024-10-30 VITALS
WEIGHT: 189 LBS | BODY MASS INDEX: 31.45 KG/M2 | SYSTOLIC BLOOD PRESSURE: 142 MMHG | OXYGEN SATURATION: 96 % | TEMPERATURE: 97 F | HEART RATE: 44 BPM | DIASTOLIC BLOOD PRESSURE: 64 MMHG

## 2024-10-30 DIAGNOSIS — E66.9 OBESITY (BMI 30-39.9): ICD-10-CM

## 2024-10-30 DIAGNOSIS — R00.1 BRADYCARDIA: ICD-10-CM

## 2024-10-30 DIAGNOSIS — Z12.31 ENCOUNTER FOR SCREENING MAMMOGRAM FOR MALIGNANT NEOPLASM OF BREAST: ICD-10-CM

## 2024-10-30 DIAGNOSIS — R25.1 TREMOR: ICD-10-CM

## 2024-10-30 DIAGNOSIS — Z23 NEED FOR IMMUNIZATION AGAINST INFLUENZA: ICD-10-CM

## 2024-10-30 DIAGNOSIS — M75.102 TEAR OF LEFT SUPRASPINATUS TENDON: Primary | ICD-10-CM

## 2024-10-30 DIAGNOSIS — M1A.9XX0 CHRONIC GOUT WITHOUT TOPHUS, UNSPECIFIED CAUSE, UNSPECIFIED SITE: ICD-10-CM

## 2024-10-30 DIAGNOSIS — G25.2 KINETIC TREMOR: ICD-10-CM

## 2024-10-30 DIAGNOSIS — G47.33 OSA (OBSTRUCTIVE SLEEP APNEA): Primary | ICD-10-CM

## 2024-10-30 DIAGNOSIS — S46.812D TEAR OF LEFT INFRASPINATUS TENDON, SUBSEQUENT ENCOUNTER: ICD-10-CM

## 2024-10-30 PROCEDURE — 3078F DIAST BP <80 MM HG: CPT | Performed by: INTERNAL MEDICINE

## 2024-10-30 PROCEDURE — 99203 OFFICE O/P NEW LOW 30 MIN: CPT | Performed by: INTERNAL MEDICINE

## 2024-10-30 PROCEDURE — 3075F SYST BP GE 130 - 139MM HG: CPT | Performed by: INTERNAL MEDICINE

## 2024-10-30 PROCEDURE — 1123F ACP DISCUSS/DSCN MKR DOCD: CPT | Performed by: INTERNAL MEDICINE

## 2024-10-30 PROCEDURE — 1159F MED LIST DOCD IN RCRD: CPT | Performed by: INTERNAL MEDICINE

## 2024-10-30 RX ORDER — ALLOPURINOL 100 MG/1
TABLET ORAL
Qty: 180 TABLET | Refills: 3 | Status: SHIPPED | OUTPATIENT
Start: 2024-10-30

## 2024-10-30 ASSESSMENT — ENCOUNTER SYMPTOMS
RESPIRATORY NEGATIVE: 1
ALLERGIC/IMMUNOLOGIC NEGATIVE: 1
GASTROINTESTINAL NEGATIVE: 1
EYES NEGATIVE: 1

## 2024-10-30 NOTE — PROGRESS NOTES
(ICD-10-CM)]; Mild pulmonary hypertension (HCC) [I27.20 (ICD-10-CM)]; Left atrial dilation [I51.7 (ICD-10-CM)]; Nonrheumatic aortic valve stenosis [I35.0 (ICD-10-CM)]; Newly recognized heart murmur [R01.1 (ICD-10-CM)]   She did not have done    No dizziness syncope cp palp sob molina  Does relate fatigue / easy fatigability (even though we decreased her metoprolol by half)     Given backlog of echo service, it may be better for her to get the echocardiogram when in office with her cardiologist  We will order the ECG today   She is considering switching to local cardiologist, she will call us and let us know (she fears her [facility] doc may have retired)      Preventative:  Health Maintenance   Topic Date Due    Annual Wellness Visit (Medicare Advantage)  01/01/2024    COVID-19 Vaccine (3 - 2023-24 season) 09/01/2024    Lipids  11/30/2024    Depression Monitoring  06/27/2025    DTaP/Tdap/Td vaccine (2 - Td or Tdap) 01/20/2028    Colorectal Cancer Screen  09/19/2029    DEXA (modify frequency per FRAX score)  Completed    Flu vaccine  Completed    Shingles vaccine  Completed    Pneumococcal 65+ years Vaccine  Completed    Respiratory Syncytial Virus (RSV) Pregnant or age 60 yrs+  Completed    Hepatitis C screen  Completed    Hepatitis A vaccine  Aged Out    Hepatitis B vaccine  Aged Out    Hib vaccine  Aged Out    Polio vaccine  Aged Out    Meningococcal (ACWY) vaccine  Aged Out    A1C test (Diabetic or Prediabetic)  Discontinued    Diabetic Alb to Cr ratio (uACR) test  Discontinued    Diabetic retinal exam  Discontinued    Breast cancer screen  Discontinued           ROS:    Denies 10pt ROS other than noted in HPI.         Objective       PHYSICAL EXAM:    BP (!) 142/64   Pulse (!) 44   Temp 97 °F (36.1 °C) (Infrared)   Wt 85.7 kg (189 lb)   SpO2 96%   BMI 31.45 kg/m²     AVSS    GA: Well-groomed, appears well, no acute distress.    HEENT: Atraumatic normocephalic.  Extraocular muscles are grossly intact.  Pupils are

## 2024-10-30 NOTE — PATIENT INSTRUCTIONS
Many major health organizations, including the American Cancer Society, recommend women ages 70 and older continue to get mammograms on a regular basis as long as they are in good health.  Why do mammograms stop at 75? For 999 out of 1000 women age 75 or older, having a mammogram does not help them live longer. Instead, having a mammogram can lead to false alarms and overdetection of breast cancer.  However, The American Cancer Society and American Society of Breast Surgeons recommend every other year over age 75 if life expectancy is greater than 10 years.          Call Dr Emanuel      Call your heart doctor at [place]

## 2024-10-30 NOTE — PROGRESS NOTES
Progress Note    Charisse Sullivan  1945    CC:Sleep Apnea       HPI : 79 year old female has SHIRLEY and was using APAP and her device was cancelled for non compliance but her compliance report from May to June 2024 , her average use was >5 hours a night. She was benefiting from CPAP use, good sleep quality and no day time symptoms but now without CPAP she is more symptomatic. History of AF, hypothyroidism and HTN.     Past Medical History:   Diagnosis Date    A-fib (HCC)     Cancer (HCC)     skin     GERD (gastroesophageal reflux disease)     Hyperlipidemia     Hypertension     SHIRLEY on CPAP     Spinal stenosis     Thyroid disease     Tremors of nervous system     familial    Type 2 diabetes mellitus without complication (HCC)       Past Surgical History:   Procedure Laterality Date    APPENDECTOMY      BLEPHAROPLASTY      CATARACT REMOVAL WITH IMPLANT Left 09/12/2017    CATARACT REMOVAL WITH IMPLANT Right 10/09/2018    CATARACT REMOVAL WITH IMPLANT Right 11/01/2018    secondary procedure to correct first cataract     CHOLECYSTECTOMY      COLONOSCOPY      COSMETIC SURGERY      abdominoplasty    ESOPHAGOGASTRODUODENOSCOPY      FOOT SURGERY Left     skin cancer    HYSTERECTOMY (CERVIX STATUS UNKNOWN)      LUMBAR SPINE SURGERY N/A 09/14/2020    L3-L4, L4-L5  POSTERIOR LUMBAR INTERBODY FUSION performed by Rubén Perez MD at [facility] OR    OVARIAN CYST REMOVAL      AL RMVL IMPLT MATRL POSTERIOR SEGMENT INTRAOCULAR Right 11/01/2018    EYE IOL REMOVAL performed by Anibal ROBERTSON MD at [facility] OR    AL XCAPSL CTRC RMVL INSJ IO LENS PROSTH W/O ECP Right 10/09/2018    RIGHT EYE CATARACT EMULSIFICATION IOL IMPLANT performed by Anibal ROBERTSON MD at [facility] OR    SKIN BIOPSY      THYROIDECTOMY, PARTIAL      left lobe removed    TONSILLECTOMY      TUBAL LIGATION        No family history on file.   Social History     Socioeconomic History    Marital status:      Spouse name: None    Number of children: None     General Abdominal pain

## 2024-11-11 ENCOUNTER — OFFICE VISIT (OUTPATIENT)
Dept: PULMONOLOGY | Age: 79
End: 2024-11-11
Payer: MEDICARE

## 2024-11-11 VITALS
HEART RATE: 54 BPM | OXYGEN SATURATION: 98 % | WEIGHT: 187 LBS | TEMPERATURE: 98.2 F | DIASTOLIC BLOOD PRESSURE: 76 MMHG | RESPIRATION RATE: 16 BRPM | BODY MASS INDEX: 31.16 KG/M2 | SYSTOLIC BLOOD PRESSURE: 148 MMHG | HEIGHT: 65 IN

## 2024-11-11 DIAGNOSIS — G47.33 OSA (OBSTRUCTIVE SLEEP APNEA): Primary | ICD-10-CM

## 2024-11-11 DIAGNOSIS — E66.9 OBESITY (BMI 30-39.9): ICD-10-CM

## 2024-11-11 PROCEDURE — 3077F SYST BP >= 140 MM HG: CPT | Performed by: INTERNAL MEDICINE

## 2024-11-11 PROCEDURE — 1159F MED LIST DOCD IN RCRD: CPT | Performed by: INTERNAL MEDICINE

## 2024-11-11 PROCEDURE — 3078F DIAST BP <80 MM HG: CPT | Performed by: INTERNAL MEDICINE

## 2024-11-11 PROCEDURE — 99214 OFFICE O/P EST MOD 30 MIN: CPT | Performed by: INTERNAL MEDICINE

## 2024-11-11 PROCEDURE — 1123F ACP DISCUSS/DSCN MKR DOCD: CPT | Performed by: INTERNAL MEDICINE

## 2024-11-11 NOTE — PROGRESS NOTES
Weight: 84.8 kg (187 lb)   Height: 1.651 m (5' 5\")       Physical Exam  Constitutional:       Appearance: Normal appearance. She is obese.   HENT:      Head: Normocephalic and atraumatic.      Nose: Nose normal.      Mouth/Throat:      Mouth: Mucous membranes are moist.      Pharynx: Oropharynx is clear.      Comments: Mallampati score is 3  Eyes:      Extraocular Movements: Extraocular movements intact.      Conjunctiva/sclera: Conjunctivae normal.      Pupils: Pupils are equal, round, and reactive to light.   Cardiovascular:      Rate and Rhythm: Normal rate and regular rhythm.      Pulses: Normal pulses.      Heart sounds: Normal heart sounds.   Pulmonary:      Effort: Pulmonary effort is normal.      Breath sounds: Normal breath sounds.   Abdominal:      General: Bowel sounds are normal.      Palpations: Abdomen is soft.   Musculoskeletal:         General: Normal range of motion.      Cervical back: Normal range of motion and neck supple.   Skin:     General: Skin is warm.   Neurological:      General: No focal deficit present.      Mental Status: She is alert and oriented to person, place, and time.          Assessment/Plan:   Diagnosis Orders   1. SHIRLEY (obstructive sleep apnea)  PAT - Home Sleep Test      2. Obesity (BMI 30-39.9)          HSAT ordered. She was using Nasal mask , wisp.    Follow up after the test.     Electronically by Ana Block MD  on 11/11/24 at 2:36 PM EST

## 2024-11-12 ENCOUNTER — TELEPHONE (OUTPATIENT)
Dept: PAIN MANAGEMENT | Age: 79
End: 2024-11-12

## 2024-11-12 NOTE — TELEPHONE ENCOUNTER
Charisse is having a Lumbar epidural steroid injection with Dr. Camacho on 12/02/2024. Would it be acceptable for patient to hold Eliquis 3 days prior & Aspirin 7 days prior to procedure. Please advise. Thank you

## 2024-11-14 ENCOUNTER — TELEPHONE (OUTPATIENT)
Dept: SLEEP CENTER | Age: 79
End: 2024-11-14

## 2024-11-14 NOTE — TELEPHONE ENCOUNTER
Call placed to patient to attempt to schedule for home sleep study per provider order. No answer. Msg left requesting call back.

## 2024-11-25 ENCOUNTER — TELEPHONE (OUTPATIENT)
Dept: PAIN MANAGEMENT | Age: 79
End: 2024-11-25

## 2024-11-25 DIAGNOSIS — M54.16 LUMBAR RADICULOPATHY: ICD-10-CM

## 2024-11-25 NOTE — TELEPHONE ENCOUNTER
Call to Charisse Sullivan that procedure was scheduled for 12/02/2024 and that [facility] should call her a few days before for the pre op call and after 3:00 PM the business day before with the arrival time. Instructed Charisse to hold ibuprofen for 24 hours, Celebrex, Mobic, and naprosyn for 4 days and CoQ 10, or fish oil for 7 days. Instructed to call office back if any questions. Charisse verbalized understanding.    **Instructed to hold Eliquis 3 days prior & Aspirin 7 days prior to procedure per med clearance in media**    Electronically signed by Radha Crawford RN on 11/25/2024 at 9:43 AM

## 2024-11-27 ENCOUNTER — TELEPHONE (OUTPATIENT)
Age: 79
End: 2024-11-27

## 2024-11-27 ENCOUNTER — TELEPHONE (OUTPATIENT)
Dept: FAMILY MEDICINE CLINIC | Age: 79
End: 2024-11-27

## 2024-11-27 ENCOUNTER — TELEMEDICINE (OUTPATIENT)
Age: 79
End: 2024-11-27
Payer: MEDICARE

## 2024-11-27 DIAGNOSIS — F33.1 MODERATE EPISODE OF RECURRENT MAJOR DEPRESSIVE DISORDER (HCC): Primary | ICD-10-CM

## 2024-11-27 DIAGNOSIS — R25.8 BRADYKINESIA: ICD-10-CM

## 2024-11-27 DIAGNOSIS — G21.19 DRUG-INDUCED PARKINSONISM (HCC): ICD-10-CM

## 2024-11-27 DIAGNOSIS — R26.9 GAIT DIFFICULTY: ICD-10-CM

## 2024-11-27 DIAGNOSIS — R25.9 MIXED ACTION AND RESTING TREMOR: Primary | ICD-10-CM

## 2024-11-27 PROCEDURE — 99204 OFFICE O/P NEW MOD 45 MIN: CPT | Performed by: PSYCHIATRY & NEUROLOGY

## 2024-11-27 PROCEDURE — 1159F MED LIST DOCD IN RCRD: CPT | Performed by: PSYCHIATRY & NEUROLOGY

## 2024-11-27 PROCEDURE — 1123F ACP DISCUSS/DSCN MKR DOCD: CPT | Performed by: PSYCHIATRY & NEUROLOGY

## 2024-11-27 RX ORDER — ARIPIPRAZOLE 5 MG/1
TABLET ORAL
Qty: 45 TABLET | Refills: 0 | Status: SHIPPED | OUTPATIENT
Start: 2024-11-27

## 2024-11-27 RX ORDER — TOPIRAMATE 50 MG/1
50 TABLET, FILM COATED ORAL
Qty: 30 TABLET | Refills: 3 | Status: SHIPPED | OUTPATIENT
Start: 2024-12-11

## 2024-11-27 RX ORDER — TOPIRAMATE 25 MG/1
25 TABLET, FILM COATED ORAL NIGHTLY
Qty: 14 TABLET | Refills: 0 | Status: SHIPPED | OUTPATIENT
Start: 2024-11-27 | End: 2024-12-11

## 2024-11-27 NOTE — PATIENT INSTRUCTIONS
Try to work with Dr. Douglas in reducing dose of abilify, if possible wean off     Needs mandy scan        Arianne Ochoa MD

## 2024-11-27 NOTE — TELEPHONE ENCOUNTER
----- Message from Dr. Kaushal Douglas DO sent at 11/27/2024  4:07 PM EST -----  Regarding: FW: Follow-Up Consult  Ordered new abilify script to taper  Ordered referral to counseling to help through this process of tapering away the abilify.  ----- Message -----  From: Arianne Ochoa MD  Sent: 11/27/2024   2:44 PM EST  To: Kaushal Douglas DO  Subject: Follow-Up Consult

## 2024-11-27 NOTE — PROGRESS NOTES
strength was antigravity in kofi ue's and le's   Postural tremors in left > right ue  Worse with wrist flexion  Action increases it in left > right ue  Bradykinesia in left arm with arm roll, finger tap in left  Nml finger tap in right ue  Nml foot tapping kofi, biking kofi    There was no dysmetria seen on the kofi found on finger-nose-finger testing and heel to shin testing.   Normal gait. - turns en bloc.   Right arm swing is decreased   Psychiatric: Memory, affect and judgment normal.        ASSESSMENT AND PLAN   1. Mixed action and resting tremor  Differential diagnosis includes drug-induced parkinsonism versus Parkinson's disease versus essential tremors    Patient has tried and failed propranolol, primidone and gabapentin    Will suggest evaluating her with a DaTscan  Suggest weaning her off Abilify if possible  Advised her to have an ammonia levels checked    Suggest a trial of Topamax titrated up to 50 mg nightly  - NM BRAIN SPECT; Future  - Ammonia; Future    2. Bradykinesia  Has mild gait difficulty and some bradykinesia  Some of the bradykinesia in the left arm can be explained by the rotator cuff problems that she has developed in the interim    But we need to rule out parkinsonism either idiopathic Parkinson's disease versus drug-induced parkinsonism  Suggested DaTscan  - NM BRAIN SPECT; Future    3. Gait difficulty      4. Drug-induced parkinsonism (HCC)  Advised her to wean off Abilify  She gets it from Dr. Douglas so she has been asked to discuss that with Dr. Douglas to gradually wean off it        MD Charisse Stack, was evaluated through a synchronous (real-time) audio-video encounter. The patient (and/or guardian if applicable) is aware that this is a billable service, which includes applicable co-pays. This virtual visit was conducted with patient's (and/or legal guardian's) consent. Patient identification was verified, and a caregiver was present when appropriate.  The

## 2024-12-02 ENCOUNTER — HOSPITAL ENCOUNTER (OUTPATIENT)
Dept: OPERATING ROOM | Age: 79
Setting detail: OUTPATIENT SURGERY
Discharge: HOME | End: 2024-12-02

## 2024-12-02 ENCOUNTER — HOSPITAL ENCOUNTER (OUTPATIENT)
Age: 79
Setting detail: OUTPATIENT SURGERY
Discharge: HOME | End: 2024-12-02
Attending: PAIN MEDICINE | Admitting: PAIN MEDICINE
Payer: MEDICARE

## 2024-12-02 VITALS
HEIGHT: 65 IN | RESPIRATION RATE: 14 BRPM | SYSTOLIC BLOOD PRESSURE: 182 MMHG | DIASTOLIC BLOOD PRESSURE: 76 MMHG | OXYGEN SATURATION: 97 % | WEIGHT: 185 LBS | TEMPERATURE: 97 F | HEART RATE: 60 BPM | BODY MASS INDEX: 30.82 KG/M2

## 2024-12-02 DIAGNOSIS — M54.16 LUMBAR RADICULOPATHY: ICD-10-CM

## 2024-12-02 PROCEDURE — 7100000010 HC PHASE II RECOVERY - FIRST 15 MIN: Performed by: PAIN MEDICINE

## 2024-12-02 PROCEDURE — 2709999900 HC NON-CHARGEABLE SUPPLY: Performed by: PAIN MEDICINE

## 2024-12-02 PROCEDURE — 7100000011 HC PHASE II RECOVERY - ADDTL 15 MIN: Performed by: PAIN MEDICINE

## 2024-12-02 PROCEDURE — 6360000004 HC RX CONTRAST MEDICATION: Performed by: PAIN MEDICINE

## 2024-12-02 PROCEDURE — 62323 NJX INTERLAMINAR LMBR/SAC: CPT | Performed by: PAIN MEDICINE

## 2024-12-02 PROCEDURE — 6360000002 HC RX W HCPCS: Performed by: PAIN MEDICINE

## 2024-12-02 PROCEDURE — 3600000005 HC SURGERY LEVEL 5 BASE: Performed by: PAIN MEDICINE

## 2024-12-02 RX ORDER — SODIUM CHLORIDE 0.9 % (FLUSH) 0.9 %
5-40 SYRINGE (ML) INJECTION PRN
Status: DISCONTINUED | OUTPATIENT
Start: 2024-12-02 | End: 2024-12-02 | Stop reason: HOSPADM

## 2024-12-02 RX ORDER — SODIUM CHLORIDE 9 MG/ML
INJECTION, SOLUTION INTRAVENOUS PRN
Status: DISCONTINUED | OUTPATIENT
Start: 2024-12-02 | End: 2024-12-02 | Stop reason: HOSPADM

## 2024-12-02 RX ORDER — LIDOCAINE HYDROCHLORIDE 5 MG/ML
INJECTION, SOLUTION INFILTRATION; INTRAVENOUS PRN
Status: DISCONTINUED | OUTPATIENT
Start: 2024-12-02 | End: 2024-12-02 | Stop reason: ALTCHOICE

## 2024-12-02 RX ORDER — SODIUM CHLORIDE 0.9 % (FLUSH) 0.9 %
5-40 SYRINGE (ML) INJECTION EVERY 12 HOURS SCHEDULED
Status: DISCONTINUED | OUTPATIENT
Start: 2024-12-02 | End: 2024-12-02 | Stop reason: HOSPADM

## 2024-12-02 ASSESSMENT — PAIN - FUNCTIONAL ASSESSMENT
PAIN_FUNCTIONAL_ASSESSMENT: 0-10
PAIN_FUNCTIONAL_ASSESSMENT: NONE - DENIES PAIN
PAIN_FUNCTIONAL_ASSESSMENT: NONE - DENIES PAIN

## 2024-12-02 NOTE — H&P
[facility]  [address], [place] [address]  [phone]    Procedure History & Physical      Charisse Sullivan     HPI:    Patient  is here for low back and leg pain for LESI L5-S1  Labs/imaging studies reviewed   All question and concerns addressed including R/B/A associated with the procedure    Past Medical History:   Diagnosis Date    A-fib (HCC)     Cancer (HCC)     skin     GERD (gastroesophageal reflux disease)     Hyperlipidemia     Hypertension     Movement disorder     Neuropathy     Minor    SHIRLEY on CPAP     Spinal stenosis     Thyroid disease     Tremor     Tremors of nervous system     familial    Type 2 diabetes mellitus without complication (HCC)        Past Surgical History:   Procedure Laterality Date    APPENDECTOMY      BLEPHAROPLASTY      CATARACT REMOVAL WITH IMPLANT Left 09/12/2017    CATARACT REMOVAL WITH IMPLANT Right 10/09/2018    CATARACT REMOVAL WITH IMPLANT Right 11/01/2018    secondary procedure to correct first cataract     CHOLECYSTECTOMY      COLONOSCOPY      COSMETIC SURGERY      abdominoplasty    ESOPHAGOGASTRODUODENOSCOPY      FOOT SURGERY Left     skin cancer    HYSTERECTOMY (CERVIX STATUS UNKNOWN)      LUMBAR SPINE SURGERY N/A 09/14/2020    L3-L4, L4-L5  POSTERIOR LUMBAR INTERBODY FUSION performed by Rubén Perez MD at [facility] OR    OVARIAN CYST REMOVAL      OK RMVL IMPLT MATRL POSTERIOR SEGMENT INTRAOCULAR Right 11/01/2018    EYE IOL REMOVAL performed by Anibal ROBERTSON MD at [facility] OR    OK XCAPSL CTRC RMVL INSJ IO LENS PROSTH W/O ECP Right 10/09/2018    RIGHT EYE CATARACT EMULSIFICATION IOL IMPLANT performed by Anibal ROBERTSON MD at [facility] OR    SKIN BIOPSY      THYROIDECTOMY, PARTIAL      left lobe removed    TONSILLECTOMY      TUBAL LIGATION         Prior to Admission medications    Medication Sig Start Date End Date Taking? Authorizing Provider   furosemide (LASIX) 20 MG tablet Take 1 tablet by mouth daily as needed

## 2024-12-02 NOTE — DISCHARGE INSTRUCTIONS
[facility] Pain Management Department  [phone]   Post-Pain Block/ Radiofrequency Home Going Instructions    1-Go home, rest for the remainder of the day  2-Please do not lift over 20 pounds the day of the injection  3-If you received sedation No: alcohol, driving, operating lawn mowers, plows, tractors or other dangerous equipment until next morning. Do not make important decisions or sign legal documents for 24 hours. You may experience light headedness, dizziness, nausea or sleepiness after sedation. Do not stay alone. A responsible adult must be with you for 24 hours. You could be nauseated from the medications you have received. Your IV site may be sore and bruised.    4-No dietary restrictions     5-Resume all medications the same day, blood thinners to be resumed 24 hours after injection    6-Keep the surgical site clean and dry, you may shower the next morning and remove the      dressing.     7- No sitz baths, tub baths or hot tubs/swimming for 24 hours.       8- If you have any pain at the injection site(s), application of an ice pack to the area should be       helpful, 20 minutes on/20 minutes off for next 48 hours.  9- Call [facility] pain management immediately at if you develop.  Fever greater than 100.4 F  Have bleeding or drainage from the puncture site  Have progressive Leg/arm numbness and or weakness  Loss of control of bowel and or bladder (wet/soil yourself)  Severe headache with inability to lift head  10-You may return to work the next day

## 2024-12-02 NOTE — OP NOTE
2024    Patient: Charisse Sullivan  :  1945  Age:  79 y.o.  Sex:  female     PRE-OPERATIVE DIAGNOSIS: Lumbar disc displacement, lumbar radiculopathy.    POST-OPERATIVE DIAGNOSIS: Same.    PROCEDURE: Fluoroscopic guided therapeutic lumbar epidural steroid injection at the L5-S1 level (# 1).    SURGEON: NANO Eddy.    ANESTHESIA: Local    ESTIMATED BLOOD LOSS: None.  ______________________________________________________________________    BRIEF HISTORY:  Charisse Sullivan comes in today for first lumbar epidural injection at L5-S1 level. The potential complications of this procedure were discussed with her again today.  She has elected to undergo the aforementioned procedure.    Charisse’s complete History & Physical examination were reviewed in depth, a copy of which is in the chart.      DESCRIPTION OF PROCEDURE:    After confirming written and informed consent, a time-out was performed and Charisse’s name and date of birth, the procedure to be performed as well as the plan for the location of the needle insertion were confirmed.    The patient was brought into the procedure room and placed in the prone position on the fluoroscopy table. A pillow was placed under the patient's lower abdomen/upper pelvis to increase lumbar interlaminar space. Standard monitors were placed, and vital signs were observed throughout the procedure. The area of the lumbar spine was prepped with chloraprep and draped in a sterile manner. The L5-S1 interspace was identified and marked under AP fluoroscopy. The skin and subcutaneous tissues at the above level were anesthestized with 0.5% lidocaine. With intermittent fluoroscopy, an # 18 gauge 3 1/2 tuohy epidural needle was inserted and directed toward the interlaminar space. The needle was slowly advanced using loss of resistance technique and 5 cc glass syringe  until the tip of the epidural needle has passed through the ligamentum flavum and entered the epidural space. AP and lateral

## 2024-12-03 ENCOUNTER — TELEPHONE (OUTPATIENT)
Dept: INTERNAL MEDICINE | Age: 79
End: 2024-12-03

## 2024-12-03 NOTE — TELEPHONE ENCOUNTER
NADEGE left message for pt related to referral. [facility] provided contact information for return call.

## 2024-12-03 NOTE — TELEPHONE ENCOUNTER
I called patient again to make sure she received my message regarding medication taper.     Patient notified and stated she wanted wait to discuss further at her visit.     She did stop taking the Abilify 1 week ago because she received injections in her hip and was told to hold some of her medications.   She did not continue the Abilify after her injections.     She just picked up the Topamax and Abilify taper dose yesterday, because it was hard to get a ride to the drug store.    She wanted to know since she has been off of the Abilify for 1 week, does she need to take the taper dose of the Abilify?    --Also patient is scheduled for AWV on 12/5/2024. She is still working on getting a ride. She wanted to know if the AWV can be done as a virtual visit?

## 2024-12-05 ENCOUNTER — OFFICE VISIT (OUTPATIENT)
Dept: FAMILY MEDICINE CLINIC | Age: 79
End: 2024-12-05
Payer: MEDICARE

## 2024-12-05 VITALS
OXYGEN SATURATION: 96 % | SYSTOLIC BLOOD PRESSURE: 112 MMHG | HEIGHT: 65 IN | DIASTOLIC BLOOD PRESSURE: 60 MMHG | TEMPERATURE: 97.1 F | HEART RATE: 64 BPM | WEIGHT: 182 LBS | BODY MASS INDEX: 30.32 KG/M2

## 2024-12-05 DIAGNOSIS — Z00.00 MEDICARE ANNUAL WELLNESS VISIT, SUBSEQUENT: Primary | ICD-10-CM

## 2024-12-05 PROCEDURE — 1159F MED LIST DOCD IN RCRD: CPT | Performed by: SURGERY

## 2024-12-05 PROCEDURE — G0439 PPPS, SUBSEQ VISIT: HCPCS | Performed by: SURGERY

## 2024-12-05 PROCEDURE — 3078F DIAST BP <80 MM HG: CPT | Performed by: SURGERY

## 2024-12-05 PROCEDURE — 3074F SYST BP LT 130 MM HG: CPT | Performed by: SURGERY

## 2024-12-05 PROCEDURE — 1123F ACP DISCUSS/DSCN MKR DOCD: CPT | Performed by: SURGERY

## 2024-12-05 ASSESSMENT — LIFESTYLE VARIABLES
HOW MANY STANDARD DRINKS CONTAINING ALCOHOL DO YOU HAVE ON A TYPICAL DAY: 1 OR 2
HOW OFTEN DO YOU HAVE A DRINK CONTAINING ALCOHOL: MONTHLY OR LESS

## 2024-12-05 ASSESSMENT — PATIENT HEALTH QUESTIONNAIRE - PHQ9
4. FEELING TIRED OR HAVING LITTLE ENERGY: NEARLY EVERY DAY
SUM OF ALL RESPONSES TO PHQ9 QUESTIONS 1 & 2: 1
SUM OF ALL RESPONSES TO PHQ QUESTIONS 1-9: 13
1. LITTLE INTEREST OR PLEASURE IN DOING THINGS: NOT AT ALL
6. FEELING BAD ABOUT YOURSELF - OR THAT YOU ARE A FAILURE OR HAVE LET YOURSELF OR YOUR FAMILY DOWN: SEVERAL DAYS
7. TROUBLE CONCENTRATING ON THINGS, SUCH AS READING THE NEWSPAPER OR WATCHING TELEVISION: SEVERAL DAYS
8. MOVING OR SPEAKING SO SLOWLY THAT OTHER PEOPLE COULD HAVE NOTICED. OR THE OPPOSITE, BEING SO FIGETY OR RESTLESS THAT YOU HAVE BEEN MOVING AROUND A LOT MORE THAN USUAL: NEARLY EVERY DAY
SUM OF ALL RESPONSES TO PHQ QUESTIONS 1-9: 13
3. TROUBLE FALLING OR STAYING ASLEEP: NEARLY EVERY DAY
2. FEELING DOWN, DEPRESSED OR HOPELESS: SEVERAL DAYS
SUM OF ALL RESPONSES TO PHQ QUESTIONS 1-9: 13
10. IF YOU CHECKED OFF ANY PROBLEMS, HOW DIFFICULT HAVE THESE PROBLEMS MADE IT FOR YOU TO DO YOUR WORK, TAKE CARE OF THINGS AT HOME, OR GET ALONG WITH OTHER PEOPLE: NOT DIFFICULT AT ALL
SUM OF ALL RESPONSES TO PHQ QUESTIONS 1-9: 13
9. THOUGHTS THAT YOU WOULD BE BETTER OFF DEAD, OR OF HURTING YOURSELF: NOT AT ALL
5. POOR APPETITE OR OVEREATING: SEVERAL DAYS

## 2024-12-05 ASSESSMENT — COLUMBIA-SUICIDE SEVERITY RATING SCALE - C-SSRS
6. HAVE YOU EVER DONE ANYTHING, STARTED TO DO ANYTHING, OR PREPARED TO DO ANYTHING TO END YOUR LIFE?: NO
2. HAVE YOU ACTUALLY HAD ANY THOUGHTS OF KILLING YOURSELF?: NO
1. WITHIN THE PAST MONTH, HAVE YOU WISHED YOU WERE DEAD OR WISHED YOU COULD GO TO SLEEP AND NOT WAKE UP?: NO

## 2024-12-05 NOTE — PROGRESS NOTES
Yes Kaushal Douglas DO   PARoxetine (PAXIL) 40 MG tablet Take 1 tablet by mouth every morning Yes Taryn Garcia MD   finasteride (PROSCAR) 5 MG tablet Take 1 tablet by mouth daily Yes Taryn Garcia MD   fluticasone (FLONASE) 50 MCG/ACT nasal spray 2 sprays, each nostril daily.  Use continuously, not as needed.  Left hand right nostril, in, point towards eye away from the opposite nostril, nose towards toes, two puffs and breath in.  Repeat on other side. Yes Kaushal Douglas DO   furosemide (LASIX) 20 MG tablet Take 1 tablet by mouth daily as needed (swelling) If using more than three times per week must call the office. Yes Kaushal Douglas DO   metoprolol tartrate (LOPRESSOR) 25 MG tablet TAKE 0.5 TABLETS BY MOUTH IN THE MORNING AND 0.5 TABLETS IN THE EVENING. Yes Kaushal Douglas DO   ELIQUIS 5 MG TABS tablet TAKE 1 TABLET BY MOUTH TWICE A DAY Yes Kaushal Douglas DO   metFORMIN (GLUCOPHAGE) 500 MG tablet TAKE 1 TABLET BY MOUTH TWICE A DAY WITH MEALS Yes Kaushal Douglas DO   omeprazole (PRILOSEC) 20 MG delayed release capsule TAKE 1 CAPSULE BY MOUTH EVERY DAY Yes Kaushal Douglas DO   atorvastatin (LIPITOR) 10 MG tablet TAKE 1 TABLET BY MOUTH EVERY DAY AT NIGHT Yes Kaushal Douglas DO   aspirin 81 MG EC tablet Take 1 tablet by mouth daily Yes Taryn Garcia MD   CPAP Machine MISC by Does not apply route nightly Yes ProviderTaryn MD   latanoprost (XALATAN) 0.005 % ophthalmic solution Place 1 drop into the left eye nightly Yes Provider, Historical, MD       CareTeam (Including outside providers/suppliers regularly involved in providing care):   Patient Care Team:  Kaushal Douglas DO as PCP - General (Family Medicine)  Kaushal Douglas DO as PCP - Empaneled Provider   Dr. Apple (nephrology)  Dr. Ochoa (neurology)  Dr. Lacey (pulmonology)       Reviewed and updated this visit:  Tobacco  Allergies  Meds  Med Hx  Surg Hx  Soc Hx  Fam Hx

## 2024-12-05 NOTE — PATIENT INSTRUCTIONS
problems that can make it hard to focus on weight loss.  Consider joining a support group for people who are trying to lose weight. Your doctor can suggest groups in your area.  Where can you learn more?  Go to https://[email or web address]/patientEd and enter U357 to learn more about \"Starting a Weight Loss Plan: Care Instructions.\"  Current as of: September 20, 2023  Content Version: 14.2  © 2024 [employer].   Care instructions adapted under license by [employer]. If you have questions about a medical condition or this instruction, always ask your healthcare professional. [employer] disclaims any warranty or liability for your use of this information.           Advance Directives: Care Instructions  Overview  An advance directive is a legal way to state your wishes at the end of your life. It tells your family and your doctor what to do if you can't say what you want.  There are two main types of advance directives. You can change them any time your wishes change.  Living will.  This form tells your family and your doctor your wishes about life support and other treatment. The form is also called a declaration.  Medical power of .  This form lets you name a person to make treatment decisions for you when you can't speak for yourself. This person is called a health care agent (health care proxy, health care surrogate). The form is also called a durable power of  for health care.  If you do not have an advance directive, decisions about your medical care may be made by a family member, or by a doctor or a  who doesn't know you.  It may help to think of an advance directive as a gift to the people who care for you. If you have one, they won't have to make tough decisions by themselves.  For more information, including forms for your state, see the [employer] website (www.caringinfo.org/planning/advance-directives/).  Follow-up care is a key part of your treatment and

## 2024-12-10 ENCOUNTER — TELEPHONE (OUTPATIENT)
Age: 79
End: 2024-12-10

## 2024-12-10 NOTE — TELEPHONE ENCOUNTER
Lisseth from Nuclear Medicine called office stating she has tried contacting pt for the last 3 weeks to schedule NM Brain Spect but has not received call back from pt.

## 2024-12-11 DIAGNOSIS — K21.9 GASTRO-ESOPHAGEAL REFLUX DISEASE WITHOUT ESOPHAGITIS: ICD-10-CM

## 2024-12-11 NOTE — TELEPHONE ENCOUNTER
Name of Medication(s) Requested:  Requested Prescriptions     Pending Prescriptions Disp Refills    omeprazole (PRILOSEC) 20 MG delayed release capsule [Pharmacy Med Name: OMEPRAZOLE DR 20 MG CAPSULE] 90 capsule 3     Sig: TAKE 1 CAPSULE BY MOUTH EVERY DAY       Medication is on current medication list Yes    Dosage and directions were verified? Yes    Quantity verified: 90 day supply     Pharmacy Verified?  Yes    Last Appointment:  12/5/2024    Future appts:  Future Appointments   Date Time Provider Department Center   12/12/2024  1:00 PM [facility] SLEEP LAB ROOM 1 [facility] SLEEP [place]   12/18/2024  1:15 PM Sheldon Camacho MD [facility]   1/7/2025  2:00 PM Arianne Ochoa MD [facility]   3/19/2025  2:30 PM Kaushal Douglas DO [facility]   7/15/2025  3:00 PM [facility]   12/9/2025  2:00 PM [facility]        (If no appt send self scheduling link. .REFILLAPPT)  Scheduling request sent?     [] Yes  [x] No    Does patient need updated?  [] Yes  [x] No

## 2024-12-12 ENCOUNTER — HOSPITAL ENCOUNTER (OUTPATIENT)
Dept: SLEEP CENTER | Age: 79
Discharge: HOME | End: 2024-12-12
Payer: MEDICARE

## 2024-12-12 DIAGNOSIS — G47.33 OSA (OBSTRUCTIVE SLEEP APNEA): ICD-10-CM

## 2024-12-12 PROCEDURE — 95800 SLP STDY UNATTENDED: CPT

## 2024-12-18 ENCOUNTER — OFFICE VISIT (OUTPATIENT)
Dept: PAIN MANAGEMENT | Age: 79
End: 2024-12-18
Payer: MEDICARE

## 2024-12-18 VITALS
SYSTOLIC BLOOD PRESSURE: 122 MMHG | WEIGHT: 182 LBS | BODY MASS INDEX: 30.32 KG/M2 | TEMPERATURE: 96.9 F | OXYGEN SATURATION: 98 % | HEIGHT: 65 IN | RESPIRATION RATE: 18 BRPM | DIASTOLIC BLOOD PRESSURE: 58 MMHG | HEART RATE: 47 BPM

## 2024-12-18 DIAGNOSIS — M54.16 LUMBAR RADICULOPATHY: Primary | ICD-10-CM

## 2024-12-18 DIAGNOSIS — M51.9 LUMBAR DISC DISORDER: ICD-10-CM

## 2024-12-18 DIAGNOSIS — M47.816 LUMBAR FACET ARTHROPATHY: ICD-10-CM

## 2024-12-18 DIAGNOSIS — M47.816 LUMBAR SPONDYLOSIS: ICD-10-CM

## 2024-12-18 DIAGNOSIS — G89.4 CHRONIC PAIN SYNDROME: ICD-10-CM

## 2024-12-18 PROCEDURE — 3078F DIAST BP <80 MM HG: CPT | Performed by: PAIN MEDICINE

## 2024-12-18 PROCEDURE — 99213 OFFICE O/P EST LOW 20 MIN: CPT | Performed by: PAIN MEDICINE

## 2024-12-18 PROCEDURE — 3074F SYST BP LT 130 MM HG: CPT | Performed by: PAIN MEDICINE

## 2024-12-18 PROCEDURE — 1123F ACP DISCUSS/DSCN MKR DOCD: CPT | Performed by: PAIN MEDICINE

## 2024-12-18 PROCEDURE — 1160F RVW MEDS BY RX/DR IN RCRD: CPT | Performed by: PAIN MEDICINE

## 2024-12-18 PROCEDURE — 1159F MED LIST DOCD IN RCRD: CPT | Performed by: PAIN MEDICINE

## 2024-12-18 NOTE — PROGRESS NOTES
[facility]  [address], [place] [address]  [phone]    Follow up Note      Charisse Sullivan     Date of Visit:  12/18/2024    CC:  Patient presents for follow up   Chief Complaint   Patient presents with    Follow-up     Lumbar epidural steroid injection Lumbar 5-Sacral 1     HPI:  Pain is better  Appropriate analgesia with current medications regimen: N/A  Change in quality of symptoms:no.    Medication side effects:N/A  Recent diagnostic testing:None  Recent interventional procedures:none..    She has been on anticoagulation medications to include Eliquis/ASA. The patient  has not been on herbal supplements.  The patient is diabetic.     Imaging:   Lumbar spine Xray 2024  Multilevel degenerative changes seen throughout the lumbar spine with posterior fusion involving the L3, L4, and L5 levels.    Lumbar spine MRI 2024  1. Status post decompressive laminectomies with posterior interbody fusion  from L3-L5.  2. No fracture or joint dislocation.  3. No significant central canal stenosis.  4. Mild right neural foraminal stenosis at L4-5.     Previous treatments: Physical Therapy, Nerve block, Epidural Steroid Injection, Surgery L3,4,5 PLIF, and medications..         Potential Aberrant Drug-Related Behavior:    No    Urine Drug Screening:  None    OARRS report:  12/2024 consistent     Opioid Agreement:  Renewal date:N/A    Past Medical History:   Diagnosis Date    A-fib (HCC)     Cancer (HCC)     skin     GERD (gastroesophageal reflux disease)     Hyperlipidemia     Hypertension     Movement disorder     Neuropathy     Minor    SHIRLEY on CPAP     Spinal stenosis     Thyroid disease     Tremor     Tremors of nervous system     familial    Type 2 diabetes mellitus without complication (HCC)      Past Surgical History:   Procedure Laterality Date    APPENDECTOMY      BLEPHAROPLASTY      CATARACT REMOVAL WITH IMPLANT Left 09/12/2017    CATARACT REMOVAL WITH IMPLANT Right

## 2024-12-18 NOTE — PROGRESS NOTES
Charisse Sullivan presents to the [facility] on 12/18/2024. Charisse is complaining of pain in her lower back/buttocks. The pain is constant. The pain is described as sharp, burning, and tight. Pain is rated on her best day at a 3, on her worst day at a 8, and on average at a 5 on the VAS scale. She took her last dose of Neurontin, Tylenol, and    today.      Any procedures since your last visit: Yes, with 50 % relief.    She is not on NSAIDS and  is  on anticoagulation medications to include ASA and Eliquis and is managed by Kaushal Douglas DO       Pacemaker or defibrillator: No     Medication Contract and Consent for Opioid Use Documents Filed       Patient Documents       Type of Document Status Date Received Received By Description    Medication Contract Received 9/4/2020  9:35 AM LINDSAY RICE Opioid medication contract with Dr Perez                       Resp 18   Ht 1.651 m (5' 5\")   Wt 82.6 kg (182 lb)   BMI 30.29 kg/m²      No LMP recorded. Patient has had a hysterectomy.

## 2024-12-19 DIAGNOSIS — N18.31 DIABETES MELLITUS DUE TO UNDERLYING CONDITION WITH STAGE 3A CHRONIC KIDNEY DISEASE, WITHOUT LONG-TERM CURRENT USE OF INSULIN (HCC): ICD-10-CM

## 2024-12-19 DIAGNOSIS — E08.22 DIABETES MELLITUS DUE TO UNDERLYING CONDITION WITH STAGE 3A CHRONIC KIDNEY DISEASE, WITHOUT LONG-TERM CURRENT USE OF INSULIN (HCC): ICD-10-CM

## 2024-12-20 RX ORDER — ATORVASTATIN CALCIUM 10 MG/1
10 TABLET, FILM COATED ORAL
Qty: 90 TABLET | Refills: 3 | Status: SHIPPED | OUTPATIENT
Start: 2024-12-20

## 2024-12-20 NOTE — TELEPHONE ENCOUNTER
Name of Medication(s) Requested:  Requested Prescriptions     Pending Prescriptions Disp Refills    metFORMIN (GLUCOPHAGE) 500 MG tablet [Pharmacy Med Name: METFORMIN  MG TABLET] 180 tablet 3     Sig: TAKE 1 TABLET BY MOUTH TWICE A DAY WITH FOOD    atorvastatin (LIPITOR) 10 MG tablet [Pharmacy Med Name: ATORVASTATIN 10 MG TABLET] 90 tablet 3     Sig: TAKE 1 TABLET BY MOUTH EVERY DAY AT NIGHT       Medication is on current medication list Yes    Dosage and directions were verified? Yes    Quantity verified: 90 day supply     Pharmacy Verified?  Yes    Last Appointment:  12/5/2024    Future appts:  Future Appointments   Date Time Provider Department Center   1/7/2025  2:00 PM Arianne Ochoa MD [facility]   3/19/2025  2:30 PM Kaushal Douglas DO [facility] DEP   7/15/2025  3:00 PM Nelly Maier MD [facility] DEP   12/9/2025  2:00 PM Nelly Maier MD [facility] DEP        (If no appt send self scheduling link. .REFILLAPPT)  Scheduling request sent?     [] Yes  [x] No    Does patient need updated?  [] Yes  [x] No

## 2024-12-24 DIAGNOSIS — F33.1 MODERATE EPISODE OF RECURRENT MAJOR DEPRESSIVE DISORDER (HCC): ICD-10-CM

## 2024-12-26 RX ORDER — ARIPIPRAZOLE 5 MG/1
TABLET ORAL
OUTPATIENT
Start: 2024-12-26

## 2024-12-26 NOTE — TELEPHONE ENCOUNTER
Name of Medication(s) Requested:  Requested Prescriptions     Pending Prescriptions Disp Refills    ARIPiprazole (ABILIFY) 5 MG tablet [Pharmacy Med Name: ARIPIPRAZOLE 5 MG TABLET] 135 tablet 1     Sig: TAPERING PROGRAM. 10MG NIGHTLY FOR TWO WEEKS THEN 5MG NIGHTLY FOR TWO WEEKS THEN 5MG MON WED FRI FOR ONE WEEK THEN STOP.       Medication is on current medication list Yes    Dosage and directions were verified? Yes    Quantity verified: 30 day supply     Pharmacy Verified?  Yes    Last Appointment:  12/5/2024    Future appts:  Future Appointments   Date Time Provider Department Center   1/7/2025  2:00 PM Arianne Ochoa MD [facility]   3/19/2025  2:30 PM Kaushal Douglas DO [facility] DEP   7/15/2025  3:00 PM Nelly Maier MD [facility] DEP   12/9/2025  2:00 PM [facility]        (If no appt send self scheduling link. .REFILLAPPT)  Scheduling request sent?     [] Yes  [x] No    Does patient need updated?  [] Yes  [x] No

## 2024-12-26 NOTE — TELEPHONE ENCOUNTER
Per Dr. Douglas,  If patient filled this and took as prescribed, please discontinue from list.  Kaushal Douglas, DO   to Me       12/26/24 11:45 AM  This is for a tapering dose, she is not going to be on this for 90 days, dispense as written (also, this should've already been initiated by now)

## 2025-01-02 ENCOUNTER — TELEPHONE (OUTPATIENT)
Dept: INTERNAL MEDICINE | Age: 80
End: 2025-01-02

## 2025-01-02 NOTE — TELEPHONE ENCOUNTER
NADEGE left message x2 related to referral, [facility] provided contact information for return call.

## 2025-01-07 ENCOUNTER — HOSPITAL ENCOUNTER (OUTPATIENT)
Dept: NEUROLOGY | Age: 80
Discharge: HOME | End: 2025-01-07

## 2025-01-07 VITALS — WEIGHT: 182 LBS | BODY MASS INDEX: 30.32 KG/M2 | HEIGHT: 65 IN

## 2025-01-08 ENCOUNTER — TELEPHONE (OUTPATIENT)
Dept: INTERNAL MEDICINE | Age: 80
End: 2025-01-08

## 2025-01-08 NOTE — TELEPHONE ENCOUNTER
ROSALEEW made contact to pt related to referral. Pt reports depression has improved and reported she has additional support right now which is also helpful. ROSALEEW provided contact information if pt had further questions or would like to engage in services in the future.

## 2025-01-15 ENCOUNTER — TELEPHONE (OUTPATIENT)
Dept: FAMILY MEDICINE CLINIC | Age: 80
End: 2025-01-15

## 2025-01-15 DIAGNOSIS — R00.1 BRADYCARDIA: Primary | ICD-10-CM
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
## 2025-01-15 DIAGNOSIS — D68.9 ACQUIRED COAGULATION DISORDER (HCC): ICD-10-CM

## 2025-01-15 NOTE — TELEPHONE ENCOUNTER
Corazon/[facility] nurse called from patient's home to report that patient informed her that she had a fall in her home on 1/9/25, hitting her head and Lt shoulder/side.  Patient told Corazon that she laid on the floor for 4 1/2 hrs until she was able to get herself up.  Patient did not go to the ED.  Corazon also stated that patient's pulse rate is 40 today.  I informed her that anytime a patient falls and hits their head, the ofc advises that they go to the ED.  She was agreeable.  Patient informed her that her daughter is a CNP and checked her out.    Msg routed for informational purposes.    Last seen 12/5/2024  Next appt 3/19/2025

## 2025-01-15 NOTE — TELEPHONE ENCOUNTER
Per Dr. Douglas -    I know her daughter well and trust her exam.    Decrease metoprolol from bid to daily.   Demetris ordered for patient to monitor heart rate.

## 2025-01-15 NOTE — TELEPHONE ENCOUNTER
I called [facility] and left a detailed voicemail msg informing her as noted by Dr. Douglas.  I called patient and left a detailed voicemail msg informing her, as noted by Dr. Douglas.  I left the phone number for [facility] Scheduling, [phone], and informed her that she can call to schedule for her Holter Monitor.

## 2025-01-23 ENCOUNTER — TELEPHONE (OUTPATIENT)
Dept: FAMILY MEDICINE CLINIC | Age: 80
End: 2025-01-23

## 2025-01-23 NOTE — TELEPHONE ENCOUNTER
Corazon from [facility] called she reported pt slipped getting out of the shower and hit the toilet  on on 1.16.25  pt hit pretty hard has a large bruise on her back pt did not go to the  ED.  Corazon stated she has to report this to the PCP. Pt stated it is just sore.    Last seen 12/5/2024  Next appt 3/19/2025    Routed for informational purposes.

## 2025-01-24 DIAGNOSIS — I48.0 PAROXYSMAL ATRIAL FIBRILLATION (HCC): ICD-10-CM

## 2025-01-24 RX ORDER — APIXABAN 5 MG/1
5 TABLET, FILM COATED ORAL 2 TIMES DAILY
Qty: 60 TABLET | Refills: 11 | Status: SHIPPED | OUTPATIENT
Start: 2025-01-24

## 2025-01-24 NOTE — TELEPHONE ENCOUNTER
Name of Medication(s) Requested:  Requested Prescriptions     Pending Prescriptions Disp Refills    ELIQUIS 5 MG TABS tablet [Pharmacy Med Name: ELIQUIS 5 MG TABLET] 60 tablet 11     Sig: TAKE 1 TABLET BY MOUTH TWICE A DAY       Medication is on current medication list Yes    Dosage and directions were verified? Yes    Quantity verified: 30 day supply     Pharmacy Verified?  Yes    Last Appointment:  12/5/2024    Future appts:  Future Appointments   Date Time Provider Department Center   1/27/2025  1:30 PM [facility]   3/19/2025  2:30 PM Kaushal Douglas DO [facility] DEP   7/15/2025  3:00 PM [facility]   12/9/2025  2:00 PM [facility]        (If no appt send self scheduling link. .REFILLAPPT)  Scheduling request sent?     [] Yes  [x] No    Does patient need updated?  [] Yes  [x] No

## 2025-01-24 NOTE — TELEPHONE ENCOUNTER
Per Dr Douglas    See if her daughter (NP) gave her a physical exam to evaluate this.  If not, she should get an acute / subgroup or urgent care visit for an examination - emergency department not needed at this point.     Left detailed message on machine

## 2025-01-27 ENCOUNTER — OFFICE VISIT (OUTPATIENT)
Dept: PULMONOLOGY | Age: 80
End: 2025-01-27
Payer: MEDICARE

## 2025-01-27 VITALS
TEMPERATURE: 98.4 F | BODY MASS INDEX: 30.66 KG/M2 | HEIGHT: 65 IN | WEIGHT: 184 LBS | DIASTOLIC BLOOD PRESSURE: 74 MMHG | HEART RATE: 48 BPM | OXYGEN SATURATION: 98 % | SYSTOLIC BLOOD PRESSURE: 112 MMHG

## 2025-01-27 DIAGNOSIS — E66.9 OBESITY (BMI 30-39.9): ICD-10-CM

## 2025-01-27 DIAGNOSIS — G47.33 OSA (OBSTRUCTIVE SLEEP APNEA): Primary | ICD-10-CM

## 2025-01-27 PROCEDURE — 3074F SYST BP LT 130 MM HG: CPT | Performed by: INTERNAL MEDICINE

## 2025-01-27 PROCEDURE — 3078F DIAST BP <80 MM HG: CPT | Performed by: INTERNAL MEDICINE

## 2025-01-27 PROCEDURE — 99213 OFFICE O/P EST LOW 20 MIN: CPT | Performed by: INTERNAL MEDICINE

## 2025-01-27 PROCEDURE — 1123F ACP DISCUSS/DSCN MKR DOCD: CPT | Performed by: INTERNAL MEDICINE

## 2025-01-27 PROCEDURE — 1159F MED LIST DOCD IN RCRD: CPT | Performed by: INTERNAL MEDICINE

## 2025-01-27 NOTE — PROGRESS NOTES
Patient to follow up with physician in 3 months. Orders for CPAP will be sent to [facility].

## 2025-01-27 NOTE — PROGRESS NOTES
Progress Note    Charisse Sullivan  1945    CC:Sleep Apnea       HPI : 80 year old female underwent HSAT, the test showed moderate SHIRLEY, SHAGGY 24.8/HOUR and charlee oxygen saturation was 88%. She was using Nasal mask.     Past Medical History:   Diagnosis Date    A-fib (HCC)     Cancer (HCC)     skin     GERD (gastroesophageal reflux disease)     Hyperlipidemia     Hypertension     Movement disorder     Neuropathy     Minor    SHIRLEY on CPAP     Spinal stenosis     Thyroid disease     Tremor     Tremors of nervous system     familial    Type 2 diabetes mellitus without complication (HCC)       Past Surgical History:   Procedure Laterality Date    APPENDECTOMY      BLEPHAROPLASTY      CATARACT REMOVAL WITH IMPLANT Left 09/12/2017    CATARACT REMOVAL WITH IMPLANT Right 10/09/2018    CATARACT REMOVAL WITH IMPLANT Right 11/01/2018    secondary procedure to correct first cataract     CHOLECYSTECTOMY      COLONOSCOPY      COSMETIC SURGERY      abdominoplasty    ESOPHAGOGASTRODUODENOSCOPY      FOOT SURGERY Left     skin cancer    HYSTERECTOMY (CERVIX STATUS UNKNOWN)      LUMBAR SPINE SURGERY N/A 09/14/2020    L3-L4, L4-L5  POSTERIOR LUMBAR INTERBODY FUSION performed by Rubén Perez MD at [facility] OR    OVARIAN CYST REMOVAL      PAIN MANAGEMENT PROCEDURE N/A 12/2/2024    Lumbar epidural steroid injection Lumbar 5-Sacral 1 performed by Sheldon Camacho MD at [facility] OR    DE RMVL IMPLT MATRL POSTERIOR SEGMENT INTRAOCULAR Right 11/01/2018    EYE IOL REMOVAL performed by Anibal ROBERTSON MD at [facility] OR    DE XCAPSL CTRC RMVL INSJ IO LENS PROSTH W/O ECP Right 10/09/2018    RIGHT EYE CATARACT EMULSIFICATION IOL IMPLANT performed by Anibal ROBERTSON MD at [facility] OR    SKIN BIOPSY      THYROIDECTOMY, PARTIAL      left lobe removed    TONSILLECTOMY      TUBAL LIGATION        No family history on file.   Social History     Socioeconomic History    Marital status:      Spouse name: None    Number of children: None

## 2025-02-11 NOTE — PROGRESS NOTES
[facility] CBC GROUP INITIAL INPATIENT CONSULTATION NOTE    REASON FOR CONSULTATION:    Leukopenia    HISTORY OF PRESENT ILLNESS:  Page B Bill is a 73 y.o. female who we are asked to see today in consultation for the above issue.    Patient has past medical history significant for hyperlipidemia, GERD, gout migraine headaches.    Patient was admitted on 2/9/2025 with pneumonia.  Chest x-ray showed infiltrate in the left base on 2/9/2025.  Respiratory viral panel was positive for influenza A H1.  Blood cultures negative.  Respiratory culture negative.  Patient receiving empiric Rocephin.  Patient also receiving Tamiflu 75 mg every 12 hours.  She was started on prednisone 40 mg daily.    Previous labs showed normal CBC results.  Most recent labs from 7/1/2024 with WBC 6.4 with normal differential, hemoglobin 14.2, platelet count 285,000.  On admission 2/9/2025, WBC 2.37 with differential of 65 segs, 19 lymphs, 14 monocytes with ANC 1.55.  On 2/10/2025, WBC declined to 1.87, no differential performed.  On 2/11/2025, WBC declined to 0.71, no differential performed.  Patient maintains normal hemoglobin and platelet count.        Past Medical History:   Diagnosis Date    GERD (gastroesophageal reflux disease)     Hyperlipidemia     Migraines        Past Surgical History:   Procedure Laterality Date    CHOLECYSTECTOMY      COLON RESECTION      COLONOSCOPY      COLONOSCOPY N/A 2/23/2023    Procedure: COLONOSCOPY FOR SCREENING- 3 YEAR SCREENING;  Surgeon: Mikael Steiner MD;  Location: [facility] MAIN OR;  Service: Gastroenterology;  Laterality: N/A;  Diverticulosis    ENDOSCOPY         SOCIAL HISTORY:   reports that she has never smoked. She has never used smokeless tobacco. She reports that she does not drink alcohol and does not use drugs.    FAMILY HISTORY:  family history includes Lung cancer in her father.    ALLERGIES:  Allergies   Allergen Reactions    Ibuprofen Itching       MEDICATIONS:  As listed in the  Physical Therapy  Physical Therapy Initial Assessment     Name: Taiwo Velez  : 1945  MRN: [id number]    Referring Provider:  Lissette Renee DO    Date of Service: 5/3/2021    Evaluating PT:  Mary Dahl, PT, DPT [id number]    Room #:  4669/2872-W  Diagnosis:  Atrial fibrillation with RVR  Precautions: Falls  Procedure/Surgery:  NA  PMHx/PSHx:  HLD, HTN, DM, skin CA, sleep apnea (uses CPAP)  Equipment Needs:  TBD    SUBJECTIVE:  Pt lives alone in a 2 story home with 6 stairs to enter and 1 rail. Bedroom/bathroom on 2nd floor with full flight and 1 rail to access. Pt ambulated with no device PTA. Pt has a cane and WW at home. Pt reported returning to daughter's home when discharged (2 story home, no stairs to enter, bedroom/bathroom on 2nd floor, full flight to access). OBJECTIVE:   Initial Evaluation  Date: 5/3/2021 Treatment Short Term/ Long Term   Goals   AM-PAC 6 Clicks 32/     Was pt agreeable to Eval/treatment? Yes     Does pt have pain? Yes, 6/10, chronic low back     Bed Mobility  Rolling: NT  Supine to sit: Supervision with HOB elevated  Sit to supine: NT  Scooting: Supervision  Mod Independent   Transfers Sit to stand: Jhoan  Stand to sit: Jhoan  Stand pivot: Jhoan with HHA  Mod. Independent with AAD, if needed   Ambulation   50 feet x 3 with Jhoan with HHA  >300 feet with Mod. Independent with AAD, if needed   Stair negotiation: ascended and descended NT  >4 steps with 1 rail Mod. Independent   ROM BUE:  Defer to OT note  BLE:  WFL     Strength BUE:  Defer to OT note  BLE:  4/5  Increase by 1/3 MMT grade   Balance Dynamic Sitting EOB:  Independent  Dynamic Standing:  Jhoan with HHA  Sitting EOB:  -  Dynamic Standing:  Mod.  Independent with AAD if needed     Pt is A & O x 4  Sensation:  No reported paresthesias  Edema:  None noted    Therapeutic Exercises:  NA    Patient education  Pt educated on safety    Patient response to education:   Pt verbalized understanding Pt demonstrated skill Pt electronic medical record.    Review of Systems  A comprehensive review of systems was obtained with pertinent positive findings as noted in the interval history above.  All other systems negative.    Vitals:    02/10/25 2139 02/10/25 2330 02/11/25 0500 02/11/25 0724   BP:  136/71  140/93   BP Location:  Right arm  Right arm   Patient Position:  Lying  Lying   Pulse: 74 66  67   Resp: 18 18  18   Temp:  99.1 °F (37.3 °C)  98.1 °F (36.7 °C)   TempSrc:  Oral  Oral   SpO2:  94%  94%   Weight:   63 kg (138 lb 12.8 oz)    Height:           Physical Exam  Constitutional:       Appearance: She is well-developed.   Eyes:      Conjunctiva/sclera: Conjunctivae normal.   Neck:      Thyroid: No thyromegaly.   Cardiovascular:      Rate and Rhythm: Normal rate and regular rhythm.      Heart sounds: No murmur heard.     No friction rub. No gallop.   Pulmonary:      Effort: No respiratory distress.      Breath sounds: Normal breath sounds.   Abdominal:      General: Bowel sounds are normal. There is no distension.      Palpations: Abdomen is soft.      Tenderness: There is no abdominal tenderness.   Lymphadenopathy:      Head:      Right side of head: No submandibular adenopathy.      Cervical: No cervical adenopathy.      Upper Body:      Right upper body: No supraclavicular adenopathy.      Left upper body: No supraclavicular adenopathy.   Skin:     General: Skin is warm and dry.      Findings: No rash.   Neurological:      Mental Status: She is alert and oriented to person, place, and time.      Cranial Nerves: No cranial nerve deficit.      Motor: No abnormal muscle tone.      Deep Tendon Reflexes: Reflexes normal.   Psychiatric:         Behavior: Behavior normal.         DIAGNOSTIC DATA:    Results from last 7 days   Lab Units 02/11/25  0455 02/10/25  0611 02/09/25  0620   WBC 10*3/mm3 0.71* 1.87* 2.37*   HEMOGLOBIN g/dL 12.8 12.6 14.0   HEMATOCRIT % 38.0 39.1 42.2   PLATELETS 10*3/mm3 193 197 229      Results from last 7 days    Lab Units 02/11/25  0455 02/10/25  1710 02/10/25  0611 02/09/25  0620   SODIUM mmol/L 136  --  137 135*   POTASSIUM mmol/L 4.5 4.3 3.6 3.5   CHLORIDE mmol/L 101  --  100 99   CO2 mmol/L 25.3  --  26.8 24.0   BUN mg/dL 10  --  8 13   CREATININE mg/dL 0.65  --  0.74 1.01*   CALCIUM mg/dL 9.0  --  8.7 8.9   BILIRUBIN mg/dL  --   --   --  0.4   ALK PHOS U/L  --   --   --  62   ALT (SGPT) U/L  --   --   --  27   AST (SGOT) U/L  --   --   --  44*   GLUCOSE mg/dL 134*  --  95 104*          Assessment & Plan   ASSESSMENT:  This is a 73 y.o. female with:    *Left lower lobe pneumonia, influenza  Patient was admitted on 2/9/2025 with pneumonia.    Chest x-ray showed infiltrate in the left base on 2/9/2025.    Respiratory viral panel was positive for influenza A H1.    Blood cultures negative.  Respiratory culture negative.    Patient receiving empiric Rocephin.    Patient also receiving Tamiflu 75 mg every 12 hours.    She was started on prednisone 40 mg daily.    *Acute leukopenia  Previous labs showed normal CBC results.    Most recent labs prior to admission from 7/1/2024 with WBC 6.4 with normal differential, hemoglobin 14.2, platelet count 285,000.    On admission 2/9/2025, WBC 2.37 with differential of 65 segs, 19 lymphs, 14 monocytes with ANC 1.55.    On 2/10/2025, WBC declined to 1.87, no differential performed.    On 2/11/2025, WBC declined to 0.71, no differential performed.    Patient maintains normal hemoglobin and platelet count.  Most likely the patient's acute leukopenia is related to viral infection with influenza.  Concern regarding the rapid decline and consideration as to whether ceftriaxone could be worsening the WBC.  Will send off additional labs with B12, folate, LDH and also check peripheral blood flow cytometry.  Continue to monitor count daily for now.  Patient is now neutropenic, will ask ID to see regarding recommendations for antibiotic coverage.    *DVT prophylaxis  Lovenox 40mg daily    PLAN:  Evaluation Time includes thorough review of current medical information, gathering information on past medical history/social history and prior level of function, completion of standardized testing/informal observation of tasks, assessment of data and education on plan of care and goals.     CPT codes:  [x] Low Complexity PT evaluation 32919  [] Moderate Complexity PT evaluation 99279  [] High Complexity PT evaluation 82051  [] PT Re-evaluation 92826  [] Gait training 18725 - minutes  [] Manual therapy 55666 - minutes  [x] Therapeutic activities 84679 20 minutes  [] Therapeutic exercises 31567 - minutes  [] Neuromuscular reeducation 01466 - minutes     [address], [place], [facility] [address] [id number]   Leukopenia/neutropenia most likely virally mediated  Contacted Dr. Tan regarding change in antibiotic coverage off of ceftriaxone (may be exacerbating leukopenia/neutropenia)  Consult infectious disease regarding recommendations for ongoing antibiotic coverage in light of neutropenia  Additional labs with B12, folate, peripheral blood flow cytometry, LDH  Neutropenic precautions  Daily CBC/differential    Discussed with patient at bedside.  Communicated with Dr. Tan.        Darrell Saavedra MD

## 2025-02-13 ENCOUNTER — TELEPHONE (OUTPATIENT)
Dept: FAMILY MEDICINE CLINIC | Age: 80
End: 2025-02-13

## 2025-02-13 NOTE — TELEPHONE ENCOUNTER
Corazon with [facility] called to report the pt's pulse is 43. All other vitals are fine. Corazon asked if there was anything that needed to be done.    Last seen 12/5/2024  Next appt 3/19/2025

## 2025-02-14 NOTE — TELEPHONE ENCOUNTER
Per Dr. Douglas-  Metoprolol was supposed to have been decreased to qd from bid.  If this is not the case, decrease to qd. If she is consistently bradycardic then the following week she will eliminate the metoprolol all together (so if she already decreased to qd then eliminate).    She can keep this metoprolol 12.5mg on hand if her heart rate elevates (and stays) above 120bpm and she is in atrial fibrillation.  If she takes an as needed dose in this fashion and 30 minutes her heart rate has not come down / Afib symptoms have not resolved then dial 911.    If she is dizzy near syncope cp palp sob molina she needs to be evaluated on an urgent basis.    She was supposed to have Zio patch done, I see no results in the computer.    She was going to call us in October and let us know if she wanted to switch to a local cardiologist from [facility], I am unsure if this happened so please confirm.  If she has not established with a new cardiologist, then she will need to see them... otherwise she will need a new referral from us to see Dr Vivas, Dr Valentin or Dr Long.     Called Corazon to advise entire PCP note. Corazon stated she will call the pt this morning and check with pt on this info and call us back with an update.

## 2025-02-14 NOTE — TELEPHONE ENCOUNTER
Discussed with patient  She states she has been taking Metoprolol 12.5 mg once daily for about a month.     She does not feel comfortable eliminating the Metoprolol completely. She states she has always been bradycardic even before being on the Metoprolol.   She will reach out to her cardiologist at [facility] to get an appointment and to discuss being off of the Metoprolol.    Patient advised to call back if she is unable to get an appointment with [facility] cardiology and we will place a referral to a local cardiologist.

## 2025-02-16 ENCOUNTER — APPOINTMENT (OUTPATIENT)
Dept: CT IMAGING | Age: 80
DRG: 079 | End: 2025-02-16
Payer: MEDICARE

## 2025-02-16 ENCOUNTER — HOSPITAL ENCOUNTER (INPATIENT)
Age: 80
LOS: 2 days | Discharge: HOME HEALTH | DRG: 079 | End: 2025-02-18
Attending: EMERGENCY MEDICINE | Admitting: FAMILY MEDICINE
Payer: MEDICARE

## 2025-02-16 ENCOUNTER — APPOINTMENT (OUTPATIENT)
Dept: GENERAL RADIOLOGY | Age: 80
DRG: 079 | End: 2025-02-16
Payer: MEDICARE

## 2025-02-16 DIAGNOSIS — G45.9 TIA (TRANSIENT ISCHEMIC ATTACK): ICD-10-CM

## 2025-02-16 DIAGNOSIS — I16.1 HYPERTENSIVE EMERGENCY: ICD-10-CM

## 2025-02-16 DIAGNOSIS — G93.40 ACUTE ENCEPHALOPATHY: Primary | ICD-10-CM

## 2025-02-16 DIAGNOSIS — I67.4 HYPERTENSIVE ENCEPHALOPATHY: ICD-10-CM

## 2025-02-16 DIAGNOSIS — R00.1 BRADYCARDIA: ICD-10-CM

## 2025-02-16 PROBLEM — M51.9 LUMBAR DISC DISORDER: Status: RESOLVED | Noted: 2024-08-12 | Resolved: 2025-02-16

## 2025-02-16 PROBLEM — T85.22XA DISLOCATED IOL (INTRAOCULAR LENS): Status: RESOLVED | Noted: 2018-11-01 | Resolved: 2025-02-16

## 2025-02-16 PROBLEM — M47.816 LUMBAR FACET ARTHROPATHY: Status: RESOLVED | Noted: 2024-08-12 | Resolved: 2025-02-16

## 2025-02-16 PROBLEM — M54.41 ACUTE MIDLINE LOW BACK PAIN WITH RIGHT-SIDED SCIATICA: Status: RESOLVED | Noted: 2020-07-06 | Resolved: 2025-02-16

## 2025-02-16 PROBLEM — M54.50 BACK PAIN AT L4-L5 LEVEL: Status: RESOLVED | Noted: 2020-07-05 | Resolved: 2025-02-16

## 2025-02-16 PROBLEM — F32.9 MAJOR DEPRESSIVE DISORDER WITH CURRENT ACTIVE EPISODE: Status: RESOLVED | Noted: 2021-04-27 | Resolved: 2025-02-16

## 2025-02-16 PROBLEM — M43.16 SPONDYLOLISTHESIS, LUMBAR REGION: Status: RESOLVED | Noted: 2020-09-16 | Resolved: 2025-02-16

## 2025-02-16 PROBLEM — H26.9 LEFT CATARACT: Status: RESOLVED | Noted: 2017-09-12 | Resolved: 2025-02-16

## 2025-02-16 PROBLEM — N18.9 CHRONIC KIDNEY DISEASE: Status: RESOLVED | Noted: 2023-01-25 | Resolved: 2025-02-16

## 2025-02-16 PROBLEM — M43.16 SPONDYLOLISTHESIS OF LUMBAR REGION: Status: RESOLVED | Noted: 2020-09-14 | Resolved: 2025-02-16

## 2025-02-16 PROBLEM — I10 HYPERTENSION: Status: RESOLVED | Noted: 2020-09-15 | Resolved: 2025-02-16

## 2025-02-16 PROBLEM — N39.46 MIXED INCONTINENCE: Status: RESOLVED | Noted: 2018-08-06 | Resolved: 2025-02-16

## 2025-02-16 PROBLEM — N81.6 RECTOCELE: Status: RESOLVED | Noted: 2018-08-06 | Resolved: 2025-02-16

## 2025-02-16 PROBLEM — R35.0 URINARY FREQUENCY: Status: RESOLVED | Noted: 2018-08-06 | Resolved: 2025-02-16

## 2025-02-16 PROBLEM — M54.16 LUMBAR RADICULOPATHY: Status: RESOLVED | Noted: 2024-11-25 | Resolved: 2025-02-16

## 2025-02-16 PROBLEM — H26.9 RIGHT CATARACT: Status: RESOLVED | Noted: 2018-10-09 | Resolved: 2025-02-16

## 2025-02-16 PROBLEM — N17.9 AKI (ACUTE KIDNEY INJURY): Status: RESOLVED | Noted: 2021-04-27 | Resolved: 2025-02-16

## 2025-02-16 LAB
ALBUMIN SERPL-MCNC: 4.2 G/DL (ref 3.5–5.2)
ALP SERPL-CCNC: 102 U/L (ref 35–104)
ALT SERPL-CCNC: 22 U/L (ref 0–32)
ANION GAP SERPL CALCULATED.3IONS-SCNC: 15 MMOL/L (ref 7–16)
AST SERPL-CCNC: 22 U/L (ref 0–31)
BACTERIA URNS QL MICRO: ABNORMAL
BASOPHILS # BLD: 0.03 K/UL (ref 0–0.2)
BASOPHILS NFR BLD: 0 % (ref 0–2)
BILIRUB SERPL-MCNC: 0.5 MG/DL (ref 0–1.2)
BILIRUB UR QL STRIP: NEGATIVE
BUN SERPL-MCNC: 27 MG/DL (ref 6–23)
CALCIUM SERPL-MCNC: 10.1 MG/DL (ref 8.6–10.2)
CHLORIDE SERPL-SCNC: 105 MMOL/L (ref 98–107)
CLARITY UR: CLEAR
CO2 SERPL-SCNC: 21 MMOL/L (ref 22–29)
COLOR UR: YELLOW
CREAT SERPL-MCNC: 1.1 MG/DL (ref 0.5–1)
EOSINOPHIL # BLD: 0.08 K/UL (ref 0.05–0.5)
EOSINOPHILS RELATIVE PERCENT: 1 % (ref 0–6)
EPI CELLS #/AREA URNS HPF: ABNORMAL /HPF
ERYTHROCYTE [DISTWIDTH] IN BLOOD BY AUTOMATED COUNT: 14.5 % (ref 11.5–15)
FLUAV RNA RESP QL NAA+PROBE: NOT DETECTED
FLUBV RNA RESP QL NAA+PROBE: NOT DETECTED
GFR, ESTIMATED: 53 ML/MIN/1.73M2
GLUCOSE BLD-MCNC: 158 MG/DL (ref 74–99)
GLUCOSE BLD-MCNC: 170 MG/DL (ref 74–99)
GLUCOSE SERPL-MCNC: 109 MG/DL (ref 74–99)
GLUCOSE UR STRIP-MCNC: NEGATIVE MG/DL
HCT VFR BLD AUTO: 38.7 % (ref 34–48)
HGB BLD-MCNC: 13.2 G/DL (ref 11.5–15.5)
HGB UR QL STRIP.AUTO: NEGATIVE
IMM GRANULOCYTES # BLD AUTO: 0.06 K/UL (ref 0–0.58)
IMM GRANULOCYTES NFR BLD: 1 % (ref 0–5)
KETONES UR STRIP-MCNC: NEGATIVE MG/DL
LEUKOCYTE ESTERASE UR QL STRIP: ABNORMAL
LYMPHOCYTES NFR BLD: 2.56 K/UL (ref 1.5–4)
LYMPHOCYTES RELATIVE PERCENT: 27 % (ref 20–42)
MAGNESIUM SERPL-MCNC: 1.3 MG/DL (ref 1.6–2.6)
MCH RBC QN AUTO: 32.1 PG (ref 26–35)
MCHC RBC AUTO-ENTMCNC: 34.1 G/DL (ref 32–34.5)
MCV RBC AUTO: 94.2 FL (ref 80–99.9)
MONOCYTES NFR BLD: 0.64 K/UL (ref 0.1–0.95)
MONOCYTES NFR BLD: 7 % (ref 2–12)
NEUTROPHILS NFR BLD: 64 % (ref 43–80)
NEUTS SEG NFR BLD: 6.01 K/UL (ref 1.8–7.3)
NITRITE UR QL STRIP: NEGATIVE
PH UR STRIP: 7 [PH] (ref 5–8)
PLATELET # BLD AUTO: 181 K/UL (ref 130–450)
PMV BLD AUTO: 9.6 FL (ref 7–12)
POTASSIUM SERPL-SCNC: 4.1 MMOL/L (ref 3.5–5)
PROT SERPL-MCNC: 7.2 G/DL (ref 6.4–8.3)
PROT UR STRIP-MCNC: NEGATIVE MG/DL
RBC # BLD AUTO: 4.11 M/UL (ref 3.5–5.5)
RBC #/AREA URNS HPF: ABNORMAL /HPF
SARS-COV-2 RNA RESP QL NAA+PROBE: NOT DETECTED
SODIUM SERPL-SCNC: 141 MMOL/L (ref 132–146)
SOURCE: NORMAL
SP GR UR STRIP: 1.01 (ref 1–1.03)
SPECIMEN DESCRIPTION: NORMAL
T4 FREE SERPL-MCNC: 1.4 NG/DL (ref 0.9–1.7)
TROPONIN I SERPL HS-MCNC: 19 NG/L (ref 0–9)
TSH SERPL DL<=0.05 MIU/L-ACNC: 1.66 UIU/ML (ref 0.27–4.2)
UROBILINOGEN UR STRIP-ACNC: 1 EU/DL (ref 0–1)
WBC #/AREA URNS HPF: ABNORMAL /HPF
WBC OTHER # BLD: 9.4 K/UL (ref 4.5–11.5)

## 2025-02-16 PROCEDURE — 80053 COMPREHEN METABOLIC PANEL: CPT

## 2025-02-16 PROCEDURE — 2500000003 HC RX 250 WO HCPCS: Performed by: FAMILY MEDICINE

## 2025-02-16 PROCEDURE — 83735 ASSAY OF MAGNESIUM: CPT

## 2025-02-16 PROCEDURE — 84443 ASSAY THYROID STIM HORMONE: CPT

## 2025-02-16 PROCEDURE — 96366 THER/PROPH/DIAG IV INF ADDON: CPT

## 2025-02-16 PROCEDURE — 99223 1ST HOSP IP/OBS HIGH 75: CPT | Performed by: FAMILY MEDICINE

## 2025-02-16 PROCEDURE — 84484 ASSAY OF TROPONIN QUANT: CPT

## 2025-02-16 PROCEDURE — 96375 TX/PRO/DX INJ NEW DRUG ADDON: CPT

## 2025-02-16 PROCEDURE — 6360000002 HC RX W HCPCS: Performed by: EMERGENCY MEDICINE

## 2025-02-16 PROCEDURE — 82962 GLUCOSE BLOOD TEST: CPT

## 2025-02-16 PROCEDURE — G0378 HOSPITAL OBSERVATION PER HR: HCPCS

## 2025-02-16 PROCEDURE — 84439 ASSAY OF FREE THYROXINE: CPT

## 2025-02-16 PROCEDURE — 2060000000 HC ICU INTERMEDIATE R&B

## 2025-02-16 PROCEDURE — 6370000000 HC RX 637 (ALT 250 FOR IP): Performed by: FAMILY MEDICINE

## 2025-02-16 PROCEDURE — 87086 URINE CULTURE/COLONY COUNT: CPT

## 2025-02-16 PROCEDURE — 99285 EMERGENCY DEPT VISIT HI MDM: CPT

## 2025-02-16 PROCEDURE — 96365 THER/PROPH/DIAG IV INF INIT: CPT

## 2025-02-16 PROCEDURE — 81001 URINALYSIS AUTO W/SCOPE: CPT

## 2025-02-16 PROCEDURE — 87636 SARSCOV2 & INF A&B AMP PRB: CPT

## 2025-02-16 PROCEDURE — 71045 X-RAY EXAM CHEST 1 VIEW: CPT

## 2025-02-16 PROCEDURE — 70450 CT HEAD/BRAIN W/O DYE: CPT

## 2025-02-16 PROCEDURE — 85025 COMPLETE CBC W/AUTO DIFF WBC: CPT

## 2025-02-16 PROCEDURE — 93005 ELECTROCARDIOGRAM TRACING: CPT | Performed by: EMERGENCY MEDICINE

## 2025-02-16 RX ORDER — DEXTROSE MONOHYDRATE 100 MG/ML
INJECTION, SOLUTION INTRAVENOUS CONTINUOUS PRN
Status: DISCONTINUED | OUTPATIENT
Start: 2025-02-16 | End: 2025-02-18 | Stop reason: HOSPADM

## 2025-02-16 RX ORDER — METOPROLOL TARTRATE 25 MG/1
12.5 TABLET, FILM COATED ORAL EVERY 12 HOURS
Status: DISCONTINUED | OUTPATIENT
Start: 2025-02-16 | End: 2025-02-16

## 2025-02-16 RX ORDER — SODIUM CHLORIDE 0.9 % (FLUSH) 0.9 %
5-40 SYRINGE (ML) INJECTION EVERY 12 HOURS SCHEDULED
Status: DISCONTINUED | OUTPATIENT
Start: 2025-02-16 | End: 2025-02-18 | Stop reason: HOSPADM

## 2025-02-16 RX ORDER — ACETAMINOPHEN 650 MG/1
650 SUPPOSITORY RECTAL EVERY 6 HOURS PRN
Status: DISCONTINUED | OUTPATIENT
Start: 2025-02-16 | End: 2025-02-18 | Stop reason: HOSPADM

## 2025-02-16 RX ORDER — HYDRALAZINE HYDROCHLORIDE 20 MG/ML
10 INJECTION INTRAMUSCULAR; INTRAVENOUS ONCE
Status: COMPLETED | OUTPATIENT
Start: 2025-02-16 | End: 2025-02-16

## 2025-02-16 RX ORDER — FLUTICASONE PROPIONATE 50 MCG
2 SPRAY, SUSPENSION (ML) NASAL DAILY
Status: DISCONTINUED | OUTPATIENT
Start: 2025-02-16 | End: 2025-02-18 | Stop reason: HOSPADM

## 2025-02-16 RX ORDER — ENOXAPARIN SODIUM 100 MG/ML
40 INJECTION SUBCUTANEOUS DAILY
Status: DISCONTINUED | OUTPATIENT
Start: 2025-02-16 | End: 2025-02-16

## 2025-02-16 RX ORDER — SODIUM CHLORIDE 0.9 % (FLUSH) 0.9 %
5-40 SYRINGE (ML) INJECTION PRN
Status: DISCONTINUED | OUTPATIENT
Start: 2025-02-16 | End: 2025-02-18 | Stop reason: HOSPADM

## 2025-02-16 RX ORDER — GLUCAGON 1 MG/ML
1 KIT INJECTION PRN
Status: DISCONTINUED | OUTPATIENT
Start: 2025-02-16 | End: 2025-02-18 | Stop reason: HOSPADM

## 2025-02-16 RX ORDER — INSULIN LISPRO 100 [IU]/ML
0-4 INJECTION, SOLUTION INTRAVENOUS; SUBCUTANEOUS
Status: DISCONTINUED | OUTPATIENT
Start: 2025-02-16 | End: 2025-02-18 | Stop reason: HOSPADM

## 2025-02-16 RX ORDER — 0.9 % SODIUM CHLORIDE 0.9 %
1000 INTRAVENOUS SOLUTION INTRAVENOUS ONCE
Status: DISCONTINUED | OUTPATIENT
Start: 2025-02-16 | End: 2025-02-18 | Stop reason: HOSPADM

## 2025-02-16 RX ORDER — OXYBUTYNIN CHLORIDE 5 MG/1
5 TABLET ORAL DAILY
Status: DISCONTINUED | OUTPATIENT
Start: 2025-02-17 | End: 2025-02-18 | Stop reason: HOSPADM

## 2025-02-16 RX ORDER — TOPIRAMATE 25 MG/1
50 TABLET, FILM COATED ORAL
Status: DISCONTINUED | OUTPATIENT
Start: 2025-02-16 | End: 2025-02-18 | Stop reason: HOSPADM

## 2025-02-16 RX ORDER — LEVOTHYROXINE SODIUM 50 UG/1
50 TABLET ORAL DAILY
Status: DISCONTINUED | OUTPATIENT
Start: 2025-02-17 | End: 2025-02-18 | Stop reason: HOSPADM

## 2025-02-16 RX ORDER — MAGNESIUM SULFATE IN WATER 40 MG/ML
2000 INJECTION, SOLUTION INTRAVENOUS ONCE
Status: COMPLETED | OUTPATIENT
Start: 2025-02-16 | End: 2025-02-16

## 2025-02-16 RX ORDER — HYDRALAZINE HYDROCHLORIDE 20 MG/ML
20 INJECTION INTRAMUSCULAR; INTRAVENOUS EVERY 4 HOURS PRN
Status: DISCONTINUED | OUTPATIENT
Start: 2025-02-16 | End: 2025-02-18 | Stop reason: HOSPADM

## 2025-02-16 RX ORDER — ALLOPURINOL 100 MG/1
200 TABLET ORAL DAILY
Status: DISCONTINUED | OUTPATIENT
Start: 2025-02-17 | End: 2025-02-18 | Stop reason: HOSPADM

## 2025-02-16 RX ORDER — GABAPENTIN 100 MG/1
100 CAPSULE ORAL 3 TIMES DAILY
Status: DISCONTINUED | OUTPATIENT
Start: 2025-02-16 | End: 2025-02-18 | Stop reason: HOSPADM

## 2025-02-16 RX ORDER — ATORVASTATIN CALCIUM 10 MG/1
10 TABLET, FILM COATED ORAL NIGHTLY
Status: DISCONTINUED | OUTPATIENT
Start: 2025-02-16 | End: 2025-02-18 | Stop reason: HOSPADM

## 2025-02-16 RX ORDER — ACETAMINOPHEN 325 MG/1
650 TABLET ORAL EVERY 6 HOURS PRN
Status: DISCONTINUED | OUTPATIENT
Start: 2025-02-16 | End: 2025-02-18 | Stop reason: HOSPADM

## 2025-02-16 RX ORDER — ASPIRIN 81 MG/1
81 TABLET ORAL DAILY
Status: DISCONTINUED | OUTPATIENT
Start: 2025-02-16 | End: 2025-02-18 | Stop reason: HOSPADM

## 2025-02-16 RX ORDER — PANTOPRAZOLE SODIUM 20 MG/1
20 TABLET, DELAYED RELEASE ORAL
Status: DISCONTINUED | OUTPATIENT
Start: 2025-02-17 | End: 2025-02-18 | Stop reason: HOSPADM

## 2025-02-16 RX ORDER — FINASTERIDE 5 MG/1
5 TABLET, FILM COATED ORAL DAILY
Status: DISCONTINUED | OUTPATIENT
Start: 2025-02-17 | End: 2025-02-18 | Stop reason: HOSPADM

## 2025-02-16 RX ORDER — PAROXETINE 20 MG/1
40 TABLET, FILM COATED ORAL EVERY MORNING
Status: DISCONTINUED | OUTPATIENT
Start: 2025-02-17 | End: 2025-02-18 | Stop reason: HOSPADM

## 2025-02-16 RX ADMIN — MAGNESIUM SULFATE IN WATER 2000 MG: 40 INJECTION, SOLUTION INTRAVENOUS at 14:34

## 2025-02-16 RX ADMIN — TOPIRAMATE 50 MG: 25 TABLET, FILM COATED ORAL at 22:12

## 2025-02-16 RX ADMIN — Medication 1000 ML: at 13:47

## 2025-02-16 RX ADMIN — APIXABAN 5 MG: 5 TABLET, FILM COATED ORAL at 21:33

## 2025-02-16 RX ADMIN — ASPIRIN 81 MG: 81 TABLET, COATED ORAL at 21:33

## 2025-02-16 RX ADMIN — ACETAMINOPHEN 650 MG: 325 TABLET ORAL at 22:12

## 2025-02-16 RX ADMIN — HYDRALAZINE HYDROCHLORIDE 10 MG: 20 INJECTION INTRAMUSCULAR; INTRAVENOUS at 15:31

## 2025-02-16 RX ADMIN — ATORVASTATIN CALCIUM 10 MG: 10 TABLET, FILM COATED ORAL at 22:12

## 2025-02-16 RX ADMIN — GABAPENTIN 100 MG: 100 CAPSULE ORAL at 21:33

## 2025-02-16 RX ADMIN — SODIUM CHLORIDE, PRESERVATIVE FREE 10 ML: 5 INJECTION INTRAVENOUS at 22:17

## 2025-02-16 ASSESSMENT — LIFESTYLE VARIABLES
HOW OFTEN DO YOU HAVE A DRINK CONTAINING ALCOHOL: NEVER
HOW MANY STANDARD DRINKS CONTAINING ALCOHOL DO YOU HAVE ON A TYPICAL DAY: PATIENT DOES NOT DRINK

## 2025-02-16 ASSESSMENT — PAIN SCALES - GENERAL: PAINLEVEL_OUTOF10: 4

## 2025-02-16 ASSESSMENT — PAIN DESCRIPTION - LOCATION: LOCATION: HEAD

## 2025-02-16 NOTE — ED PROVIDER NOTES
ED PROVIDER NOTE    Chief Complaint   Patient presents with    Altered Mental Status     Pt from home for increasing confusion.        HPI:  2/16/25,   Time: 1:19 PM EST       Charisse Sullivan is a 80 y.o. female presenting to the ED for altered mental status.  Acute onset this morning.  History provided by both patient and her daughter.  Her daughter states that she spoke with her last night and she was seeming like her normal self.  She has had some URI symptoms for the last week or so.  Patient does report persistent sinus congestion and cough.  Otherwise states that she is getting somewhat better from her illness.  This morning when the patient's daughter called her around 10:30 AM the patient was not making any sense.  There was no slurring of the speech or aphasia, however she was asking questions that did not make any sense.  Patient also reports some dizziness and exertional shortness of breath. No leg swelling.  Patient denies any headache, fever, chills, chest pain, abdominal pain, nausea, vomiting, diarrhea.  Decreased appetite and oral intake.  Normal urine output. No new meds. No drug/etoh use.    Chart review: hx of afib on apixaban, DM2, HTN, HLD, pulmonary HTN    Reviewed outpatient pulmonology progress note from 1/27/2025 by Dr. Lacey:  Dx SHIRLEY, obesity    Review of Systems:     Review of Systems  Pertinent positives and negatives as stated in HPI     --------------------------------------------- PAST HISTORY ---------------------------------------------  Past Medical History:   Past Medical History:   Diagnosis Date    A-fib (HCC)     Cancer (HCC)     skin     GERD (gastroesophageal reflux disease)     Hyperlipidemia     Hypertension     Movement disorder     Neuropathy     Minor    SHIRLEY on CPAP     Spinal stenosis     Thyroid disease     Tremor     Tremors of nervous system     familial    Type 2 diabetes mellitus without complication (HCC)        Past Surgical History:   Past Surgical History:  prognosis.  Questions are answered at this time and they are agreeable with the plan.    ED Course/Medical Decision Makin y.o. female here with intermittent confusion and altered mental status. Non-toxic appearing, afebrile, hemodynamically stable, and in no acute distress. Breathing comfortably on room air without respiratory distress. Neurologically intact. NIHSS 0.  During ED course patient became significantly hypertensive in the 220s systolic.  Daughter also stated that she had a self-limited episode of confusion during this time.  Patient was treated with IV hydralazine and subsequently had improvement in her blood pressure as well as her mental status.  Labs show hypomagnesemia which was replaced with IV magnesium. Workup otherwise unrevealing, CT head, CXR, troponin, UA are all reassuring.  Admitted to internal medicine service for further management       --------------------------------- IMPRESSION AND DISPOSITION ---------------------------------    IMPRESSION  1. Acute encephalopathy    2. Hypertensive emergency    3. Hypertensive encephalopathy        DISPOSITION  Disposition: Admit to telemetry  Patient condition is stable      NOTE: This report was transcribed using voice recognition software. Every effort was made to ensure accuracy; however, inadvertent computerized transcription errors may be present    Oliver Tolentino MD  Attending Emergency Physician         Oliver Tolentino MD  25

## 2025-02-17 ENCOUNTER — APPOINTMENT (OUTPATIENT)
Age: 80
DRG: 079 | End: 2025-02-17
Attending: INTERNAL MEDICINE
Payer: MEDICARE

## 2025-02-17 ENCOUNTER — APPOINTMENT (OUTPATIENT)
Dept: ULTRASOUND IMAGING | Age: 80
DRG: 079 | End: 2025-02-17
Payer: MEDICARE

## 2025-02-17 PROBLEM — I16.1 HYPERTENSIVE EMERGENCY: Status: ACTIVE | Noted: 2025-02-17

## 2025-02-17 PROBLEM — G45.9 TIA (TRANSIENT ISCHEMIC ATTACK): Status: ACTIVE | Noted: 2025-02-17

## 2025-02-17 PROBLEM — G93.40 ACUTE ENCEPHALOPATHY: Status: ACTIVE | Noted: 2025-02-17

## 2025-02-17 LAB
ANION GAP SERPL CALCULATED.3IONS-SCNC: 13 MMOL/L (ref 7–16)
BASOPHILS # BLD: 0.03 K/UL (ref 0–0.2)
BASOPHILS NFR BLD: 0 % (ref 0–2)
BUN SERPL-MCNC: 26 MG/DL (ref 6–23)
CALCIUM SERPL-MCNC: 9.2 MG/DL (ref 8.6–10.2)
CHLORIDE SERPL-SCNC: 109 MMOL/L (ref 98–107)
CO2 SERPL-SCNC: 19 MMOL/L (ref 22–29)
CREAT SERPL-MCNC: 1.1 MG/DL (ref 0.5–1)
ECHO AO ASC DIAM: 2.5 CM
ECHO AO ASCENDING AORTA INDEX: 1.29 CM/M2
ECHO AR MAX VEL PISA: 3.6 M/S
ECHO AV AREA PEAK VELOCITY: 2 CM2
ECHO AV AREA VTI: 2.2 CM2
ECHO AV AREA/BSA PEAK VELOCITY: 1 CM2/M2
ECHO AV AREA/BSA VTI: 1.1 CM2/M2
ECHO AV CUSP MM: 1.7 CM
ECHO AV MEAN GRADIENT: 7 MMHG
ECHO AV MEAN VELOCITY: 1.3 M/S
ECHO AV PEAK GRADIENT: 11 MMHG
ECHO AV PEAK VELOCITY: 1.7 M/S
ECHO AV REGURGITANT PHT: 569.1 MS
ECHO AV VELOCITY RATIO: 0.65
ECHO AV VTI: 41.3 CM
ECHO BSA: 1.99 M2
ECHO EST RA PRESSURE: 3 MMHG
ECHO LA DIAMETER INDEX: 2.11 CM/M2
ECHO LA DIAMETER: 4.1 CM
ECHO LA VOL A-L A2C: 48 ML (ref 22–52)
ECHO LA VOL A-L A4C: 62 ML (ref 22–52)
ECHO LA VOL BP: 55 ML (ref 22–52)
ECHO LA VOL MOD A2C: 46 ML (ref 22–52)
ECHO LA VOL MOD A4C: 59 ML (ref 22–52)
ECHO LA VOL/BSA BIPLANE: 28 ML/M2 (ref 16–34)
ECHO LA VOLUME AREA LENGTH: 58 ML
ECHO LA VOLUME INDEX A-L A2C: 25 ML/M2 (ref 16–34)
ECHO LA VOLUME INDEX A-L A4C: 32 ML/M2 (ref 16–34)
ECHO LA VOLUME INDEX AREA LENGTH: 30 ML/M2 (ref 16–34)
ECHO LA VOLUME INDEX MOD A2C: 24 ML/M2 (ref 16–34)
ECHO LA VOLUME INDEX MOD A4C: 30 ML/M2 (ref 16–34)
ECHO LV EDV A2C: 82 ML
ECHO LV EDV A4C: 100 ML
ECHO LV EDV BP: 91 ML (ref 56–104)
ECHO LV EDV INDEX A4C: 52 ML/M2
ECHO LV EDV INDEX BP: 47 ML/M2
ECHO LV EDV NDEX A2C: 42 ML/M2
ECHO LV EJECTION FRACTION A2C: 78 %
ECHO LV EJECTION FRACTION A4C: 71 %
ECHO LV EJECTION FRACTION BIPLANE: 74 % (ref 55–100)
ECHO LV ESV A2C: 18 ML
ECHO LV ESV A4C: 29 ML
ECHO LV ESV BP: 23 ML (ref 19–49)
ECHO LV ESV INDEX A2C: 9 ML/M2
ECHO LV ESV INDEX A4C: 15 ML/M2
ECHO LV ESV INDEX BP: 12 ML/M2
ECHO LV FRACTIONAL SHORTENING: 35 % (ref 28–44)
ECHO LV INTERNAL DIMENSION DIASTOLE INDEX: 2.53 CM/M2
ECHO LV INTERNAL DIMENSION DIASTOLIC: 4.9 CM (ref 3.9–5.3)
ECHO LV INTERNAL DIMENSION SYSTOLIC INDEX: 1.65 CM/M2
ECHO LV INTERNAL DIMENSION SYSTOLIC: 3.2 CM
ECHO LV ISOVOLUMETRIC RELAXATION TIME (IVRT): 125.6 MS
ECHO LV IVSD: 1 CM (ref 0.6–0.9)
ECHO LV MASS 2D: 176 G (ref 67–162)
ECHO LV MASS INDEX 2D: 90.7 G/M2 (ref 43–95)
ECHO LV POSTERIOR WALL DIASTOLIC: 1 CM (ref 0.6–0.9)
ECHO LV RELATIVE WALL THICKNESS RATIO: 0.41
ECHO LVOT AREA: 2.8 CM2
ECHO LVOT AV VTI INDEX: 0.76
ECHO LVOT DIAM: 1.9 CM
ECHO LVOT MEAN GRADIENT: 3 MMHG
ECHO LVOT PEAK GRADIENT: 5 MMHG
ECHO LVOT PEAK VELOCITY: 1.1 M/S
ECHO LVOT STROKE VOLUME INDEX: 45.6 ML/M2
ECHO LVOT SV: 88.4 ML
ECHO LVOT VTI: 31.2 CM
ECHO MV "A" WAVE DURATION: 159.9 MSEC
ECHO MV A VELOCITY: 1.22 M/S
ECHO MV AREA PHT: 3.6 CM2
ECHO MV AREA VTI: 2.1 CM2
ECHO MV E DECELERATION TIME (DT): 333.2 MS
ECHO MV E VELOCITY: 0.97 M/S
ECHO MV E/A RATIO: 0.8
ECHO MV LVOT VTI INDEX: 1.32
ECHO MV MAX VELOCITY: 1.3 M/S
ECHO MV MEAN GRADIENT: 2 MMHG
ECHO MV MEAN VELOCITY: 0.6 M/S
ECHO MV PEAK GRADIENT: 7 MMHG
ECHO MV PRESSURE HALF TIME (PHT): 60.7 MS
ECHO MV VTI: 41.2 CM
ECHO PV MAX VELOCITY: 1 M/S
ECHO PV MEAN GRADIENT: 3 MMHG
ECHO PV MEAN VELOCITY: 0.8 M/S
ECHO PV PEAK GRADIENT: 4 MMHG
ECHO PV VTI: 22 CM
ECHO PVEIN A DURATION: 137 MS
ECHO PVEIN A VELOCITY: 0.3 M/S
ECHO PVEIN PEAK D VELOCITY: 0.4 M/S
ECHO PVEIN PEAK S VELOCITY: 0.5 M/S
ECHO PVEIN S/D RATIO: 1.3
ECHO RIGHT VENTRICULAR SYSTOLIC PRESSURE (RVSP): 32 MMHG
ECHO RV INTERNAL DIMENSION: 3.5 CM
ECHO RV LONGITUDINAL DIMENSION: 6.5 CM
ECHO RV MID DIMENSION: 2.8 CM
ECHO TV REGURGITANT MAX VELOCITY: 2.68 M/S
ECHO TV REGURGITANT PEAK GRADIENT: 29 MMHG
EOSINOPHIL # BLD: 0.07 K/UL (ref 0.05–0.5)
EOSINOPHILS RELATIVE PERCENT: 1 % (ref 0–6)
ERYTHROCYTE [DISTWIDTH] IN BLOOD BY AUTOMATED COUNT: 14.6 % (ref 11.5–15)
GFR, ESTIMATED: 52 ML/MIN/1.73M2
GLUCOSE BLD-MCNC: 145 MG/DL (ref 74–99)
GLUCOSE BLD-MCNC: 165 MG/DL (ref 74–99)
GLUCOSE BLD-MCNC: 170 MG/DL (ref 74–99)
GLUCOSE SERPL-MCNC: 120 MG/DL (ref 74–99)
HCT VFR BLD AUTO: 34.1 % (ref 34–48)
HGB BLD-MCNC: 11.6 G/DL (ref 11.5–15.5)
IMM GRANULOCYTES # BLD AUTO: 0.05 K/UL (ref 0–0.58)
IMM GRANULOCYTES NFR BLD: 1 % (ref 0–5)
LYMPHOCYTES NFR BLD: 2.39 K/UL (ref 1.5–4)
LYMPHOCYTES RELATIVE PERCENT: 28 % (ref 20–42)
MAGNESIUM SERPL-MCNC: 1.8 MG/DL (ref 1.6–2.6)
MCH RBC QN AUTO: 32.1 PG (ref 26–35)
MCHC RBC AUTO-ENTMCNC: 34 G/DL (ref 32–34.5)
MCV RBC AUTO: 94.5 FL (ref 80–99.9)
MONOCYTES NFR BLD: 0.73 K/UL (ref 0.1–0.95)
MONOCYTES NFR BLD: 8 % (ref 2–12)
NEUTROPHILS NFR BLD: 62 % (ref 43–80)
NEUTS SEG NFR BLD: 5.43 K/UL (ref 1.8–7.3)
PLATELET # BLD AUTO: 169 K/UL (ref 130–450)
PMV BLD AUTO: 9.7 FL (ref 7–12)
POTASSIUM SERPL-SCNC: 4 MMOL/L (ref 3.5–5)
RBC # BLD AUTO: 3.61 M/UL (ref 3.5–5.5)
SODIUM SERPL-SCNC: 141 MMOL/L (ref 132–146)
WBC OTHER # BLD: 8.7 K/UL (ref 4.5–11.5)

## 2025-02-17 PROCEDURE — 99233 SBSQ HOSP IP/OBS HIGH 50: CPT | Performed by: INTERNAL MEDICINE

## 2025-02-17 PROCEDURE — 2060000000 HC ICU INTERMEDIATE R&B

## 2025-02-17 PROCEDURE — 93880 EXTRACRANIAL BILAT STUDY: CPT

## 2025-02-17 PROCEDURE — 97165 OT EVAL LOW COMPLEX 30 MIN: CPT

## 2025-02-17 PROCEDURE — 97530 THERAPEUTIC ACTIVITIES: CPT | Performed by: PHYSICAL THERAPIST

## 2025-02-17 PROCEDURE — 85025 COMPLETE CBC W/AUTO DIFF WBC: CPT

## 2025-02-17 PROCEDURE — 83735 ASSAY OF MAGNESIUM: CPT

## 2025-02-17 PROCEDURE — 6370000000 HC RX 637 (ALT 250 FOR IP): Performed by: INTERNAL MEDICINE

## 2025-02-17 PROCEDURE — 94660 CPAP INITIATION&MGMT: CPT

## 2025-02-17 PROCEDURE — 2500000003 HC RX 250 WO HCPCS: Performed by: FAMILY MEDICINE

## 2025-02-17 PROCEDURE — 80048 BASIC METABOLIC PNL TOTAL CA: CPT

## 2025-02-17 PROCEDURE — 36415 COLL VENOUS BLD VENIPUNCTURE: CPT

## 2025-02-17 PROCEDURE — G0378 HOSPITAL OBSERVATION PER HR: HCPCS

## 2025-02-17 PROCEDURE — 6360000002 HC RX W HCPCS: Performed by: FAMILY MEDICINE

## 2025-02-17 PROCEDURE — 6370000000 HC RX 637 (ALT 250 FOR IP): Performed by: FAMILY MEDICINE

## 2025-02-17 PROCEDURE — 93306 TTE W/DOPPLER COMPLETE: CPT

## 2025-02-17 PROCEDURE — 97535 SELF CARE MNGMENT TRAINING: CPT

## 2025-02-17 PROCEDURE — 97161 PT EVAL LOW COMPLEX 20 MIN: CPT | Performed by: PHYSICAL THERAPIST

## 2025-02-17 PROCEDURE — 96366 THER/PROPH/DIAG IV INF ADDON: CPT

## 2025-02-17 PROCEDURE — 82962 GLUCOSE BLOOD TEST: CPT

## 2025-02-17 RX ORDER — MAGNESIUM SULFATE IN WATER 40 MG/ML
2000 INJECTION, SOLUTION INTRAVENOUS ONCE
Status: COMPLETED | OUTPATIENT
Start: 2025-02-17 | End: 2025-02-17

## 2025-02-17 RX ORDER — AMLODIPINE BESYLATE 5 MG/1
5 TABLET ORAL DAILY
Status: DISCONTINUED | OUTPATIENT
Start: 2025-02-18 | End: 2025-02-18 | Stop reason: HOSPADM

## 2025-02-17 RX ADMIN — APIXABAN 5 MG: 5 TABLET, FILM COATED ORAL at 09:57

## 2025-02-17 RX ADMIN — OXYBUTYNIN CHLORIDE 5 MG: 5 TABLET ORAL at 09:11

## 2025-02-17 RX ADMIN — PANTOPRAZOLE SODIUM 20 MG: 20 TABLET, DELAYED RELEASE ORAL at 07:56

## 2025-02-17 RX ADMIN — ATORVASTATIN CALCIUM 10 MG: 10 TABLET, FILM COATED ORAL at 22:27

## 2025-02-17 RX ADMIN — GABAPENTIN 100 MG: 100 CAPSULE ORAL at 22:27

## 2025-02-17 RX ADMIN — MAGNESIUM SULFATE HEPTAHYDRATE 2000 MG: 40 INJECTION, SOLUTION INTRAVENOUS at 10:02

## 2025-02-17 RX ADMIN — ALLOPURINOL 200 MG: 100 TABLET ORAL at 09:10

## 2025-02-17 RX ADMIN — APIXABAN 5 MG: 5 TABLET, FILM COATED ORAL at 22:27

## 2025-02-17 RX ADMIN — SODIUM CHLORIDE, PRESERVATIVE FREE 10 ML: 5 INJECTION INTRAVENOUS at 22:28

## 2025-02-17 RX ADMIN — ACETAMINOPHEN 650 MG: 325 TABLET ORAL at 22:31

## 2025-02-17 RX ADMIN — LEVOTHYROXINE SODIUM 50 MCG: 0.05 TABLET ORAL at 07:56

## 2025-02-17 RX ADMIN — ASPIRIN 81 MG: 81 TABLET, COATED ORAL at 09:09

## 2025-02-17 RX ADMIN — SALINE NASAL SPRAY 1 SPRAY: 1.5 SOLUTION NASAL at 13:29

## 2025-02-17 RX ADMIN — TOPIRAMATE 50 MG: 25 TABLET, FILM COATED ORAL at 22:27

## 2025-02-17 RX ADMIN — FINASTERIDE 5 MG: 5 TABLET, FILM COATED ORAL at 09:10

## 2025-02-17 RX ADMIN — SODIUM CHLORIDE, PRESERVATIVE FREE 10 ML: 5 INJECTION INTRAVENOUS at 09:12

## 2025-02-17 RX ADMIN — PAROXETINE HYDROCHLORIDE 40 MG: 20 TABLET, FILM COATED ORAL at 09:10

## 2025-02-17 RX ADMIN — GABAPENTIN 100 MG: 100 CAPSULE ORAL at 13:29

## 2025-02-17 RX ADMIN — GABAPENTIN 100 MG: 100 CAPSULE ORAL at 09:09

## 2025-02-17 ASSESSMENT — PAIN SCALES - GENERAL
PAINLEVEL_OUTOF10: 4
PAINLEVEL_OUTOF10: 0

## 2025-02-17 ASSESSMENT — PAIN DESCRIPTION - LOCATION: LOCATION: GENERALIZED

## 2025-02-17 ASSESSMENT — PAIN DESCRIPTION - DESCRIPTORS: DESCRIPTORS: ACHING

## 2025-02-17 NOTE — H&P
[facility]   History and Physical      CHIEF COMPLAINT:  altered mental status    History of Present Illness:  80 y.o. female with a history of AFib on Eliquis, type 2 DM, HTN, HLD, SHIRLEY, CKD stage 3 presents with altered mental status.  Patient reports she woke up around 6am to go to the bathroom, took her CPAP off and when she got back to bed was unable to figure out how to get the CPAP back on.  Was unable to remember what day it was, what medications she needed to take, felt confused.  No headache, chest pain, shortness of breath or vision changes associated.  Has felt lightheaded recently.  Denied fall today or yesterday, but has had multiple falls recently which she thinks are related to losing her balance.  She is undergoing workup for possible Parkinsons.  Patient's daughter called around 11am to check on patient because patient has had URI symptoms for over a week, and noted that patient was confused.  Daughter drove to patient's house then called ambulance. By the time patient got to ED her mental status was back to baseline and her BP was 128/91, however during ED stay patient's BP went up to 227/87 accompanied by confusion.  Patient was given hydralazine and mental status again improved when BP went down.  Patient has been taking Mucinex Fast-Max which was tylenol, dextromethorphan, guaifenesin, and phenylephrine for her URI symptoms.  Has also been using her flonase 2 sprays BID.  Daughter remembers patient had a similar episode of confusion recently which self-resolved.  The patient and daughter had thought it might be hypoglycemia causing the confusion.  Workup in ED significant for BUN 27, creatinine 1.1 (at baseline), troponin 19, glucose 109, mag 1.3.  Flu/COVID negative.  CT head no acute finding.  CXR no acute finding.  Given magnesium and hydralazine in ED.    Informant(s) for H&P: patient, chart    REVIEW OF SYSTEMS:  no fevers, chills, cp, sob, n/v, ha,  vision/hearing changes, wt changes, hot/cold flashes, other open skin lesions, diarrhea, constipation, dysuria/hematuria unless noted in HPI. Complete ROS performed with the patient and is otherwise negative.      PMH:  Past Medical History:   Diagnosis Date    A-fib (HCC)     Cancer (HCC)     skin     GERD (gastroesophageal reflux disease)     Hyperlipidemia     Hypertension     Movement disorder     Neuropathy     Minor    SHIRLEY on CPAP     Spinal stenosis     Thyroid disease     Tremor     Tremors of nervous system     familial    Type 2 diabetes mellitus without complication (HCC)        Surgical History:  Past Surgical History:   Procedure Laterality Date    APPENDECTOMY      BLEPHAROPLASTY      CATARACT REMOVAL WITH IMPLANT Left 09/12/2017    CATARACT REMOVAL WITH IMPLANT Right 10/09/2018    CATARACT REMOVAL WITH IMPLANT Right 11/01/2018    secondary procedure to correct first cataract     CHOLECYSTECTOMY      COLONOSCOPY      COSMETIC SURGERY      abdominoplasty    ESOPHAGOGASTRODUODENOSCOPY      FOOT SURGERY Left     skin cancer    HYSTERECTOMY (CERVIX STATUS UNKNOWN)      LUMBAR SPINE SURGERY N/A 09/14/2020    L3-L4, L4-L5  POSTERIOR LUMBAR INTERBODY FUSION performed by Rubén Perez MD at [facility] [place]    OVARIAN CYST REMOVAL      PAIN MANAGEMENT PROCEDURE N/A 12/2/2024    Lumbar epidural steroid injection Lumbar 5-Sacral 1 performed by Sheldon Camacho MD at [facility] OR    UT RMVL IMPLT MATRL POSTERIOR SEGMENT INTRAOCULAR Right 11/01/2018    EYE IOL REMOVAL performed by Anibal ROBERTSON MD at [facility] OR    UT XCAPSL CTRC RMVL INSJ IO LENS PROSTH W/O ECP Right 10/09/2018    RIGHT EYE CATARACT EMULSIFICATION IOL IMPLANT performed by Anibal ROBERTSON MD at [facility] OR    SKIN BIOPSY      THYROIDECTOMY, PARTIAL      left lobe removed    TONSILLECTOMY      TUBAL LIGATION         Medications Prior to Admission:    Prior to Admission medications    Medication Sig Start Date End Date Taking? Authorizing

## 2025-02-17 NOTE — PROGRESS NOTES
4 Eyes Skin Assessment     NAME:  Charisse Sullivan  YOB: 1945  MEDICAL RECORD NUMBER:  [id number]    The patient is being assessed for  Admission    I agree that at least one RN has performed a thorough Head to Toe Skin Assessment on the patient. ALL assessment sites listed below have been assessed.      Areas assessed by both nurses:    Head, Face, Ears, Shoulders, Back, Chest, Arms, Elbows, Hands, Sacrum. Buttock, Coccyx, Ischium, and Legs. Feet and Heels        Does the Patient have a Wound? No noted wound(s)       Nicolás Prevention initiated by RN: Yes  Wound Care Orders initiated by RN: No    Pressure Injury (Stage 3,4, Unstageable, DTI, NWPT, and Complex wounds) if present, place Wound referral order by RN under : No    New Ostomies, if present place, Ostomy referral order under : No     Nurse 1 eSignature: Electronically signed by Amee Middleton RN on 2/17/25 at 2:00 AM EST    **SHARE this note so that the co-signing nurse can place an eSignature**    Nurse 2 eSignature: Electronically signed by Mona Mcgregor RN on 2/17/25 at 2:04 AM EST

## 2025-02-17 NOTE — PROGRESS NOTES
additional 2 g this morning.    Disposition  -Continue to monitor response to the above treatments but anticipate will require least 24 to 48 hours for medical optimization to ensure safe discharge.    Code Status: Full  DVT prophylaxis: Lovenox     Case discussed with daughter Nuvia at bedside.    Time spent reviewing chart notes, labs, imaging and speaking to consultants equivalent to 50 minutes.    NOTE: This report was transcribed using voice recognition software. Every effort was made to ensure accuracy; however, inadvertent computerized transcription errors may be present.     Electronically signed by Anibal Paz MD on 2/17/2025 at 1:09 PM

## 2025-02-17 NOTE — CARE COORDINATION
Case Management Assessment  Initial Evaluation    Date/Time of Evaluation: 2/17/2025 1:57 PM  Assessment Completed by: ODALIS Hamilton    If patient is discharged prior to next notation, then this note serves as note for discharge by case management.    Patient Name: Charisse Sullivan                   YOB: 1945  Diagnosis: Hypertensive encephalopathy [I67.4]  Acute encephalopathy [G93.40]  Hypertensive emergency [I16.1]                   Date / Time: 2/16/2025  1:10 PM    Patient Admission Status: Inpatient   Readmission Risk (Low < 19, Mod (19-27), High > 27): Readmission Risk Score: 11.5    Current PCP: Kaushal Douglas, DO  PCP verified by CM? Yes    Chart Reviewed: Yes      History Provided by: Patient  Patient Orientation: Alert and Oriented    Patient Cognition: Alert    Hospitalization in the last 30 days (Readmission):  No    If yes, Readmission Assessment in CM Navigator will be completed.    Advance Directives:      Code Status: Full Code   Patient's Primary Decision Maker is: Legal Next of Kin    Primary Decision Maker: LaurieNuvia - Child - [phone]    Secondary Decision Maker: Sacha Sullivan - Child - [phone]    Discharge Planning:    Patient lives with: Alone Type of Home: House  Primary Care Giver: Self  Patient Support Systems include: Children   Current Financial resources: Medicare  Current community resources: ECF/Home Care  Current services prior to admission: Private Duty Homecare            Current DME:              Type of Home Care services:  PT, OT, Aide Services    ADLS  Prior functional level: Independent in ADLs/IADLs  Current functional level: Independent in ADLs/IADLs    PT AM-PAC: 18 /24  OT AM-PAC: 20 /24    Family can provide assistance at DC: Yes  Would you like Case Management to discuss the discharge plan with any other family members/significant others, and if so, who? Yes (ok to talk to dtr or son)  Plans to Return to Present Housing:

## 2025-02-17 NOTE — PROGRESS NOTES
tub transfer bench, Rollater, ww, walker, cane       Prior Level of Function: Assistance from hired help with showers with ADLs , Assistance from family and hired help with IADLs; ambulated with rollator and cane as needed    Driving: Not since August  Occupation: No    Pain Level: 0/10 pain reported     Cognition: A&O: 4/4; Follows Multiple step directions   Memory: good    Sequencing: good    Problem solving: good    Judgement/safety: good     [facility]       AM-PAC Daily Activity - Inpatient   How much help is needed for putting on and taking off regular lower body clothing?: A Little  How much help is needed for bathing (which includes washing, rinsing, drying)?: A Little  How much help is needed for toileting (which includes using toilet, bedpan, or urinal)?: A Little  How much help is needed for putting on and taking off regular upper body clothing?: None  How much help is needed for taking care of personal grooming?: A Little  How much help for eating meals?: None  AM-[facility] Inpatient Daily Activity Raw Score: 20  AM-PAC Inpatient ADL T-Scale Score : 42.03  ADL Inpatient CMS 0-100% Score: 38.32  ADL Inpatient CMS G-Code Modifier : CJ     Functional Assessment:    Initial Eval Status  Date: 2/17/25 Treatment Status  Date: STGs = LTGs  Time frame: 10-14 days   Feeding Independent   Independent    Grooming Supervision     Unsteadiness standing at sink completing unilateral tasks due to increase fatigue/SOB; Educated on EC techniques.    Completed brushing of hair and oral care.   Independent    UB Dressing Independent   Independent    LB Dressing Minimal Assist     Assistance donning R sock; Educated and provided sock aid.   Modified [place]    Bathing Minimal Assist  Modified [place]    Toileting Supervision   Modified [place]    Bed Mobility  Supine to sit: Supervision   Sit to supine:  N/T   Rolling: N/T    Seated in bedside chair    Supine to sit: Modified [place]   Sit to supine: Modified  development of plan of care and goals.        Evaluating OT: Rayshawn Starks OTR/L; #[id number]

## 2025-02-17 NOTE — PROGRESS NOTES
Physical Therapy  Physical Therapy Initial Evaluation/Plan of Care    Room #:  0633/0633-01  Patient Name: Charisse Sullivan  YOB: 1945  MRN: [id number]    Date of Service: 2/17/2025     Tentative placement recommendation: Home with Home Health Physical Therapy vs Home with no PT  Equipment recommendation: Patient has needed equipment       Evaluating Physical Therapist: Nomi Dias, PT, DPT #[id number]      Specific Provider Orders/Date/Referring Provider :     02/16/25 1930    PT evaluation and treat  Start:  02/16/25 1930,   End:  02/16/25 1930,   ONE TIME,   Standing Count:  1 Occurrences,   R       Dilan, Veronica ROBERTS, DO Acknowledge New    Admitting Diagnosis:   Hypertensive encephalopathy [I67.4]  Acute encephalopathy [G93.40]  Hypertensive emergency [I16.1]      Surgery: none  Visit Diagnoses         Codes    Acute encephalopathy    -  Primary G93.40    Hypertensive emergency     I16.1            Patient Active Problem List   Diagnosis    Obesity    S/P lumbar fusion    Type 2 diabetes mellitus with chronic kidney disease (HCC)    Atrial fibrillation (HCC)    Hypothyroidism    Anemia of chronic disease    SHIRLEY (obstructive sleep apnea)    Chronic gout without tophus    OAB (overactive bladder)    Chronic kidney disease, stage 3a (HCC)     Microalbuminuric diabetic nephropathy (HCC)    Mixed hyperlipidemia    Mixed stress and urge urinary incontinence    Mild pulmonary hypertension (HCC)    Chronic pain syndrome    Hypertensive encephalopathy        ASSESSMENT of Current Deficits Patient exhibits decreased strength, balance, and endurance impairing functional mobility, transfers, gait , gait distance, and tolerance to activity. Pt mildly unsteady with ambulation and stairs with no LOB, dizziness, or SOB during session with instruction for WW control and approximation.        PHYSICAL THERAPY  PLAN OF CARE       Physical therapy plan of care is established based on physician order,  patient diagnosis and

## 2025-02-17 NOTE — PLAN OF CARE
Problem: Chronic Conditions and Co-morbidities  Goal: Patient's chronic conditions and co-morbidity symptoms are monitored and maintained or improved  Outcome: Progressing  Flowsheets (Taken 2/16/2025 2201)  Care Plan - Patient's Chronic Conditions and Co-Morbidity Symptoms are Monitored and Maintained or Improved: Monitor and assess patient's chronic conditions and comorbid symptoms for stability, deterioration, or improvement     Problem: Discharge Planning  Goal: Discharge to home or other facility with appropriate resources  Outcome: Progressing  Flowsheets (Taken 2/16/2025 2201)  Discharge to home or other facility with appropriate resources: Identify barriers to discharge with patient and caregiver     Problem: Safety - Adult  Goal: Free from fall injury  Outcome: Progressing

## 2025-02-17 NOTE — PLAN OF CARE
Problem: Chronic Conditions and Co-morbidities  Goal: Patient's chronic conditions and co-morbidity symptoms are monitored and maintained or improved  2/17/2025 1132 by Ann-Marie Moncada RN  Outcome: Progressing  Flowsheets (Taken 2/16/2025 2201 by Amee Middleton, RN)  Care Plan - Patient's Chronic Conditions and Co-Morbidity Symptoms are Monitored and Maintained or Improved: Monitor and assess patient's chronic conditions and comorbid symptoms for stability, deterioration, or improvement     Problem: Discharge Planning  Goal: Discharge to home or other facility with appropriate resources  2/17/2025 1132 by Ann-Marie Moncada RN  Outcome: Progressing  Flowsheets (Taken 2/16/2025 2201 by Amee Middleton, RN)  Discharge to home or other facility with appropriate resources: Identify barriers to discharge with patient and caregiver     Problem: Safety - Adult  Goal: Free from fall injury  2/17/2025 1132 by Ann-Marie Moncada RN  Outcome: Progressing  Flowsheets (Taken 2/17/2025 1132)  Free From Fall Injury:   Instruct family/caregiver on patient safety   Based on caregiver fall risk screen, instruct family/caregiver to ask for assistance with transferring infant if caregiver noted to have fall risk factors     Problem: ABCDS Injury Assessment  Goal: Absence of physical injury  Outcome: Progressing  Flowsheets (Taken 2/17/2025 1132)  Absence of Physical Injury: Implement safety measures based on patient assessment     Problem: ABCDS Injury Assessment  Goal: Absence of physical injury  Outcome: Progressing  Flowsheets (Taken 2/17/2025 1132)  Absence of Physical Injury: Implement safety measures based on patient assessment     Problem: Pain  Goal: Verbalizes/displays adequate comfort level or baseline comfort level  Outcome: Progressing  Flowsheets (Taken 2/17/2025 1132)  Verbalizes/displays adequate comfort level or baseline comfort level:   Encourage patient to monitor pain and request assistance   Assess pain  using appropriate pain scale   Administer analgesics based on type and severity of pain and evaluate response   Implement non-pharmacological measures as appropriate and evaluate response   Consider cultural and social influences on pain and pain management

## 2025-02-17 NOTE — CONSULTS
Today's Date: 2/17/2025  Patient Name: Charisse Sullivan  Date of admission: 2/16/2025  1:10 PM  Patient's age: 80 y.o., 1945female    Admission Dx: Hypertensive encephalopathy [I67.4]  Acute encephalopathy [G93.40]  Hypertensive emergency [I16.1]    Requesting Physician: Veronica Kong DO    Chief Complaint   Patient presents with    Altered Mental Status     Pt from home for increasing confusion.        HISTORY OF PRESENT ILLNESS:      80-year-old pleasant lady that has history of chronic hypertension, probable Parkinson's disease, hypercholesterolemia, diabetes, hypothyroidism, paroxysmal atrial fibrillation, and other problems as below, was brought to the hospital secondary to hypertensive urgency and episodes of confusion.  She was on metoprolol for blood pressure control which was discontinued recently about couple days ago secondary to slow heart rhythm.  She is being evaluated for Parkinson disease.  He denies chest pain  She has been getting short of breath with exertion.      Review of systems:    Negative 10 systems review, except as mentioned above.      Past Medical History:   has a past medical history of A-fib (HCC), Cancer (HCC), GERD (gastroesophageal reflux disease), Hyperlipidemia, Hypertension, Movement disorder, Neuropathy, SHIRLEY on CPAP, Spinal stenosis, Thyroid disease, Tremor, Tremors of nervous system, and Type 2 diabetes mellitus without complication (HCC).    Past Surgical History:   has a past surgical history that includes Thyroidectomy, partial; skin biopsy; Tonsillectomy; ovarian cyst removal; Cholecystectomy; Cosmetic surgery; blepharoplasty; Hysterectomy; Tubal ligation; Foot surgery (Left); Cataract removal with implant (Left, 09/12/2017); Cataract removal with implant (Right, 10/09/2018); pr xcapsl ctrc rmvl insj io lens prosth w/o ecp (Right, 10/09/2018); Cataract removal with implant (Right, 11/01/2018); pr rmvl implt matrl posterior segment intraocular (Right, 11/01/2018);  microvascular ischemic disease.     XR CHEST PORTABLE    Result Date: 2/16/2025  EXAMINATION: ONE XRAY VIEW OF THE CHEST 2/16/2025 3:21 pm COMPARISON: 08/10/2021 HISTORY: ORDERING SYSTEM PROVIDED HISTORY: ams TECHNOLOGIST PROVIDED HISTORY: Reason for exam:->ams FINDINGS: The lungs are without acute focal process.  There is no effusion or pneumothorax. The cardiomediastinal silhouette is without acute process. The osseous structures are without acute process.     No acute process.               IMPRESSION:      Hypertensive urgency  Mental status changes  Questionable Parkinson's disease  Atrial fibrillation  Diabetes  Obstructive sleep apnea  Hyperlipidemia  Chronic renal insufficiency stage II    RECOMMENDATIONS:    Will start patient on low-dose Norvasc  She may have had hypertensive urgency secondary to rebound after discontinuation of her beta-blocker recently.  Continue anticoagulation  Her neurological symptoms might not be related to her blood pressure problems and might be secondary to underlying Parkinson disease or other neurological disease.  She had an echocardiogram that showed good LV function        Gustavo Valentin MD, MD, FACC

## 2025-02-18 VITALS
DIASTOLIC BLOOD PRESSURE: 73 MMHG | WEIGHT: 190 LBS | HEIGHT: 65 IN | HEART RATE: 62 BPM | SYSTOLIC BLOOD PRESSURE: 152 MMHG | BODY MASS INDEX: 31.65 KG/M2 | RESPIRATION RATE: 18 BRPM | TEMPERATURE: 98.1 F | OXYGEN SATURATION: 98 %

## 2025-02-18 LAB
EKG ATRIAL RATE: 45 BPM
EKG P AXIS: 11 DEGREES
EKG P-R INTERVAL: 156 MS
EKG Q-T INTERVAL: 448 MS
EKG QRS DURATION: 76 MS
EKG QTC CALCULATION (BAZETT): 387 MS
EKG R AXIS: 0 DEGREES
EKG T AXIS: 46 DEGREES
EKG VENTRICULAR RATE: 45 BPM
GLUCOSE BLD-MCNC: 172 MG/DL (ref 74–99)
GLUCOSE BLD-MCNC: 203 MG/DL (ref 74–99)
MICROORGANISM SPEC CULT: ABNORMAL
MICROORGANISM SPEC CULT: ABNORMAL
SERVICE CMNT-IMP: ABNORMAL
SPECIMEN DESCRIPTION: ABNORMAL

## 2025-02-18 PROCEDURE — 94660 CPAP INITIATION&MGMT: CPT

## 2025-02-18 PROCEDURE — 99239 HOSP IP/OBS DSCHRG MGMT >30: CPT | Performed by: INTERNAL MEDICINE

## 2025-02-18 PROCEDURE — 6370000000 HC RX 637 (ALT 250 FOR IP): Performed by: INTERNAL MEDICINE

## 2025-02-18 PROCEDURE — 93010 ELECTROCARDIOGRAM REPORT: CPT | Performed by: INTERNAL MEDICINE

## 2025-02-18 PROCEDURE — G0378 HOSPITAL OBSERVATION PER HR: HCPCS

## 2025-02-18 PROCEDURE — 82962 GLUCOSE BLOOD TEST: CPT

## 2025-02-18 PROCEDURE — 6370000000 HC RX 637 (ALT 250 FOR IP): Performed by: FAMILY MEDICINE

## 2025-02-18 PROCEDURE — 93246 EXT ECG>7D<15D RECORDING: CPT

## 2025-02-18 PROCEDURE — 2500000003 HC RX 250 WO HCPCS: Performed by: FAMILY MEDICINE

## 2025-02-18 RX ORDER — AMLODIPINE BESYLATE 5 MG/1
5 TABLET ORAL DAILY
Qty: 30 TABLET | Refills: 0 | Status: SHIPPED | OUTPATIENT
Start: 2025-02-19

## 2025-02-18 RX ADMIN — APIXABAN 5 MG: 5 TABLET, FILM COATED ORAL at 08:36

## 2025-02-18 RX ADMIN — GABAPENTIN 100 MG: 100 CAPSULE ORAL at 13:39

## 2025-02-18 RX ADMIN — AMLODIPINE BESYLATE 5 MG: 5 TABLET ORAL at 08:43

## 2025-02-18 RX ADMIN — FINASTERIDE 5 MG: 5 TABLET, FILM COATED ORAL at 08:37

## 2025-02-18 RX ADMIN — ALLOPURINOL 200 MG: 100 TABLET ORAL at 08:36

## 2025-02-18 RX ADMIN — SODIUM CHLORIDE, PRESERVATIVE FREE 10 ML: 5 INJECTION INTRAVENOUS at 08:35

## 2025-02-18 RX ADMIN — PANTOPRAZOLE SODIUM 20 MG: 20 TABLET, DELAYED RELEASE ORAL at 06:22

## 2025-02-18 RX ADMIN — PAROXETINE HYDROCHLORIDE 40 MG: 20 TABLET, FILM COATED ORAL at 08:35

## 2025-02-18 RX ADMIN — GABAPENTIN 100 MG: 100 CAPSULE ORAL at 08:35

## 2025-02-18 RX ADMIN — LEVOTHYROXINE SODIUM 50 MCG: 0.05 TABLET ORAL at 06:22

## 2025-02-18 RX ADMIN — ASPIRIN 81 MG: 81 TABLET, COATED ORAL at 08:37

## 2025-02-18 RX ADMIN — OXYBUTYNIN CHLORIDE 5 MG: 5 TABLET ORAL at 08:43

## 2025-02-18 NOTE — CARE COORDINATION
SOCIAL WORK / DISCHARGE PLANNING:  Pt to discharge home today. Sw spoke with pt at bedside. She was asking about taking ww home. Sw confirmed pt already has ww and rollater at home but was asking because someone told her she could take it home. Resume HHC order with nursing added in Epic and Ryan, [facility] rep is aware. Has all necessary dme needed. Family will provide home going transport.                 Electronically signed by ODALIS Taylor on 2/18/2025 at 2:02 PM

## 2025-02-18 NOTE — PLAN OF CARE
Problem: Chronic Conditions and Co-morbidities  Goal: Patient's chronic conditions and co-morbidity symptoms are monitored and maintained or improved  2/18/2025 0104 by Amee Middleton RN  Outcome: Progressing  2/17/2025 1132 by Ann-Marie Moncada RN  Outcome: Progressing  Flowsheets  Taken 2/17/2025 0945 by Amee Middleton RN  Care Plan - Patient's Chronic Conditions and Co-Morbidity Symptoms are Monitored and Maintained or Improved: Monitor and assess patient's chronic conditions and comorbid symptoms for stability, deterioration, or improvement  Taken 2/16/2025 2201 by Amee Middleton RN  Care Plan - Patient's Chronic Conditions and Co-Morbidity Symptoms are Monitored and Maintained or Improved: Monitor and assess patient's chronic conditions and comorbid symptoms for stability, deterioration, or improvement     Problem: Discharge Planning  Goal: Discharge to home or other facility with appropriate resources  2/18/2025 0104 by Amee Middleton RN  Outcome: Progressing  2/17/2025 1132 by Ann-Marie Moncada RN  Outcome: Progressing  Flowsheets  Taken 2/17/2025 0945 by Amee Middleton RN  Discharge to home or other facility with appropriate resources: Identify barriers to discharge with patient and caregiver  Taken 2/16/2025 2201 by Amee Middleton RN  Discharge to home or other facility with appropriate resources: Identify barriers to discharge with patient and caregiver     Problem: Safety - Adult  Goal: Free from fall injury  2/18/2025 0104 by Amee Middleton RN  Outcome: Progressing  2/17/2025 1132 by Ann-Marie Moncada RN  Outcome: Progressing  Flowsheets (Taken 2/17/2025 1132)  Free From Fall Injury:   Instruct family/caregiver on patient safety   Based on caregiver fall risk screen, instruct family/caregiver to ask for assistance with transferring infant if caregiver noted to have fall risk factors     Problem: ABCDS Injury Assessment  Goal: Absence of physical injury  2/17/2025 1132  by Ann-Marie Moncada, RN  Outcome: Progressing  Flowsheets (Taken 2/17/2025 1132)  Absence of Physical Injury: Implement safety measures based on patient assessment     Problem: Pain  Goal: Verbalizes/displays adequate comfort level or baseline comfort level  2/17/2025 1132 by Ann-Marie Moncada, RN  Outcome: Progressing  Flowsheets (Taken 2/17/2025 1132)  Verbalizes/displays adequate comfort level or baseline comfort level:   Encourage patient to monitor pain and request assistance   Assess pain using appropriate pain scale   Administer analgesics based on type and severity of pain and evaluate response   Implement non-pharmacological measures as appropriate and evaluate response   Consider cultural and social influences on pain and pain management

## 2025-02-18 NOTE — DISCHARGE SUMMARY
CARDIOLOGY  IP CONSULT TO SOCIAL WORK    Procedures: ***    Hospital Course: ***    Discharge Exam:  Vitals:    02/18/25 0015 02/18/25 0330 02/18/25 0815 02/18/25 1115   BP: (!) 153/68 129/60 (!) 141/76 (!) 152/73   Pulse: 60 53 53 62   Resp: 18 19 18 18   Temp: 98.4 °F (36.9 °C) 98.8 °F (37.1 °C) 97.7 °F (36.5 °C) 98.1 °F (36.7 °C)   TempSrc: Axillary Axillary Oral Oral   SpO2: 99% 98% 98% 98%   Weight:       Height:           {GENERAL PHYSICAL EXAM:19990}  I/O last 3 completed shifts:  In: 240 [P.O.:240]  Out: -   I/O this shift:  In: 420 [P.O.:420]  Out: -       LABS:  Recent Labs     02/16/25  1321 02/17/25  0520    141   K 4.1 4.0    109*   CO2 21* 19*   BUN 27* 26*   CREATININE 1.1* 1.1*   GLUCOSE 109* 120*   CALCIUM 10.1 9.2       Recent Labs     02/16/25  1321 02/17/25  0520   WBC 9.4 8.7   RBC 4.11 3.61   HGB 13.2 11.6   HCT 38.7 34.1   MCV 94.2 94.5   MCH 32.1 32.1   MCHC 34.1 34.0   RDW 14.5 14.6    169   MPV 9.6 9.7       Recent Labs     02/17/25  1557 02/17/25  2230 02/18/25  0638 02/18/25  1124   POCGLU 170* 165* 172* 203*       {LABS:[id number]}    Imaging:  Vascular duplex carotid bilateral    Result Date: 2/17/2025  EXAMINATION: ULTRASOUND EVALUATION OF THE CAROTID ARTERIES 2/17/2025 TECHNIQUE: Duplex ultrasound using B-mode/gray scaled imaging, Doppler spectral analysis and color flow Doppler was obtained of the carotid arteries. COMPARISON: None. HISTORY: ORDERING SYSTEM PROVIDED HISTORY: TIA TECHNOLOGIST PROVIDED HISTORY: What reading provider will be dictating this exam?->CRC FINDINGS: RIGHT: The right common carotid artery demonstrates peak systolic velocities of 69/54 cm/sec in the proximal and distal segments respectively.  The right common carotid artery demonstrates end-diastolic velocities of 8/12 cm/sec in the proximal and distal segments respectively. The right internal carotid artery demonstrates the systolic velocities of 54/59/80 cm/sec in the proximal, mid and  (PAXIL) 40 MG tablet Take 1 tablet by mouth every morning      finasteride (PROSCAR) 5 MG tablet Take 1 tablet by mouth daily      furosemide (LASIX) 20 MG tablet Take 1 tablet by mouth daily as needed (swelling) If using more than three times per week must call the office.  Qty: 90 tablet, Refills: 1      aspirin 81 MG EC tablet Take 1 tablet by mouth daily    Associated Diagnoses: Diabetes mellitus due to underlying condition with stage 3a chronic kidney disease, without long-term current use of insulin (HCC)      CPAP Machine MISC by Does not apply route nightly      latanoprost (XALATAN) 0.005 % ophthalmic solution Place 1 drop into the left eye nightly      gabapentin (NEURONTIN) 100 MG capsule Take 1 capsule by mouth 3 times daily for 180 days. Intended supply: 90 days  start nightly and increase to tid as tolerated.  Qty: 270 capsule, Refills: 1    Associated Diagnoses: Movement disorder; Tremor; Tear of left rotator cuff, unspecified tear extent, unspecified whether traumatic; Lumbar disc disorder; Lumbar spondylosis           STOP taking these medications       fluticasone (FLONASE) 50 MCG/ACT nasal spray Comments:   Reason for Stopping:         metoprolol tartrate (LOPRESSOR) 25 MG tablet Comments:   Reason for Stopping:             Case discussed with daughter Nuvia at bedside prior to discharge.    Case discussed with Dr. Valentin of cardiology prior to discharge.    Note  that  31  minutes were spent in preparing discharge papers, discussing discharge with patient, medication review, etc.    NOTE: This report was transcribed using voice recognition software. Every effort was made to ensure accuracy; however, inadvertent computerized transcription errors may be present.     Signed:  Electronically signed by Anibal Paz MD on 2/18/2025 at 3:36 PM

## 2025-02-18 NOTE — PROGRESS NOTES
CLINICAL PHARMACY NOTE: MEDS TO BEDS    Total # of Prescriptions Filled: 1   The following medications were delivered to the patient:  Amlodipine 5 mg    Additional Documentation:

## 2025-02-18 NOTE — DISCHARGE INSTR - DIET

## 2025-02-19 ENCOUNTER — CARE COORDINATION (OUTPATIENT)
Dept: CARE COORDINATION | Age: 80
End: 2025-02-19

## 2025-02-19 ENCOUNTER — TELEPHONE (OUTPATIENT)
Dept: FAMILY MEDICINE CLINIC | Age: 80
End: 2025-02-19

## 2025-02-19 DIAGNOSIS — I16.1 HYPERTENSIVE EMERGENCY: Primary | ICD-10-CM

## 2025-02-19 PROCEDURE — 1111F DSCHRG MED/CURRENT MED MERGE: CPT | Performed by: SURGERY

## 2025-02-19 NOTE — CARE COORDINATION
Care Transitions Note    Initial Call - Call within 2 business days of discharge: Yes    Patient Current Location:  Home: [address] [place] [id number]    Care Transition Nurse contacted the patient by telephone to perform post hospital discharge assessment, verified name and  as identifiers. Provided introduction to self, and explanation of the Care Transition Nurse role.     Patient: Charisse Sullivan    Patient : 1945   MRN: [id number]    Reason for Admission: hypertensive encephalopathy  Discharge Date: 25  RURS: Readmission Risk Score: 10.8      Last Discharge Facility       Date Complaint Diagnosis Description Type Department Provider    25 Altered Mental Status Acute encephalopathy ... ED to Hosp-Admission (Discharged) (ADMITTED) [facility] Anibal Paz MD; Karla Tolentino...            Was this an external facility discharge? No    Additional needs identified to be addressed with provider   Standard priority: needs referral to Dr Valentin, HFU PCP             Method of communication with provider: phone.    Patients top risk factors for readmission: AFib on Eliquis, type 2 DM, HTN, HLD, SHIRLEY, CKD stage 3     Interventions to address risk factors:   Education: blood thinner precautions, monitor and log BPs, pulse, appt needs  Review of patient management of conditions/medications: reviewed medications, side effects  Home Health: [facility] resumption of care today with RN   Referrals: Cardiology referral needed   Communication with providers: Phoned PCP for f/u appt, referral for CardiologySp    Care Summary Note: Spoke to Charisse for transitions initial call. Patient was admitted to [facility]  for hypertensive encephalopathy, AMS. Pt has recent falls, light headedness, bradycardia. Patient stopped metoprolol d/t pulse in 50's. Flu/COVID negative IP. Stated she has been ill @ 10 days. Pt was recently tested for Parkinson's negative results. CT head negative. EKG showed bradycardia. Pt is

## 2025-02-19 NOTE — TELEPHONE ENCOUNTER
Lilliam from [facility] called asking if Misael will follow for home care? Lilliam informed Dr Douglas is out of the office and a message will be sent to the covering provider.  PT, OT, SN.   Dx acute encephalopathy.     Last seen 12/5/2024  Next appt 3/19/2025      Lilliam [phone]

## 2025-02-19 NOTE — PROGRESS NOTES
Date:  2/18/2025  Patient: Charisse Sullivan  Admission:  2/16/2025  1:10 PM  Admit DX: Hypertensive encephalopathy [I67.4]  Acute encephalopathy [G93.40]  Hypertensive emergency [I16.1]  Age:  80 y.o., 1945     LOS: 2 days       SUBJECTIVE:      Denies chest pain or shortness of breath  Vitals are stable  No cardiac problems are reported by the nurses      OBJECTIVE:    BP (!) 152/73   Pulse 62   Temp 98.1 °F (36.7 °C) (Oral)   Resp 18   Ht 1.651 m (5' 5\")   Wt 86.2 kg (190 lb)   SpO2 98%   BMI 31.62 kg/m²     Intake/Output Summary (Last 24 hours) at 2/18/2025 2136  Last data filed at 2/18/2025 0815  Gross per 24 hour   Intake 420 ml   Output --   Net 420 ml       EXAM:     General: Alert and oriented, No acute distress.  Appears as stated age.  Eye:  Pupils are equal, round and reactive to light, Extraocular movements are intact, Normal conjunctiva.    Head: Normocephalic, Normal hearing, Oral mucosa is moist.  Neck: Supple, No carotid bruit, No jugular venous distention, No lymphadenopathy.  Respiratory: Lungs are clear to auscultation, Respirations are non-labored, Breath sounds are equal, Symmetrical chest wall expansion.  Cardiovascular: Normal rate, No murmur, No gallop, Good pulses equal in all extremities, No edema.  Gastrointestinal: Soft, Non-tender, Non-distended, Normal bowel sounds.  Musculoskeletal: Normal strength, No tenderness, No deformity.  Integumentary:  Warm, Dry.    Neurologic: Alert, Oriented, No focal deficits.  Cognition and Speech: Oriented, Speech clear and coherent.  Psychiatric: Cooperative, Appropriate mood & affect, Normal judgment.  Current Inpatient Medications:    Labs:   CBC:   Recent Labs     02/16/25  1321 02/17/25  0520   WBC 9.4 8.7   HGB 13.2 11.6   HCT 38.7 34.1    169     BMP:   Recent Labs     02/16/25  1321 02/17/25  0520    141   K 4.1 4.0   CO2 21* 19*   BUN 27* 26*   CREATININE 1.1* 1.1*   LABGLOM 53* 52*   GLUCOSE 109* 120*     BNP: No results  an echocardiogram that showed good LV function           Gustavo Valentin MD, MD, FACC

## 2025-02-26 ENCOUNTER — OFFICE VISIT (OUTPATIENT)
Dept: FAMILY MEDICINE CLINIC | Age: 80
End: 2025-02-26

## 2025-02-26 ENCOUNTER — HOSPITAL ENCOUNTER (OUTPATIENT)
Age: 80
Discharge: HOME | End: 2025-02-26
Payer: MEDICARE

## 2025-02-26 ENCOUNTER — TELEPHONE (OUTPATIENT)
Dept: FAMILY MEDICINE CLINIC | Age: 80
End: 2025-02-26

## 2025-02-26 VITALS
WEIGHT: 194.7 LBS | TEMPERATURE: 98 F | RESPIRATION RATE: 16 BRPM | OXYGEN SATURATION: 98 % | BODY MASS INDEX: 32.44 KG/M2 | HEIGHT: 65 IN | HEART RATE: 54 BPM | DIASTOLIC BLOOD PRESSURE: 66 MMHG | SYSTOLIC BLOOD PRESSURE: 123 MMHG

## 2025-02-26 DIAGNOSIS — Z09 HOSPITAL DISCHARGE FOLLOW-UP: ICD-10-CM

## 2025-02-26 DIAGNOSIS — N18.31 CHRONIC KIDNEY DISEASE, STAGE 3A (HCC): ICD-10-CM

## 2025-02-26 DIAGNOSIS — M1A.9XX0 CHRONIC GOUT WITHOUT TOPHUS, UNSPECIFIED CAUSE, UNSPECIFIED SITE: ICD-10-CM

## 2025-02-26 DIAGNOSIS — R40.4 TRANSIENT ALTERATION OF AWARENESS: ICD-10-CM

## 2025-02-26 DIAGNOSIS — R00.1 BRADYCARDIA: ICD-10-CM

## 2025-02-26 DIAGNOSIS — N18.31 CKD STAGE 3A, GFR 45-59 ML/MIN (HCC): ICD-10-CM

## 2025-02-26 DIAGNOSIS — I48.0 PAROXYSMAL ATRIAL FIBRILLATION (HCC): ICD-10-CM

## 2025-02-26 DIAGNOSIS — I67.4 HYPERTENSIVE ENCEPHALOPATHY: ICD-10-CM

## 2025-02-26 DIAGNOSIS — I67.4 HYPERTENSIVE ENCEPHALOPATHY: Primary | ICD-10-CM

## 2025-02-26 DIAGNOSIS — F33.1 MODERATE EPISODE OF RECURRENT MAJOR DEPRESSIVE DISORDER (HCC): ICD-10-CM

## 2025-02-26 DIAGNOSIS — R25.1 TREMOR: ICD-10-CM

## 2025-02-26 PROBLEM — N18.32 CHRONIC KIDNEY DISEASE, STAGE 3B (HCC): Status: ACTIVE | Noted: 2025-02-26

## 2025-02-26 LAB
ALBUMIN SERPL-MCNC: 4.1 G/DL (ref 3.5–5.2)
ALP SERPL-CCNC: 98 U/L (ref 35–104)
ALT SERPL-CCNC: 20 U/L (ref 0–32)
ANION GAP SERPL CALCULATED.3IONS-SCNC: 15 MMOL/L (ref 7–16)
AST SERPL-CCNC: 17 U/L (ref 0–31)
BILIRUB SERPL-MCNC: 0.3 MG/DL (ref 0–1.2)
BNP SERPL-MCNC: 731 PG/ML (ref 0–450)
BUN SERPL-MCNC: 32 MG/DL (ref 6–23)
CALCIUM SERPL-MCNC: 10.3 MG/DL (ref 8.6–10.2)
CHLORIDE SERPL-SCNC: 104 MMOL/L (ref 98–107)
CO2 SERPL-SCNC: 20 MMOL/L (ref 22–29)
CREAT SERPL-MCNC: 1.2 MG/DL (ref 0.5–1)
GFR, ESTIMATED: 49 ML/MIN/1.73M2
GLUCOSE SERPL-MCNC: 103 MG/DL (ref 74–99)
LACTATE BLDV-SCNC: 2.6 MMOL/L (ref 0.5–2.2)
POTASSIUM SERPL-SCNC: 4.3 MMOL/L (ref 3.5–5)
PROT SERPL-MCNC: 7.2 G/DL (ref 6.4–8.3)
SODIUM SERPL-SCNC: 139 MMOL/L (ref 132–146)

## 2025-02-26 PROCEDURE — 83605 ASSAY OF LACTIC ACID: CPT

## 2025-02-26 PROCEDURE — 83880 ASSAY OF NATRIURETIC PEPTIDE: CPT

## 2025-02-26 PROCEDURE — 36415 COLL VENOUS BLD VENIPUNCTURE: CPT

## 2025-02-26 PROCEDURE — 80053 COMPREHEN METABOLIC PANEL: CPT

## 2025-02-26 RX ORDER — METOPROLOL TARTRATE 25 MG/1
TABLET, FILM COATED ORAL
Qty: 90 TABLET | Refills: 3 | OUTPATIENT
Start: 2025-02-26

## 2025-02-26 RX ORDER — IRBESARTAN 75 MG/1
75 TABLET ORAL DAILY
Qty: 90 TABLET | Refills: 1 | Status: SHIPPED | OUTPATIENT
Start: 2025-02-26

## 2025-02-26 ASSESSMENT — PATIENT HEALTH QUESTIONNAIRE - PHQ9
1. LITTLE INTEREST OR PLEASURE IN DOING THINGS: NOT AT ALL
8. MOVING OR SPEAKING SO SLOWLY THAT OTHER PEOPLE COULD HAVE NOTICED. OR THE OPPOSITE, BEING SO FIGETY OR RESTLESS THAT YOU HAVE BEEN MOVING AROUND A LOT MORE THAN USUAL: NOT AT ALL
7. TROUBLE CONCENTRATING ON THINGS, SUCH AS READING THE NEWSPAPER OR WATCHING TELEVISION: SEVERAL DAYS
3. TROUBLE FALLING OR STAYING ASLEEP: NOT AT ALL
2. FEELING DOWN, DEPRESSED OR HOPELESS: NOT AT ALL
SUM OF ALL RESPONSES TO PHQ QUESTIONS 1-9: 4
4. FEELING TIRED OR HAVING LITTLE ENERGY: NEARLY EVERY DAY
5. POOR APPETITE OR OVEREATING: NOT AT ALL
9. THOUGHTS THAT YOU WOULD BE BETTER OFF DEAD, OR OF HURTING YOURSELF: NOT AT ALL
SUM OF ALL RESPONSES TO PHQ QUESTIONS 1-9: 4
6. FEELING BAD ABOUT YOURSELF - OR THAT YOU ARE A FAILURE OR HAVE LET YOURSELF OR YOUR FAMILY DOWN: NOT AT ALL
SUM OF ALL RESPONSES TO PHQ QUESTIONS 1-9: 4
SUM OF ALL RESPONSES TO PHQ QUESTIONS 1-9: 4

## 2025-02-26 NOTE — PROGRESS NOTES
possible relation to hypertensive encephalopathy. Imaging stable in hospital.    Interval history/Current status:   History of Present Illness  The patient is a 80-year-old female here for a hospital follow-up, confusion, hypertension, tremors, chronic kidney disease, bradycardia, type 2 diabetes, and back pain.    She was admitted to the hospital with intermittent confusion, suspected to be related to her blood pressure, as she had discontinued metoprolol prior to the episode. She experienced another episode of confusion on the previous Saturday, despite having been discharged from the hospital. Her daughter, a hospitalist at [employer], did not check her blood pressure during these episodes. Her blood pressure typically ranges from 120 to 130, but it was elevated during her hospital stay. She continues to experience unusual sensations in her head, which have lessened in severity since the episode on the previous Sunday. She describes the sensation as feeling like a bubble in her head, which impairs her vision and dexterity during severe episodes. Her daughter observed her crying and disorientation during the episode, which lasted for approximately 4 hours while she was attempting to put on her CPAP mask. She does not believe the episode was a seizure as she remained conscious throughout the episode. She had an appointment scheduled for a nuclear medicine brain scan with Dr. Valdez but canceled it due to multiple doctor's appointments. She has undergone 3 MRIs in a single day due to claustrophobia.    She began taking Topamax, prescribed by Dr. Valdez and Dr. Douglas, for her tremors. Her first episode occurred around 02/01/2024 or 02/02/2024, with an increased dose of Topamax to 50 mg approximately a week prior. Dr. Chen and her daughter have considered discontinuing the Topamax.    She has been experiencing heavy breathing for the past few weeks, even waking up at night. She has been ill for about 3 weeks, with  ambulatory

## 2025-02-26 NOTE — TELEPHONE ENCOUNTER
Received a message from patient. She stopped her metoprolol all together.   She did see Dr Valentin in the hospital.       Patient would like a referral to see Dr Valentin.     Patient notified to discuss with Dr Maier at her visit today.

## 2025-02-26 NOTE — PATIENT INSTRUCTIONS
Talk with neuro about topamax, can we stop?  Stop amlodipine  Hold metformin until your next set of labs in 2 weeks  Start irbesartan  Come back 2 week visit with Mala for BP check and labs       Learning About Being Physically Active  What is physical activity?     Being physically active means doing any kind of activity that gets your body moving.  The types of physical activity that can help you get fit and stay healthy include:  Aerobic or \"cardio\" activities. These make your heart beat faster and make you breathe harder, such as brisk walking, riding a bike, or running. They strengthen your heart and lungs and build up your endurance.  Strength training activities. These make your muscles work against, or \"resist,\" something. Examples include lifting weights or doing push-ups. These activities help tone and strengthen your muscles and bones.  Stretches. These let you move your joints and muscles through their full range of motion. Stretching helps you be more flexible.  Reaching a balance between these three types of physical activity is important because each one contributes to your overall fitness.  What are the benefits of being active?  Being active is one of the best things you can do for your health. It helps you to:  Feel stronger and have more energy to do all the things you like to do.  Focus better at school or work.  Feel, think, and sleep better.  Reach and stay at a healthy weight.  Lose fat and build lean muscle.  Lower your risk for serious health problems, including diabetes, heart attack, high blood pressure, and some cancers.  Keep your heart, lungs, bones, muscles, and joints strong and healthy.  How can you make being active part of your life?  Start slowly. Make it your long-term goal to get at least 30 minutes of exercise on most days of the week. Walking is a good choice. You also may want to do other activities, such as running, swimming, cycling, or playing tennis or team

## 2025-02-27 ENCOUNTER — TELEPHONE (OUTPATIENT)
Age: 80
End: 2025-02-27

## 2025-02-27 DIAGNOSIS — E87.20 METABOLIC ACIDOSIS: Primary | ICD-10-CM

## 2025-02-27 NOTE — TELEPHONE ENCOUNTER
Pt states she was recently in ED due to altered mental status. She states they told her it could be due to taking topiramate 50 mg. Was told her to call and ask you if she should d/c taking

## 2025-02-28 ENCOUNTER — CARE COORDINATION (OUTPATIENT)
Dept: CARE COORDINATION | Age: 80
End: 2025-02-28

## 2025-02-28 NOTE — CARE COORDINATION
Care Transitions Note    Follow Up Call     Patient Current Location:  Home: [address] [place] [id number]    Care Transition Nurse contacted the patient by telephone. Verified name and  as identifiers.    Additional needs identified to be addressed with provider   No needs identified                 Method of communication with provider: none.    Care Summary Note: Spoke to Charisse for transitions call. Patient went to PCP appt on . Patient has order to stop amlodipine, sart Avapro 70 MG daily. Stated she is currently still taking amlodipine, awaiting new medication to be picked up today, will stop amlodipine tomorrow when she starts Avapro.   Pt stopped oxybutinin, metformin is on hold, not taking ibuprofen or Clarition. Patient called Neurology office to advise on stopping or reducing Topomax, awaiting call back.    Patient has appt with Cardiology on 3/21, new referral. HHC showed her how to use BP cuff, has a hard time getting cuff on herself, gets BP when HHC or aide is there. Advised to start logging BPs for f/u appt. Echo results were sent to Dr Maier, was advised to avoid excessive Sodium, continue to hold Metformin until repeat labs completed. Patient has Zio patch on, wearing for 2 weeks.    Discussed RPM, interested but has too much going on right now, will consider in the future.       Plan of care updates since last contact:  Review of patient management of conditions/medications: Reviewed PCP appt notes, changes       Advance Care Planning:   Does patient have an Advance Directive: reviewed and current.    Medication Review:  Medications changed since last call, reviewed today.     Remote Patient Monitoring:  Offered patient enrollment in the Remote Patient Monitoring (RPM) program for in-home monitoring: Yes, but did not enroll at this time: declined today, will consider at later date .    Assessments:  Care Transitions Subsequent and Final Call    Subsequent and Final Calls  Do you have

## 2025-02-28 NOTE — TELEPHONE ENCOUNTER
Episodic confusion like what she experienced is unlikely from topamax  Because if it is from topamax it will be continuous since increasing topamax but not episodic     Ok to stay on only 25 mg qhs of topamax for now  I see bp med altered      ?tia vs hypertensive urgency    I see she had ct brain  Needs f/u   Needs mri brain repeated may be    Did she have eeg, mra? In hospital      Arianne Ochoa MD

## 2025-03-04 ENCOUNTER — TELEMEDICINE (OUTPATIENT)
Age: 80
End: 2025-03-04
Payer: MEDICARE

## 2025-03-04 DIAGNOSIS — R25.9 MIXED ACTION AND RESTING TREMOR: Primary | ICD-10-CM

## 2025-03-04 DIAGNOSIS — R41.0 EPISODIC CONFUSION: ICD-10-CM

## 2025-03-04 DIAGNOSIS — R25.8 BRADYKINESIA: ICD-10-CM

## 2025-03-04 DIAGNOSIS — F33.1 MODERATE EPISODE OF RECURRENT MAJOR DEPRESSIVE DISORDER (HCC): ICD-10-CM

## 2025-03-04 PROCEDURE — 1159F MED LIST DOCD IN RCRD: CPT | Performed by: PSYCHIATRY & NEUROLOGY

## 2025-03-04 PROCEDURE — 99214 OFFICE O/P EST MOD 30 MIN: CPT | Performed by: PSYCHIATRY & NEUROLOGY

## 2025-03-04 PROCEDURE — 1123F ACP DISCUSS/DSCN MKR DOCD: CPT | Performed by: PSYCHIATRY & NEUROLOGY

## 2025-03-04 RX ORDER — ARIPIPRAZOLE 5 MG/1
TABLET ORAL
Qty: 45 TABLET | Refills: 0 | OUTPATIENT
Start: 2025-03-04

## 2025-03-04 RX ORDER — HYDRALAZINE HYDROCHLORIDE 50 MG/1
TABLET, FILM COATED ORAL
Qty: 30 TABLET | Refills: 3 | Status: SHIPPED | OUTPATIENT
Start: 2025-03-04

## 2025-03-04 NOTE — PROGRESS NOTES
Family hx of parkinsons: none     Prior hx: no neurologist visit     Treatment:   Has been on primidone- d/cved. Worsened  On eliquis for atrial fibrillation.      Stopped metoprolol - bradycardia   Tried inderal- caused cough     On neurontin 100 mg bid   Helpd back pain, not tremors     D/reginald off abilify           W/u:   Ct brain:   IMPRESSION:  1. No acute intracranial abnormality.  2. Moderate cerebral atrophy with periventricular white matter changes.  3. Nonspecific white matter changes, likely related to chronic microvascular  ischemic disease.              Now:   Tremors: fluctuates.   Left > right  Bad days -   Stiffness: Yes: Comment: on sitting for a while, in legs and back   Gait/Walking difficulties: Yes: Comment: fell once.   Cane, walker, wheelchair use: Yes: Comment: uses cane or rollator. Helps back pain.  Has had back sx in sep 2020      Falls: 2 falls in last year.   Fell forward.   Needed help      Memory loss: mostly good.   Word finding difficulty minimal      Mood/behavior: slightly down with symptoms.   Hallucinations: Yes: Comment: not awake. On waking up from sleep   Sleep: No. Talks in sleep.   Wakes up talking to people      Slowness in daily activities: Yes: Comment: mild trouble with speech   Slurred speech: No, kids look at her to finish sentences   Drooling of saliva: No  Swallowing difficulty: No  rls: none     Pain: has back pain.   Minimal in right buttock.   Neurontin helps.   7/10         Testing done: mri lumbar spine: IMPRESSION:  1. Status post decompressive laminectomies with posterior interbody fusion  from L3-L5.  2. No fracture or joint dislocation.  3. No significant central canal stenosis.  4. Mild right neural foraminal stenosis at L4-5.     Mri brain:   IMPRESSION:  1. No acute intracranial abnormality.  2. Mild volume loss with mild chronic microvascular ischemic changes.     Tsh July 2024: nml        No caffeine intake. Rare  Inhalers: none          Past Medical

## 2025-03-07 ENCOUNTER — CARE COORDINATION (OUTPATIENT)
Dept: CARE COORDINATION | Age: 80
End: 2025-03-07

## 2025-03-07 NOTE — CARE COORDINATION
Care Transitions Note    Follow Up Call     Attempted to reach patient for transitions of care follow up.  Unable to reach patient.      Outreach Attempts:   HIPAA compliant voicemail left for patient.     Care Summary Note: Attempted to reach. Left message for patient for transitions follow up call.     Follow Up Appointment:   Future Appointments         Provider Specialty Dept Phone    3/12/2025 1:00 PM SCHEDULE, [facility] [phone]    4/16/2025 3:00 PM Arianne Ochoa MD Neurology [phone]    7/15/2025 3:00 PM Nelly Maier MD [facility] [phone]    12/9/2025 2:00 PM Nelly Maier MD Family Medicine [phone]            Plan for follow-up call in 2-5 days based on severity of symptoms and risk factors. Plan for next call: symptom management-2nd attempt sub, Neuro appt review, changes, cont on Topomax 50 MG hs, EEG, MRI scheduled? + Hydralazine if BP >160, tolerating Avapro? Metformin still held?     Charisse Lazo RN

## 2025-03-07 NOTE — CARE COORDINATION
Care Transitions Note    Follow Up Call     Patient Current Location:  Home: [address] [place] [address]    Care Transition Nurse contacted the patient by telephone. Verified name and  as identifiers.    Additional needs identified to be addressed with provider   Standard priority: Needs Neuro OP testing scheduled-[place] [facility]                 Method of communication with provider: phone.    Care Summary Note: SPoke to Charisse, stated she has been unable to reach [facility] [place] for Neuro ordered EEG, MRI, NM brain spect.     Patient continues on Topomax per Neurology order, has hydralazine as needed for BP systolic > 160, has aides and VN checking her BPs, has issues getting cuff on herself. Last BP was 148/74. Patient stopped amlodipine, started Avapro, no adverse side effects. Continues to hold Metformin.     Writer [place] [facility] [place] and requested call to patient to schedule. Noted testing is scheduled for 3/13, MRI on 3/19.    Will follow up for transitions.     Plan of care updates since last contact:  Review of patient management of conditions/medications: reviewed changes in meds, appt needs, scheduled OP testing completed       Advance Care Planning:   Does patient have an Advance Directive: reviewed and current.    Medication Review:  Medications changed since last call, reviewed today.     Remote Patient Monitoring:  Offered patient enrollment in the Remote Patient Monitoring (RPM) program for in-home monitoring: Yes, but did not enroll at this time: already monitoring with home equipment.    Assessments:  Care Transitions Subsequent and Final Call    Subsequent and Final Calls  Do you have any ongoing symptoms?: No  Have your medications changed?: Yes  Patient Reports: Hydralazine 1/2 tab if BP over 160  Do you have any questions related to your medications?: No  Do you currently have any active services?: Yes  Are you currently active with any services?: Home Health  Do you have any

## 2025-03-12 ENCOUNTER — NURSE ONLY (OUTPATIENT)
Dept: FAMILY MEDICINE CLINIC | Age: 80
End: 2025-03-12

## 2025-03-12 VITALS — SYSTOLIC BLOOD PRESSURE: 120 MMHG | DIASTOLIC BLOOD PRESSURE: 62 MMHG

## 2025-03-12 DIAGNOSIS — I27.20 MILD PULMONARY HYPERTENSION (HCC): Primary | ICD-10-CM

## 2025-03-13 ENCOUNTER — HOSPITAL ENCOUNTER (OUTPATIENT)
Dept: NUCLEAR MEDICINE | Age: 80
Discharge: HOME | End: 2025-03-15
Attending: PSYCHIATRY & NEUROLOGY
Payer: MEDICARE

## 2025-03-13 ENCOUNTER — HOSPITAL ENCOUNTER (OUTPATIENT)
Dept: NUCLEAR MEDICINE | Age: 80
Discharge: HOME | End: 2025-03-15
Attending: PSYCHIATRY & NEUROLOGY

## 2025-03-13 DIAGNOSIS — R25.9 MIXED ACTION AND RESTING TREMOR: ICD-10-CM

## 2025-03-13 DIAGNOSIS — R25.8 BRADYKINESIA: ICD-10-CM

## 2025-03-13 PROCEDURE — A9584 IODINE I-123 IOFLUPANE: HCPCS | Performed by: RADIOLOGY

## 2025-03-13 PROCEDURE — 3430000000 HC RX DIAGNOSTIC RADIOPHARMACEUTICAL: Performed by: RADIOLOGY

## 2025-03-13 PROCEDURE — 78803 RP LOCLZJ TUM SPECT 1 AREA: CPT

## 2025-03-13 PROCEDURE — 6370000000 HC RX 637 (ALT 250 FOR IP): Performed by: RADIOLOGY

## 2025-03-13 RX ORDER — IODINE SOLUTION STRONG 5% (LUGOL'S) 5 %
0.8 SOLUTION ORAL ONCE
Status: COMPLETED | OUTPATIENT
Start: 2025-03-13 | End: 2025-03-13

## 2025-03-13 RX ADMIN — IODINE SOLUTION STRONG 5% (LUGOL'S) 0.8 ML: 5 SOLUTION at 08:26

## 2025-03-13 RX ADMIN — IOFLUPANE I-123 6 MILLICURIE: 2 INJECTION, SOLUTION INTRAVENOUS at 09:34

## 2025-03-14 ENCOUNTER — CARE COORDINATION (OUTPATIENT)
Dept: CARE COORDINATION | Age: 80
End: 2025-03-14

## 2025-03-14 NOTE — CARE COORDINATION
Care Transitions Note    Follow Up Call     Patient Current Location:  Home: [address] [place] [address]    Care Transition Nurse contacted the patient by telephone. Verified name and  as identifiers.    Additional needs identified to be addressed with provider   No needs identified                 Method of communication with provider: none.    Care Summary Note: Spoke to Charisse for transitions follow up call. Patient went to appt for Datscan, confirmed likely diagnosis of Parkinson's. Patient's daughter is NP, stated disease process is usually slow progression. Pt has f/u VV with  to discuss results in April. Has appt with Dr Valentin next week. Pt has MRI of brain next week. Doing OK overall. Will follow up next week    Plan of care updates since last contact:  Review of patient management of conditions/medications: reviewed symptoms, appt       Advance Care Planning:   Does patient have an Advance Directive: reviewed and current.    Medication Review:  No changes since last call.     Remote Patient Monitoring:  Offered patient enrollment in the Remote Patient Monitoring (RPM) program for in-home monitoring: Yes, but did not enroll at this time: already monitoring with home equipment.    Assessments:  Care Transitions Subsequent and Final Call    Subsequent and Final Calls  Do you have any ongoing symptoms?: No  Have your medications changed?: No  Do you have any questions related to your medications?: No  Do you currently have any active services?: Yes  Are you currently active with any services?: Home Health  Do you have any needs or concerns that I can assist you with?: No  Identified Barriers: Other  Care Transitions Interventions  Other Interventions:              Follow Up Appointment:   Reviewed upcoming appointment(s).  Future Appointments         Provider Specialty Dept Phone    3/19/2025 7:00 AM (Arrive by 6:30 AM) [facility] ANGIO HOLD BAY 1; [facility] RADIOLOGY ANESTHESIOLOGIST; [facility] MRI RM 1 Radiology

## 2025-03-16 DIAGNOSIS — E03.9 ACQUIRED HYPOTHYROIDISM: Primary | ICD-10-CM

## 2025-03-17 RX ORDER — LEVOTHYROXINE SODIUM 50 UG/1
TABLET ORAL
Qty: 96 TABLET | Refills: 1 | Status: SHIPPED | OUTPATIENT
Start: 2025-03-17 | End: 2025-09-17

## 2025-03-17 NOTE — TELEPHONE ENCOUNTER
Name of Medication(s) Requested:  Requested Prescriptions     Pending Prescriptions Disp Refills    levothyroxine (SYNTHROID) 50 MCG tablet [Pharmacy Med Name: LEVOTHYROXINE 50 MCG TABLET] 96 tablet 2     Si TABLET 5 DAYS PER WEEK, 1.5 TABLET 2 DAYS PER WEEK       Medication is on current medication list Yes    Dosage and directions were verified? Yes    Quantity verified: 90 day supply     Pharmacy Verified?  Yes    Last Appointment:  2024    Future appts:  Future Appointments   Date Time Provider Department Center   3/19/2025  7:00 AM [facility] MRI RM 1 SEYZ MRI [facility] Rad/Car   2025 12:45 PM [facility] NDEEG1 [facility]   2025  3:00 PM Arianne Ochoa MD [facility] NEURO Neurology -   7/15/2025  3:00 PM Nelly Maier MD [facility] ECC DEP   2025  2:00 PM Nelly Maier MD [facility] ECC DEP        (If no appt send self scheduling link. .REFILLAPPT)  Scheduling request sent?     [] Yes  [x] No    Does patient need updated?  [] Yes  [x] No

## 2025-03-19 ENCOUNTER — ANESTHESIA EVENT (OUTPATIENT)
Dept: MRI IMAGING | Age: 80
End: 2025-03-19
Payer: MEDICARE

## 2025-03-19 ENCOUNTER — HOSPITAL ENCOUNTER (OUTPATIENT)
Dept: MRI IMAGING | Age: 80
Discharge: HOME | End: 2025-03-21
Attending: PSYCHIATRY & NEUROLOGY
Payer: MEDICARE

## 2025-03-19 ENCOUNTER — ANESTHESIA (OUTPATIENT)
Dept: MRI IMAGING | Age: 80
End: 2025-03-19
Payer: MEDICARE

## 2025-03-19 VITALS
OXYGEN SATURATION: 97 % | DIASTOLIC BLOOD PRESSURE: 74 MMHG | HEART RATE: 61 BPM | SYSTOLIC BLOOD PRESSURE: 146 MMHG | RESPIRATION RATE: 18 BRPM

## 2025-03-19 DIAGNOSIS — R41.0 EPISODIC CONFUSION: ICD-10-CM

## 2025-03-19 DIAGNOSIS — Z12.31 ENCOUNTER FOR SCREENING MAMMOGRAM FOR MALIGNANT NEOPLASM OF BREAST: ICD-10-CM

## 2025-03-19 PROCEDURE — 6360000002 HC RX W HCPCS

## 2025-03-19 PROCEDURE — 7100000011 HC PHASE II RECOVERY - ADDTL 15 MIN

## 2025-03-19 PROCEDURE — 2500000003 HC RX 250 WO HCPCS

## 2025-03-19 PROCEDURE — 7100000010 HC PHASE II RECOVERY - FIRST 15 MIN

## 2025-03-19 PROCEDURE — A9579 GAD-BASE MR CONTRAST NOS,1ML: HCPCS | Performed by: RADIOLOGY

## 2025-03-19 PROCEDURE — 2580000003 HC RX 258

## 2025-03-19 PROCEDURE — 6360000004 HC RX CONTRAST MEDICATION: Performed by: RADIOLOGY

## 2025-03-19 PROCEDURE — 70553 MRI BRAIN STEM W/O & W/DYE: CPT

## 2025-03-19 RX ORDER — KETAMINE HYDROCHLORIDE 10 MG/ML
INJECTION, SOLUTION INTRAMUSCULAR; INTRAVENOUS
Status: DISCONTINUED | OUTPATIENT
Start: 2025-03-19 | End: 2025-03-19 | Stop reason: SDUPTHER

## 2025-03-19 RX ORDER — SODIUM CHLORIDE 9 MG/ML
INJECTION, SOLUTION INTRAVENOUS
Status: DISCONTINUED | OUTPATIENT
Start: 2025-03-19 | End: 2025-03-19 | Stop reason: SDUPTHER

## 2025-03-19 RX ORDER — MIDAZOLAM HYDROCHLORIDE 1 MG/ML
INJECTION, SOLUTION INTRAMUSCULAR; INTRAVENOUS
Status: DISCONTINUED | OUTPATIENT
Start: 2025-03-19 | End: 2025-03-19 | Stop reason: SDUPTHER

## 2025-03-19 RX ORDER — FENTANYL CITRATE 50 UG/ML
INJECTION, SOLUTION INTRAMUSCULAR; INTRAVENOUS
Status: DISCONTINUED | OUTPATIENT
Start: 2025-03-19 | End: 2025-03-19 | Stop reason: SDUPTHER

## 2025-03-19 RX ORDER — DEXMEDETOMIDINE HYDROCHLORIDE 100 UG/ML
INJECTION, SOLUTION INTRAVENOUS
Status: DISCONTINUED | OUTPATIENT
Start: 2025-03-19 | End: 2025-03-19 | Stop reason: SDUPTHER

## 2025-03-19 RX ORDER — GLYCOPYRROLATE 1 MG/5 ML
SYRINGE (ML) INTRAVENOUS
Status: DISCONTINUED | OUTPATIENT
Start: 2025-03-19 | End: 2025-03-19 | Stop reason: SDUPTHER

## 2025-03-19 RX ADMIN — Medication 0.2 MG: at 07:25

## 2025-03-19 RX ADMIN — FENTANYL CITRATE 25 MCG: 50 INJECTION INTRAMUSCULAR; INTRAVENOUS at 07:25

## 2025-03-19 RX ADMIN — SODIUM CHLORIDE: 9 INJECTION, SOLUTION INTRAVENOUS at 07:19

## 2025-03-19 RX ADMIN — Medication 20 MG: at 07:25

## 2025-03-19 RX ADMIN — GADOTERIDOL 20 ML: 279.3 INJECTION, SOLUTION INTRAVENOUS at 07:38

## 2025-03-19 RX ADMIN — DEXMEDETOMIDINE HCL 8 MCG: 100 INJECTION INTRAVENOUS at 07:25

## 2025-03-19 RX ADMIN — MIDAZOLAM 2 MG: 1 INJECTION INTRAMUSCULAR; INTRAVENOUS at 07:25

## 2025-03-19 RX ADMIN — PHENYLEPHRINE HYDROCHLORIDE 100 MCG: 10 INJECTION INTRAVENOUS at 07:43

## 2025-03-19 ASSESSMENT — LIFESTYLE VARIABLES: SMOKING_STATUS: 0

## 2025-03-19 NOTE — ANESTHESIA POSTPROCEDURE EVALUATION
Department of Anesthesiology  Postprocedure Note    Patient: Charisse Sullivan  MRN: [id number]  YOB: 1945  Date of evaluation: 3/19/2025    Procedure Summary       Date: 03/19/25 Room / Location: [facility] MRI; [facility] Special Procedures; [facility] Radiology    Anesthesia Start: 0719 Anesthesia Stop: 0757    Procedure: MRI BRAIN W WO CONTRAST Diagnosis: Episodic confusion    Scheduled Providers:  Responsible Provider: Bobby Becker DO    Anesthesia Type: MAC ASA Status: 3            Anesthesia Type: MAC    Moraima Phase I:      Moraima Phase II: Moraima Score: 9    Anesthesia Post Evaluation    Patient location during evaluation: bedside  Patient participation: complete - patient cannot participate  Level of consciousness: awake and alert  Airway patency: patent  Nausea & Vomiting: no nausea and no vomiting  Cardiovascular status: blood pressure returned to baseline  Respiratory status: acceptable  Hydration status: euvolemic  Multimodal analgesia pain management approach    No notable events documented.

## 2025-03-19 NOTE — PROCEDURES
0755 return from mri with anesthesia, easily arouseable, vss    0815 awake and alert, eating and drinking without difficulty    0830 d/c to home with daughter

## 2025-03-19 NOTE — ANESTHESIA PRE PROCEDURE
SEGMENT INTRAOCULAR Right 11/01/2018    EYE IOL REMOVAL performed by Anibal ROBERTSON MD at [facility] OR    NM XCAPSL CTRC RMVL INSJ IO LENS PROSTH W/O ECP Right 10/09/2018    RIGHT EYE CATARACT EMULSIFICATION IOL IMPLANT performed by Anibal ROBERTSON MD at [facility] OR    SKIN BIOPSY      THYROIDECTOMY, PARTIAL      left lobe removed    TONSILLECTOMY      TUBAL LIGATION         Social History:    Social History     Tobacco Use    Smoking status: Never    Smokeless tobacco: Never   Substance Use Topics    Alcohol use: Not Currently     Comment: rare                                Counseling given: Not Answered      Vital Signs (Current): There were no vitals filed for this visit.                                           BP Readings from Last 3 Encounters:   03/12/25 120/62   02/26/25 123/66   02/18/25 (!) 152/73       NPO Status:                                                                                 BMI:   Wt Readings from Last 3 Encounters:   02/26/25 88.3 kg (194 lb 11.2 oz)   02/16/25 86.2 kg (190 lb)   01/27/25 83.5 kg (184 lb)     There is no height or weight on file to calculate BMI.    CBC:   Lab Results   Component Value Date/Time    WBC 8.7 02/17/2025 05:20 AM    RBC 3.61 02/17/2025 05:20 AM    HGB 11.6 02/17/2025 05:20 AM    HCT 34.1 02/17/2025 05:20 AM    MCV 94.5 02/17/2025 05:20 AM    RDW 14.6 02/17/2025 05:20 AM     02/17/2025 05:20 AM       CMP:   Lab Results   Component Value Date/Time     02/26/2025 04:35 PM    K 4.3 02/26/2025 04:35 PM    K 4.7 04/30/2021 09:16 AM     02/26/2025 04:35 PM    CO2 20 02/26/2025 04:35 PM    BUN 32 02/26/2025 04:35 PM    CREATININE 1.2 02/26/2025 04:35 PM    CREATININE 1.1 06/20/2019 12:00 AM    GFRAA >60 09/07/2022 02:31 PM    LABGLOM 49 02/26/2025 04:35 PM    LABGLOM 58 11/30/2023 12:07 PM    GLUCOSE 103 02/26/2025 04:35 PM    CALCIUM 10.3 02/26/2025 04:35 PM    BILITOT 0.3 02/26/2025 04:35 PM    ALKPHOS 98 02/26/2025 04:35 PM

## 2025-03-21 ENCOUNTER — CARE COORDINATION (OUTPATIENT)
Dept: CARE COORDINATION | Age: 80
End: 2025-03-21

## 2025-03-21 NOTE — CARE COORDINATION
Care Transitions Note    Follow Up Call     Attempted to reach patient for transitions of care follow up.  Unable to reach patient.      Outreach Attempts:   HIPAA compliant voicemail left for patient.     Care Summary Note: Unable to reach for transitions call. Left message. Noted pt completed MRI, Nephrology called to schedule appt, pt declined at this time, too much going on per patient.     Follow Up Appointment:   Future Appointments         Provider Specialty Dept Phone    4/7/2025 12:45 PM [facility] NDEEG1 [phone]    4/16/2025 3:00 PM Arianne Ochoa MD Neurology [phone]    7/15/2025 3:00 PM Nelly Maier MD Family Medicine [phone]    12/9/2025 2:00 PM Nelly Maier MD Family Medicine [phone]            Plan for follow-up call in 2-5 days based on severity of symptoms and risk factors. Plan for next call: symptom management-final call, MRI review, Neph appt> ACM referral?    Charisse Lazo RN

## 2025-03-24 ENCOUNTER — TELEPHONE (OUTPATIENT)
Dept: FAMILY MEDICINE CLINIC | Age: 80
End: 2025-03-24

## 2025-03-24 NOTE — TELEPHONE ENCOUNTER
Patient called stating that Dr. Maier put orders in for her to repeat lab for Lactic Acid.  Patient asked when she should have it done again.        Last seen 2/26/2025  Next appt 7/15/2025

## 2025-03-25 NOTE — TELEPHONE ENCOUNTER
Per Dr Maier:   Sorry I did not document this! She can have done now. Next few days if able.     Pt informed and verbalized understanding.

## 2025-03-26 DIAGNOSIS — R00.1 BRADYCARDIA: ICD-10-CM

## 2025-03-27 ENCOUNTER — CARE COORDINATION (OUTPATIENT)
Dept: CARE COORDINATION | Age: 80
End: 2025-03-27

## 2025-03-27 ENCOUNTER — RESULTS FOLLOW-UP (OUTPATIENT)
Age: 80
End: 2025-03-27

## 2025-03-27 NOTE — CARE COORDINATION
Care Transitions Note    Final Call     Patient Current Location:  Home: [address] [place] [id number]    Care Transition Nurse contacted the patient by telephone. Verified name and  as identifiers.    Patient graduated from the Care Transitions program on 3/27/25.  Patient/family progressing towards self-management. .      Advance Care Planning:   Does patient have an Advance Directive: reviewed and current.    Handoff:   Patient/family agreeable to [facility] outreach.      Care Summary Note: Spoke to Charisse for final transitions call. Patient had MRI of brain, [facility] but has not reviewed with her Dr yet. Stated she is feeling better since medications changes, no AMS since . Does occasionally have dizziness. Tremors have reduced significantly.    Patient will have lab work completed soon. Stated her aide is on vacation, will take her when she gets back. Pt will try and have labs on   when she gets EEG.     Denies any needs today. Patient is agreeable to referral to [facility] Glory Crowe. Will send referral. CTN sign off for transitions.     Assessments:  Care Transitions Subsequent and Final Call    Subsequent and Final Calls  Do you have any ongoing symptoms?: No  Have your medications changed?: No  Do you have any questions related to your medications?: No  Do you currently have any active services?: Yes  Are you currently active with any services?: Home Health  Do you have any needs or concerns that I can assist you with?: No  Identified Barriers: Other  Care Transitions Interventions  Other Interventions:              Upcoming Appointments:    Future Appointments         Provider Specialty Dept Phone    2025 12:45 PM [facility] NDEEG1 [phone]    2025 3:00 PM Arianne Ochoa MD Neurology [phone]    7/15/2025 3:00 PM Nelly Maier MD Family Medicine [phone]    2025 2:00 PM Nelly Maier MD Family Medicine [phone]            Patient has

## 2025-03-31 ENCOUNTER — CARE COORDINATION (OUTPATIENT)
Dept: CARE COORDINATION | Age: 80
End: 2025-03-31

## 2025-03-31 NOTE — CARE COORDINATION
Ambulatory Care Coordination Note     3/31/2025 12:37 PM     Patient outreach attempt by this ACM today to offer care management services. ACM was unable to reach the patient by telephone today;   left voice message requesting a return phone call to this ACM.  Myzehart message sent requesting patient to contact this ACM.     ACM: Glory Crowe RN     Care Summary Note:     ACM attempted to contact patient to offer enrollment into Care Coordination. ACM left a voice message with contact information requesting a return call.     PLAN  -If no return call, continue to attempt to contact patient.   -Introduction letter sent via My chart      PCP/Specialist follow up:   Future Appointments         Provider Specialty Dept Phone    4/7/2025 12:45 PM [facility] NDEEG1 [phone]    4/16/2025 3:00 PM Arianne Ochoa MD Neurology [phone]    7/15/2025 3:00 PM Nelly Maier MD Family Medicine [phone]    12/9/2025 2:00 PM Nelly Maier MD Family Medicine [phone]            Follow Up:   Plan for next AC outreach in approximately 1 week and 2 weeks to complete:  Enroll if patient has needs/agrees .

## 2025-04-03 ENCOUNTER — RESULTS FOLLOW-UP (OUTPATIENT)
Age: 80
End: 2025-04-03

## 2025-04-07 ENCOUNTER — HOSPITAL ENCOUNTER (OUTPATIENT)
Dept: NEUROLOGY | Age: 80
Discharge: HOME | End: 2025-04-07
Attending: PSYCHIATRY & NEUROLOGY
Payer: MEDICARE

## 2025-04-07 DIAGNOSIS — R41.0 EPISODIC CONFUSION: ICD-10-CM

## 2025-04-07 PROCEDURE — 95819 EEG AWAKE AND ASLEEP: CPT

## 2025-04-07 PROCEDURE — 95819 EEG AWAKE AND ASLEEP: CPT | Performed by: PSYCHIATRY & NEUROLOGY

## 2025-04-07 NOTE — PROCEDURES
PROCEDURE NOTE  Date: 4/7/2025   Name: Charisse Sullivan  YOB: 1945    Procedures:    Method:   This recording was done using 16 channel of EEG recording and 1 channel of EKG recording. It captures periods of wakefulness and sleep. Photic stimulation is performed as activation procedure. Hyperventilation is not performed.       Interpretation:   There is a posterior dominant rhythm of 9 Hz alpha activity which attenuates with eye opening.  beta activity could not be adequately assessed in this recording as there were significant frontal motion artifact  which interferes with this recording.  There is evidence of intermittent generalized 2 to 3 Hz delta range slowing seen in this recording along with prominent right temporal 2 Hz delta range slowing seen intermittently.    there were no epileptiform abnormalities or electrographic seizures in this recording.     EKG demonstrates a regular rhythm with rate of 54 bpm.     Summary: This is an abnormal Electroencephalogram with evidence of the following:     There is evidence of intermittent generalized slowing suggestive of metabolic or toxic encephalopathy, dementia, delirium, or medication effect.   There is evidence of focal right temporal slowing suggestive of cortical and subcortical dysfunction involving the posterior right temporal lobe  There are no epileptiform abnormality or electrographic seizures in this recording .          Arianne Ochoa MD

## 2025-04-10 ENCOUNTER — CARE COORDINATION (OUTPATIENT)
Dept: CARE COORDINATION | Age: 80
End: 2025-04-10

## 2025-04-10 SDOH — SOCIAL STABILITY: SOCIAL NETWORK
IN A TYPICAL WEEK, HOW MANY TIMES DO YOU TALK ON THE PHONE WITH FAMILY, FRIENDS, OR NEIGHBORS?: MORE THAN THREE TIMES A WEEK

## 2025-04-10 SDOH — SOCIAL STABILITY: SOCIAL NETWORK: HOW OFTEN DO YOU GET TOGETHER WITH FRIENDS OR RELATIVES?: THREE TIMES A WEEK

## 2025-04-10 SDOH — ECONOMIC STABILITY: FOOD INSECURITY: HOW HARD IS IT FOR YOU TO PAY FOR THE VERY BASICS LIKE FOOD, HOUSING, MEDICAL CARE, AND HEATING?: NOT HARD AT ALL

## 2025-04-10 SDOH — HEALTH STABILITY: MENTAL HEALTH: HOW MANY DRINKS CONTAINING ALCOHOL DO YOU HAVE ON A TYPICAL DAY WHEN YOU ARE DRINKING?: 1 OR 2

## 2025-04-10 SDOH — ECONOMIC STABILITY: HOUSING INSECURITY: IN THE LAST 12 MONTHS, WAS THERE A TIME WHEN YOU WERE NOT ABLE TO PAY THE MORTGAGE OR RENT ON TIME?: NO

## 2025-04-10 SDOH — SOCIAL STABILITY: SOCIAL NETWORK: HOW OFTEN DO YOU ATTEND CHURCH OR RELIGIOUS SERVICES?: NEVER

## 2025-04-10 SDOH — HEALTH STABILITY: PHYSICAL HEALTH: ON AVERAGE, HOW MANY MINUTES DO YOU ENGAGE IN EXERCISE AT THIS LEVEL?: PATIENT DECLINED

## 2025-04-10 SDOH — SOCIAL STABILITY: SOCIAL INSECURITY: ARE YOU MARRIED, WIDOWED, DIVORCED, SEPARATED, NEVER MARRIED, OR LIVING WITH A PARTNER?: DIVORCED

## 2025-04-10 SDOH — SOCIAL STABILITY: SOCIAL NETWORK
DO YOU BELONG TO ANY CLUBS OR ORGANIZATIONS SUCH AS CHURCH GROUPS, UNIONS, FRATERNAL OR ATHLETIC GROUPS, OR SCHOOL GROUPS?: NO

## 2025-04-10 SDOH — SOCIAL STABILITY: SOCIAL NETWORK: HOW OFTEN DO YOU ATTEND MEETINGS OF THE CLUBS OR ORGANIZATIONS YOU BELONG TO?: NEVER

## 2025-04-10 SDOH — ECONOMIC STABILITY: FOOD INSECURITY: WITHIN THE PAST 12 MONTHS, YOU WORRIED THAT YOUR FOOD WOULD RUN OUT BEFORE YOU GOT THE MONEY TO BUY MORE.: NEVER TRUE

## 2025-04-10 SDOH — HEALTH STABILITY: MENTAL HEALTH
DO YOU FEEL STRESS - TENSE, RESTLESS, NERVOUS, OR ANXIOUS, OR UNABLE TO SLEEP AT NIGHT BECAUSE YOUR MIND IS TROUBLED ALL THE TIME - THESE DAYS?: NOT AT ALL

## 2025-04-10 SDOH — ECONOMIC STABILITY: FOOD INSECURITY: WITHIN THE PAST 12 MONTHS, THE FOOD YOU BOUGHT JUST DIDN'T LAST AND YOU DIDN'T HAVE MONEY TO GET MORE.: NEVER TRUE

## 2025-04-10 SDOH — ECONOMIC STABILITY: TRANSPORTATION INSECURITY: IN THE PAST 12 MONTHS, HAS LACK OF TRANSPORTATION KEPT YOU FROM MEDICAL APPOINTMENTS OR FROM GETTING MEDICATIONS?: NO

## 2025-04-10 SDOH — HEALTH STABILITY: MENTAL HEALTH: HOW OFTEN DO YOU HAVE A DRINK CONTAINING ALCOHOL?: MONTHLY OR LESS

## 2025-04-10 SDOH — HEALTH STABILITY: PHYSICAL HEALTH
ON AVERAGE, HOW MANY DAYS PER WEEK DO YOU ENGAGE IN MODERATE TO STRENUOUS EXERCISE (LIKE A BRISK WALK)?: PATIENT DECLINED

## 2025-04-10 ASSESSMENT — ACTIVITIES OF DAILY LIVING (ADL): LACK_OF_TRANSPORTATION: NO

## 2025-04-10 NOTE — CARE COORDINATION
Ambulatory Care Coordination Note     4/10/2025 5:02 PM     Patient Current Location:  Home: [address] [place] [id number]     This patient was received as a referral from Care Transition Nurse.    ACM contacted the patient by telephone. Verified name and  with patient as identifiers. Provided introduction to self, and explanation of the ACM role.   Patient accepted care management services at this time.          ACM: Glory Crowe RN     Challenges to be reviewed by the provider   Additional needs identified to be addressed with provider Yes  Enrolled to CM, declined RPM               Method of communication with provider: chart routing.    Utilization: Initial Call - N/A    Care Summary Note:       ACM outreached to patient for enrollment.  Patient agreed.  Patient is A/O x 4.  Patient admits to frequent falls in the past in her home.  At this time, she is independent with most ADL's but needs some assistance for showering and bathroom assistance.  Patient reports having her daughter and her son check on her frequently /daily during the week.  She uses a walker for mobility and has a cane and rollator that she uses sparingly.  She has an Cleo throughout her home that she can call for assistance and for different alarms.  She reports not driving herself and has transportation needs met by her family.  Patient reports having a friend fill her pill minder at this time so she doesn't forget her pills. Patient does need assistance with dressing, medication reminders and cleaning her home/laundry.  Patient reports she is \"ok\" right now as she is preparing to move in with her daughter that is a NP in [place].  ACM will discuss more details once the move is completed.  At this time, patient lives in a 2 story home in which her bedroom is in the family room at this time.  She also reports using CPAP device at night for diagnosis of SHIRLEY.     See assessment for HTN, DM    Patient has hx of CKD 3a,3b.  BLE/ankle

## 2025-04-16 ENCOUNTER — TELEMEDICINE (OUTPATIENT)
Age: 80
End: 2025-04-16
Payer: MEDICARE

## 2025-04-16 DIAGNOSIS — G20.A1 PARKINSON'S DISEASE WITHOUT DYSKINESIA OR FLUCTUATING MANIFESTATIONS (HCC): ICD-10-CM

## 2025-04-16 DIAGNOSIS — R25.8 BRADYKINESIA: ICD-10-CM

## 2025-04-16 DIAGNOSIS — I16.0 HYPERTENSIVE URGENCY: Primary | ICD-10-CM

## 2025-04-16 DIAGNOSIS — R26.9 GAIT DIFFICULTY: ICD-10-CM

## 2025-04-16 PROCEDURE — 99214 OFFICE O/P EST MOD 30 MIN: CPT | Performed by: PSYCHIATRY & NEUROLOGY

## 2025-04-16 RX ORDER — TOPIRAMATE 50 MG/1
50 TABLET, FILM COATED ORAL
Qty: 90 TABLET | Refills: 1 | Status: SHIPPED | OUTPATIENT
Start: 2025-04-16

## 2025-04-16 RX ORDER — HYDRALAZINE HYDROCHLORIDE 50 MG/1
TABLET, FILM COATED ORAL
Qty: 30 TABLET | Refills: 3 | Status: SHIPPED | OUTPATIENT
Start: 2025-04-16

## 2025-04-16 NOTE — PROGRESS NOTES
in the legs w/exercise, Negative for Blood Clots, Negative for Leg Swelling  Respiratory:  Negative for Shortness of Breath, Negative for Cough/Wheezing, Negativefor Asthma, Negative for Other Lung Problems  Heme/Lymph:  Negative for Swollen Nodes/Glands, Negative for Bleeding Problems, Negative for Anemia  Musculoskeletal:   Negative for Joint Replacement, Negative for Broken Bones  GI:  Negative  for Gallbladder, Negative  for Blood in Stool, Negative for Diarrhea, Negative Intestinal Bleeding, Negative for Poor Appetite, Negative for Hiatal Hernia, Negative for Ulcer, Negative for Hemorrhoids, Negative for Nausea/Vomiting, Negative for Constipation  G/U:  NegativeNegative for Pain/Burning, Negative for Urinary Frequency/Urgency, Negative for Blood in Urine, Negative for Slow/Small Stream, Negative for poor Bladder Emptying, Negative for up at night to urinate, Negativesexual Dysfunction  Endo:  Negative for Cold or Heat Intolerance, Negative for Hot Flashes/Flushing, Negative for Abnormal Thirst, Negative for Change in Body Hair  Skin:   Negative for Lumps or Nodules, Negative for Breast Lumps, Negative for Rashes or Sores   Psych:  Negative for Anxiety/Depression       PHYSICAL EXAM  Constitutional: She  is oriented to person, place and time and well- developed, well nourished and in no distress.  HENT:    Head: Normocephalic and atraumatic.    Nose: Nose normal.   Mouth/Throat: no oropharyngeal exudate.    Eyes: eom full  Neck:  Normal range of motion.  Musculoskeletal: Normal range of motion.   Neurological:   Mental Status: Level of alertness: Normal: Awake, appropriate, attentive  Orientation: Normal: Oriented to person, place and time  Affect: Normal  Speech and Language: Normal: Speech fluent with intact naming, repetition, comprehension, reading and writing.  Cranial Nerve: II - XII normal.  Muscle strength was antigravity in kofi ue's and le's   There was no dysmetria seen on the kofi found on

## 2025-04-16 NOTE — PATIENT INSTRUCTIONS
Talk with cardiologist about watchman procedure    Stair lift as durable medical equipment at [facility]      Arianne Ochoa MD

## 2025-04-23 ENCOUNTER — CARE COORDINATION (OUTPATIENT)
Dept: CARE COORDINATION | Age: 80
End: 2025-04-23

## 2025-05-15 ENCOUNTER — CARE COORDINATION (OUTPATIENT)
Dept: CARE COORDINATION | Age: 80
End: 2025-05-15

## 2025-05-15 NOTE — CARE COORDINATION
Ambulatory Care Coordination Note     5/15/2025 11:18 AM     Patient Current Location:  Home: [address] [place] [address]     ACM contacted the patient by telephone. Verified name and  with patient as identifiers.         ACM: Glory Crowe RN     Challenges to be reviewed by the provider   Additional needs identified to be addressed with provider No  none               Method of communication with provider: none.    Utilization: Patient has not had any utilization since our last call.     Care Summary Note:     See assessment for HTN, DM     Patient has hx of CKD 3a,3b.  BLE/ankle edema with vascular discoloration to shins reported by patient.  Denies increase to baseline edema or weeping.     All questions answered     Appointments reviewed    Patient getting ready to move in with daughter.  The stairs are in disrepair (old home) and she will be adding a stair lift.  Script was sent by Dr. Menon.  Patient number will follow her.    Patient stressed moving/packing but reports no hypertensive episodes.        Offered patient enrollment in the Remote Patient Monitoring (RPM) program for in-home monitoring: Yes, but did not enroll at this time: controlled chronic disease management.     Assessments Completed:   Diabetes Assessment    Medic Alert ID: No  Meal Planning: Avoidance of concentrated sweets   How often do you test your blood sugar?: No Testing   Do you have barriers with adherence to non-pharmacologic self-management interventions? (Nutrition/Exercise/Self-Monitoring): No   Have you ever had to go to the ED for symptoms of low blood sugar?: No       Do you have hyperglycemia symptoms?: No   Do you have hypoglycemia symptoms?: No   Blood Sugar Monitoring Regimen: Not Testing   Blood Sugar Trends: No Change         ,   Hypertension - Encounter Level    Symptom course: stable (Comment: Dtr is a NP and she checks her mom's BP and follows trending/needs)          Medications Reviewed:   Current

## 2025-06-16 ENCOUNTER — CARE COORDINATION (OUTPATIENT)
Dept: CARE COORDINATION | Age: 80
End: 2025-06-16

## 2025-06-16 DIAGNOSIS — G25.9 MOVEMENT DISORDER: ICD-10-CM

## 2025-06-16 DIAGNOSIS — R25.1 TREMOR: ICD-10-CM

## 2025-06-16 DIAGNOSIS — M47.816 LUMBAR SPONDYLOSIS: ICD-10-CM

## 2025-06-16 DIAGNOSIS — M51.9 LUMBAR DISC DISORDER: ICD-10-CM

## 2025-06-16 DIAGNOSIS — M75.102 TEAR OF LEFT ROTATOR CUFF, UNSPECIFIED TEAR EXTENT, UNSPECIFIED WHETHER TRAUMATIC: ICD-10-CM

## 2025-06-16 RX ORDER — GABAPENTIN 100 MG/1
100 CAPSULE ORAL 3 TIMES DAILY
Qty: 90 CAPSULE | Refills: 0 | Status: SHIPPED | OUTPATIENT
Start: 2025-06-16 | End: 2025-12-13

## 2025-06-16 NOTE — CARE COORDINATION
Ambulatory Care Coordination Note     2025 3:25 PM     Patient Current Location:  Home: [address] [place] [address] [address] [place] [address]     ACM contacted the patient by telephone. Verified name and  with patient as identifiers.         ACM: Glory Crowe RN     Challenges to be reviewed by the provider   Additional needs identified to be addressed with provider No  NA               Method of communication with provider: none.    Utilization: Patient has not had any utilization since our last call.     Care Summary Note:     See assessment for HTN, DM     Patient has hx of CKD 3a,3b.  BLE/ankle edema with vascular discoloration to shins reported by patient.  Denies increase to baseline edema or weeping.     All questions answered     Appointments reviewed     Patient moved in with daughter at her  home.  They have things getting repaired and in place.  Patient reports feeling really happy about the move.  She denies any negative experiences during the move.  She reports having a walsh come to the home soon to repair the steps.  Patient is very happy with workers and those assisting.     Reports no hypertensive episodes.         Offered patient enrollment in the Remote Patient Monitoring (RPM) program for in-home monitoring: Yes, but did not enroll at this time: declined to enroll in the program becausedeclined.     Assessments Completed:   Diabetes Assessment    Medic Alert ID: No  Meal Planning: Avoidance of concentrated sweets   How often do you test your blood sugar?: No Testing   Do you have barriers with adherence to non-pharmacologic self-management interventions? (Nutrition/Exercise/Self-Monitoring): No   Have you ever had to go to the ED for symptoms of low blood sugar?: No       Do you have hyperglycemia symptoms?: No   Do you have hypoglycemia symptoms?: No   Blood Sugar Monitoring Regimen: Not Testing   Blood Sugar Trends: No Change         ,   Hypertension - Encounter Level    Symptom course: stable

## 2025-06-16 NOTE — TELEPHONE ENCOUNTER
Name of Medication(s) Requested:  Requested Prescriptions     Pending Prescriptions Disp Refills    gabapentin (NEURONTIN) 100 MG capsule 270 capsule 0     Sig: Take 1 capsule by mouth 3 times daily for 180 days. Intended supply: 90 days  start nightly and increase to tid as tolerated.       Medication is on current medication list Yes    Dosage and directions were verified? Yes    Quantity verified: 90 day supply     Pharmacy Verified?  Yes    Last Appointment:  12/5/2024    Future appts:  Future Appointments   Date Time Provider Department Center   7/15/2025  3:00 PM Nelly Maier MD [facility] DEP   8/12/2025 11:30 AM Arianne Ochoa MD [facility] NEURO Neurology -   12/9/2025  2:00 PM Nelly Maier MD [facility] DEP        (If no appt send self scheduling link. .REFILLAPPT)  Scheduling request sent?     [] Yes  [x] No    Does patient need updated?  [] Yes  [x] No

## 2025-07-01 RX ORDER — PAROXETINE 40 MG/1
40 TABLET, FILM COATED ORAL EVERY MORNING
Qty: 30 TABLET | Refills: 0 | Status: SHIPPED | OUTPATIENT
Start: 2025-07-01

## 2025-07-01 NOTE — TELEPHONE ENCOUNTER
Name of Medication(s) Requested:  Requested Prescriptions     Pending Prescriptions Disp Refills    PARoxetine (PAXIL) 40 MG tablet 30 tablet 0     Sig: Take 1 tablet by mouth every morning       Medication is on current medication list Yes    Dosage and directions were verified? Yes    Quantity verified: 30 day supply     Pharmacy Verified?  Yes    Last Appointment:  12/5/2024    Future appts:  Future Appointments   Date Time Provider Department Center   7/15/2025  3:00 PM Nelly Maier MD [facility] DEP   8/12/2025 11:30 AM Arianne Ochoa MD [facility] NEURO Neurology -   12/9/2025  2:00 PM Nelly Maier MD [facility] DEP        (If no appt send self scheduling link. .REFILLAPPT)  Scheduling request sent?     [] Yes  [x] No    Does patient need updated?  [] Yes  [x] No

## 2025-07-15 ENCOUNTER — OFFICE VISIT (OUTPATIENT)
Dept: FAMILY MEDICINE CLINIC | Age: 80
End: 2025-07-15
Payer: MEDICARE

## 2025-07-15 VITALS
DIASTOLIC BLOOD PRESSURE: 52 MMHG | OXYGEN SATURATION: 99 % | WEIGHT: 218.4 LBS | HEIGHT: 65 IN | SYSTOLIC BLOOD PRESSURE: 112 MMHG | TEMPERATURE: 97.6 F | RESPIRATION RATE: 20 BRPM | HEART RATE: 52 BPM | BODY MASS INDEX: 36.39 KG/M2

## 2025-07-15 DIAGNOSIS — G25.9 MOVEMENT DISORDER: ICD-10-CM

## 2025-07-15 DIAGNOSIS — F33.42 RECURRENT MAJOR DEPRESSIVE DISORDER, IN FULL REMISSION: ICD-10-CM

## 2025-07-15 DIAGNOSIS — E66.01 MORBID (SEVERE) OBESITY DUE TO EXCESS CALORIES (HCC): ICD-10-CM

## 2025-07-15 DIAGNOSIS — M1A.9XX0 CHRONIC GOUT WITHOUT TOPHUS, UNSPECIFIED CAUSE, UNSPECIFIED SITE: ICD-10-CM

## 2025-07-15 DIAGNOSIS — E11.22 TYPE 2 DIABETES MELLITUS WITH STAGE 3A CHRONIC KIDNEY DISEASE, WITHOUT LONG-TERM CURRENT USE OF INSULIN (HCC): ICD-10-CM

## 2025-07-15 DIAGNOSIS — I27.20 MILD PULMONARY HYPERTENSION (HCC): ICD-10-CM

## 2025-07-15 DIAGNOSIS — M47.816 LUMBAR SPONDYLOSIS: ICD-10-CM

## 2025-07-15 DIAGNOSIS — G20.A2 PARKINSON'S DISEASE WITHOUT DYSKINESIA, WITH FLUCTUATING MANIFESTATIONS (HCC): Primary | ICD-10-CM

## 2025-07-15 DIAGNOSIS — G62.9 NEUROPATHY: ICD-10-CM

## 2025-07-15 DIAGNOSIS — E03.9 ACQUIRED HYPOTHYROIDISM: ICD-10-CM

## 2025-07-15 DIAGNOSIS — N18.32 CHRONIC KIDNEY DISEASE, STAGE 3B (HCC): ICD-10-CM

## 2025-07-15 DIAGNOSIS — I48.0 PAROXYSMAL ATRIAL FIBRILLATION (HCC): ICD-10-CM

## 2025-07-15 DIAGNOSIS — M51.9 LUMBAR DISC DISORDER: ICD-10-CM

## 2025-07-15 DIAGNOSIS — N18.31 TYPE 2 DIABETES MELLITUS WITH STAGE 3A CHRONIC KIDNEY DISEASE, WITHOUT LONG-TERM CURRENT USE OF INSULIN (HCC): ICD-10-CM

## 2025-07-15 DIAGNOSIS — R25.1 TREMOR: ICD-10-CM

## 2025-07-15 DIAGNOSIS — M75.102 TEAR OF LEFT ROTATOR CUFF, UNSPECIFIED TEAR EXTENT, UNSPECIFIED WHETHER TRAUMATIC: ICD-10-CM

## 2025-07-15 PROBLEM — I16.1 HYPERTENSIVE EMERGENCY: Status: RESOLVED | Noted: 2025-02-17 | Resolved: 2025-07-15

## 2025-07-15 PROBLEM — I67.4 HYPERTENSIVE ENCEPHALOPATHY: Status: RESOLVED | Noted: 2025-02-16 | Resolved: 2025-07-15

## 2025-07-15 PROBLEM — G93.40 ACUTE ENCEPHALOPATHY: Status: RESOLVED | Noted: 2025-02-17 | Resolved: 2025-07-15

## 2025-07-15 PROCEDURE — 1123F ACP DISCUSS/DSCN MKR DOCD: CPT | Performed by: FAMILY MEDICINE

## 2025-07-15 PROCEDURE — 1160F RVW MEDS BY RX/DR IN RCRD: CPT | Performed by: FAMILY MEDICINE

## 2025-07-15 PROCEDURE — G2211 COMPLEX E/M VISIT ADD ON: HCPCS | Performed by: FAMILY MEDICINE

## 2025-07-15 PROCEDURE — 1159F MED LIST DOCD IN RCRD: CPT | Performed by: FAMILY MEDICINE

## 2025-07-15 PROCEDURE — 99214 OFFICE O/P EST MOD 30 MIN: CPT | Performed by: FAMILY MEDICINE

## 2025-07-15 RX ORDER — ALLOPURINOL 100 MG/1
TABLET ORAL
Qty: 180 TABLET | Refills: 3 | Status: SHIPPED | OUTPATIENT
Start: 2025-07-15

## 2025-07-15 RX ORDER — PAROXETINE 40 MG/1
40 TABLET, FILM COATED ORAL EVERY MORNING
Qty: 90 TABLET | Refills: 3 | Status: SHIPPED | OUTPATIENT
Start: 2025-07-15

## 2025-07-15 RX ORDER — ATORVASTATIN CALCIUM 10 MG/1
10 TABLET, FILM COATED ORAL DAILY
Qty: 90 TABLET | Refills: 3 | Status: SHIPPED | OUTPATIENT
Start: 2025-07-15

## 2025-07-15 RX ORDER — GABAPENTIN 100 MG/1
100 CAPSULE ORAL 3 TIMES DAILY
Qty: 90 CAPSULE | Refills: 1 | Status: SHIPPED | OUTPATIENT
Start: 2025-07-15 | End: 2026-01-11

## 2025-07-15 RX ORDER — LEVOTHYROXINE SODIUM 50 UG/1
TABLET ORAL
Qty: 96 TABLET | Refills: 3 | Status: SHIPPED | OUTPATIENT
Start: 2025-07-15 | End: 2026-01-15

## 2025-07-15 RX ORDER — IRBESARTAN 75 MG/1
75 TABLET ORAL DAILY
Qty: 90 TABLET | Refills: 3 | Status: SHIPPED | OUTPATIENT
Start: 2025-07-15

## 2025-07-15 NOTE — PROGRESS NOTES
atrial fibrillation, transient ischemic attack, diabetes, hypothyroidism, hyperlipidemia, anemia, chronic kidney disease, Parkinson's disease, depression, hair loss, hypertension, gout, seizure risk, and foot discoloration.    Atrial Fibrillation  - Under the care of a cardiologist  - Recommended the Watchman procedure as an alternative to Eliquis  - Considering this option but yet to be contacted by the specialist in [place]    Transient Ischemic Attack  - Experienced a TIA in 02/2025 causing confusion  - Some medications were discontinued  - Continues Topamax    Diabetes  - Previously on metformin, which was discontinued during a hospital stay    Hypothyroidism  - Underwent a right thyroidectomy in 2012 due to displacement, not cancer    Hyperlipidemia    Anemia    Chronic Kidney Disease    Parkinson's Disease  - Recently diagnosed  - Tremors have significantly improved  - Not currently on medication for this condition  - Plans to discuss with their doctor next month    Seizure Risk  - Prone to seizures based on recent brain tests  - Not currently on medication for this condition    Depression  - On Paxil, which is well-managed    Hair Loss  - On finasteride    Hypertension  - On irbesartan and hydralazine  - Takes hydralazine only when blood pressure exceeds 160  - Blood pressure exceeded 160 three or four times in the past two months    Gout  - On allopurinol    Foot Discoloration  - Noticed over the past 4 to 5 months  - Becomes more pronounced after showering  - Circulation checked, no coldness in feet  - Occasional numbness in toes attributed to previous back surgery    Occupations: 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    Alcohol: Rarely consumes alcohol.    Tobacco: Reports no smoking.    Living Condition: Lives with their daughter after selling their house.    PAST SURGICAL HISTORY:  - Right thyroidectomy in 2012 due to displacement.  - Previous back

## 2025-07-23 RX ORDER — FINASTERIDE 5 MG/1
5 TABLET, FILM COATED ORAL DAILY
Qty: 90 TABLET | Refills: 1 | Status: SHIPPED | OUTPATIENT
Start: 2025-07-23

## 2025-07-23 NOTE — TELEPHONE ENCOUNTER
Name of Medication(s) Requested:  Requested Prescriptions     Pending Prescriptions Disp Refills    finasteride (PROSCAR) 5 MG tablet 90 tablet 1     Sig: Take 1 tablet by mouth daily       Medication is on current medication list Yes    Dosage and directions were verified? Yes    Quantity verified: 90 day supply     Pharmacy Verified?  Yes    Last Appointment:  7/15/2025    Future appts:  Future Appointments   Date Time Provider Department Center   8/12/2025 11:30 AM Arianne Ochoa MD [facility] [place] [facility] Neurology -   8/27/2025  1:00 PM [facility] CARDIOVASCULAR RM 2 [facility] V LAB [facility] Radiolo   10/22/2025  4:20 PM Nelly Maier MD [facility] ECC DEP        (If no appt send self scheduling link. .REFILLAPPT)  Scheduling request sent?     [] Yes  [x] No    Does patient need updated?  [] Yes  [x] No

## 2025-07-31 ENCOUNTER — TELEPHONE (OUTPATIENT)
Age: 80
End: 2025-07-31

## 2025-07-31 NOTE — TELEPHONE ENCOUNTER
Called patient and LMOM letting her know that doctor will not be in office for her 8/12/25 appt. I switched her to a virtual and let her know to call office back if there is any issues

## 2025-08-12 ENCOUNTER — TELEMEDICINE (OUTPATIENT)
Age: 80
End: 2025-08-12
Payer: MEDICARE

## 2025-08-12 DIAGNOSIS — I16.0 HYPERTENSIVE URGENCY: Primary | ICD-10-CM

## 2025-08-12 DIAGNOSIS — G20.A1 PARKINSON'S DISEASE WITHOUT DYSKINESIA OR FLUCTUATING MANIFESTATIONS (HCC): ICD-10-CM

## 2025-08-12 DIAGNOSIS — F95.1 CHRONIC VOCAL TIC DISORDER: ICD-10-CM

## 2025-08-12 DIAGNOSIS — Z86.73 HISTORY OF STROKE: ICD-10-CM

## 2025-08-12 PROCEDURE — 99214 OFFICE O/P EST MOD 30 MIN: CPT | Performed by: PSYCHIATRY & NEUROLOGY

## 2025-08-22 RX ORDER — FUROSEMIDE 20 MG/1
20 TABLET ORAL DAILY PRN
Qty: 90 TABLET | Refills: 1 | Status: SHIPPED | OUTPATIENT
Start: 2025-08-22

## 2025-08-28 ENCOUNTER — CARE COORDINATION (OUTPATIENT)
Dept: CARE COORDINATION | Age: 80
End: 2025-08-28

## (undated) DEVICE — 3 ML SYRINGE LUER-LOCK TIP: Brand: MONOJECT

## (undated) DEVICE — GOWN,SIRUS,FABRNF,L,20/CS: Brand: MEDLINE

## (undated) DEVICE — BLADE ES L6IN ELASTOMERIC COAT EXT DURABLE BEND UPTO 90DEG

## (undated) DEVICE — COVER,TABLE,60X90,STERILE: Brand: MEDLINE

## (undated) DEVICE — SHEET, T, LAPAROTOMY, STERILE: Brand: MEDLINE

## (undated) DEVICE — GOWN,SIRUS,FABRNF,XL,20/CS: Brand: MEDLINE

## (undated) DEVICE — KIT EVAC 400CC DIA1/8IN H PAT 12.5IN 3 SPR RND SHP PVC DRN

## (undated) DEVICE — PACK AUTOLOG AUTOTRANSUFION

## (undated) DEVICE — SHEET,DRAPE,40X58,STERILE: Brand: MEDLINE

## (undated) DEVICE — CODMAN® SURGICAL PATTIES 1/2" X 1" (1.27CM X 2.54CM): Brand: CODMAN®

## (undated) DEVICE — INTENDED FOR TISSUE SEPARATION, AND OTHER PROCEDURES THAT REQUIRE A SHARP SURGICAL BLADE TO PUNCTURE OR CUT.: Brand: BARD-PARKER ® STAINLESS STEEL BLADES

## (undated) DEVICE — EXTRAS UGOKWE

## (undated) DEVICE — BANDAGE ADH W1XL3IN NAT FAB WVN FLX DURABLE N ADH PD SEAL

## (undated) DEVICE — Device: Brand: NIPRO HYPODERMIC NEEDLE

## (undated) DEVICE — Device

## (undated) DEVICE — BLOOD TRANSFUSION FILTER: Brand: HAEMONETICS

## (undated) DEVICE — GLOVE ORANGE PI 8   MSG9080

## (undated) DEVICE — 6 ML SYRINGE LUER-LOCK TIP: Brand: MONOJECT

## (undated) DEVICE — THE MILL DISPOSABLE - MEDIUM

## (undated) DEVICE — STERILE POLYISOPRENE POWDER-FREE SURGICAL GLOVES: Brand: PROTEXIS

## (undated) DEVICE — GAUZE,SPONGE,4"X4",12PLY,STERILE,LF,2'S: Brand: MEDLINE

## (undated) DEVICE — TOWEL,OR,DSP,ST,BLUE,STD,6/PK,12PK/CS: Brand: MEDLINE

## (undated) DEVICE — HYPODERMIC SAFETY NEEDLE: Brand: MAGELLAN

## (undated) DEVICE — GLOVE SURG SZ 65 THK91MIL LTX FREE SYN POLYISOPRENE

## (undated) DEVICE — LOCATOR RAD PASS SPHR MRK NAVIGATION BALL DISP STLTH STN

## (undated) DEVICE — APPLICATOR PREP 26ML 0.7% IOD POVACRYLEX 74% ISO ALC ST

## (undated) DEVICE — NEEDLE HYPO 18GA L1.5IN PNK POLYPR HUB S STL REG BVL STR

## (undated) DEVICE — GLOVE ORANGE PI 7 1/2   MSG9075

## (undated) DEVICE — DRESSING ADH N ADH 8X35 IN 6X175 IN SFT CLTH MEDIPORE +

## (undated) DEVICE — 1.5L THIN WALL CAN: Brand: CRD

## (undated) DEVICE — GRADUATE

## (undated) DEVICE — 3M™ IOBAN™ 2 ANTIMICROBIAL INCISE DRAPE 6650EZ: Brand: IOBAN™ 2

## (undated) DEVICE — SURGICAL PROCEDURE PACK CATRCT LT EYE BASIC CUST ST JOS LF

## (undated) DEVICE — TUBING SUCT 12FR MAL ALUM SHFT FN CAP VENT UNIV CONN W/ OBT

## (undated) DEVICE — SURGICAL PROCEDURE PACK LAMINECTOMY LUMBAR

## (undated) DEVICE — ENCORE® LATEX MICRO SIZE 8.5, STERILE LATEX POWDER-FREE SURGICAL GLOVE: Brand: ENCORE

## (undated) DEVICE — GLOVE ORANGE PI 7   MSG9070

## (undated) DEVICE — DRILL SYSTEM 7

## (undated) DEVICE — SPHERE EYE 1 MRK GUIDANCE PASS STEALTHSTATION 1PK/EA

## (undated) DEVICE — SYRINGE 20ML LL S/C 50

## (undated) DEVICE — LABEL MED 4 IN SURG PANEL W/ PEN STRL

## (undated) DEVICE — SOLUTION IV IRRIG WATER 1000ML POUR BRL 2F7114

## (undated) DEVICE — GLOVE SURG 8.5 LTX TRIFLEX WIDE FINGER PWDR

## (undated) DEVICE — 5.0MM PRECISION ROUND

## (undated) DEVICE — 12 ML SYRINGE,LUER-LOCK TIP: Brand: MONOJECT

## (undated) DEVICE — GLOVE SURG SZ 85 STD WHT LTX SYN POLYMER BEAD REINF ANTI RL

## (undated) DEVICE — DRAPE C ARM UNIV W41XL74IN CLR PLAS XR VELC CLSR POLY STRP

## (undated) DEVICE — SET SPINAL NEURO STNEU1

## (undated) DEVICE — BRUNNS CURRETTES

## (undated) DEVICE — KIT PLT RATIO DISPNS KT 2IN CANN TIP SPRY TIP DISP MAGELLAN

## (undated) DEVICE — READY WET SKIN SCRUB TRAY-LF: Brand: MEDLINE INDUSTRIES, INC.

## (undated) DEVICE — SOLUTION SCRB 32OZ 7.5% POVIDONE IOD BTL GENTLE EFFECTIVE

## (undated) DEVICE — CLOTH SURG PREP PREOPERATIVE CHLORHEXIDINE GLUC 2% READYPREP

## (undated) DEVICE — Z DUP USE 2257490 ADHESIVE SKIN CLSRE 036ML TPCL 2CTL CNCRLTE HIGH VSCSTY DRMB

## (undated) DEVICE — PATIENT RETURN ELECTRODE, SINGLE-USE, CONTACT QUALITY MONITORING, ADULT, WITH 9FT CORD, FOR PATIENTS WEIGING OVER 33LBS. (15KG): Brand: MEGADYNE

## (undated) DEVICE — APPLICATOR MEDICATED 10.5 CC SOLUTION HI LT ORNG CHLORAPREP

## (undated) DEVICE — 1000 S-DRAPE TOWEL DRAPE 10/BX: Brand: STERI-DRAPE™

## (undated) DEVICE — JACKSON TABLE POSITIONER KIT: Brand: MEDLINE INDUSTRIES, INC.

## (undated) DEVICE — DOUBLE BASIN SET: Brand: MEDLINE INDUSTRIES, INC.

## (undated) DEVICE — NEEDLE SPNL L3.5IN PNK HUB S STL REG WALL FIT STYL W/ QNCKE

## (undated) DEVICE — MARKER,SKIN,WI/RULER AND LABELS: Brand: MEDLINE

## (undated) DEVICE — 3M™ RED DOT™ MONITORING ELECTRODE WITH FOAM TAPE AND STICKY GEL 2560, 50/BAG, 20/CASE, 72/PLT: Brand: RED DOT™

## (undated) DEVICE — TOTAL TRAY, DB, 100% SILI FOLEY, 16FR 10: Brand: MEDLINE

## (undated) DEVICE — SOLUTION IV IRRIG POUR BRL 0.9% SODIUM CHL 2F7124